# Patient Record
Sex: MALE | Race: WHITE | NOT HISPANIC OR LATINO | Employment: FULL TIME | URBAN - METROPOLITAN AREA
[De-identification: names, ages, dates, MRNs, and addresses within clinical notes are randomized per-mention and may not be internally consistent; named-entity substitution may affect disease eponyms.]

---

## 2017-01-13 RX ORDER — CARVEDILOL 25 MG/1
TABLET ORAL
Qty: 180 TABLET | Refills: 1 | Status: SHIPPED | OUTPATIENT
Start: 2017-01-13 | End: 2017-01-30

## 2017-01-30 ENCOUNTER — OFFICE VISIT (OUTPATIENT)
Dept: INTERNAL MEDICINE | Facility: CLINIC | Age: 47
End: 2017-01-30

## 2017-01-30 VITALS
SYSTOLIC BLOOD PRESSURE: 136 MMHG | BODY MASS INDEX: 23.67 KG/M2 | HEIGHT: 69 IN | WEIGHT: 159.8 LBS | RESPIRATION RATE: 16 BRPM | TEMPERATURE: 98.2 F | HEART RATE: 96 BPM | OXYGEN SATURATION: 96 % | DIASTOLIC BLOOD PRESSURE: 80 MMHG

## 2017-01-30 DIAGNOSIS — G43.109 MIGRAINE WITH AURA AND WITHOUT STATUS MIGRAINOSUS, NOT INTRACTABLE: ICD-10-CM

## 2017-01-30 DIAGNOSIS — Z95.2 AORTIC VALVE REPLACED: ICD-10-CM

## 2017-01-30 DIAGNOSIS — I10 ESSENTIAL HYPERTENSION: Primary | ICD-10-CM

## 2017-01-30 DIAGNOSIS — I48.0 PAROXYSMAL ATRIAL FIBRILLATION (HCC): ICD-10-CM

## 2017-01-30 PROCEDURE — 99214 OFFICE O/P EST MOD 30 MIN: CPT | Performed by: INTERNAL MEDICINE

## 2017-01-30 NOTE — MR AVS SNAPSHOT
Fernie L Charlotte   1/30/2017 1:10 PM   Office Visit    Provider:  Juan Antonio Ford MD   Department:  [facility] INTERNAL MEDICINE   Dept Phone:  [phone]                Your Full Care Plan              Today's Medication Changes          These changes are accurate as of: 1/30/17  2:07 PM.  If you have any questions, ask your nurse or doctor.               Medication(s)that have changed:     carvedilol 25 MG tablet   Commonly known as:  COREG   TAKE 1 TABLET TWICE A DAY   What changed:  Another medication with the same name was removed. Continue taking this medication, and follow the directions you see here.   Changed by:  Juan Antonio Ford MD       CloNIDine 0.2 MG tablet   Commonly known as:  CATAPRES   TAKE 1 TABLET BY MOUTH TWICE A DAY   What changed:  Another medication with the same name was removed. Continue taking this medication, and follow the directions you see here.   Changed by:  Juan Antonio Ford MD       hydrochlorothiazide 12.5 MG capsule   Commonly known as:  MICROZIDE   Take 12.5 mg by mouth daily.   What changed:  Another medication with the same name was removed. Continue taking this medication, and follow the directions you see here.   Changed by:  Contreras Colunga       imipramine 50 MG tablet   Commonly known as:  TOFRANIL   Take 50 mg by mouth every night.   What changed:  Another medication with the same name was removed. Continue taking this medication, and follow the directions you see here.   Changed by:  Contreras Colunga       warfarin 5 MG tablet   Commonly known as:  COUMADIN   TAKE 1 1/2 TABLETS ON MONDAY AND FRIDAY AND 1 TABLET ALL OTHER DAYS OR AS DIRECTED   What changed:  Another medication with the same name was removed. Continue taking this medication, and follow the directions you see here.   Changed by:  Juan Antonio Buenrostro MD         Stop taking medication(s)listed here:     cetirizine 10 MG tablet   Commonly known as:  zyrTEC   Stopped by:  Juan Antonio ASHTON  MD Liliana           fluticasone 50 MCG/ACT nasal spray   Commonly known as:  FLONASE   Stopped by:  Juan Antonio Ford MD           oxyCODONE-acetaminophen 5-325 MG per tablet   Commonly known as:  PERCOCET   Stopped by:  Juan Antonio Ford MD                      Your Updated Medication List          This list is accurate as of: 17  2:07 PM.  Always use your most recent med list.                amoxicillin 500 MG capsule   Commonly known as:  AMOXIL       carvedilol 25 MG tablet   Commonly known as:  COREG   TAKE 1 TABLET TWICE A DAY       CloNIDine 0.2 MG tablet   Commonly known as:  CATAPRES   TAKE 1 TABLET BY MOUTH TWICE A DAY       hydrochlorothiazide 12.5 MG capsule   Commonly known as:  MICROZIDE       imipramine 50 MG tablet   Commonly known as:  TOFRANIL       warfarin 5 MG tablet   Commonly known as:  COUMADIN   TAKE 1 1/2 TABLETS ON MONDAY AND FRIDAY AND 1 TABLET ALL OTHER DAYS OR AS DIRECTED               You Were Diagnosed With        Codes Comments    Essential hypertension    -  Primary ICD-10-CM: I10  ICD-9-CM: 401.9     Migraine with aura and without status migrainosus, not intractable     ICD-10-CM: G43.109  ICD-9-CM: 346.00     Paroxysmal atrial fibrillation     ICD-10-CM: I48.0  ICD-9-CM: 427.31     Aortic valve replaced     ICD-10-CM: Z95.2  ICD-9-CM: V43.3       Instructions     None    Patient Instructions History      [place] [facility]PD[facility] Kim[facility] HospitalDailymotion Signup     [facility] Wheelz allows you to send messages to your doctor, view your test results, renew your prescriptions, schedule appointments, and more. To sign up, go to [employer] and click on the Sign Up Now link in the New User? box. Enter your Wheelz Activation Code exactly as it appears below along with the last four digits of your Social Security Number and your Date of Birth () to complete the sign-up process. If you do not sign up before the expiration date, you must request a new code.    Wheelz Activation Code:  [email or web address]  Expires: 2/13/2017  2:07 PM    If you have questions, you can email Matchmaker [email or web address] or call [phone] to talk to our MyChart staff. Remember, [employer] is NOT to be used for urgent needs. For medical emergencies, dial 911.               Other Info from Your Visit           Your Appointments     May 01, 2017  1:00 PM EDT   Follow Up with Juan Antonio Ford MD   [facility] INTERNAL MEDICINE (--)    [address] [place] [id number] [phone]           Arrive 15 minutes prior to appointment.              Allergies     Contrast Dye  Itching    Iodinated Diagnostic Agents      Tagamet [Cimetidine]  Itching      Reason for Visit     Hypertension follow up AF, Aortic stenosis    Migraine           Vital Signs     Blood Pressure Pulse Temperature Respirations Height Weight    136/80 (BP Location: Left arm, Patient Position: Sitting, Cuff Size: Large Adult) 96 98.2 °F (36.8 °C) (Oral) 16 69\" (175.3 cm) 159 lb 12.8 oz (72.5 kg)    Oxygen Saturation Body Mass Index Smoking Status             96% 23.6 kg/m2 Never Smoker         Problems and Diagnoses Noted     High blood pressure    Migraines    Atrial fibrillation (irregular heartbeat)      "

## 2017-01-30 NOTE — PROGRESS NOTES
Subjective   Fernie Porter is a 46 y.o. male.     Chief Complaint   Patient presents with   • Hypertension     follow up AF, Aortic stenosis   • Migraine         Hypertension   This is a chronic problem. The current episode started more than 1 year ago. The problem is unchanged. The problem is controlled. Associated symptoms include headaches and palpitations. Pertinent negatives include no chest pain, orthopnea, peripheral edema, PND or shortness of breath. There are no associated agents to hypertension. Risk factors for coronary artery disease include male gender. Past treatments include alpha 1 blockers, diuretics and beta blockers. The current treatment provides significant improvement. There are no compliance problems.  There is no history of angina, kidney disease, CAD/MI, CVA or heart failure.   Migraine    This is a chronic problem. The current episode started more than 1 year ago. The problem occurs constantly. The problem has been waxing and waning. The pain is located in the left unilateral region. The pain does not radiate. The pain quality is similar to prior headaches. The quality of the pain is described as throbbing. The pain is moderate. Pertinent negatives include no abdominal pain, coughing, fever, nausea or vomiting. Nothing aggravates the symptoms. He has tried NSAIDs for the symptoms. The treatment provided significant relief.        The following portions of the patient's history were reviewed and updated as appropriate: allergies, current medications, past social history and problem list.    Outpatient Prescriptions Marked as Taking for the 1/30/17 encounter (Office Visit) with Juan Antonio Ford MD   Medication Sig Dispense Refill   • amoxicillin (AMOXIL) 500 MG capsule Take 2,000 mg by mouth.     • carvedilol (COREG) 25 MG tablet TAKE 1 TABLET TWICE A  tablet 1   • CloNIDine (CATAPRES) 0.2 MG tablet TAKE 1 TABLET BY MOUTH TWICE A  tablet 1   • hydrochlorothiazide (MICROZIDE)  12.5 MG capsule Take 12.5 mg by mouth daily.     • imipramine (TOFRANIL) 50 MG tablet Take 50 mg by mouth every night.     • warfarin (COUMADIN) 5 MG tablet TAKE 1 1/2 TABLETS ON MONDAY AND FRIDAY AND 1 TABLET ALL OTHER DAYS OR AS DIRECTED 135 tablet 0   • [DISCONTINUED] fluticasone (FLONASE) 50 MCG/ACT nasal spray 2 sprays into each nostril daily. Administer 2 sprays in each nostril for each dose.         Review of Systems   Constitutional: Negative for chills, fatigue and fever.   Respiratory: Negative for cough, shortness of breath and wheezing.    Cardiovascular: Positive for palpitations. Negative for chest pain, orthopnea, leg swelling and PND.   Gastrointestinal: Negative for abdominal pain, constipation, diarrhea, nausea and vomiting.   Neurological: Positive for headaches.       Objective   Vitals:    01/30/17 1318   BP: 136/80   Pulse: 96   Resp: 16   Temp: 98.2 °F (36.8 °C)   SpO2: 96%      Last Weight    01/30/17  1318   Weight: 159 lb 12.8 oz (72.5 kg)    [unfilled]  Body mass index is 23.6 kg/(m^2).      Physical Exam   Constitutional: He appears well-developed and well-nourished. No distress.   HENT:   Head: Normocephalic and atraumatic.   Neck: Carotid bruit is not present. No thyromegaly present.   Cardiovascular: Normal rate, regular rhythm and intact distal pulses.  Exam reveals no gallop.    No murmur heard.  Prosthetic heart sounds   Pulmonary/Chest: Effort normal and breath sounds normal. No respiratory distress. He has no wheezes. He has no rales.   Abdominal: Soft. Bowel sounds are normal. He exhibits no mass. There is no tenderness. There is no guarding.         Problem List Items Addressed This Visit        Cardiovascular and Mediastinum    Aortic valve replaced    Paroxysmal atrial fibrillation    Hypertension - Primary    Migraine        Assessment/Plan   In for recheck of hypertension, AF, aortic stenosis, and migraines.  Migraines persist.  They're doing pretty well to present time.   Gets 3 or 4 per month.  Has failed several drugs in the past including Topamax.  Got annual lab work January 2017 including CBC, CMP, urinalysis and labs look good.  Reviewed with patient.  Continue to monitor every 3 months.  FMLA forms filled out today.         Dragon disclaimer:   Much of this encounter note is an electronic transcription/translation of spoken language to printed text. The electronic translation of spoken language may permit erroneous, or at times, nonsensical words or phrases to be inadvertently transcribed; Although I have reviewed the note for such errors, some may still exist.

## 2017-05-22 RX ORDER — HYDROCHLOROTHIAZIDE 12.5 MG/1
CAPSULE, GELATIN COATED ORAL
Qty: 90 CAPSULE | Refills: 1 | Status: SHIPPED | OUTPATIENT
Start: 2017-05-22 | End: 2017-09-15 | Stop reason: SDUPTHER

## 2017-05-23 RX ORDER — CLONIDINE HYDROCHLORIDE 0.2 MG/1
TABLET ORAL
Qty: 180 TABLET | Refills: 1 | Status: SHIPPED | OUTPATIENT
Start: 2017-05-23 | End: 2017-11-17 | Stop reason: SDUPTHER

## 2017-05-30 ENCOUNTER — OFFICE VISIT (OUTPATIENT)
Dept: INTERNAL MEDICINE | Facility: CLINIC | Age: 47
End: 2017-05-30

## 2017-05-30 VITALS
SYSTOLIC BLOOD PRESSURE: 118 MMHG | OXYGEN SATURATION: 99 % | RESPIRATION RATE: 16 BRPM | BODY MASS INDEX: 24.2 KG/M2 | DIASTOLIC BLOOD PRESSURE: 58 MMHG | HEART RATE: 73 BPM | WEIGHT: 163.4 LBS | HEIGHT: 69 IN | TEMPERATURE: 97.9 F

## 2017-05-30 DIAGNOSIS — G43.109 MIGRAINE WITH AURA AND WITHOUT STATUS MIGRAINOSUS, NOT INTRACTABLE: ICD-10-CM

## 2017-05-30 DIAGNOSIS — I48.0 PAROXYSMAL ATRIAL FIBRILLATION (HCC): ICD-10-CM

## 2017-05-30 DIAGNOSIS — I10 ESSENTIAL HYPERTENSION: Primary | ICD-10-CM

## 2017-05-30 PROCEDURE — 99213 OFFICE O/P EST LOW 20 MIN: CPT | Performed by: INTERNAL MEDICINE

## 2017-07-07 RX ORDER — CARVEDILOL 25 MG/1
TABLET ORAL
Qty: 180 TABLET | Refills: 1 | Status: SHIPPED | OUTPATIENT
Start: 2017-07-07 | End: 2017-08-28

## 2017-07-07 RX ORDER — IMIPRAMINE HCL 50 MG
TABLET ORAL
Qty: 180 TABLET | Refills: 1 | Status: SHIPPED | OUTPATIENT
Start: 2017-07-07 | End: 2017-12-19 | Stop reason: SDUPTHER

## 2017-08-28 ENCOUNTER — OFFICE VISIT (OUTPATIENT)
Dept: INTERNAL MEDICINE | Facility: CLINIC | Age: 47
End: 2017-08-28

## 2017-08-28 VITALS
TEMPERATURE: 98.7 F | WEIGHT: 158.2 LBS | DIASTOLIC BLOOD PRESSURE: 82 MMHG | HEIGHT: 69 IN | HEART RATE: 72 BPM | RESPIRATION RATE: 16 BRPM | SYSTOLIC BLOOD PRESSURE: 140 MMHG | BODY MASS INDEX: 23.43 KG/M2

## 2017-08-28 DIAGNOSIS — I48.0 PAROXYSMAL ATRIAL FIBRILLATION (HCC): ICD-10-CM

## 2017-08-28 DIAGNOSIS — I10 ESSENTIAL HYPERTENSION: Primary | ICD-10-CM

## 2017-08-28 DIAGNOSIS — G43.109 MIGRAINE WITH AURA AND WITHOUT STATUS MIGRAINOSUS, NOT INTRACTABLE: ICD-10-CM

## 2017-08-28 DIAGNOSIS — Z95.2 AORTIC VALVE REPLACED: ICD-10-CM

## 2017-08-28 PROCEDURE — 99213 OFFICE O/P EST LOW 20 MIN: CPT | Performed by: INTERNAL MEDICINE

## 2017-08-28 NOTE — PROGRESS NOTES
Subjective   Fernie Porter is a 46 y.o. male.     Chief Complaint   Patient presents with   • Hypertension   • Atrial Fibrillation   • Migraine         Hypertension   This is a chronic problem. The current episode started more than 1 year ago. The problem is unchanged. The problem is controlled. Associated symptoms include headaches and palpitations. Pertinent negatives include no chest pain, orthopnea, peripheral edema, PND or shortness of breath. There are no associated agents to hypertension. Risk factors for coronary artery disease include male gender. Past treatments include alpha 1 blockers, diuretics and beta blockers. The current treatment provides significant improvement. There are no compliance problems.  There is no history of angina, kidney disease, CAD/MI, CVA or heart failure.   Atrial Fibrillation   Presents for follow-up visit. Symptoms include palpitations. Symptoms are negative for chest pain and shortness of breath. The symptoms have been stable. Past medical history includes atrial fibrillation. There are no medication compliance problems.   Migraine    This is a chronic problem. The current episode started more than 1 year ago. The problem occurs constantly. The problem has been waxing and waning. The pain is located in the left unilateral region. The pain does not radiate. The pain quality is similar to prior headaches. The quality of the pain is described as throbbing. The pain is moderate. Pertinent negatives include no abdominal pain, coughing, fever, nausea or vomiting. Nothing aggravates the symptoms. He has tried NSAIDs for the symptoms. The treatment provided significant relief.        The following portions of the patient's history were reviewed and updated as appropriate: allergies, current medications, past social history and problem list.    Outpatient Prescriptions Marked as Taking for the 8/28/17 encounter (Office Visit) with Juan Antonio Ford MD   Medication Sig Dispense Refill    • carvedilol (COREG) 25 MG tablet TAKE 1 TABLET TWICE A  tablet 1   • CloNIDine (CATAPRES) 0.2 MG tablet TAKE 1 TABLET BY MOUTH TWICE A  tablet 1   • hydrochlorothiazide (MICROZIDE) 12.5 MG capsule TAKE ONE CAPSULE BY MOUTH EVERY DAY 90 capsule 1   • hydrocortisone 2.5 % cream      • imipramine (TOFRANIL) 50 MG tablet TAKE 2 TABLETS BY MOUTH AT BEDTIME 180 tablet 1   • warfarin (COUMADIN) 5 MG tablet TAKE 1 1/2 TABLETS ON MONDAY AND FRIDAY AND 1 TABLET ALL OTHER DAYS OR AS DIRECTED 135 tablet 0       Review of Systems   Constitutional: Negative for chills, fatigue and fever.   Respiratory: Negative for cough, shortness of breath and wheezing.    Cardiovascular: Positive for palpitations. Negative for chest pain, orthopnea, leg swelling and PND.   Gastrointestinal: Negative for abdominal pain, constipation, diarrhea, nausea and vomiting.   Neurological: Positive for headaches.       Objective   Vitals:    08/28/17 1403   BP: 140/82   Pulse: 72   Resp: 16   Temp: 98.7 °F (37.1 °C)      Last Weight    08/28/17  1403   Weight: 158 lb 3.2 oz (71.8 kg)    [unfilled]  Body mass index is 23.36 kg/(m^2).      Physical Exam   Constitutional: He appears well-developed and well-nourished. No distress.   HENT:   Head: Normocephalic and atraumatic.   Neck: Carotid bruit is not present. No thyromegaly present.   Cardiovascular: Normal rate, regular rhythm and intact distal pulses.  Exam reveals no gallop.    Murmur heard.   Crescendo decrescendo systolic murmur is present with a grade of 2/6   Prosthetic heart sounds.  Grade 2/6 JOHNNIE loudest at the pulmonic area.   Pulmonary/Chest: Effort normal and breath sounds normal. No respiratory distress. He has no wheezes. He has no rales.   Abdominal: Soft. Bowel sounds are normal. He exhibits no mass. There is no tenderness. There is no guarding.         Problem List Items Addressed This Visit        Cardiovascular and Mediastinum    Aortic valve replaced    Paroxysmal  atrial fibrillation    Hypertension - Primary    Migraine        Assessment/Plan   In for recheck of hypertension, AF, aortic stenosis, and migraines.  Migraines persist.  They're doing pretty well to present time.  Gets 3 or 4 per month.  Has failed several drugs in the past including Topamax.  Got annual lab work January 2017 including CBC, CMP, urinalysis and labs look good.  Continue to monitor every 3 months.           Cassandra disclaimer:   Much of this encounter note is an electronic transcription/translation of spoken language to printed text. The electronic translation of spoken language may permit erroneous, or at times, nonsensical words or phrases to be inadvertently transcribed; Although I have reviewed the note for such errors, some may still exist.

## 2017-09-15 RX ORDER — HYDROCHLOROTHIAZIDE 12.5 MG/1
CAPSULE, GELATIN COATED ORAL
Qty: 90 CAPSULE | Refills: 1 | Status: SHIPPED | OUTPATIENT
Start: 2017-09-15 | End: 2018-06-12 | Stop reason: SDUPTHER

## 2017-10-10 ENCOUNTER — OFFICE VISIT (OUTPATIENT)
Dept: CARDIOLOGY | Facility: CLINIC | Age: 47
End: 2017-10-10

## 2017-10-10 VITALS
BODY MASS INDEX: 23.85 KG/M2 | SYSTOLIC BLOOD PRESSURE: 110 MMHG | HEIGHT: 69 IN | DIASTOLIC BLOOD PRESSURE: 70 MMHG | WEIGHT: 161 LBS | HEART RATE: 80 BPM

## 2017-10-10 DIAGNOSIS — I10 ESSENTIAL HYPERTENSION: ICD-10-CM

## 2017-10-10 DIAGNOSIS — I48.0 PAROXYSMAL ATRIAL FIBRILLATION (HCC): ICD-10-CM

## 2017-10-10 DIAGNOSIS — Z95.2 AORTIC VALVE REPLACED: Primary | ICD-10-CM

## 2017-10-10 PROCEDURE — 93000 ELECTROCARDIOGRAM COMPLETE: CPT | Performed by: INTERNAL MEDICINE

## 2017-10-10 PROCEDURE — 99214 OFFICE O/P EST MOD 30 MIN: CPT | Performed by: INTERNAL MEDICINE

## 2017-10-10 NOTE — PROGRESS NOTES
Date of Office Visit: 10/10/17  Encounter Provider: Juan Antonio Buenrostro MD  Place of Service: [facility] CARDIOLOGY  Patient Name: Fernie Porter  :1970      No chief complaint on file.    History of Present Illness  HPI Comments:   Mr. Porter is a pleasant 47-year-old gentleman with history of bicuspid  aortic valve with evidence of aortic stenosis as well as some mild  insufficiency. He had coarctation of the aorta with pseudoaneurysm and had  that repaired years ago. He also had intermittent episodes of atrial  fibrillation treated medically, but very symptomatic when he would go into  that. He presented 2012 to our arrhythmia clinic with rapid atrial  fibrillation. He did not convert back with IV amiodarone. Was started on  Pradaxa. We upped his sotalol. He ultimately did convert back to sinus  rhythm, but was still having some questionable shortness of breath. We  repeated an echocardiogram on him that showed what appeared to be worsening  aortic valve stenosis.   He then underwent on 2012 aortic valve replacement with resection of the ascending  aorta with an aortic valve conduit using a #25 [employer] mechanical valve. He had  reimplantation of the right coronary arteries and had a left Cryomaze procedure with left  atrial appendage ligation. He went home and was having a fair amount of problems with his  blood pressure as well as his Coumadin.   He then had intermittent episodes of hypotension.  We adjusted his medication and that  improved.  He had problems with dyspnea.  We checked an echocardiogram on him and  everything looked okay except his fluid collection around his aorta.  We did a CT of his  chest which showed what looked to be a seroma.  They suggested follow-up.  That was  reviewed with Dr. Casanova  he agreed it was benign.  He comes in now for follow-up. The patient denies chest pain, pressure and heaviness. No shortness of breath, othopnea or PND.  No palpitations, near syncope or syncope. No stroke type symptoms like paralysis, paresthesia, abrupt vision loss and dysarthria. No bleeding like blood in the stool or dark stools.  He feels like he's doing well.  Things at home are good.  3 days a week does a chill detraining.          Past Medical History:   Diagnosis Date   • AAA (abdominal aortic aneurysm) 1992    repaired, subclaven stenosis subsequently   • Aortic stenosis    • Bicuspid aortic valve    • Coarctation of aorta    • Hypertension    • PAF (paroxysmal atrial fibrillation)          Past Surgical History:   Procedure Laterality Date   • ABDOMINAL AORTIC ANEURYSM REPAIR  1992   • AORTIC VALVE REPAIR/REPLACEMENT      [employer]   • ASCENDING AORTIC ANEURYSM REPAIR W/ MECHANICAL AORTIC VALVE REPLACEMENT     • CARDIAC SURGERY  09/2012    AVR [employer] mechanical, maize procedure, aortic root resection   • CARDIAC SURGERY  1975    Coarct repair. Priscilla coarct aneurism 1989   • CARDIOVERSION     • COARCTATION OF AORTA EXCISION     • HERNIA REPAIR     • INGUINAL HERNIA REPAIR           Current Outpatient Prescriptions on File Prior to Visit   Medication Sig Dispense Refill   • carvedilol (COREG) 25 MG tablet TAKE 1 TABLET TWICE A  tablet 1   • CloNIDine (CATAPRES) 0.2 MG tablet TAKE 1 TABLET BY MOUTH TWICE A  tablet 1   • hydrochlorothiazide (MICROZIDE) 12.5 MG capsule TAKE ONE CAPSULE BY MOUTH EVERY DAY 90 capsule 1   • hydrocortisone 2.5 % cream      • imipramine (TOFRANIL) 50 MG tablet TAKE 2 TABLETS BY MOUTH AT BEDTIME 180 tablet 1   • warfarin (COUMADIN) 5 MG tablet TAKE 1 1/2 TABLETS ON MONDAY AND FRIDAY AND 1 TABLET ALL OTHER DAYS OR AS DIRECTED (Patient taking differently: TAKE 1 1/2 TABLETS ON M,W,F AND 1 TABLET ALL OTHER DAYS OR AS DIRECTED) 135 tablet 0     No current facility-administered medications on file prior to visit.          Social History     Social History   • Marital status:      Spouse name: N/A   •  Number of children: N/A   • Years of education: N/A     Occupational History   • Not on file.     Social History Main Topics   • Smoking status: Never Smoker   • Smokeless tobacco: Not on file   • Alcohol use No   • Drug use: Not on file   • Sexual activity: Not on file     Other Topics Concern   • Not on file     Social History Narrative    ** Merged History Encounter **            Family History   Problem Relation Age of Onset   • No Known Problems Father    • No Known Problems Sister    • Hypertension Mother    • Stroke Mother          Review of Systems   Constitution: Negative for decreased appetite, diaphoresis, fever, weakness, malaise/fatigue, weight gain and weight loss.   HENT: Negative for congestion, hearing loss, nosebleeds and tinnitus.    Eyes: Negative for blurred vision, double vision, vision loss in left eye, vision loss in right eye and visual disturbance.   Cardiovascular:        As noted in HPI   Respiratory:        As noted HPI   Endocrine: Negative for cold intolerance and heat intolerance.   Hematologic/Lymphatic: Negative for bleeding problem. Does not bruise/bleed easily.   Skin: Negative for color change, flushing, itching and rash.   Musculoskeletal: Negative for arthritis, back pain, joint pain, joint swelling, muscle weakness and myalgias.   Gastrointestinal: Negative for bloating, abdominal pain, constipation, diarrhea, dysphagia, heartburn, hematemesis, hematochezia, melena, nausea and vomiting.   Genitourinary: Negative for bladder incontinence, dysuria, frequency, nocturia and urgency.   Neurological: Negative for dizziness, focal weakness, headaches, light-headedness, loss of balance, numbness, paresthesias and vertigo.   Psychiatric/Behavioral: Negative for depression, memory loss and substance abuse.       Procedures      ECG 12 Lead  Date/Time: 10/10/2017 2:44 PM  Performed by: ANIA RAY  Authorized by: ANIA RAY   Comparison: compared with previous ECG   Similar  "to previous ECG  Rhythm: sinus rhythm  Rate: normal  Conduction: non-specific intraventricular conduction delay  QRS axis: normal                 Objective:    /70 (BP Location: Left arm)  Pulse 80  Ht 69\" (175.3 cm)  Wt 161 lb (73 kg)  BMI 23.78 kg/m2       Physical Exam  Physical Exam   Constitutional: He is oriented to person, place, and time. He appears well-developed and well-nourished. No distress.   HENT:   Head: Normocephalic.   Eyes: Conjunctivae are normal. Pupils are equal, round, and reactive to light. No scleral icterus.   Neck: Normal carotid pulses, no hepatojugular reflux and no JVD present. Carotid bruit is not present. No tracheal deviation, no edema and no erythema present. No thyromegaly present.   Cardiovascular: Normal rate, regular rhythm, S1 normal and intact distal pulses.   No extrasystoles are present. PMI is not displaced.  Exam reveals no gallop, no distant heart sounds and no friction rub.    Murmur heard.   Systolic murmur is present with a grade of 2/6  at the upper right sternal border  Pulses:       Carotid pulses are 2+ on the right side, and 2+ on the left side.       Radial pulses are 2+ on the right side, and 2+ on the left side.        Femoral pulses are 2+ on the right side, and 2+ on the left side.       Dorsalis pedis pulses are 2+ on the right side, and 2+ on the left side.        Posterior tibial pulses are 2+ on the right side, and 2+ on the left side.   Artificial S2   Pulmonary/Chest: Effort normal and breath sounds normal. No respiratory distress. He has no decreased breath sounds. He has no wheezes. He has no rhonchi. He has no rales. He exhibits no tenderness.   Abdominal: Soft. Bowel sounds are normal. He exhibits no distension and no mass. There is no hepatosplenomegaly. There is no tenderness. There is no rebound and no guarding.   Musculoskeletal: He exhibits no edema, tenderness or deformity.   Neurological: He is alert and oriented to person, place, " and time.   Skin: Skin is warm and dry. No rash noted. He is not diaphoretic. No cyanosis or erythema. No pallor. Nails show no clubbing.   Psychiatric: He has a normal mood and affect. His speech is normal and behavior is normal. Judgment and thought content normal.           Assessment:    1. This is a 47-year-old gentleman with a history of bicuspid aortic valve with severe aortic stenosis, ascending aortic enlargement.  He is now status post aortic valve  replacement with a valve conduit using a mechanical [employer] Vikash valve with reimplantation of the coronary arteries.Echocardiogram August 2016 unremarkable.  He will see us skin in follow-up in a year we'll repeat an echocardiogram at that time he's to call back for any problems.  2. 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  He is status post patch repair of that.     3. Paroxysmal atrial fibrillation.  This was prior to his valve surgery, but he now has cryoMaze procedure and appendage ligation.  No recurrent episodes off medication continue the same.  4. Hypertension.  His blood pressure is elevated.  He is to check this at home and call us back in a couple weeks let us know what they are running.  5. Abnormal fluid collection around the aortic graft.  It may just represent a seroma. Benign per Dr. Casanova.            Plan:

## 2017-11-17 RX ORDER — CLONIDINE HYDROCHLORIDE 0.2 MG/1
TABLET ORAL
Qty: 180 TABLET | Refills: 1 | Status: SHIPPED | OUTPATIENT
Start: 2017-11-17 | End: 2018-05-16 | Stop reason: SDUPTHER

## 2017-11-27 ENCOUNTER — OFFICE VISIT (OUTPATIENT)
Dept: INTERNAL MEDICINE | Facility: CLINIC | Age: 47
End: 2017-11-27

## 2017-11-27 VITALS
HEART RATE: 71 BPM | HEIGHT: 69 IN | WEIGHT: 161 LBS | SYSTOLIC BLOOD PRESSURE: 128 MMHG | TEMPERATURE: 98.4 F | DIASTOLIC BLOOD PRESSURE: 76 MMHG | OXYGEN SATURATION: 98 % | BODY MASS INDEX: 23.85 KG/M2 | RESPIRATION RATE: 16 BRPM

## 2017-11-27 DIAGNOSIS — Z23 NEED FOR IMMUNIZATION AGAINST INFLUENZA: Primary | ICD-10-CM

## 2017-11-27 DIAGNOSIS — Z95.2 AORTIC VALVE REPLACED: ICD-10-CM

## 2017-11-27 DIAGNOSIS — G43.109 MIGRAINE WITH AURA AND WITHOUT STATUS MIGRAINOSUS, NOT INTRACTABLE: ICD-10-CM

## 2017-11-27 DIAGNOSIS — I10 ESSENTIAL HYPERTENSION: ICD-10-CM

## 2017-11-27 DIAGNOSIS — I48.0 PAROXYSMAL ATRIAL FIBRILLATION (HCC): ICD-10-CM

## 2017-11-27 PROCEDURE — 90471 IMMUNIZATION ADMIN: CPT | Performed by: INTERNAL MEDICINE

## 2017-11-27 PROCEDURE — 99214 OFFICE O/P EST MOD 30 MIN: CPT | Performed by: INTERNAL MEDICINE

## 2017-11-27 PROCEDURE — 90686 IIV4 VACC NO PRSV 0.5 ML IM: CPT | Performed by: INTERNAL MEDICINE

## 2017-11-27 NOTE — PROGRESS NOTES
Subjective   Fernie Porter is a 47 y.o. male.     No chief complaint on file.        Hypertension   This is a chronic problem. The current episode started more than 1 year ago. The problem is unchanged. The problem is controlled. Associated symptoms include headaches and palpitations. Pertinent negatives include no chest pain, orthopnea, peripheral edema, PND or shortness of breath. There are no associated agents to hypertension. Risk factors for coronary artery disease include male gender. Past treatments include alpha 1 blockers, diuretics and beta blockers. The current treatment provides significant improvement. There are no compliance problems.  There is no history of angina, kidney disease, CAD/MI, CVA or heart failure.   Atrial Fibrillation   Presents for follow-up visit. Symptoms include palpitations. Symptoms are negative for chest pain and shortness of breath. The symptoms have been stable. Past medical history includes atrial fibrillation. There are no medication compliance problems.   Migraine    This is a chronic problem. The current episode started more than 1 year ago. The problem occurs constantly. The problem has been waxing and waning. The pain is located in the left unilateral region. The pain does not radiate. The pain quality is similar to prior headaches. The quality of the pain is described as throbbing. The pain is moderate. Pertinent negatives include no abdominal pain, coughing, fever, nausea or vomiting. Nothing aggravates the symptoms. He has tried NSAIDs for the symptoms. The treatment provided significant relief.        The following portions of the patient's history were reviewed and updated as appropriate: allergies, current medications, past social history and problem list.    Outpatient Prescriptions Marked as Taking for the 11/27/17 encounter (Office Visit) with Juan Antonio Ford MD   Medication Sig Dispense Refill   • carvedilol (COREG) 25 MG tablet TAKE 1 TABLET TWICE A   tablet 1   • CloNIDine (CATAPRES) 0.2 MG tablet TAKE 1 TABLET BY MOUTH TWICE A  tablet 1   • hydrochlorothiazide (MICROZIDE) 12.5 MG capsule TAKE ONE CAPSULE BY MOUTH EVERY DAY 90 capsule 1   • hydrocortisone 2.5 % cream      • imipramine (TOFRANIL) 50 MG tablet TAKE 2 TABLETS BY MOUTH AT BEDTIME 180 tablet 1   • warfarin (COUMADIN) 5 MG tablet TAKE 1 1/2 TABLETS ON MONDAY AND FRIDAY AND 1 TABLET ALL OTHER DAYS OR AS DIRECTED (Patient taking differently: TAKE 1 1/2 TABLETS ON M,W,F AND 1 TABLET ALL OTHER DAYS OR AS DIRECTED) 135 tablet 0       Review of Systems   Constitutional: Negative for chills, fatigue and fever.   Respiratory: Negative for cough, shortness of breath and wheezing.    Cardiovascular: Positive for palpitations. Negative for chest pain, orthopnea, leg swelling and PND.   Gastrointestinal: Negative for abdominal pain, constipation, diarrhea, nausea and vomiting.   Neurological: Positive for headaches.       Objective   Vitals:    11/27/17 1436   BP: 128/76   Pulse: 71   Resp: 16   Temp: 98.4 °F (36.9 °C)   SpO2: 98%      Last Weight    11/27/17  1436   Weight: 161 lb (73 kg)    [unfilled]  Body mass index is 23.78 kg/(m^2).      Physical Exam   Constitutional: He appears well-developed and well-nourished. No distress.   HENT:   Head: Normocephalic and atraumatic.   Neck: Carotid bruit is not present. No thyromegaly present.   Cardiovascular: Normal rate, regular rhythm and intact distal pulses.  Exam reveals no gallop.    Murmur heard.   Crescendo decrescendo systolic murmur is present with a grade of 2/6   Prosthetic heart sounds.  Grade 2/6 JOHNNIE loudest at the pulmonic area.   Pulmonary/Chest: Effort normal and breath sounds normal. No respiratory distress. He has no wheezes. He has no rales.   Abdominal: Soft. Bowel sounds are normal. He exhibits no mass. There is no tenderness. There is no guarding.         Problem List Items Addressed This Visit        Cardiovascular and Mediastinum     Aortic valve replaced    Paroxysmal atrial fibrillation    Hypertension    Migraine      Other Visit Diagnoses     Need for immunization against influenza    -  Primary    Relevant Orders    Flu Vaccine Quad PF 3YR+ (Completed)        Assessment/Plan   In for recheck of hypertension, AF, aortic stenosis, and migraines.  Migraines persist.  They're doing pretty well to present time.  Had a worse when recently.  Gets 3 or 4 per month.  Has failed several drugs in the past including Topamax.  Let pressure control is excellent.  Atrial fib is controlled.  Got annual lab work January 2017 including CBC, CMP, urinalysis and labs look good.  Continue to monitor every 3 months.  Flu shot updated today.         Dragon disclaimer:   Much of this encounter note is an electronic transcription/translation of spoken language to printed text. The electronic translation of spoken language may permit erroneous, or at times, nonsensical words or phrases to be inadvertently transcribed; Although I have reviewed the note for such errors, some may still exist.

## 2017-12-07 RX ORDER — WARFARIN SODIUM 5 MG/1
TABLET ORAL
Qty: 135 TABLET | Refills: 0 | Status: SHIPPED | OUTPATIENT
Start: 2017-12-07 | End: 2018-03-13 | Stop reason: SDUPTHER

## 2017-12-19 RX ORDER — IMIPRAMINE HCL 50 MG
TABLET ORAL
Qty: 180 TABLET | Refills: 1 | Status: SHIPPED | OUTPATIENT
Start: 2017-12-19 | End: 2018-06-12 | Stop reason: SDUPTHER

## 2017-12-19 RX ORDER — CARVEDILOL 25 MG/1
TABLET ORAL
Qty: 180 TABLET | Refills: 1 | Status: SHIPPED | OUTPATIENT
Start: 2017-12-19 | End: 2018-06-12 | Stop reason: SDUPTHER

## 2018-01-18 ENCOUNTER — TELEPHONE (OUTPATIENT)
Dept: CARDIOLOGY | Facility: HOSPITAL | Age: 48
End: 2018-01-18

## 2018-02-02 ENCOUNTER — TELEPHONE (OUTPATIENT)
Dept: INTERNAL MEDICINE | Facility: CLINIC | Age: 48
End: 2018-02-02

## 2018-02-02 RX ORDER — OSELTAMIVIR PHOSPHATE 75 MG/1
75 CAPSULE ORAL 2 TIMES DAILY
Qty: 10 CAPSULE | Refills: 0 | Status: SHIPPED | OUTPATIENT
Start: 2018-02-02 | End: 2018-05-07

## 2018-02-02 NOTE — TELEPHONE ENCOUNTER
The patient called saying that he thinks he is coming down with the flu. He has been experiencing headaches, body aches, and fatigue since yesterday. He does not have any congestion yet and is not sure if he has a fever or not. He asked about getting a prescription for tamiflu. Please advise. Thanks!    FYI: Patient is scheduled for a regular checkup on Monday, 2/5.    Okay Tamiflu 75 mg twice a day #10.

## 2018-02-05 ENCOUNTER — OFFICE VISIT (OUTPATIENT)
Dept: INTERNAL MEDICINE | Facility: CLINIC | Age: 48
End: 2018-02-05

## 2018-02-05 VITALS
OXYGEN SATURATION: 99 % | WEIGHT: 162.4 LBS | SYSTOLIC BLOOD PRESSURE: 114 MMHG | HEART RATE: 70 BPM | BODY MASS INDEX: 24.05 KG/M2 | RESPIRATION RATE: 16 BRPM | TEMPERATURE: 97.4 F | DIASTOLIC BLOOD PRESSURE: 42 MMHG | HEIGHT: 69 IN

## 2018-02-05 DIAGNOSIS — I48.0 PAROXYSMAL ATRIAL FIBRILLATION (HCC): ICD-10-CM

## 2018-02-05 DIAGNOSIS — Z95.2 AORTIC VALVE REPLACED: ICD-10-CM

## 2018-02-05 DIAGNOSIS — Z79.899 MEDICATION MANAGEMENT: ICD-10-CM

## 2018-02-05 DIAGNOSIS — G43.109 MIGRAINE WITH AURA AND WITHOUT STATUS MIGRAINOSUS, NOT INTRACTABLE: ICD-10-CM

## 2018-02-05 DIAGNOSIS — I10 ESSENTIAL HYPERTENSION: Primary | ICD-10-CM

## 2018-02-05 LAB
ALBUMIN SERPL-MCNC: 4.5 G/DL (ref 3.5–5.2)
ALBUMIN/GLOB SERPL: 1.9 G/DL
ALP SERPL-CCNC: 54 U/L (ref 39–117)
ALT SERPL-CCNC: 29 U/L (ref 1–41)
AST SERPL-CCNC: 30 U/L (ref 1–40)
BASOPHILS # BLD AUTO: 0.04 10*3/MM3 (ref 0–0.2)
BASOPHILS NFR BLD AUTO: 0.7 % (ref 0–1.5)
BILIRUB SERPL-MCNC: 0.4 MG/DL (ref 0.1–1.2)
BUN SERPL-MCNC: 14 MG/DL (ref 6–20)
BUN/CREAT SERPL: 16.5 (ref 7–25)
CALCIUM SERPL-MCNC: 9.7 MG/DL (ref 8.6–10.5)
CHLORIDE SERPL-SCNC: 96 MMOL/L (ref 98–107)
CHOLEST SERPL-MCNC: 173 MG/DL (ref 0–200)
CO2 SERPL-SCNC: 32.3 MMOL/L (ref 22–29)
CREAT SERPL-MCNC: 0.85 MG/DL (ref 0.76–1.27)
EOSINOPHIL # BLD AUTO: 0.32 10*3/MM3 (ref 0–0.7)
EOSINOPHIL NFR BLD AUTO: 5.7 % (ref 0.3–6.2)
ERYTHROCYTE [DISTWIDTH] IN BLOOD BY AUTOMATED COUNT: 12.9 % (ref 11.5–14.5)
GFR SERPLBLD CREATININE-BSD FMLA CKD-EPI: 118 ML/MIN/1.73
GFR SERPLBLD CREATININE-BSD FMLA CKD-EPI: 97 ML/MIN/1.73
GLOBULIN SER CALC-MCNC: 2.4 GM/DL
GLUCOSE SERPL-MCNC: 51 MG/DL (ref 65–99)
HCT VFR BLD AUTO: 47.8 % (ref 40.4–52.2)
HDLC SERPL-MCNC: 32 MG/DL (ref 40–60)
HGB BLD-MCNC: 15.6 G/DL (ref 13.7–17.6)
IMM GRANULOCYTES # BLD: 0 10*3/MM3 (ref 0–0.03)
IMM GRANULOCYTES NFR BLD: 0 % (ref 0–0.5)
LDLC SERPL CALC-MCNC: 96 MG/DL (ref 0–100)
LYMPHOCYTES # BLD AUTO: 1.43 10*3/MM3 (ref 0.9–4.8)
LYMPHOCYTES NFR BLD AUTO: 25.3 % (ref 19.6–45.3)
MCH RBC QN AUTO: 30.5 PG (ref 27–32.7)
MCHC RBC AUTO-ENTMCNC: 32.6 G/DL (ref 32.6–36.4)
MCV RBC AUTO: 93.4 FL (ref 79.8–96.2)
MONOCYTES # BLD AUTO: 0.52 10*3/MM3 (ref 0.2–1.2)
MONOCYTES NFR BLD AUTO: 9.2 % (ref 5–12)
NEUTROPHILS # BLD AUTO: 3.34 10*3/MM3 (ref 1.9–8.1)
NEUTROPHILS NFR BLD AUTO: 59.1 % (ref 42.7–76)
PLATELET # BLD AUTO: 157 10*3/MM3 (ref 140–500)
POTASSIUM SERPL-SCNC: 3.8 MMOL/L (ref 3.5–5.2)
PROT SERPL-MCNC: 6.9 G/DL (ref 6–8.5)
RBC # BLD AUTO: 5.12 10*6/MM3 (ref 4.6–6)
SODIUM SERPL-SCNC: 140 MMOL/L (ref 136–145)
TRIGL SERPL-MCNC: 223 MG/DL (ref 0–150)
VLDLC SERPL CALC-MCNC: 44.6 MG/DL (ref 5–40)
WBC # BLD AUTO: 5.65 10*3/MM3 (ref 4.5–10.7)

## 2018-02-05 PROCEDURE — 99214 OFFICE O/P EST MOD 30 MIN: CPT | Performed by: INTERNAL MEDICINE

## 2018-02-05 RX ORDER — FLUTICASONE PROPIONATE 50 MCG
2 SPRAY, SUSPENSION (ML) NASAL DAILY
Qty: 1 BOTTLE | Refills: 6 | Status: SHIPPED | OUTPATIENT
Start: 2018-02-05 | End: 2018-09-06 | Stop reason: SDUPTHER

## 2018-02-05 NOTE — PROGRESS NOTES
Subjective   Fernie Porter is a 47 y.o. male.     Chief Complaint   Patient presents with   • Hypertension   • Atrial Fibrillation   • Headache   • Aortic Stenosis         Hypertension   This is a chronic problem. The current episode started more than 1 year ago. The problem is unchanged. The problem is controlled. Associated symptoms include headaches. Pertinent negatives include no orthopnea or peripheral edema. There are no associated agents to hypertension. Risk factors for coronary artery disease include male gender. Past treatments include alpha 1 blockers, diuretics and beta blockers. The current treatment provides significant improvement. There are no compliance problems.  There is no history of angina, kidney disease, CAD/MI, CVA or heart failure.   Atrial Fibrillation   Presents for follow-up visit. The symptoms have been stable. Past medical history includes atrial fibrillation. There are no medication compliance problems.   Headache    This is a chronic problem. The current episode started more than 1 year ago. The problem occurs constantly. Pertinent negatives include no abdominal pain, coughing, fever, nausea or vomiting.   Migraine    This is a chronic problem. The current episode started more than 1 year ago. The problem occurs constantly. The problem has been waxing and waning. The pain is located in the left unilateral region. The pain does not radiate. The pain quality is similar to prior headaches. The quality of the pain is described as throbbing. The pain is moderate. Pertinent negatives include no abdominal pain, coughing, fever, nausea or vomiting. Nothing aggravates the symptoms. He has tried NSAIDs for the symptoms. The treatment provided significant relief.        The following portions of the patient's history were reviewed and updated as appropriate: allergies, current medications, past social history and problem list.    Outpatient Prescriptions Marked as Taking for the 2/5/18  encounter (Office Visit) with Juan Antonio Ford MD   Medication Sig Dispense Refill   • carvedilol (COREG) 25 MG tablet TAKE 1 TABLET TWICE A  tablet 1   • CloNIDine (CATAPRES) 0.2 MG tablet TAKE 1 TABLET BY MOUTH TWICE A  tablet 1   • hydrochlorothiazide (MICROZIDE) 12.5 MG capsule TAKE ONE CAPSULE BY MOUTH EVERY DAY 90 capsule 1   • hydrocortisone 2.5 % cream Apply  topically 3 (Three) Times a Day.     • imipramine (TOFRANIL) 50 MG tablet TAKE 2 TABLETS BY MOUTH AT BEDTIME 180 tablet 1   • oseltamivir (TAMIFLU) 75 MG capsule Take 1 capsule by mouth 2 (Two) Times a Day. 10 capsule 0   • warfarin (COUMADIN) 5 MG tablet TAKE 1.5 TABLETS DAILY OR AS DIRECTED 135 tablet 0       Review of Systems   Constitutional: Negative for chills, fatigue and fever.   Respiratory: Negative for cough and wheezing.    Cardiovascular: Negative for orthopnea and leg swelling.   Gastrointestinal: Negative for abdominal pain, constipation, diarrhea, nausea and vomiting.   Neurological: Positive for headaches.       Objective   Vitals:    02/05/18 1338   BP: 114/42   Pulse: 70   Resp: 16   Temp: 97.4 °F (36.3 °C)   SpO2: 99%      Last Weight    02/05/18  1338   Weight: 73.7 kg (162 lb 6.4 oz)    [unfilled]  Body mass index is 23.97 kg/(m^2).      Physical Exam   Constitutional: He appears well-developed and well-nourished. No distress.   HENT:   Head: Normocephalic and atraumatic.   Neck: Carotid bruit is not present. No thyromegaly present.   Cardiovascular: Normal rate, regular rhythm and intact distal pulses.  Exam reveals no gallop.    Murmur heard.   Crescendo decrescendo systolic murmur is present with a grade of 2/6   Prosthetic heart sounds.  Grade 2/6 JOHNNIE loudest at the pulmonic area.   Pulmonary/Chest: Effort normal and breath sounds normal. No respiratory distress. He has no wheezes. He has no rales.   Abdominal: Soft. Bowel sounds are normal. He exhibits no mass. There is no tenderness. There is no guarding.          Problem List Items Addressed This Visit        Cardiovascular and Mediastinum    Aortic valve replaced    Paroxysmal atrial fibrillation    Hypertension - Primary    Migraine      Other Visit Diagnoses     Medication management            Assessment/Plan   In for recheck of hypertension, AF, aortic stenosis, and migraines.  Migraines persist.  They're doing pretty well to present time.  Gets 3 or 4 per month.  Has failed several drugs in the past including Topamax.  Blood pressure control is excellent.  Atrial fib is controlled.  Got annual lab work January 2017 including CBC, CMP, urinalysis and labs look good.  Continue to monitor every 3 months.  Head congestion and headache and malaise 4 days ago.  Took a round of Tamiflu and feels back to normal at this point.  Turned out to be pretty mild constellation of symptoms.  FMLA papers filled out today.         Dragon disclaimer:   Much of this encounter note is an electronic transcription/translation of spoken language to printed text. The electronic translation of spoken language may permit erroneous, or at times, nonsensical words or phrases to be inadvertently transcribed; Although I have reviewed the note for such errors, some may still exist.

## 2018-03-14 RX ORDER — WARFARIN SODIUM 5 MG/1
TABLET ORAL
Qty: 135 TABLET | Refills: 0 | Status: SHIPPED | OUTPATIENT
Start: 2018-03-14 | End: 2018-06-19 | Stop reason: SDUPTHER

## 2018-05-07 ENCOUNTER — OFFICE VISIT (OUTPATIENT)
Dept: INTERNAL MEDICINE | Facility: CLINIC | Age: 48
End: 2018-05-07

## 2018-05-07 VITALS
BODY MASS INDEX: 24.23 KG/M2 | TEMPERATURE: 97.5 F | DIASTOLIC BLOOD PRESSURE: 60 MMHG | OXYGEN SATURATION: 97 % | HEART RATE: 68 BPM | RESPIRATION RATE: 16 BRPM | SYSTOLIC BLOOD PRESSURE: 112 MMHG | WEIGHT: 163.6 LBS | HEIGHT: 69 IN

## 2018-05-07 DIAGNOSIS — I48.0 PAROXYSMAL ATRIAL FIBRILLATION (HCC): ICD-10-CM

## 2018-05-07 DIAGNOSIS — G43.109 MIGRAINE WITH AURA AND WITHOUT STATUS MIGRAINOSUS, NOT INTRACTABLE: ICD-10-CM

## 2018-05-07 DIAGNOSIS — Z00.00 ENCOUNTER FOR PREVENTIVE HEALTH EXAMINATION: Primary | ICD-10-CM

## 2018-05-07 DIAGNOSIS — I10 ESSENTIAL HYPERTENSION: ICD-10-CM

## 2018-05-07 DIAGNOSIS — Z95.2 AORTIC VALVE REPLACED: ICD-10-CM

## 2018-05-07 PROCEDURE — 99396 PREV VISIT EST AGE 40-64: CPT | Performed by: INTERNAL MEDICINE

## 2018-05-07 NOTE — PROGRESS NOTES
Subjective   Fernie Porter is a 47 y.o. male.     Chief Complaint   Patient presents with   • Hypertension   • Atrial Fibrillation   • Migraine   • Aortic Stenosis         In for annual preventative exam.  Sleep is good.  Gets 7-8 hours at night.  Exercises 3-4 days per week.  Energy is good.  Diet is well-balanced.      Hypertension   This is a chronic problem. The current episode started more than 1 year ago. The problem is unchanged. The problem is controlled. Associated symptoms include headaches. Pertinent negatives include no chest pain, orthopnea, palpitations, peripheral edema or shortness of breath. There are no associated agents to hypertension. Risk factors for coronary artery disease include male gender. Current antihypertension treatment includes alpha 1 blockers, diuretics and beta blockers. The current treatment provides significant improvement. There are no compliance problems.  There is no history of angina, kidney disease, CAD/MI, CVA or heart failure.   Atrial Fibrillation   Presents for follow-up visit. Symptoms are negative for chest pain, dizziness, palpitations, shortness of breath and weakness. The symptoms have been stable. Past medical history includes atrial fibrillation. There are no medication compliance problems.   Migraine    This is a chronic problem. The current episode started more than 1 year ago. The problem occurs constantly. The problem has been waxing and waning. The pain is located in the left unilateral region. The pain does not radiate. The pain quality is similar to prior headaches. The quality of the pain is described as throbbing. The pain is moderate. Pertinent negatives include no abdominal pain, back pain, coughing, dizziness, ear pain, fever, nausea, rhinorrhea, sore throat, vomiting or weakness. Nothing aggravates the symptoms. He has tried NSAIDs for the symptoms. The treatment provided significant relief.        The following portions of the patient's history  were reviewed and updated as appropriate: allergies, current medications, past social history and problem list.    Outpatient Prescriptions Marked as Taking for the 5/7/18 encounter (Office Visit) with Juan Antonio Ford MD   Medication Sig Dispense Refill   • carvedilol (COREG) 25 MG tablet TAKE 1 TABLET TWICE A  tablet 1   • CloNIDine (CATAPRES) 0.2 MG tablet TAKE 1 TABLET BY MOUTH TWICE A  tablet 1   • fluticasone (FLONASE) 50 MCG/ACT nasal spray 2 sprays into each nostril Daily. 1 bottle 6   • hydrochlorothiazide (MICROZIDE) 12.5 MG capsule TAKE ONE CAPSULE BY MOUTH EVERY DAY 90 capsule 1   • hydrocortisone 2.5 % cream Apply  topically 3 (Three) Times a Day.     • imipramine (TOFRANIL) 50 MG tablet TAKE 2 TABLETS BY MOUTH AT BEDTIME 180 tablet 1   • warfarin (COUMADIN) 5 MG tablet TAKE 1.5 TABLETS DAILY OR AS DIRECTED 135 tablet 0       Review of Systems   Constitutional: Negative for chills, fatigue and fever.   HENT: Negative for congestion, ear pain, nosebleeds, rhinorrhea, sore throat and voice change.    Eyes: Negative for discharge, itching and visual disturbance.   Respiratory: Negative for cough, chest tightness, shortness of breath and wheezing.    Cardiovascular: Negative for chest pain, palpitations, orthopnea and leg swelling.   Gastrointestinal: Negative for abdominal pain, anal bleeding, constipation, diarrhea, nausea and vomiting.   Endocrine: Negative for cold intolerance, heat intolerance and polyuria.   Genitourinary: Negative for difficulty urinating, dysuria, frequency, hematuria and urgency.   Musculoskeletal: Positive for arthralgias (thumb ache bilateral). Negative for back pain and myalgias.   Skin: Negative for rash and wound.   Allergic/Immunologic: Positive for environmental allergies.   Neurological: Positive for headaches. Negative for dizziness, syncope, weakness and light-headedness.   Hematological: Negative for adenopathy. Does not bruise/bleed easily.    Psychiatric/Behavioral: Negative for confusion and sleep disturbance. The patient is not nervous/anxious.        Objective   Vitals:    05/07/18 1125   BP: 112/60   Pulse: 68   Resp: 16   Temp: 97.5 °F (36.4 °C)   SpO2: 97%      1    05/07/18  1125   Weight: 74.2 kg (163 lb 9.6 oz)    [unfilled]  Body mass index is 24.15 kg/m².      Physical Exam   Constitutional: He is oriented to person, place, and time. He appears well-developed and well-nourished. No distress.   HENT:   Head: Normocephalic and atraumatic.   Right Ear: External ear normal.   Left Ear: External ear normal.   Nose: Nose normal.   Mouth/Throat: Oropharynx is clear and moist.   Eyes: Conjunctivae and EOM are normal. Pupils are equal, round, and reactive to light. No scleral icterus.   Neck: Normal range of motion. Neck supple. No JVD present. Carotid bruit is not present. No thyromegaly present.   Cardiovascular: Normal rate, regular rhythm and intact distal pulses.  Exam reveals no gallop and no friction rub.    Murmur heard.   Crescendo decrescendo systolic murmur is present with a grade of 2/6   Prosthetic heart sounds.  Grade 2/6 JOHNNIE loudest at the pulmonic area.   Pulmonary/Chest: Effort normal and breath sounds normal. No respiratory distress. He has no wheezes. He has no rales.   Abdominal: Soft. Bowel sounds are normal. He exhibits no distension and no mass. There is no tenderness. There is no rebound and no guarding. No hernia.   Musculoskeletal: Normal range of motion. He exhibits no edema.   Lymphadenopathy:     He has no cervical adenopathy.   Neurological: He is alert and oriented to person, place, and time. He has normal reflexes. He displays normal reflexes.   Skin: Skin is warm and dry.   Psychiatric: He has a normal mood and affect. His behavior is normal. Judgment and thought content normal.   Nursing note and vitals reviewed.        Problem List Items Addressed This Visit        Cardiovascular and Mediastinum    Aortic valve  replaced    Paroxysmal atrial fibrillation    Hypertension    Migraine      Other Visit Diagnoses     Encounter for preventive health examination    -  Primary        Assessment/Plan   In for annual preventative exam and recheck of hypertension, AF, aortic stenosis, and migraines.  Migraines persist.  They're doing pretty well to present time.  Gets 3 or 4 per month.  Has failed several drugs in the past including Topamax.  Blood pressure control is excellent.  Atrial fib is controlled.  Got annual lab work February 2018 including CBC, CMP, urinalysis and labs look good.  Continue to monitor every 3 months.           Cassandra disclaimer:   Much of this encounter note is an electronic transcription/translation of spoken language to printed text. The electronic translation of spoken language may permit erroneous, or at times, nonsensical words or phrases to be inadvertently transcribed; Although I have reviewed the note for such errors, some may still exist.

## 2018-05-16 RX ORDER — CLONIDINE HYDROCHLORIDE 0.2 MG/1
TABLET ORAL
Qty: 180 TABLET | Refills: 1 | Status: SHIPPED | OUTPATIENT
Start: 2018-05-16 | End: 2018-11-08 | Stop reason: SDUPTHER

## 2018-06-12 RX ORDER — HYDROCHLOROTHIAZIDE 12.5 MG/1
CAPSULE, GELATIN COATED ORAL
Qty: 90 CAPSULE | Refills: 1 | Status: SHIPPED | OUTPATIENT
Start: 2018-06-12 | End: 2018-12-07 | Stop reason: SDUPTHER

## 2018-06-12 RX ORDER — IMIPRAMINE HCL 50 MG
TABLET ORAL
Qty: 180 TABLET | Refills: 1 | Status: SHIPPED | OUTPATIENT
Start: 2018-06-12 | End: 2018-12-07 | Stop reason: SDUPTHER

## 2018-06-12 RX ORDER — CARVEDILOL 25 MG/1
TABLET ORAL
Qty: 180 TABLET | Refills: 1 | Status: SHIPPED | OUTPATIENT
Start: 2018-06-12 | End: 2018-12-07 | Stop reason: SDUPTHER

## 2018-06-20 RX ORDER — WARFARIN SODIUM 5 MG/1
TABLET ORAL
Qty: 135 TABLET | Refills: 0 | Status: SHIPPED | OUTPATIENT
Start: 2018-06-20 | End: 2018-09-26 | Stop reason: SDUPTHER

## 2018-08-06 ENCOUNTER — OFFICE VISIT (OUTPATIENT)
Dept: INTERNAL MEDICINE | Facility: CLINIC | Age: 48
End: 2018-08-06

## 2018-08-06 VITALS
DIASTOLIC BLOOD PRESSURE: 64 MMHG | SYSTOLIC BLOOD PRESSURE: 118 MMHG | HEIGHT: 69 IN | TEMPERATURE: 98.6 F | OXYGEN SATURATION: 96 % | BODY MASS INDEX: 24.29 KG/M2 | HEART RATE: 65 BPM | WEIGHT: 164 LBS | RESPIRATION RATE: 16 BRPM

## 2018-08-06 DIAGNOSIS — G43.109 MIGRAINE WITH AURA AND WITHOUT STATUS MIGRAINOSUS, NOT INTRACTABLE: ICD-10-CM

## 2018-08-06 DIAGNOSIS — I10 ESSENTIAL HYPERTENSION: Primary | ICD-10-CM

## 2018-08-06 DIAGNOSIS — I48.0 PAROXYSMAL ATRIAL FIBRILLATION (HCC): ICD-10-CM

## 2018-08-06 DIAGNOSIS — Z95.2 AORTIC VALVE REPLACED: ICD-10-CM

## 2018-08-06 PROCEDURE — 99213 OFFICE O/P EST LOW 20 MIN: CPT | Performed by: INTERNAL MEDICINE

## 2018-08-06 NOTE — PATIENT INSTRUCTIONS
Exercising to Stay Healthy  Exercising regularly is important. It has many health benefits, such as:  · Improving your overall fitness, flexibility, and endurance.  · Increasing your bone density.  · Helping with weight control.  · Decreasing your body fat.  · Increasing your muscle strength.  · Reducing stress and tension.  · Improving your overall health.    In order to become healthy and stay healthy, it is recommended that you do moderate-intensity and vigorous-intensity exercise. You can tell that you are exercising at a moderate intensity if you have a higher heart rate and faster breathing, but you are still able to hold a conversation. You can tell that you are exercising at a vigorous intensity if you are breathing much harder and faster and cannot hold a conversation while exercising.  How often should I exercise?  Choose an activity that you enjoy and set realistic goals. Your health care provider can help you to make an activity plan that works for you. Exercise regularly as directed by your health care provider. This may include:  · Doing resistance training twice each week, such as:  ? Push-ups.  ? Sit-ups.  ? Lifting weights.  ? Using resistance bands.  · Doing a given intensity of exercise for a given amount of time. Choose from these options:  ? 150 minutes of moderate-intensity exercise every week.  ? 75 minutes of vigorous-intensity exercise every week.  ? A mix of moderate-intensity and vigorous-intensity exercise every week.    Children, pregnant women, people who are out of shape, people who are overweight, and older adults may need to consult a health care provider for individual recommendations. If you have any sort of medical condition, be sure to consult your health care provider before starting a new exercise program.  What are some exercise ideas?  Some moderate-intensity exercise ideas include:  · Walking at a rate of 1 mile in 15  minutes.  · Biking.  · Hiking.  · Golfing.  · Dancing.    Some vigorous-intensity exercise ideas include:  · Walking at a rate of at least 4.5 miles per hour.  · Jogging or running at a rate of 5 miles per hour.  · Biking at a rate of at least 10 miles per hour.  · Lap swimming.  · Roller-skating or in-line skating.  · Cross-country skiing.  · Vigorous competitive sports, such as football, basketball, and soccer.  · Jumping rope.  · Aerobic dancing.    What are some everyday activities that can help me to get exercise?  · Yard work, such as:  ? Pushing a .  ? Raking and bagging leaves.  · Washing and waxing your car.  · Pushing a stroller.  · Shoveling snow.  · Gardening.  · Washing windows or floors.  How can I be more active in my day-to-day activities?  · Use the stairs instead of the elevator.  · Take a walk during your lunch break.  · If you drive, park your car farther away from work or school.  · If you take public transportation, get off one stop early and walk the rest of the way.  · Make all of your phone calls while standing up and walking around.  · Get up, stretch, and walk around every 30 minutes throughout the day.  What guidelines should I follow while exercising?  · Do not exercise so much that you hurt yourself, feel dizzy, or get very short of breath.  · Consult your health care provider before starting a new exercise program.  · Wear comfortable clothes and shoes with good support.  · Drink plenty of water while you exercise to prevent dehydration or heat stroke. Body water is lost during exercise and must be replaced.  · Work out until you breathe faster and your heart beats faster.  This information is not intended to replace advice given to you by your health care provider. Make sure you discuss any questions you have with your health care provider.  Document Released: 01/20/2012 Document Revised: 05/25/2017 Document Reviewed: 05/21/2015  [employer] Interactive Patient Education © 2018  [employer]  Calorie Counting for Weight Loss  Calories are units of energy. Your body needs a certain amount of calories from food to keep you going throughout the day. When you eat more calories than your body needs, your body stores the extra calories as fat. When you eat fewer calories than your body needs, your body burns fat to get the energy it needs.  Calorie counting means keeping track of how many calories you eat and drink each day. Calorie counting can be helpful if you need to lose weight. If you make sure to eat fewer calories than your body needs, you should lose weight. Ask your health care provider what a healthy weight is for you.  For calorie counting to work, you will need to eat the right number of calories in a day in order to lose a healthy amount of weight per week. A dietitian can help you determine how many calories you need in a day and will give you suggestions on how to reach your calorie goal.  · A healthy amount of weight to lose per week is usually 1-2 lb (0.5-0.9 kg). This usually means that your daily calorie intake should be reduced by 500-750 calories.  · Eating 1,200 - 1,500 calories per day can help most women lose weight.  · Eating 1,500 - 1,800 calories per day can help most men lose weight.    What do I need to know about calorie counting?  In order to meet your daily calorie goal, you will need to:  · Find out how many calories are in each food you would like to eat. Try to do this before you eat.  · Decide how much of the food you plan to eat.  · Write down what you ate and how many calories it had. Doing this is called keeping a food log.    To successfully lose weight, it is important to balance calorie counting with a healthy lifestyle that includes regular activity. Aim for 150 minutes of moderate exercise (such as walking) or 75 minutes of vigorous exercise (such as running) each week.  Where do I find calorie information?    The number of calories in a food can be  found on a Nutrition Facts label. If a food does not have a Nutrition Facts label, try to look up the calories online or ask your dietitian for help.  Remember that calories are listed per serving. If you choose to have more than one serving of a food, you will have to multiply the calories per serving by the amount of servings you plan to eat. For example, the label on a package of bread might say that a serving size is 1 slice and that there are 90 calories in a serving. If you eat 1 slice, you will have eaten 90 calories. If you eat 2 slices, you will have eaten 180 calories.  How do I keep a food log?  Immediately after each meal, record the following information in your food log:  · What you ate. Don't forget to include toppings, sauces, and other extras on the food.  · How much you ate. This can be measured in cups, ounces, or number of items.  · How many calories each food and drink had.  · The total number of calories in the meal.    Keep your food log near you, such as in a small notebook in your pocket, or use a mobile stephanie or website. Some programs will calculate calories for you and show you how many calories you have left for the day to meet your goal.  What are some calorie counting tips?  · Use your calories on foods and drinks that will fill you up and not leave you hungry:  ? Some examples of foods that fill you up are nuts and nut butters, vegetables, lean proteins, and high-fiber foods like whole grains. High-fiber foods are foods with more than 5 g fiber per serving.  ? Drinks such as sodas, specialty coffee drinks, alcohol, and juices have a lot of calories, yet do not fill you up.  · Eat nutritious foods and avoid empty calories. Empty calories are calories you get from foods or beverages that do not have many vitamins or protein, such as candy, sweets, and soda. It is better to have a nutritious high-calorie food (such as an avocado) than a food with few nutrients (such as a bag of  "chips).  · Know how many calories are in the foods you eat most often. This will help you calculate calorie counts faster.  · Pay attention to calories in drinks. Low-calorie drinks include water and unsweetened drinks.  · Pay attention to nutrition labels for \"low fat\" or \"fat free\" foods. These foods sometimes have the same amount of calories or more calories than the full fat versions. They also often have added sugar, starch, or salt, to make up for flavor that was removed with the fat.  · Find a way of tracking calories that works for you. Get creative. Try different apps or programs if writing down calories does not work for you.  What are some portion control tips?  · Know how many calories are in a serving. This will help you know how many servings of a certain food you can have.  · Use a measuring cup to measure serving sizes. You could also try weighing out portions on a kitchen scale. With time, you will be able to estimate serving sizes for some foods.  · Take some time to put servings of different foods on your favorite plates, bowls, and cups so you know what a serving looks like.  · Try not to eat straight from a bag or box. Doing this can lead to overeating. Put the amount you would like to eat in a cup or on a plate to make sure you are eating the right portion.  · Use smaller plates, glasses, and bowls to prevent overeating.  · Try not to multitask (for example, watch TV or use your computer) while eating. If it is time to eat, sit down at a table and enjoy your food. This will help you to know when you are full. It will also help you to be aware of what you are eating and how much you are eating.  What are tips for following this plan?  Reading food labels  · Check the calorie count compared to the serving size. The serving size may be smaller than what you are used to eating.  · Check the source of the calories. Make sure the food you are eating is high in vitamins and protein and low in " saturated and trans fats.  Shopping  · Read nutrition labels while you shop. This will help you make healthy decisions before you decide to purchase your food.  · Make a grocery list and stick to it.  Cooking  · Try to cook your favorite foods in a healthier way. For example, try baking instead of frying.  · Use low-fat dairy products.  Meal planning  · Use more fruits and vegetables. Half of your plate should be fruits and vegetables.  · Include lean proteins like poultry and fish.  How do I count calories when eating out?  · Ask for smaller portion sizes.  · Consider sharing an entree and sides instead of getting your own entree.  · If you get your own entree, eat only half. Ask for a box at the beginning of your meal and put the rest of your entree in it so you are not tempted to eat it.  · If calories are listed on the menu, choose the lower calorie options.  · Choose dishes that include vegetables, fruits, whole grains, low-fat dairy products, and lean protein.  · Choose items that are boiled, broiled, grilled, or steamed. Stay away from items that are buttered, battered, fried, or served with cream sauce. Items labeled “crispy” are usually fried, unless stated otherwise.  · Choose water, low-fat milk, unsweetened iced tea, or other drinks without added sugar. If you want an alcoholic beverage, choose a lower calorie option such as a glass of wine or light beer.  · Ask for dressings, sauces, and syrups on the side. These are usually high in calories, so you should limit the amount you eat.  · If you want a salad, choose a garden salad and ask for grilled meats. Avoid extra toppings like keith, cheese, or fried items. Ask for the dressing on the side, or ask for olive oil and vinegar or lemon to use as dressing.  · Estimate how many servings of a food you are given. For example, a serving of cooked rice is ½ cup or about the size of half a baseball. Knowing serving sizes will help you be aware of how much food  you are eating at restaurants. The list below tells you how big or small some common portion sizes are based on everyday objects:  ? 1 oz--4 stacked dice.  ? 3 oz--1 deck of cards.  ? 1 tsp--1 die.  ? 1 Tbsp--½ a ping-pong ball.  ? 2 Tbsp--1 ping-pong ball.  ? ½ cup--½ baseball.  ? 1 cup--1 baseball.  Summary  · Calorie counting means keeping track of how many calories you eat and drink each day. If you eat fewer calories than your body needs, you should lose weight.  · A healthy amount of weight to lose per week is usually 1-2 lb (0.5-0.9 kg). This usually means reducing your daily calorie intake by 500-750 calories.  · The number of calories in a food can be found on a Nutrition Facts label. If a food does not have a Nutrition Facts label, try to look up the calories online or ask your dietitian for help.  · Use your calories on foods and drinks that will fill you up, and not on foods and drinks that will leave you hungry.  · Use smaller plates, glasses, and bowls to prevent overeating.  This information is not intended to replace advice given to you by your health care provider. Make sure you discuss any questions you have with your health care provider.  Document Released: 12/18/2006 Document Revised: 11/17/2017 Document Reviewed: 11/17/2017  [employer] Interactive Patient Education © 2018 [employer]  BMI for Adults  Body mass index (BMI) is a number that is calculated from a person's weight and height. In most adults, the number is used to find how much of an adult's weight is made up of fat. BMI is not as accurate as a direct measure of body fat.  How is BMI calculated?  BMI is calculated by dividing weight in kilograms by height in meters squared. It can also be calculated by dividing weight in pounds by height in inches squared, then multiplying the resulting number by 703. Charts are available to help you find your BMI quickly and easily without doing this calculation.  How is BMI interpreted?  Health care  professionals use BMI charts to identify whether an adult is underweight, at a normal weight, or overweight based on the following guidelines:  · Underweight: BMI less than 18.5.  · Normal weight: BMI between 18.5 and 24.9.  · Overweight: BMI between 25 and 29.9.  · Obese: BMI of 30 and above.    BMI is usually interpreted the same for males and females.  Weight includes both fat and muscle, so someone with a muscular build, such as an athlete, may have a BMI that is higher than 24.9. In cases like these, BMI may not accurately depict body fat. To determine if excess body fat is the cause of a BMI of 25 or higher, further assessments may need to be done by a health care provider.  Why is BMI a useful tool?  BMI is used to identify a possible weight problem that may be related to a medical problem or may increase the risk for medical problems. BMI can also be used to promote changes to reach a healthy weight.  This information is not intended to replace advice given to you by your health care provider. Make sure you discuss any questions you have with your health care provider.  Document Released: 08/29/2005 Document Revised: 04/27/2017 Document Reviewed: 05/15/2015  [employer] Interactive Patient Education © 2018 [employer]

## 2018-08-06 NOTE — PROGRESS NOTES
Subjective   Fernie Porter is a 47 y.o. male.     Chief Complaint   Patient presents with   • Hypertension     migraines   • Atrial Fibrillation   • Aortic Stenosis         Hypertension   This is a chronic problem. The current episode started more than 1 year ago. The problem is unchanged. The problem is controlled. Associated symptoms include headaches. Pertinent negatives include no orthopnea or peripheral edema. There are no associated agents to hypertension. Risk factors for coronary artery disease include male gender. Current antihypertension treatment includes alpha 1 blockers, diuretics and beta blockers. The current treatment provides significant improvement. There are no compliance problems.  There is no history of angina, kidney disease, CAD/MI, CVA or heart failure.   Atrial Fibrillation   Presents for follow-up visit. The symptoms have been stable. Past medical history includes atrial fibrillation. There are no medication compliance problems.   Headache    This is a chronic problem. The current episode started more than 1 year ago. The problem occurs constantly. Pertinent negatives include no abdominal pain, coughing, fever, nausea or vomiting.   Migraine    This is a chronic problem. The current episode started more than 1 year ago. The problem occurs constantly. The problem has been waxing and waning. The pain is located in the left unilateral region. The pain does not radiate. The pain quality is similar to prior headaches. The quality of the pain is described as throbbing. The pain is moderate. Pertinent negatives include no abdominal pain, coughing, fever, nausea or vomiting. Nothing aggravates the symptoms. He has tried NSAIDs for the symptoms. The treatment provided significant relief.        The following portions of the patient's history were reviewed and updated as appropriate: allergies, current medications, past social history and problem list.    Outpatient Prescriptions Marked as Taking  for the 8/6/18 encounter (Office Visit) with Juan Antonio Ford MD   Medication Sig Dispense Refill   • carvedilol (COREG) 25 MG tablet TAKE 1 TABLET TWICE A  tablet 1   • CloNIDine (CATAPRES) 0.2 MG tablet TAKE 1 TABLET BY MOUTH TWICE A  tablet 1   • fluticasone (FLONASE) 50 MCG/ACT nasal spray 2 sprays into each nostril Daily. 1 bottle 6   • hydrochlorothiazide (MICROZIDE) 12.5 MG capsule TAKE ONE CAPSULE BY MOUTH EVERY DAY 90 capsule 1   • hydrocortisone 2.5 % cream Apply  topically 3 (Three) Times a Day.     • imipramine (TOFRANIL) 50 MG tablet TAKE 2 TABLETS BY MOUTH AT BEDTIME 180 tablet 1   • warfarin (COUMADIN) 5 MG tablet TAKE 1.5 TABLETS DAILY OR AS DIRECTED 135 tablet 0       Review of Systems   Constitutional: Negative for chills, fatigue and fever.   Respiratory: Negative for cough and wheezing.    Cardiovascular: Negative for orthopnea and leg swelling.   Gastrointestinal: Negative for abdominal pain, constipation, diarrhea, nausea and vomiting.   Neurological: Positive for headaches.       Objective   Vitals:    08/06/18 1104   BP: 118/64   Pulse: 65   Resp: 16   Temp: 98.6 °F (37 °C)   SpO2: 96%      1    08/06/18  1104   Weight: 74.4 kg (164 lb)    [unfilled]  Body mass index is 24.21 kg/m².      Physical Exam   Constitutional: He appears well-developed and well-nourished. No distress.   HENT:   Head: Normocephalic and atraumatic.   Neck: Carotid bruit is not present. No thyromegaly present.   Cardiovascular: Normal rate, regular rhythm and intact distal pulses.  Exam reveals no gallop.    Murmur heard.   Crescendo decrescendo systolic murmur is present with a grade of 2/6   Prosthetic heart sounds.  Grade 2/6 JOHNNIE loudest at the pulmonic area.   Pulmonary/Chest: Effort normal and breath sounds normal. No respiratory distress. He has no wheezes. He has no rales.   Abdominal: Soft. Bowel sounds are normal. He exhibits no mass. There is no tenderness. There is no guarding.          Problem List Items Addressed This Visit        Cardiovascular and Mediastinum    Aortic valve replaced    Paroxysmal atrial fibrillation (CMS/HCC)    Hypertension - Primary    Migraine        Assessment/Plan   In for recheck of hypertension, AF, aortic stenosis, and migraines.  Migraines persist.  They're doing pretty well to present time.  Gets 3 or 4 per month.  Has failed several drugs in the past including Topamax.  Blood pressure control is excellent.  Atrial fib is controlled.  Got annual lab work January 2017 including CBC, CMP, urinalysis and labs look good.  Continue to monitor every 3 months.           Cassandra disclaimer:   Much of this encounter note is an electronic transcription/translation of spoken language to printed text. The electronic translation of spoken language may permit erroneous, or at times, nonsensical words or phrases to be inadvertently transcribed; Although I have reviewed the note for such errors, some may still exist.

## 2018-09-06 RX ORDER — FLUTICASONE PROPIONATE 50 MCG
2 SPRAY, SUSPENSION (ML) NASAL DAILY
Qty: 16 ML | Refills: 6 | Status: SHIPPED | OUTPATIENT
Start: 2018-09-06 | End: 2019-03-15 | Stop reason: SDUPTHER

## 2018-09-26 RX ORDER — WARFARIN SODIUM 5 MG/1
TABLET ORAL
Qty: 135 TABLET | Refills: 0 | Status: SHIPPED | OUTPATIENT
Start: 2018-09-26 | End: 2018-12-23 | Stop reason: SDUPTHER

## 2018-10-19 ENCOUNTER — OFFICE VISIT (OUTPATIENT)
Dept: CARDIOLOGY | Facility: CLINIC | Age: 48
End: 2018-10-19

## 2018-10-19 ENCOUNTER — HOSPITAL ENCOUNTER (OUTPATIENT)
Dept: CARDIOLOGY | Facility: HOSPITAL | Age: 48
Discharge: HOME | End: 2018-10-19
Attending: INTERNAL MEDICINE | Admitting: INTERNAL MEDICINE

## 2018-10-19 VITALS
DIASTOLIC BLOOD PRESSURE: 94 MMHG | BODY MASS INDEX: 23.99 KG/M2 | WEIGHT: 162 LBS | HEART RATE: 69 BPM | SYSTOLIC BLOOD PRESSURE: 140 MMHG | HEIGHT: 69 IN

## 2018-10-19 VITALS
BODY MASS INDEX: 24.29 KG/M2 | DIASTOLIC BLOOD PRESSURE: 80 MMHG | HEIGHT: 69 IN | HEART RATE: 71 BPM | WEIGHT: 164 LBS | SYSTOLIC BLOOD PRESSURE: 122 MMHG

## 2018-10-19 DIAGNOSIS — I48.0 PAROXYSMAL ATRIAL FIBRILLATION (HCC): ICD-10-CM

## 2018-10-19 DIAGNOSIS — I10 ESSENTIAL HYPERTENSION: ICD-10-CM

## 2018-10-19 DIAGNOSIS — Z95.2 AORTIC VALVE REPLACED: Primary | ICD-10-CM

## 2018-10-19 DIAGNOSIS — Z95.2 AORTIC VALVE REPLACED: ICD-10-CM

## 2018-10-19 LAB
AORTIC DIMENSIONLESS INDEX: 0.4 (DI)
ASCENDING AORTA: 3.4 CM
BH CV ECHO MEAS - AO MAX PG (FULL): 10.3 MMHG
BH CV ECHO MEAS - AO MAX PG: 12.3 MMHG
BH CV ECHO MEAS - AO MEAN PG (FULL): 5 MMHG
BH CV ECHO MEAS - AO MEAN PG: 6.1 MMHG
BH CV ECHO MEAS - AO ROOT AREA (BSA CORRECTED): 1.7
BH CV ECHO MEAS - AO ROOT AREA: 8.7 CM^2
BH CV ECHO MEAS - AO ROOT DIAM: 3.3 CM
BH CV ECHO MEAS - AO V2 MAX: 175.3 CM/SEC
BH CV ECHO MEAS - AO V2 MEAN: 118 CM/SEC
BH CV ECHO MEAS - AO V2 VTI: 30.7 CM
BH CV ECHO MEAS - ASC AORTA: 3.4 CM
BH CV ECHO MEAS - AVA(I,A): 1.2 CM^2
BH CV ECHO MEAS - AVA(I,D): 1.2 CM^2
BH CV ECHO MEAS - AVA(V,A): 1.2 CM^2
BH CV ECHO MEAS - AVA(V,D): 1.2 CM^2
BH CV ECHO MEAS - BSA(HAYCOCK): 1.9 M^2
BH CV ECHO MEAS - BSA: 1.9 M^2
BH CV ECHO MEAS - BZI_BMI: 24.2 KILOGRAMS/M^2
BH CV ECHO MEAS - BZI_METRIC_HEIGHT: 175.3 CM
BH CV ECHO MEAS - BZI_METRIC_WEIGHT: 74.4 KG
BH CV ECHO MEAS - EDV(MOD-SP2): 74 ML
BH CV ECHO MEAS - EDV(MOD-SP4): 106 ML
BH CV ECHO MEAS - EDV(TEICH): 130.2 ML
BH CV ECHO MEAS - EF(CUBED): 60.6 %
BH CV ECHO MEAS - EF(MOD-BP): 55 %
BH CV ECHO MEAS - EF(MOD-SP2): 52.7 %
BH CV ECHO MEAS - EF(MOD-SP4): 57.5 %
BH CV ECHO MEAS - EF(TEICH): 51.8 %
BH CV ECHO MEAS - ESV(MOD-SP2): 35 ML
BH CV ECHO MEAS - ESV(MOD-SP4): 45 ML
BH CV ECHO MEAS - ESV(TEICH): 62.8 ML
BH CV ECHO MEAS - IVS/LVPW: 1.1
BH CV ECHO MEAS - IVSD: 1.2 CM
BH CV ECHO MEAS - LAT PEAK E' VEL: 12 CM/SEC
BH CV ECHO MEAS - LV DIASTOLIC VOL/BSA (35-75): 55.8 ML/M^2
BH CV ECHO MEAS - LV MASS(C)D: 229.7 GRAMS
BH CV ECHO MEAS - LV MASS(C)DI: 121 GRAMS/M^2
BH CV ECHO MEAS - LV MAX PG: 2 MMHG
BH CV ECHO MEAS - LV MEAN PG: 1.1 MMHG
BH CV ECHO MEAS - LV SYSTOLIC VOL/BSA (12-30): 23.7 ML/M^2
BH CV ECHO MEAS - LV V1 MAX: 70.8 CM/SEC
BH CV ECHO MEAS - LV V1 MEAN: 48.4 CM/SEC
BH CV ECHO MEAS - LV V1 VTI: 13 CM
BH CV ECHO MEAS - LVIDD: 5.2 CM
BH CV ECHO MEAS - LVIDS: 3.8 CM
BH CV ECHO MEAS - LVLD AP2: 7.3 CM
BH CV ECHO MEAS - LVLD AP4: 7.7 CM
BH CV ECHO MEAS - LVLS AP2: 6.3 CM
BH CV ECHO MEAS - LVLS AP4: 7.3 CM
BH CV ECHO MEAS - LVOT AREA (M): 2.8 CM^2
BH CV ECHO MEAS - LVOT AREA: 2.9 CM^2
BH CV ECHO MEAS - LVOT DIAM: 1.9 CM
BH CV ECHO MEAS - LVPWD: 1.1 CM
BH CV ECHO MEAS - MED PEAK E' VEL: 6 CM/SEC
BH CV ECHO MEAS - MV A DUR: 0.1 SEC
BH CV ECHO MEAS - MV A MAX VEL: 29.1 CM/SEC
BH CV ECHO MEAS - MV DEC SLOPE: 502.1 CM/SEC^2
BH CV ECHO MEAS - MV DEC TIME: 0.14 SEC
BH CV ECHO MEAS - MV E MAX VEL: 90.2 CM/SEC
BH CV ECHO MEAS - MV E/A: 3.1
BH CV ECHO MEAS - MV MAX PG: 6.4 MMHG
BH CV ECHO MEAS - MV MEAN PG: 2 MMHG
BH CV ECHO MEAS - MV P1/2T MAX VEL: 121.2 CM/SEC
BH CV ECHO MEAS - MV P1/2T: 70.7 MSEC
BH CV ECHO MEAS - MV V2 MAX: 126 CM/SEC
BH CV ECHO MEAS - MV V2 MEAN: 62.2 CM/SEC
BH CV ECHO MEAS - MV V2 VTI: 29.3 CM
BH CV ECHO MEAS - MVA P1/2T LCG: 1.8 CM^2
BH CV ECHO MEAS - MVA(P1/2T): 3.1 CM^2
BH CV ECHO MEAS - MVA(VTI): 1.3 CM^2
BH CV ECHO MEAS - PA ACC TIME: 0.1 SEC
BH CV ECHO MEAS - PA MAX PG (FULL): 1.8 MMHG
BH CV ECHO MEAS - PA MAX PG: 3.4 MMHG
BH CV ECHO MEAS - PA PR(ACCEL): 34.6 MMHG
BH CV ECHO MEAS - PA V2 MAX: 92.3 CM/SEC
BH CV ECHO MEAS - PI END-D VEL: 119.4 CM/SEC
BH CV ECHO MEAS - PULM DIAS VEL: 53.8 CM/SEC
BH CV ECHO MEAS - PULM S/D: 0.34
BH CV ECHO MEAS - PULM SYS VEL: 18.4 CM/SEC
BH CV ECHO MEAS - PVA(V,A): 1.7 CM^2
BH CV ECHO MEAS - PVA(V,D): 1.7 CM^2
BH CV ECHO MEAS - QP/QS: 0.86
BH CV ECHO MEAS - RAP SYSTOLE: 3 MMHG
BH CV ECHO MEAS - RV MAX PG: 1.6 MMHG
BH CV ECHO MEAS - RV MEAN PG: 1 MMHG
BH CV ECHO MEAS - RV V1 MAX: 63.1 CM/SEC
BH CV ECHO MEAS - RV V1 MEAN: 47.3 CM/SEC
BH CV ECHO MEAS - RV V1 VTI: 13 CM
BH CV ECHO MEAS - RVOT AREA: 2.5 CM^2
BH CV ECHO MEAS - RVOT DIAM: 1.8 CM
BH CV ECHO MEAS - RVSP: 19 MMHG
BH CV ECHO MEAS - SI(AO): 140.1 ML/M^2
BH CV ECHO MEAS - SI(CUBED): 45.2 ML/M^2
BH CV ECHO MEAS - SI(LVOT): 19.7 ML/M^2
BH CV ECHO MEAS - SI(MOD-SP2): 20.5 ML/M^2
BH CV ECHO MEAS - SI(MOD-SP4): 32.1 ML/M^2
BH CV ECHO MEAS - SI(TEICH): 35.5 ML/M^2
BH CV ECHO MEAS - SUP REN AO DIAM: 1.8 CM
BH CV ECHO MEAS - SV(AO): 265.9 ML
BH CV ECHO MEAS - SV(CUBED): 85.8 ML
BH CV ECHO MEAS - SV(LVOT): 37.4 ML
BH CV ECHO MEAS - SV(MOD-SP2): 39 ML
BH CV ECHO MEAS - SV(MOD-SP4): 61 ML
BH CV ECHO MEAS - SV(RVOT): 32.2 ML
BH CV ECHO MEAS - SV(TEICH): 67.4 ML
BH CV ECHO MEAS - TAPSE (>1.6): 2.4 CM2
BH CV ECHO MEAS - TR MAX VEL: 200 CM/SEC
BH CV ECHO MEASUREMENTS AVERAGE E/E' RATIO: 10.02
BH CV XLRA - RV BASE: 3 CM
BH CV XLRA - TDI S': 8 CM/SEC
LEFT ATRIUM VOLUME INDEX: 26 ML/M2
SINUS: 2.8 CM
STJ: 3.3 CM

## 2018-10-19 PROCEDURE — 99214 OFFICE O/P EST MOD 30 MIN: CPT | Performed by: INTERNAL MEDICINE

## 2018-10-19 PROCEDURE — 93306 TTE W/DOPPLER COMPLETE: CPT

## 2018-10-19 PROCEDURE — 93000 ELECTROCARDIOGRAM COMPLETE: CPT | Performed by: INTERNAL MEDICINE

## 2018-10-19 PROCEDURE — 93306 TTE W/DOPPLER COMPLETE: CPT | Performed by: INTERNAL MEDICINE

## 2018-10-19 RX ORDER — AMLODIPINE BESYLATE 2.5 MG/1
2.5 TABLET ORAL DAILY
Qty: 30 TABLET | Refills: 11 | Status: SHIPPED | OUTPATIENT
Start: 2018-10-19 | End: 2018-11-02 | Stop reason: DRUGHIGH

## 2018-10-19 NOTE — PROGRESS NOTES
Date of Office Visit: 10/19/18  Encounter Provider: Juan Antonio Buenrostro MD  Place of Service: [facility] CARDIOLOGY  Patient Name: Fernie Porter  :1970  Referral Provider:Referring, Self      Chief Complaint   Patient presents with   • Cardiac Valve Problem     History of Present Illness    Mr. Porter is a pleasant 48-year-old gentleman with history of bicuspid  aortic valve with evidence of aortic stenosis as well as some mild  insufficiency. He had coarctation of the aorta with pseudoaneurysm and had  that repaired years ago. He also had intermittent episodes of atrial  fibrillation treated medically, but very symptomatic when he would go into  that. He presented 2012 to our arrhythmia clinic with rapid atrial  fibrillation. He did not convert back with IV amiodarone. Was started on  Pradaxa. We upped his sotalol. He ultimately did convert back to sinus  rhythm, but was still having some questionable shortness of breath. We  repeated an echocardiogram on him that showed what appeared to be worsening  aortic valve stenosis.   He then underwent on 2012 aortic valve replacement with resection of the ascending  aorta with an aortic valve conduit using a #25 [employer] mechanical valve. He had  reimplantation of the right coronary arteries and had a left Cryomaze procedure with left  atrial appendage ligation. He went home and was having a fair amount of problems with his  blood pressure as well as his Coumadin.   He then had intermittent episodes of hypotension.  We adjusted his medication and that  improved.  He had problems with dyspnea.  We checked an echocardiogram on him and  everything looked okay except his fluid collection around his aorta.  We did a CT of his  chest which showed what looked to be a seroma.  They suggested follow-up. The patient denies chest pain, pressure and heaviness. No shortness of breath, othopnea or PND. No palpitations, near syncope or  syncope. No stroke type symptoms like paralysis, paresthesia, abrupt vision loss and dysarthria. No bleeding like blood in the stool or dark stools.  He still very active training agility dogs as well as doing some jogging.  He has noted his blood pressures a little bit elevated at home at times in the 140 range and he gets intermittent flushing upped a couple times a day lasting 30 minutes.          Past Medical History:   Diagnosis Date   • AAA (abdominal aortic aneurysm) (CMS/Formerly [facility]) 1992    repaired, subclaven stenosis subsequently   • Aortic stenosis    • Bicuspid aortic valve    • Coarctation of aorta    • Hypertension    • PAF (paroxysmal atrial fibrillation) (CMS/Formerly [facility])          Past Surgical History:   Procedure Laterality Date   • ABDOMINAL AORTIC ANEURYSM REPAIR  1992   • AORTIC VALVE REPAIR/REPLACEMENT      [employer] Mechanical   • ASCENDING AORTIC ANEURYSM REPAIR W/ MECHANICAL AORTIC VALVE REPLACEMENT     • CARDIAC SURGERY  09/2012    AVR [facility] [facility] mechanical, maize procedure, aortic root resection   • CARDIAC SURGERY  1975    Coarct repair. Priscilla coarct aneurism 1989   • CARDIOVERSION     • COARCTATION OF AORTA EXCISION     • HERNIA REPAIR     • INGUINAL HERNIA REPAIR           Current Outpatient Prescriptions on File Prior to Visit   Medication Sig Dispense Refill   • carvedilol (COREG) 25 MG tablet TAKE 1 TABLET TWICE A  tablet 1   • CloNIDine (CATAPRES) 0.2 MG tablet TAKE 1 TABLET BY MOUTH TWICE A  tablet 1   • fluticasone (FLONASE) 50 MCG/ACT nasal spray 2 SPRAYS INTO EACH NOSTRIL DAILY. (Patient taking differently: 2 sprays into the nostril(s) as directed by provider As Needed.) 16 mL 6   • hydrochlorothiazide (MICROZIDE) 12.5 MG capsule TAKE ONE CAPSULE BY MOUTH EVERY DAY 90 capsule 1   • hydrocortisone 2.5 % cream Apply  topically 3 (Three) Times a Day.     • imipramine (TOFRANIL) 50 MG tablet TAKE 2 TABLETS BY MOUTH AT BEDTIME 180 tablet 1   • warfarin (COUMADIN) 5 MG tablet TAKE 1.5  TABLETS DAILY OR AS DIRECTED 135 tablet 0     No current facility-administered medications on file prior to visit.          Social History     Social History   • Marital status:      Spouse name: N/A   • Number of children: N/A   • Years of education: N/A     Occupational History   • Not on file.     Social History Main Topics   • Smoking status: Never Smoker   • Smokeless tobacco: Never Used   • Alcohol use No   • Drug use: Unknown   • Sexual activity: Not on file     Other Topics Concern   • Not on file     Social History Narrative    ** Merged History Encounter **            Family History   Problem Relation Age of Onset   • No Known Problems Father    • No Known Problems Sister    • Hypertension Mother    • Stroke Mother          Review of Systems   Constitution: Negative for decreased appetite, diaphoresis, fever, weakness, malaise/fatigue, weight gain and weight loss.   HENT: Negative for congestion, hearing loss, nosebleeds and tinnitus.    Eyes: Negative for blurred vision, double vision, vision loss in left eye, vision loss in right eye and visual disturbance.   Cardiovascular:        As noted in HPI   Respiratory:        As noted HPI   Endocrine: Negative for cold intolerance and heat intolerance.   Hematologic/Lymphatic: Negative for bleeding problem. Does not bruise/bleed easily.   Skin: Positive for flushing. Negative for color change, itching and rash.   Musculoskeletal: Negative for arthritis, back pain, joint pain, joint swelling, muscle weakness and myalgias.   Gastrointestinal: Negative for bloating, abdominal pain, constipation, diarrhea, dysphagia, heartburn, hematemesis, hematochezia, melena, nausea and vomiting.   Genitourinary: Negative for bladder incontinence, dysuria, frequency, nocturia and urgency.   Neurological: Negative for dizziness, focal weakness, headaches, light-headedness, loss of balance, numbness, paresthesias and vertigo.   Psychiatric/Behavioral: Negative for  "depression, memory loss and substance abuse.       Procedures      ECG 12 Lead  Date/Time: 10/19/2018 2:01 PM  Performed by: ANIA RAY  Authorized by: ANIA RAY   Comparison: compared with previous ECG   Similar to previous ECG  Rhythm: sinus rhythm  Rate: normal  Conduction: non-specific intraventricular conduction delay  QRS axis: normal                  Objective:    /94 (BP Location: Right arm)   Pulse 69   Ht 175.3 cm (69\")   Wt 73.5 kg (162 lb)   BMI 23.92 kg/m²        Physical Exam  Physical Exam   Constitutional: He is oriented to person, place, and time. He appears well-developed and well-nourished. No distress.   HENT:   Head: Normocephalic.   Eyes: Pupils are equal, round, and reactive to light. Conjunctivae are normal. No scleral icterus.   Neck: Normal carotid pulses, no hepatojugular reflux and no JVD present. Carotid bruit is not present. No tracheal deviation, no edema and no erythema present. No thyromegaly present.   Cardiovascular: Normal rate, regular rhythm, S1 normal and intact distal pulses.   No extrasystoles are present. PMI is not displaced.  Exam reveals no gallop, no distant heart sounds and no friction rub.    Murmur heard.   Systolic murmur is present with a grade of 2/6  at the upper right sternal border  Pulses:       Carotid pulses are 2+ on the right side, and 2+ on the left side.       Radial pulses are 2+ on the right side, and 2+ on the left side.        Femoral pulses are 2+ on the right side, and 2+ on the left side.       Dorsalis pedis pulses are 2+ on the right side, and 2+ on the left side.        Posterior tibial pulses are 2+ on the right side, and 2+ on the left side.   Artificial S2   Pulmonary/Chest: Effort normal and breath sounds normal. No respiratory distress. He has no decreased breath sounds. He has no wheezes. He has no rhonchi. He has no rales. He exhibits no tenderness.   Abdominal: Soft. Bowel sounds are normal. He exhibits no " distension and no mass. There is no hepatosplenomegaly. There is no tenderness. There is no rebound and no guarding.   Musculoskeletal: He exhibits no edema, tenderness or deformity.   Neurological: He is alert and oriented to person, place, and time.   Skin: Skin is warm and dry. No rash noted. He is not diaphoretic. No cyanosis or erythema. No pallor. Nails show no clubbing.   Psychiatric: He has a normal mood and affect. His speech is normal and behavior is normal. Judgment and thought content normal.           Assessment:    1. This is a 48-year-old gentleman with a history of bicuspid aortic valve with severe aortic stenosis, ascending aortic enlargement.  He is now status post aortic valve  replacement with a valve conduit using a mechanical [employer]. Vikash valve with reimplantation of the coronary arteries.Echocardiogram October 2018 unremarkable.   normal functioning valve may be mild concentric hypertrophy normal LV systolic function.  He is to continue the same see us again in follow-up in a year and call back if problems.    2. 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  He is status post patch repair of that.   Follow-up CT scan in 2015 appears to be minimal evidence of coarctation.  3. Paroxysmal atrial fibrillation.  This was prior to his valve surgery, but he now has cryoMaze procedure and appendage ligation.  No recurrent episodes off medication continue the same.  4. Hypertension.  His blood pressure is elevated.  We are starting him on amlodipine 2.5 mg a day he's to check his blood pressure twice a day call us back in one to 2 weeks.  5. Abnormal fluid collection around the aortic graft.  It may just represent a seroma. Benign per Dr. Casanova.     6.  Flushing may be related to his blood pressure see if this improves.         Plan:

## 2018-10-26 ENCOUNTER — TELEPHONE (OUTPATIENT)
Dept: CARDIOLOGY | Facility: CLINIC | Age: 48
End: 2018-10-26

## 2018-10-26 NOTE — TELEPHONE ENCOUNTER
Pt called give an update on his bp since starting the Amlodipine 2.5 mg qd.  Pt is also taking the the Carvedilol 25 mg bid, Clonidine 0.2 mg bid and HCTZ 12.5 mg.     His avg bp is 145/75 hr 73.  His highest bp 156/79 and the lowest 130/68.      Pt reports that his is not having any adverse side affects from the medication.  Does pt need to continue with med regimen or any other recommendations?

## 2018-10-26 NOTE — TELEPHONE ENCOUNTER
Called and informed pt regarding the increase.  He verbalized understanding.  He will continue to monitor his bp/ hr and call back with an update.

## 2018-11-02 RX ORDER — AMLODIPINE BESYLATE 5 MG/1
5 TABLET ORAL DAILY
Qty: 90 TABLET | Refills: 3 | Status: SHIPPED | OUTPATIENT
Start: 2018-11-02 | End: 2019-10-27 | Stop reason: SDUPTHER

## 2018-11-05 ENCOUNTER — OFFICE VISIT (OUTPATIENT)
Dept: INTERNAL MEDICINE | Facility: CLINIC | Age: 48
End: 2018-11-05

## 2018-11-05 VITALS
RESPIRATION RATE: 16 BRPM | HEART RATE: 66 BPM | BODY MASS INDEX: 23.7 KG/M2 | OXYGEN SATURATION: 97 % | TEMPERATURE: 98.4 F | HEIGHT: 69 IN | DIASTOLIC BLOOD PRESSURE: 56 MMHG | WEIGHT: 160 LBS | SYSTOLIC BLOOD PRESSURE: 110 MMHG

## 2018-11-05 DIAGNOSIS — I10 ESSENTIAL HYPERTENSION: ICD-10-CM

## 2018-11-05 DIAGNOSIS — Z23 NEED FOR IMMUNIZATION AGAINST INFLUENZA: Primary | ICD-10-CM

## 2018-11-05 DIAGNOSIS — I48.0 PAROXYSMAL ATRIAL FIBRILLATION (HCC): ICD-10-CM

## 2018-11-05 DIAGNOSIS — Z95.2 AORTIC VALVE REPLACED: ICD-10-CM

## 2018-11-05 PROCEDURE — 90471 IMMUNIZATION ADMIN: CPT | Performed by: INTERNAL MEDICINE

## 2018-11-05 PROCEDURE — 90674 CCIIV4 VAC NO PRSV 0.5 ML IM: CPT | Performed by: INTERNAL MEDICINE

## 2018-11-05 PROCEDURE — 99214 OFFICE O/P EST MOD 30 MIN: CPT | Performed by: INTERNAL MEDICINE

## 2018-11-05 NOTE — PROGRESS NOTES
Subjective   Fernie Porter is a 48 y.o. male.     Chief Complaint   Patient presents with   • Hypertension   • Migraine         Hypertension   This is a chronic problem. The current episode started more than 1 year ago. The problem is unchanged. The problem is controlled. Associated symptoms include headaches. Pertinent negatives include no orthopnea or peripheral edema. There are no associated agents to hypertension. Risk factors for coronary artery disease include male gender. Current antihypertension treatment includes alpha 1 blockers, diuretics and beta blockers. The current treatment provides significant improvement. There are no compliance problems.  There is no history of angina, kidney disease, CAD/MI, CVA or heart failure.   Migraine    This is a chronic problem. The current episode started more than 1 year ago. The problem occurs constantly. The problem has been waxing and waning. The pain is located in the left unilateral region. The pain does not radiate. The pain quality is similar to prior headaches. The quality of the pain is described as throbbing. The pain is moderate. Pertinent negatives include no abdominal pain, coughing, fever, nausea or vomiting. Nothing aggravates the symptoms. He has tried NSAIDs for the symptoms. The treatment provided significant relief.   Atrial Fibrillation   Presents for follow-up visit. The symptoms have been stable. Past medical history includes atrial fibrillation. There are no medication compliance problems.   Headache    This is a chronic problem. The current episode started more than 1 year ago. The problem occurs constantly. Pertinent negatives include no abdominal pain, coughing, fever, nausea or vomiting.        The following portions of the patient's history were reviewed and updated as appropriate: allergies, current medications, past social history and problem list.    Outpatient Prescriptions Marked as Taking for the 11/5/18 encounter (Office Visit)  with Juan Antonio Ford MD   Medication Sig Dispense Refill   • amLODIPine (NORVASC) 5 MG tablet Take 1 tablet by mouth Daily. 90 tablet 3   • carvedilol (COREG) 25 MG tablet TAKE 1 TABLET TWICE A  tablet 1   • CloNIDine (CATAPRES) 0.2 MG tablet TAKE 1 TABLET BY MOUTH TWICE A  tablet 1   • fluticasone (FLONASE) 50 MCG/ACT nasal spray 2 SPRAYS INTO EACH NOSTRIL DAILY. (Patient taking differently: 2 sprays into the nostril(s) as directed by provider As Needed.) 16 mL 6   • hydrochlorothiazide (MICROZIDE) 12.5 MG capsule TAKE ONE CAPSULE BY MOUTH EVERY DAY 90 capsule 1   • hydrocortisone 2.5 % cream Apply  topically 3 (Three) Times a Day.     • imipramine (TOFRANIL) 50 MG tablet TAKE 2 TABLETS BY MOUTH AT BEDTIME 180 tablet 1   • warfarin (COUMADIN) 5 MG tablet TAKE 1.5 TABLETS DAILY OR AS DIRECTED 135 tablet 0       Review of Systems   Constitutional: Negative for chills, fatigue and fever.   Respiratory: Negative for cough and wheezing.    Cardiovascular: Negative for orthopnea and leg swelling.   Gastrointestinal: Negative for abdominal pain, constipation, diarrhea, nausea and vomiting.   Neurological: Positive for headaches.       Objective   Vitals:    11/05/18 0952   BP: 110/56   Pulse: 66   Resp: 16   Temp: 98.4 °F (36.9 °C)   SpO2: 97%      1    11/05/18 0952   Weight: 72.6 kg (160 lb)    [unfilled]  Body mass index is 23.7 kg/m².      Physical Exam   Constitutional: He appears well-developed and well-nourished. No distress.   HENT:   Head: Normocephalic and atraumatic.   Neck: Carotid bruit is not present. No thyromegaly present.   Cardiovascular: Normal rate, regular rhythm and intact distal pulses.  Exam reveals no gallop.    Murmur heard.   Crescendo decrescendo systolic murmur is present with a grade of 2/6   Prosthetic heart sounds.  Grade 2/6 JOHNNIE loudest at the pulmonic area.   Pulmonary/Chest: Effort normal and breath sounds normal. No respiratory distress. He has no wheezes. He has no  rales.   Abdominal: Soft. Bowel sounds are normal. He exhibits no mass. There is no tenderness. There is no guarding.         Problem List Items Addressed This Visit        Cardiovascular and Mediastinum    Paroxysmal atrial fibrillation (CMS/HCC)    Hypertension       Other    Aortic valve replaced      Other Visit Diagnoses     Need for immunization against influenza    -  Primary    Relevant Orders    Flucelvax Quad=>4Years (PFS) (Completed)        Assessment/Plan   In for recheck of hypertension, AF, aortic stenosis, and migraines.  Migraines persist.  They're doing pretty well to present time.  Gets 3 or 4 per month.  Has failed several drugs in the past including Topamax.  Blood pressure control is excellent.  Atrial fib is controlled.  Got annual lab work February 2018 including CBC, CMP, urinalysis and labs look good.  Continue to monitor every 3 months.           Cassandra disclaimer:   Much of this encounter note is an electronic transcription/translation of spoken language to printed text. The electronic translation of spoken language may permit erroneous, or at times, nonsensical words or phrases to be inadvertently transcribed; Although I have reviewed the note for such errors, some may still exist.

## 2018-11-05 NOTE — PATIENT INSTRUCTIONS
Influenza Virus Vaccine injection  What is this medicine?  INFLUENZA VIRUS VACCINE (in [facility] SE[facility]) helps to reduce the risk of getting influenza also known as the flu. The vaccine only helps protect you against some strains of the flu.  This medicine may be used for other purposes; ask your health care provider or pharmacist if you have questions.  COMMON BRAND NAME(S): Afluria, Agriflu, Alfuria, FLUAD, Fluarix, Fluarix Quadrivalent, Flublok, Flublok Quadrivalent, FLUCELVAX, Flulaval, Fluvirin, Fluzone, Fluzone High-Dose, Fluzone Intradermal  What should I tell my health care provider before I take this medicine?  They need to know if you have any of these conditions:  -bleeding disorder like hemophilia  -fever or infection  -Guillain-Drift syndrome or other neurological problems  -immune system problems  -infection with the human immunodeficiency virus (HIV) or AIDS  -low blood platelet counts  -multiple sclerosis  -an unusual or allergic reaction to influenza virus vaccine, latex, other medicines, foods, dyes, or preservatives. Different brands of vaccines contain different allergens. Some may contain latex or eggs. Talk to your doctor about your allergies to make sure that you get the right vaccine.  -pregnant or trying to get pregnant  -breast-feeding  How should I use this medicine?  This vaccine is for injection into a muscle or under the skin. It is given by a health care professional.  A copy of Vaccine Information Statements will be given before each vaccination. Read this sheet carefully each time. The sheet may change frequently.  Talk to your healthcare provider to see which vaccines are right for you. Some vaccines should not be used in all age groups.  Overdosage: If you think you have taken too much of this medicine contact a poison control center or emergency room at once.  NOTE: This medicine is only for you. Do not share this medicine with others.  What if I miss a dose?  This  does not apply.  What may interact with this medicine?  -chemotherapy or radiation therapy  -medicines that lower your immune system like etanercept, anakinra, infliximab, and adalimumab  -medicines that treat or prevent blood clots like warfarin  -phenytoin  -steroid medicines like prednisone or cortisone  -theophylline  -vaccines  This list may not describe all possible interactions. Give your health care provider a list of all the medicines, herbs, non-prescription drugs, or dietary supplements you use. Also tell them if you smoke, drink alcohol, or use illegal drugs. Some items may interact with your medicine.  What should I watch for while using this medicine?  Report any side effects that do not go away within 3 days to your doctor or health care professional. Call your health care provider if any unusual symptoms occur within 6 weeks of receiving this vaccine.  You may still catch the flu, but the illness is not usually as bad. You cannot get the flu from the vaccine. The vaccine will not protect against colds or other illnesses that may cause fever. The vaccine is needed every year.  What side effects may I notice from receiving this medicine?  Side effects that you should report to your doctor or health care professional as soon as possible:  -allergic reactions like skin rash, itching or hives, swelling of the face, lips, or tongue  Side effects that usually do not require medical attention (report to your doctor or health care professional if they continue or are bothersome):  -fever  -headache  -muscle aches and pains  -pain, tenderness, redness, or swelling at the injection site  -tiredness  This list may not describe all possible side effects. Call your doctor for medical advice about side effects. You may report side effects to FDA at 9-584-FDA-5810.  Where should I keep my medicine?  The vaccine will be given by a health care professional in a clinic, pharmacy, doctor's office, or other health care  setting. You will not be given vaccine doses to store at home.  NOTE: This sheet is a summary. It may not cover all possible information. If you have questions about this medicine, talk to your doctor, pharmacist, or health care provider.  © 2018 [employer]vier/Gold Standard (2016-07-08 10:07:28)

## 2018-11-07 ENCOUNTER — TELEPHONE (OUTPATIENT)
Dept: CARDIOLOGY | Facility: CLINIC | Age: 48
End: 2018-11-07

## 2018-11-07 NOTE — TELEPHONE ENCOUNTER
----- Message from Fernie Porter sent at 11/7/2018  2:24 AM EST -----  Regarding: Test Results Question  Contact: [phone]  I have a question about ECHOCARDIOGRAM 2D COMPLETE resulted on 10/19/18, 1:53 PM.    I have a few questions I thought of after my appointment  With my heart wall Getting thicker. Can I continue to exercise at the same level “jogging on treadmill 3to4 times a week for about 20min”?  Do you think it will get worse?  Is there anything I should be looking out for?    Thanks   Fernie Porter

## 2018-11-08 RX ORDER — CLONIDINE HYDROCHLORIDE 0.2 MG/1
TABLET ORAL
Qty: 180 TABLET | Refills: 1 | Status: SHIPPED | OUTPATIENT
Start: 2018-11-08 | End: 2019-02-11 | Stop reason: SDUPTHER

## 2018-11-09 ENCOUNTER — TELEPHONE (OUTPATIENT)
Dept: INTERNAL MEDICINE | Facility: CLINIC | Age: 48
End: 2018-11-09

## 2018-11-09 RX ORDER — AMOXICILLIN 500 MG/1
CAPSULE ORAL
Qty: 20 CAPSULE | Refills: 0 | Status: SHIPPED | OUTPATIENT
Start: 2018-11-09 | End: 2021-03-15

## 2018-11-09 NOTE — TELEPHONE ENCOUNTER
The patient is having a dental procedure on 11/13/18 and is supposed to take antibiotics prior to the procedure since he is a cardiac patient. Is this ok? What should he take? Please advise.     Yes.  It is okay.  Take amoxicillin 500 mg, 4 tablets one hour prior to the procedure.  Give him #20 and he can save some for future use.

## 2018-11-14 LAB — INR PPP: 3.5

## 2018-11-16 ENCOUNTER — ANTICOAGULATION VISIT (OUTPATIENT)
Dept: PHARMACY | Facility: HOSPITAL | Age: 48
End: 2018-11-16

## 2018-11-16 DIAGNOSIS — I48.0 PAROXYSMAL ATRIAL FIBRILLATION (HCC): ICD-10-CM

## 2018-11-16 DIAGNOSIS — Z95.2 AORTIC VALVE REPLACED: ICD-10-CM

## 2018-11-16 NOTE — PROGRESS NOTES
Anticoagulation Clinic Progress Note    Anticoagulation Summary  As of 11/16/2018    INR goal:   2.5-3.5   TTR:   55.6 % (10.8 mo)   INR used for dosing:   3.50 (11/14/2018)   Warfarin maintenance plan:   7.5 mg on Sun, Tue, Thu; 5 mg all other days   Weekly warfarin total:   42.5 mg   Plan last modified:   Christy Teixeira, [facility] (11/16/2018)   Next INR check:   11/28/2018   Priority:   Maintenance   Target end date:       Indications    Aortic valve replaced [Z95.2]  Paroxysmal atrial fibrillation (CMS/HCC) [I48.0]             Anticoagulation Episode Summary     INR check location:       Preferred lab:       Send INR reminders to:   [facility] [employer]  POOL    Comments:   *[place]ag[place]k*      Anticoagulation Care Providers     Provider Role Specialty Phone number    Juan Antonio Buenrostro MD Referring Cardiology [phone]          Drug interactions: has remained unchanged.  Diet: has remained unchanged.    Clinic Interview:  No pertinent clinical findings have been reported.    INR History:  Anticoagulation Monitoring 12/22/2016 12/29/2016 11/16/2018   INR 3.30 4.20 3.50   INR Date 12/22/2016 12/29/2016 11/14/2018   INR Goal 2.5-3.5 2.5-3.5 2.5-3.5   Trend Same Same Down   Last Week Total 45 mg 45 mg 45 mg   Next Week Total 45 mg 17.5 mg 42.5 mg   Sun 7.5 mg 2.5 mg (1/1) 7.5 mg   Mon 5 mg 2.5 mg (1/2) 5 mg   Tue 7.5 mg - 7.5 mg   Wed 5 mg - 5 mg   Thu 7.5 mg Hold (12/29) 7.5 mg   Fri 5 mg Hold (12/30) 5 mg   Sat 7.5 mg Hold (12/31) 5 mg   Visit Report - - -   Some recent data might be hidden       Plan:  1. INR is therapeutic today- see above in Anticoagulation Summary.    Fernie Porter to continue their warfarin regimen- see above in Anticoagulation Summary.  2. Follow up in 2 weeks  3. Pt has agreed to only be called if INR out of range. They have been instructed to call if any changes in medications, doses, concerns, etc. Patient expresses understanding and has no further questions at this time.    Christy  Lluvia, [facility]

## 2018-11-29 LAB — INR PPP: 3.4

## 2018-11-30 ENCOUNTER — ANTICOAGULATION VISIT (OUTPATIENT)
Dept: PHARMACY | Facility: HOSPITAL | Age: 48
End: 2018-11-30

## 2018-11-30 DIAGNOSIS — Z95.2 AORTIC VALVE REPLACED: ICD-10-CM

## 2018-11-30 DIAGNOSIS — I48.0 PAROXYSMAL ATRIAL FIBRILLATION (HCC): ICD-10-CM

## 2018-11-30 NOTE — PROGRESS NOTES
Anticoagulation Clinic Progress Note    Anticoagulation Summary  As of 11/30/2018    INR goal:   2.5-3.5   TTR:   100.0 % (3 d)   INR used for dosing:   3.40 (11/29/2018)   Warfarin maintenance plan:   7.5 mg on Sun, Tue, Thu; 5 mg all other days   Weekly warfarin total:   42.5 mg   Plan last modified:   Christy Teixeira, [facility] (11/16/2018)   Next INR check:   12/13/2018   Priority:   Maintenance   Target end date:       Indications    Aortic valve replaced [Z95.2]  Paroxysmal atrial fibrillation (CMS/HCC) [I48.0]             Anticoagulation Episode Summary     INR check location:       Preferred lab:       Send INR reminders to:   [facility]  POOL    Comments:   *Coaguchek*      Anticoagulation Care Providers     Provider Role Specialty Phone number    Juan Antonio Buenrostro MD Referring Cardiology [phone]          Drug interactions: has remained unchanged.  Diet: has remained unchanged.    Clinic Interview:  No pertinent clinical findings have been reported.    INR History:  Anticoagulation Monitoring 12/29/2016 11/16/2018 11/30/2018   INR 4.20 3.50 3.40   INR Date 12/29/2016 11/14/2018 11/29/2018   INR Goal 2.5-3.5 2.5-3.5 2.5-3.5   Trend Same Down Same   Last Week Total 45 mg 45 mg 42.5 mg   Next Week Total 17.5 mg 42.5 mg 42.5 mg   Sun 2.5 mg (1/1) 7.5 mg 7.5 mg   Mon 2.5 mg (1/2) 5 mg 5 mg   Tue - 7.5 mg 7.5 mg   Wed - 5 mg 5 mg   Thu Hold (12/29) 7.5 mg 7.5 mg   Fri Hold (12/30) 5 mg 5 mg   Sat Hold (12/31) 5 mg 5 mg   Visit Report - - -   Some recent data might be hidden       Plan:  1. INR is therapeutic today- see above in Anticoagulation Summary.    Fernie Porter to continue their warfarin regimen- see above in Anticoagulation Summary.  2. Follow up in 2 weeks  3. Pt has agreed to only be called if INR out of range. They have been instructed to call if any changes in medications, doses, concerns, etc. Patient expresses understanding and has no further questions at this time.    Christy  Lluvia, [facility]

## 2018-12-07 RX ORDER — HYDROCHLOROTHIAZIDE 12.5 MG/1
CAPSULE, GELATIN COATED ORAL
Qty: 90 CAPSULE | Refills: 1 | Status: SHIPPED | OUTPATIENT
Start: 2018-12-07 | End: 2019-05-10 | Stop reason: SDUPTHER

## 2018-12-07 RX ORDER — IMIPRAMINE HCL 50 MG
TABLET ORAL
Qty: 180 TABLET | Refills: 1 | Status: SHIPPED | OUTPATIENT
Start: 2018-12-07 | End: 2019-05-31 | Stop reason: SDUPTHER

## 2018-12-07 RX ORDER — CARVEDILOL 25 MG/1
TABLET ORAL
Qty: 180 TABLET | Refills: 1 | Status: SHIPPED | OUTPATIENT
Start: 2018-12-07 | End: 2019-02-04

## 2018-12-14 ENCOUNTER — ANTICOAGULATION VISIT (OUTPATIENT)
Dept: PHARMACY | Facility: HOSPITAL | Age: 48
End: 2018-12-14

## 2018-12-14 DIAGNOSIS — I48.0 PAROXYSMAL ATRIAL FIBRILLATION (HCC): ICD-10-CM

## 2018-12-14 DIAGNOSIS — Z95.2 AORTIC VALVE REPLACED: ICD-10-CM

## 2018-12-14 LAB — INR PPP: 3.5

## 2018-12-14 NOTE — PROGRESS NOTES
Anticoagulation Clinic Progress Note    Anticoagulation Summary  As of 12/14/2018    INR goal:   2.5-3.5   TTR:   100.0 % (2.6 wk)   INR used for dosing:   3.50 (12/14/2018)   Warfarin maintenance plan:   7.5 mg on Sun, Tue, Thu; 5 mg all other days   Weekly warfarin total:   42.5 mg   No change documented:   Keyla Patel RPH   Plan last modified:   Christy Teixeira RPH (11/16/2018)   Next INR check:   12/28/2018   Priority:   Maintenance   Target end date:       Indications    Aortic valve replaced [Z95.2]  Paroxysmal atrial fibrillation (CMS/HCC) [I48.0]             Anticoagulation Episode Summary     INR check location:       Preferred lab:       [facility]  [facility]    Comments:   *Coaguchek*      Anticoagulation Care Providers     Provider Role Specialty Phone number    Juan Antonio Buenrostro MD Referring Cardiology [phone]        Clinic Interview:  No pertinent clinical findings have been reported.    INR History:  Anticoagulation Monitoring 11/16/2018 11/30/2018 12/14/2018   INR 3.50 3.40 3.50   INR Date 11/14/2018 11/29/2018 12/14/2018   INR Goal 2.5-3.5 2.5-3.5 2.5-3.5   Trend Down Same Same   Last Week Total 45 mg 42.5 mg 42.5 mg   Next Week Total 42.5 mg 42.5 mg 42.5 mg   Sun 7.5 mg 7.5 mg 7.5 mg   Mon 5 mg 5 mg 5 mg   Tue 7.5 mg 7.5 mg 7.5 mg   Wed 5 mg 5 mg 5 mg   Thu 7.5 mg 7.5 mg 7.5 mg   Fri 5 mg 5 mg 5 mg   Sat 5 mg 5 mg 5 mg   Visit Report - - -   Some recent data might be hidden       Plan:  1. INR is Therapeutic today- see above in Anticoagulation Summary.   Will instruct Fernie Porter to Continue their warfarin regimen- see above in Anticoagulation Summary.  2. Patient is Home INR Danny. Will retest in 2  weeks  3. Pt has agreed to only be called if INR out of range. They have been instructed to call if any changes in medications, doses, concerns, etc. Patient expresses understanding and has no further questions at this time.    Keyla Patel RP

## 2018-12-24 RX ORDER — WARFARIN SODIUM 5 MG/1
TABLET ORAL
Qty: 135 TABLET | Refills: 0 | Status: SHIPPED | OUTPATIENT
Start: 2018-12-24 | End: 2019-03-27 | Stop reason: SDUPTHER

## 2018-12-28 ENCOUNTER — ANTICOAGULATION VISIT (OUTPATIENT)
Dept: PHARMACY | Facility: HOSPITAL | Age: 48
End: 2018-12-28

## 2018-12-28 DIAGNOSIS — Z95.2 AORTIC VALVE REPLACED: ICD-10-CM

## 2018-12-28 DIAGNOSIS — I48.0 PAROXYSMAL ATRIAL FIBRILLATION (HCC): ICD-10-CM

## 2018-12-28 LAB — INR PPP: 3.7

## 2018-12-28 NOTE — PROGRESS NOTES
Anticoagulation Clinic Progress Note    Anticoagulation Summary  As of 12/28/2018    INR goal:   2.5-3.5   TTR:   56.3 % (1.1 mo)   INR used for dosing:   3.70! (12/28/2018)   Warfarin maintenance plan:   7.5 mg on Sun, Tue, Thu; 5 mg all other days   Weekly warfarin total:   42.5 mg   No change documented:   Luiz Reid RPH   Plan last modified:   Christy Teixeira RPH (11/16/2018)   Next INR check:   1/4/2019   Priority:   Maintenance   Target end date:       Indications    Aortic valve replaced [Z95.2]  Paroxysmal atrial fibrillation (CMS/HCC) [I48.0]             Anticoagulation Episode Summary     INR check location:       Preferred lab:       Send INR reminders to:   [facility]  POOL    Comments:   *Coaguchek*      Anticoagulation Care Providers     Provider Role Specialty Phone number    Juan Antonio Buenrostro MD Referring Cardiology [phone]          Drug interactions: has remained unchanged.  Diet: has remained unchanged.    Clinic Interview:  No pertinent clinical findings have been reported.    INR History:  Anticoagulation Monitoring 11/30/2018 12/14/2018 12/28/2018   INR 3.40 3.50 3.70   INR Date 11/29/2018 12/14/2018 12/28/2018   INR Goal 2.5-3.5 2.5-3.5 2.5-3.5   Trend Same Same Same   Last Week Total 42.5 mg 42.5 mg 42.5 mg   Next Week Total 42.5 mg 42.5 mg 42.5 mg   Sun 7.5 mg 7.5 mg 7.5 mg   Mon 5 mg 5 mg 5 mg   Tue 7.5 mg 7.5 mg 7.5 mg   Wed 5 mg 5 mg 5 mg   Thu 7.5 mg 7.5 mg 7.5 mg   Fri 5 mg 5 mg 5 mg   Sat 5 mg 5 mg 5 mg   Visit Report - - -   Some recent data might be hidden       Plan:  1. INR is Supratherapeutic today- see above in Anticoagulation Summary.   Will instruct Fernie CORY [place] to Continue their warfarin regimen- see above in Anticoagulation Summary.  2. Follow up in 1 week  3. Pt has agreed to only be called if INR out of range. They have been instructed to call if any changes in medications, doses, concerns, etc.     Luiz Reid RPH

## 2019-01-04 ENCOUNTER — ANTICOAGULATION VISIT (OUTPATIENT)
Dept: PHARMACY | Facility: HOSPITAL | Age: 49
End: 2019-01-04

## 2019-01-04 DIAGNOSIS — Z95.2 AORTIC VALVE REPLACED: ICD-10-CM

## 2019-01-04 DIAGNOSIS — I48.0 PAROXYSMAL ATRIAL FIBRILLATION (HCC): ICD-10-CM

## 2019-01-04 LAB — INR PPP: 3.2

## 2019-01-04 NOTE — PROGRESS NOTES
Anticoagulation Clinic Progress Note    Anticoagulation Summary  As of 1/4/2019    INR goal:   2.5-3.5   TTR:   56.9 % (1.3 mo)   INR used for dosing:   3.20 (1/4/2019)   Warfarin maintenance plan:   7.5 mg on Sun, Tue, Thu; 5 mg all other days   Weekly warfarin total:   42.5 mg   No change documented:   Luiz Reid RPH   Plan last modified:   Christy Teixeira RPH (11/16/2018)   Next INR check:   1/18/2019   Priority:   Maintenance   Target end date:       Indications    Aortic valve replaced [Z95.2]  Paroxysmal atrial fibrillation (CMS/HCC) [I48.0]             Anticoagulation Episode Summary     INR check location:       Preferred lab:       Send INR reminders to:   [facility]  POOL    Comments:   *Coaguchek*      Anticoagulation Care Providers     Provider Role Specialty Phone number    Juan Antonio Buenrostro MD Referring Cardiology [phone]          Drug interactions: has remained unchanged.  Diet: has remained unchanged.    Clinic Interview:  No pertinent clinical findings have been reported.    INR History:  Anticoagulation Monitoring 12/14/2018 12/28/2018 1/4/2019   INR 3.50 3.70 3.20   INR Date 12/14/2018 12/28/2018 1/4/2019   INR Goal 2.5-3.5 2.5-3.5 2.5-3.5   Trend Same Same Same   Last Week Total 42.5 mg 42.5 mg 42.5 mg   Next Week Total 42.5 mg 42.5 mg 42.5 mg   Sun 7.5 mg 7.5 mg 7.5 mg   Mon 5 mg 5 mg 5 mg   Tue 7.5 mg 7.5 mg 7.5 mg   Wed 5 mg 5 mg 5 mg   Thu 7.5 mg 7.5 mg 7.5 mg   Fri 5 mg 5 mg 5 mg   Sat 5 mg 5 mg 5 mg   Visit Report - - -   Some recent data might be hidden       Plan:  1. INR is Therapeutic today- see above in Anticoagulation Summary.   Will instruct Fernie Porter to Continue their warfarin regimen- see above in Anticoagulation Summary.  2. Follow up in 2 weeks  3. Pt has agreed to only be called if INR out of range. They have been instructed to call if any changes in medications, doses, concerns, etc. Patient expresses understanding and has no further  questions at this time.    Luiz eRid RPH

## 2019-01-18 ENCOUNTER — ANTICOAGULATION VISIT (OUTPATIENT)
Dept: PHARMACY | Facility: HOSPITAL | Age: 49
End: 2019-01-18

## 2019-01-18 DIAGNOSIS — Z95.2 AORTIC VALVE REPLACED: ICD-10-CM

## 2019-01-18 DIAGNOSIS — I48.0 PAROXYSMAL ATRIAL FIBRILLATION (HCC): ICD-10-CM

## 2019-01-18 LAB — INR PPP: 3.2

## 2019-01-18 NOTE — PROGRESS NOTES
Anticoagulation Clinic Progress Note    Anticoagulation Summary  As of 1/18/2019    INR goal:   2.5-3.5   TTR:   68.3 % (1.8 mo)   INR used for dosing:   3.20 (1/18/2019)   Warfarin maintenance plan:   7.5 mg on Sun, Tue, Thu; 5 mg all other days   Weekly warfarin total:   42.5 mg   No change documented:   Luiz Reid RPH   Plan last modified:   Christy Teixeira RP (11/16/2018)   Next INR check:   2/1/2019   Priority:   Maintenance   Target end date:       Indications    Aortic valve replaced [Z95.2]  Paroxysmal atrial fibrillation (CMS/HCC) [I48.0]             Anticoagulation Episode Summary     INR check location:       Preferred lab:       Send INR reminders to:   [facility]  POOL    Comments:   *Coaguchek*      Anticoagulation Care Providers     Provider Role Specialty Phone number    Juan Antonio Buenrostro MD Referring Cardiology [phone]          Drug interactions: has remained unchanged.  Diet: has remained unchanged.    Clinic Interview:  No pertinent clinical findings have been reported.    INR History:  Anticoagulation Monitoring 12/28/2018 1/4/2019 1/18/2019   INR 3.70 3.20 3.20   INR Date 12/28/2018 1/4/2019 1/18/2019   INR Goal 2.5-3.5 2.5-3.5 2.5-3.5   Trend Same Same Same   Last Week Total 42.5 mg 42.5 mg 42.5 mg   Next Week Total 42.5 mg 42.5 mg 42.5 mg   Sun 7.5 mg 7.5 mg 7.5 mg   Mon 5 mg 5 mg 5 mg   Tue 7.5 mg 7.5 mg 7.5 mg   Wed 5 mg 5 mg 5 mg   Thu 7.5 mg 7.5 mg 7.5 mg   Fri 5 mg 5 mg 5 mg   Sat 5 mg 5 mg 5 mg   Visit Report - - -   Some recent data might be hidden       Plan:  1. INR is Therapeutic today- see above in Anticoagulation Summary.   Will instruct Fernie Porter to Continue their warfarin regimen- see above in Anticoagulation Summary.  2. Follow up in 2 weeks  3. Pt has agreed to only be called if INR out of range. They have been instructed to call if any changes in medications, doses, concerns, etc. Patient expresses understanding and has no further  questions at this time.    Luiz Reid RPH

## 2019-02-01 LAB — INR PPP: 2.8

## 2019-02-04 ENCOUNTER — ANTICOAGULATION VISIT (OUTPATIENT)
Dept: PHARMACY | Facility: HOSPITAL | Age: 49
End: 2019-02-04

## 2019-02-04 ENCOUNTER — OFFICE VISIT (OUTPATIENT)
Dept: INTERNAL MEDICINE | Facility: CLINIC | Age: 49
End: 2019-02-04

## 2019-02-04 VITALS
TEMPERATURE: 98.5 F | OXYGEN SATURATION: 99 % | SYSTOLIC BLOOD PRESSURE: 130 MMHG | BODY MASS INDEX: 24.14 KG/M2 | HEIGHT: 69 IN | DIASTOLIC BLOOD PRESSURE: 64 MMHG | WEIGHT: 163 LBS | RESPIRATION RATE: 16 BRPM | HEART RATE: 64 BPM

## 2019-02-04 DIAGNOSIS — I48.0 PAROXYSMAL ATRIAL FIBRILLATION (HCC): ICD-10-CM

## 2019-02-04 DIAGNOSIS — Z95.2 AORTIC VALVE REPLACED: ICD-10-CM

## 2019-02-04 DIAGNOSIS — I10 ESSENTIAL HYPERTENSION: Primary | ICD-10-CM

## 2019-02-04 PROCEDURE — 99214 OFFICE O/P EST MOD 30 MIN: CPT | Performed by: INTERNAL MEDICINE

## 2019-02-04 RX ORDER — PROPRANOLOL HYDROCHLORIDE 80 MG/1
80 CAPSULE, EXTENDED RELEASE ORAL DAILY
Qty: 90 CAPSULE | Refills: 3 | Status: SHIPPED | OUTPATIENT
Start: 2019-02-04 | End: 2020-01-24

## 2019-02-04 NOTE — PROGRESS NOTES
Subjective   Fernie Porter is a 48 y.o. male.     Chief Complaint   Patient presents with   • Hypertension     paf, avr   • Migraine         Hypertension   This is a chronic problem. The current episode started more than 1 year ago. The problem is unchanged. The problem is controlled. Associated symptoms include headaches. Pertinent negatives include no orthopnea or peripheral edema. There are no associated agents to hypertension. Risk factors for coronary artery disease include male gender. Current antihypertension treatment includes alpha 1 blockers, diuretics and beta blockers. The current treatment provides significant improvement. There are no compliance problems.  There is no history of angina, kidney disease, CAD/MI, CVA or heart failure.   Migraine    This is a chronic problem. The current episode started more than 1 year ago. The problem occurs constantly. The problem has been waxing and waning. The pain is located in the left unilateral region. The pain does not radiate. The pain quality is similar to prior headaches. The quality of the pain is described as throbbing. The pain is moderate. Pertinent negatives include no abdominal pain, coughing, fever, nausea or vomiting. Nothing aggravates the symptoms. He has tried NSAIDs for the symptoms. The treatment provided significant relief.   Atrial Fibrillation   Presents for follow-up visit. The symptoms have been stable. Past medical history includes atrial fibrillation. There are no medication compliance problems.   Headache    This is a chronic problem. The current episode started more than 1 year ago. The problem occurs constantly. Pertinent negatives include no abdominal pain, coughing, fever, nausea or vomiting.        The following portions of the patient's history were reviewed and updated as appropriate: allergies, current medications, past social history and problem list.    Outpatient Medications Marked as Taking for the 2/4/19 encounter (Office  Visit) with Juan Antonio Ford MD   Medication Sig Dispense Refill   • amLODIPine (NORVASC) 5 MG tablet Take 1 tablet by mouth Daily. 90 tablet 3   • amoxicillin (AMOXIL) 500 MG capsule Take 4 tablets by mouth 1 hr prior to dental procedures 20 capsule 0   • carvedilol (COREG) 25 MG tablet TAKE 1 TABLET TWICE A  tablet 1   • CloNIDine (CATAPRES) 0.2 MG tablet TAKE 1 TABLET BY MOUTH TWICE A  tablet 1   • fluticasone (FLONASE) 50 MCG/ACT nasal spray 2 SPRAYS INTO EACH NOSTRIL DAILY. (Patient taking differently: 2 sprays into the nostril(s) as directed by provider As Needed.) 16 mL 6   • hydrochlorothiazide (MICROZIDE) 12.5 MG capsule TAKE ONE CAPSULE BY MOUTH EVERY DAY 90 capsule 1   • hydrocortisone 2.5 % cream Apply  topically 3 (Three) Times a Day.     • imipramine (TOFRANIL) 50 MG tablet TAKE 2 TABLETS BY MOUTH AT BEDTIME 180 tablet 1   • warfarin (COUMADIN) 5 MG tablet TAKE 1.5 TABLETS DAILY OR AS DIRECTED 135 tablet 0       Review of Systems   Constitutional: Negative for chills, fatigue and fever.   Respiratory: Negative for cough and wheezing.    Cardiovascular: Negative for orthopnea and leg swelling.   Gastrointestinal: Negative for abdominal pain, constipation, diarrhea, nausea and vomiting.   Neurological: Positive for headaches.       Objective   Vitals:    02/04/19 1007   BP: 130/64   Pulse: 64   Resp: 16   Temp: 98.5 °F (36.9 °C)   SpO2: 99%          02/04/19  1007   Weight: 73.9 kg (163 lb)    [unfilled]  Body mass index is 24.14 kg/m².      Physical Exam   Constitutional: He appears well-developed and well-nourished. No distress.   HENT:   Head: Normocephalic and atraumatic.   Neck: Carotid bruit is not present. No thyromegaly present.   Cardiovascular: Normal rate, regular rhythm and intact distal pulses. Exam reveals no gallop.   Murmur heard.   Crescendo decrescendo systolic murmur is present with a grade of 2/6.  Prosthetic heart sounds.  Grade 2/6 JOHNNIE loudest at the pulmonic area.    Pulmonary/Chest: Effort normal and breath sounds normal. No respiratory distress. He has no wheezes. He has no rales.   Abdominal: Soft. Bowel sounds are normal. He exhibits no mass. There is no tenderness. There is no guarding.         Problem List Items Addressed This Visit        Cardiovascular and Mediastinum    Paroxysmal atrial fibrillation (CMS/HCC)    Hypertension - Primary       Other    Aortic valve replaced        Assessment/Plan   In for recheck of hypertension, AF, aortic stenosis, and migraines.  Migraines persist.  They're doing pretty well to present time.  Gets 3 or 4 per month.  Has failed several drugs in the past including Topamax.  Blood pressure control is excellent.  Atrial fib is controlled.  Got annual lab work February 2018 including CBC, CMP, urinalysis and labs look good.  Continue to monitor every 3 months.  Had virtually no migraines in December than that.  Complicated migraine in January.  We'll try switching his coreg to Inderal LA 80 mg daily and see if it does a job for migraines.  If not we'll consider trying Depakote.  He thinks he failed Zonegran as well.         Dragon disclaimer:   Much of this encounter note is an electronic transcription/translation of spoken language to printed text. The electronic translation of spoken language may permit erroneous, or at times, nonsensical words or phrases to be inadvertently transcribed; Although I have reviewed the note for such errors, some may still exist.

## 2019-02-04 NOTE — PROGRESS NOTES
Anticoagulation Clinic Progress Note    Anticoagulation Summary  As of 2019    INR goal:   2.5-3.5   TTR:   74.9 % (2.2 mo)   INR used for dosin.80 (2019)   Warfarin maintenance plan:   7.5 mg on Sun, Tue, Thu; 5 mg all other days   Weekly warfarin total:   42.5 mg   No change documented:   Luiz Reid RPH   Plan last modified:   Christy Teixeira RPH (2018)   Next INR check:   2/15/2019   Priority:   Maintenance   Target end date:       Indications    Aortic valve replaced [Z95.2]  Paroxysmal atrial fibrillation (CMS/HCC) [I48.0]             Anticoagulation Episode Summary     INR check location:       Preferred lab:       Send INR reminders to:   [facility]  POOL    Comments:   *Coaguchek*      Anticoagulation Care Providers     Provider Role Specialty Phone number    Juan Antonio Buenrostro MD Referring Cardiology [phone]          Drug interactions: has remained unchanged.  Diet: has remained unchanged.    Clinic Interview:  No pertinent clinical findings have been reported.    INR History:  Anticoagulation Monitoring 2019   INR 3.20 3.20 2.80   INR Date 2019   INR Goal 2.5-3.5 2.5-3.5 2.5-3.5   Trend Same Same Same   Last Week Total 42.5 mg 42.5 mg 42.5 mg   Next Week Total 42.5 mg 42.5 mg 42.5 mg   Sun 7.5 mg 7.5 mg 7.5 mg   Mon 5 mg 5 mg 5 mg   Tue 7.5 mg 7.5 mg 7.5 mg   Wed 5 mg 5 mg 5 mg   Thu 7.5 mg 7.5 mg 7.5 mg   Fri 5 mg 5 mg 5 mg   Sat 5 mg 5 mg 5 mg   Visit Report - - -   Some recent data might be hidden       Plan:  1. INR is Therapeutic today- see above in Anticoagulation Summary.   Will instruct Fernie Porter to Continue their warfarin regimen- see above in Anticoagulation Summary.  2. Follow up in 2 weeks  3. Pt has agreed to only be called if INR out of range. They have been instructed to call if any changes in medications, doses, concerns, etc. Patient expresses understanding and has no further questions at  this time.    Luiz Reid [facility]

## 2019-02-11 RX ORDER — CLONIDINE HYDROCHLORIDE 0.2 MG/1
0.2 TABLET ORAL 2 TIMES DAILY
Qty: 180 TABLET | Refills: 1 | Status: SHIPPED | OUTPATIENT
Start: 2019-02-11 | End: 2019-05-17 | Stop reason: SDUPTHER

## 2019-02-15 ENCOUNTER — ANTICOAGULATION VISIT (OUTPATIENT)
Dept: PHARMACY | Facility: HOSPITAL | Age: 49
End: 2019-02-15

## 2019-02-15 DIAGNOSIS — I48.0 PAROXYSMAL ATRIAL FIBRILLATION (HCC): ICD-10-CM

## 2019-02-15 DIAGNOSIS — Z95.2 AORTIC VALVE REPLACED: ICD-10-CM

## 2019-02-15 LAB — INR PPP: 2.9

## 2019-02-15 NOTE — PROGRESS NOTES
Anticoagulation Clinic Progress Note    Anticoagulation Summary  As of 2/15/2019    INR goal:   2.5-3.5   TTR:   79.3 % (2.7 mo)   INR used for dosin.90 (2/15/2019)   Warfarin maintenance plan:   7.5 mg on Sun, Tue, Thu; 5 mg all other days   Weekly warfarin total:   42.5 mg   Plan last modified:   Christy Teixeira RP (2018)   Next INR check:   3/1/2019   Priority:   Maintenance   Target end date:       Indications    Aortic valve replaced [Z95.2]  Paroxysmal atrial fibrillation (CMS/HCC) [I48.0]             Anticoagulation Episode Summary     INR check location:       Preferred lab:       Send INR reminders to:   [facility]  POOL    Comments:   *Coaguchek*      Anticoagulation Care Providers     Provider Role Specialty Phone number    Juan Antonio Buenrostro MD Referring Cardiology [phone]          Clinic Interview:  No pertinent clinical findings have been reported.    INR History:  Anticoagulation Monitoring 2019 2019 2/15/2019   INR 3.20 2.80 2.90   INR Date 2019 2019 2/15/2019   INR Goal 2.5-3.5 2.5-3.5 2.5-3.5   Trend Same Same Same   Last Week Total 42.5 mg 42.5 mg 42.5 mg   Next Week Total 42.5 mg 42.5 mg 42.5 mg   Sun 7.5 mg 7.5 mg 7.5 mg   Mon 5 mg 5 mg 5 mg   Tue 7.5 mg 7.5 mg 7.5 mg   Wed 5 mg 5 mg 5 mg   Thu 7.5 mg 7.5 mg 7.5 mg   Fri 5 mg 5 mg 5 mg   Sat 5 mg 5 mg 5 mg   Visit Report - - -   Some recent data might be hidden       Plan:  1. INR is therapeutic today- see above in Anticoagulation Summary.    Fernie CORY Porter to continue their warfarin regimen- see above in Anticoagulation Summary.  2. Follow up in 2 weeks  3. Pt has agreed to only be called if INR out of range. They have been instructed to call if any changes in medications, doses, concerns, etc. Patient expresses understanding and has no further questions at this time.    Brittany Lockhart [facility]

## 2019-03-01 LAB — INR PPP: 3.1

## 2019-03-04 ENCOUNTER — ANTICOAGULATION VISIT (OUTPATIENT)
Dept: PHARMACY | Facility: HOSPITAL | Age: 49
End: 2019-03-04

## 2019-03-04 DIAGNOSIS — Z95.2 AORTIC VALVE REPLACED: ICD-10-CM

## 2019-03-04 DIAGNOSIS — I48.0 PAROXYSMAL ATRIAL FIBRILLATION (HCC): ICD-10-CM

## 2019-03-04 NOTE — PROGRESS NOTES
Anticoagulation Clinic Progress Note    Anticoagulation Summary  As of 3/4/2019    INR goal:   2.5-3.5   TTR:   82.3 % (3.2 mo)   INR used for dosing:   3.10 (3/1/2019)   Warfarin maintenance plan:   7.5 mg on Sun, Tue, Thu; 5 mg all other days   Weekly warfarin total:   42.5 mg   No change documented:   Brayan Patterson RPH   Plan last modified:   Christy Teixeira RPH (11/16/2018)   Next INR check:   3/15/2019   Priority:   Maintenance   Target end date:       Indications    Aortic valve replaced [Z95.2]  Paroxysmal atrial fibrillation (CMS/HCC) [I48.0]             Anticoagulation Episode Summary     INR check location:       Preferred lab:       [facility]  [facility]    Comments:   *Coaguchek*      Anticoagulation Care Providers     Provider Role Specialty Phone number    Juan Antonio Buenrostro MD Referring Cardiology [phone]                INR History:  Anticoagulation Monitoring 2/4/2019 2/15/2019 3/4/2019   INR 2.80 2.90 3.10   INR Date 2/1/2019 2/15/2019 3/1/2019   INR Goal 2.5-3.5 2.5-3.5 2.5-3.5   Trend Same Same Same   Last Week Total 42.5 mg 42.5 mg 42.5 mg   Next Week Total 42.5 mg 42.5 mg 42.5 mg   Sun 7.5 mg 7.5 mg 7.5 mg   Mon 5 mg 5 mg 5 mg   Tue 7.5 mg 7.5 mg 7.5 mg   Wed 5 mg 5 mg 5 mg   Thu 7.5 mg 7.5 mg 7.5 mg   Fri 5 mg 5 mg 5 mg   Sat 5 mg 5 mg 5 mg   Visit Report - - -   Some recent data might be hidden       Plan:  1. INR is Therapeutic today- see above in Anticoagulation Summary.   Will instruct Fernie Porter to Continue their warfarin regimen- see above in Anticoagulation Summary.  2. Follow up in 2 weeks  3. Pt has agreed to only be called if INR out of range. They have been instructed to call if any changes in medications, doses, concerns, etc. Patient expresses understanding and has no further questions at this time.    Brayan Patterson RPH

## 2019-03-15 LAB — INR PPP: 3.4

## 2019-03-15 RX ORDER — FLUTICASONE PROPIONATE 50 MCG
2 SPRAY, SUSPENSION (ML) NASAL DAILY
Qty: 16 ML | Refills: 6 | Status: SHIPPED | OUTPATIENT
Start: 2019-03-15 | End: 2019-12-14 | Stop reason: SDUPTHER

## 2019-03-18 ENCOUNTER — ANTICOAGULATION VISIT (OUTPATIENT)
Dept: PHARMACY | Facility: HOSPITAL | Age: 49
End: 2019-03-18

## 2019-03-18 DIAGNOSIS — I48.0 PAROXYSMAL ATRIAL FIBRILLATION (HCC): ICD-10-CM

## 2019-03-18 DIAGNOSIS — Z95.2 AORTIC VALVE REPLACED: ICD-10-CM

## 2019-03-18 NOTE — PROGRESS NOTES
Anticoagulation Clinic Progress Note    Anticoagulation Summary  As of 3/18/2019    INR goal:   2.5-3.5   TTR:   84.6 % (3.6 mo)   INR used for dosing:   3.40 (3/15/2019)   Warfarin maintenance plan:   7.5 mg on Sun, Tue, Thu; 5 mg all other days   Weekly warfarin total:   42.5 mg   No change documented:   Brayan Patterson RPH   Plan last modified:   Christy Teixeira RPH (11/16/2018)   Next INR check:   3/29/2019   Priority:   Maintenance   Target end date:       Indications    Aortic valve replaced [Z95.2]  Paroxysmal atrial fibrillation (CMS/HCC) [I48.0]             Anticoagulation Episode Summary     INR check location:       Preferred lab:       [facility]  [facility]    Comments:   *Coaguchek*      Anticoagulation Care Providers     Provider Role Specialty Phone number    Juan Antonio Buenrostro MD Referring Cardiology [phone]              INR History:  Anticoagulation Monitoring 2/15/2019 3/4/2019 3/18/2019   INR 2.90 3.10 3.40   INR Date 2/15/2019 3/1/2019 3/15/2019   INR Goal 2.5-3.5 2.5-3.5 2.5-3.5   Trend Same Same Same   Last Week Total 42.5 mg 42.5 mg 42.5 mg   Next Week Total 42.5 mg 42.5 mg 42.5 mg   Sun 7.5 mg 7.5 mg 7.5 mg   Mon 5 mg 5 mg 5 mg   Tue 7.5 mg 7.5 mg 7.5 mg   Wed 5 mg 5 mg 5 mg   Thu 7.5 mg 7.5 mg 7.5 mg   Fri 5 mg 5 mg 5 mg   Sat 5 mg 5 mg 5 mg   Visit Report - - -   Some recent data might be hidden       Plan:  1. INR is Therapeutic today- see above in Anticoagulation Summary.   Will instruct Fernie Porter to Continue their warfarin regimen- see above in Anticoagulation Summary.  2. Follow up in 2 weeks  3. Pt has agreed to only be called if INR out of range. They have been instructed to call if any changes in medications, doses, concerns, etc. Patient expresses understanding and has no further questions at this time.    Brayan Patterson RPH

## 2019-03-20 ENCOUNTER — OFFICE VISIT (OUTPATIENT)
Dept: INTERNAL MEDICINE | Facility: CLINIC | Age: 49
End: 2019-03-20

## 2019-03-20 VITALS
HEIGHT: 69 IN | BODY MASS INDEX: 23.99 KG/M2 | WEIGHT: 162 LBS | DIASTOLIC BLOOD PRESSURE: 72 MMHG | TEMPERATURE: 97.6 F | RESPIRATION RATE: 16 BRPM | HEART RATE: 87 BPM | SYSTOLIC BLOOD PRESSURE: 122 MMHG | OXYGEN SATURATION: 98 %

## 2019-03-20 DIAGNOSIS — M54.31 SCIATICA OF RIGHT SIDE: Primary | ICD-10-CM

## 2019-03-20 PROCEDURE — 99213 OFFICE O/P EST LOW 20 MIN: CPT | Performed by: INTERNAL MEDICINE

## 2019-03-20 RX ORDER — GABAPENTIN 300 MG/1
300 CAPSULE ORAL 3 TIMES DAILY
Qty: 30 CAPSULE | Refills: 0 | Status: SHIPPED | OUTPATIENT
Start: 2019-03-20 | End: 2019-03-28 | Stop reason: SDUPTHER

## 2019-03-20 RX ORDER — METHYLPREDNISOLONE 4 MG/1
TABLET ORAL
Qty: 1 EACH | Refills: 0 | Status: SHIPPED | OUTPATIENT
Start: 2019-03-20 | End: 2019-05-06

## 2019-03-20 NOTE — PROGRESS NOTES
Subjective   Fernie Porter is a 48 y.o. male.     Chief Complaint   Patient presents with   • Back Pain   • Leg Pain     right         Back Pain   This is a new problem. The current episode started 1 to 4 weeks ago. The problem occurs constantly. The problem has been gradually worsening since onset. The pain is present in the lumbar spine. The quality of the pain is described as stabbing. The pain radiates to the right thigh. The pain is severe. The symptoms are aggravated by standing. Associated symptoms include leg pain. Pertinent negatives include no fever, numbness or weakness. He has tried NSAIDs for the symptoms. The treatment provided no relief.   Leg Pain    The incident occurred 5 to 7 days ago. The incident occurred at home. The injury mechanism was a twisting injury. The quality of the pain is described as stabbing. The pain is severe. The pain has been worsening since onset. Pertinent negatives include no numbness.        The following portions of the patient's history were reviewed and updated as appropriate: allergies, current medications, past social history and problem list.    Outpatient Medications Marked as Taking for the 3/20/19 encounter (Office Visit) with Juan Antonio Ford MD   Medication Sig Dispense Refill   • amLODIPine (NORVASC) 5 MG tablet Take 1 tablet by mouth Daily. 90 tablet 3   • amoxicillin (AMOXIL) 500 MG capsule Take 4 tablets by mouth 1 hr prior to dental procedures 20 capsule 0   • CloNIDine (CATAPRES) 0.2 MG tablet Take 1 tablet by mouth 2 (Two) Times a Day. 180 tablet 1   • fluticasone (FLONASE) 50 MCG/ACT nasal spray 2 sprays into the nostril(s) as directed by provider Daily. 16 mL 6   • hydrochlorothiazide (MICROZIDE) 12.5 MG capsule TAKE ONE CAPSULE BY MOUTH EVERY DAY 90 capsule 1   • hydrocortisone 2.5 % cream Apply  topically 3 (Three) Times a Day.     • imipramine (TOFRANIL) 50 MG tablet TAKE 2 TABLETS BY MOUTH AT BEDTIME 180 tablet 1   • propranolol LA (INDERAL LA) 80  MG 24 hr capsule Take 1 capsule by mouth Daily. 90 capsule 3   • warfarin (COUMADIN) 5 MG tablet TAKE 1.5 TABLETS DAILY OR AS DIRECTED 135 tablet 0       Review of Systems   Constitutional: Negative for chills and fever.   Genitourinary: Negative for difficulty urinating.   Musculoskeletal: Positive for back pain. Negative for arthralgias.   Neurological: Negative for weakness and numbness.       Objective   Vitals:    03/20/19 0915   BP: 122/72   Pulse: 87   Resp: 16   Temp: 97.6 °F (36.4 °C)   SpO2: 98%          03/20/19 0915   Weight: 73.5 kg (162 lb)    [unfilled]  Body mass index is 23.99 kg/m².      Physical Exam   Constitutional: He is oriented to person, place, and time. He appears well-developed and well-nourished.   HENT:   Head: Normocephalic and atraumatic.   Neurological: He is alert and oriented to person, place, and time. He has normal strength and normal reflexes. He exhibits normal muscle tone.         Problem List Items Addressed This Visit     None      Visit Diagnoses     Sciatica of right side    -  Primary        Assessment/Plan   Injury to lower back about 12 days ago.  He was lifting some pallets at home and he twisted to the side to lift 1 and without motion developed low back pain.  That gradually worsened over the next several days.  About 5 days ago he began with pain into the right anterior thigh.  This is a sharp and severe pain.  Not treating this so far.  The back pain seems to actually be improving.  Most of his symptoms now occur with standing or walking.  It sounds like he is ruptured a lumbar disc.  The good news is the back pain is improving.  The sciatica is getting worse however.  Treatment is going to be problematic given his long-term Coumadin use as well as the fact that he is got an artificial valve that would need to be bridged in the event of any invasive procedures including epidural.  For now he is advised to be off work.  We will add a Medrol Dosepak today.   Gabapentin 300 mg at bedtime.  We will need to keep an eye on his protimes.  He does home pro time monitoring.  Due back in 6 weeks and will determine whether he needs an MRI at that time.           .[email or web address]  Dragon disclaimer:   Much of this encounter note is an electronic transcription/translation of spoken language to printed text. The electronic translation of spoken language may permit erroneous, or at times, nonsensical words or phrases to be inadvertently transcribed; Although I have reviewed the note for such errors, some may still exist.

## 2019-03-21 ENCOUNTER — TELEPHONE (OUTPATIENT)
Dept: INTERNAL MEDICINE | Facility: CLINIC | Age: 49
End: 2019-03-21

## 2019-03-21 NOTE — TELEPHONE ENCOUNTER
Patient was in the office 3-20-19 c/o back pain. You advised him not to do any heavy lifting & to be off work for now. Patient is wondering how much weight he should avoid lifting and for what period of time? Patient is due back in 6 weeks for a f/u appt. He is also wondering how long he should remain off work. He works for [employer] where he mostly does heavy lifting. Please advise.     Weight restriction should be 10 pounds.  He will need to be off work until his symptoms have cleared up.

## 2019-03-21 NOTE — TELEPHONE ENCOUNTER
Informed patient. He requested a written letter he can give to his employer explaining this. Patient will  letter in the office tomorrow.

## 2019-03-28 RX ORDER — WARFARIN SODIUM 5 MG/1
TABLET ORAL
Qty: 135 TABLET | Refills: 0 | Status: SHIPPED | OUTPATIENT
Start: 2019-03-28 | End: 2019-07-10 | Stop reason: SDUPTHER

## 2019-03-28 RX ORDER — GABAPENTIN 300 MG/1
CAPSULE ORAL
Qty: 30 CAPSULE | Refills: 0 | OUTPATIENT
Start: 2019-03-28 | End: 2019-05-06 | Stop reason: SDUPTHER

## 2019-03-29 LAB — INR PPP: 3.5

## 2019-04-01 ENCOUNTER — ANTICOAGULATION VISIT (OUTPATIENT)
Dept: PHARMACY | Facility: HOSPITAL | Age: 49
End: 2019-04-01

## 2019-04-01 DIAGNOSIS — I48.0 PAROXYSMAL ATRIAL FIBRILLATION (HCC): ICD-10-CM

## 2019-04-01 DIAGNOSIS — Z95.2 AORTIC VALVE REPLACED: ICD-10-CM

## 2019-04-01 NOTE — PROGRESS NOTES
Anticoagulation Clinic Progress Note    Anticoagulation Summary  As of 4/1/2019    INR goal:   2.5-3.5   TTR:   86.3 % (4.1 mo)   INR used for dosing:   3.50 (3/29/2019)   Warfarin maintenance plan:   7.5 mg every Sun, Tue, Thu; 5 mg all other days   Weekly warfarin total:   42.5 mg   No change documented:   Cindy Mathew   Plan last modified:   Christy Teixeira [facility] (11/16/2018)   Next INR check:   4/12/2019   Priority:   Maintenance   Target end date:       Indications    Aortic valve replaced [Z95.2]  Paroxysmal atrial fibrillation (CMS/HCC) [I48.0]             Anticoagulation Episode Summary     INR check location:       Preferred lab:       Send INR reminders to:   [facility]  POOL    Comments:   *Coag[place]k*      Anticoagulation Care Providers     Provider Role Specialty Phone number    Juan Antonio Buenrostro MD Referring Cardiology [phone]          Clinic Interview:  No pertinent clinical findings have been reported.    INR History:  Anticoagulation Monitoring 3/4/2019 3/18/2019 4/1/2019   INR 3.10 3.40 3.50   INR Date 3/1/2019 3/15/2019 3/29/2019   INR Goal 2.5-3.5 2.5-3.5 2.5-3.5   Trend Same Same Same   Last Week Total 42.5 mg 42.5 mg 42.5 mg   Next Week Total 42.5 mg 42.5 mg 42.5 mg   Sun 7.5 mg 7.5 mg 7.5 mg   Mon 5 mg 5 mg 5 mg   Tue 7.5 mg 7.5 mg 7.5 mg   Wed 5 mg 5 mg 5 mg   Thu 7.5 mg 7.5 mg 7.5 mg   Fri 5 mg 5 mg 5 mg   Sat 5 mg 5 mg 5 mg   Visit Report - - -   Some recent data might be hidden       Plan:  1. INR is therapeutic today- see above in Anticoagulation Summary.    Fernie Porter to continue their warfarin regimen- see above in Anticoagulation Summary.  2. Follow up in 2 weeks  3. Pt has agreed to only be called if INR out of range. They have been instructed to call if any changes in medications, doses, concerns, etc. Patient expresses understanding and has no further questions at this time.    Cindy Mathew

## 2019-04-12 LAB — INR PPP: 3.3

## 2019-04-15 ENCOUNTER — ANTICOAGULATION VISIT (OUTPATIENT)
Dept: PHARMACY | Facility: HOSPITAL | Age: 49
End: 2019-04-15

## 2019-04-15 DIAGNOSIS — I48.0 PAROXYSMAL ATRIAL FIBRILLATION (HCC): ICD-10-CM

## 2019-04-15 DIAGNOSIS — Z95.2 AORTIC VALVE REPLACED: ICD-10-CM

## 2019-04-15 NOTE — PROGRESS NOTES
Anticoagulation Clinic Progress Note    Anticoagulation Summary  As of 4/15/2019    INR goal:   2.5-3.5   TTR:   87.7 % (4.6 mo)   INR used for dosing:   3.30 (4/12/2019)   Warfarin maintenance plan:   7.5 mg every Sun, Tue, Thu; 5 mg all other days   Weekly warfarin total:   42.5 mg   No change documented:   Brayan Patterson RPH   Plan last modified:   Christy Teixeira RPH (11/16/2018)   Next INR check:   4/26/2019   Priority:   Maintenance   Target end date:       Indications    Aortic valve replaced [Z95.2]  Paroxysmal atrial fibrillation (CMS/HCC) [I48.0]             Anticoagulation Episode Summary     INR check location:       Preferred lab:       Send INR reminders to:    [facility]  POOL    Comments:   *Coaguchek*      Anticoagulation Care Providers     Provider Role Specialty Phone number    Juan Antonio Buenrostro MD Referring Cardiology [phone]          Clinic Interview:  No pertinent clinical findings have been reported.    INR History:  Anticoagulation Monitoring 3/18/2019 4/1/2019 4/15/2019   INR 3.40 3.50 3.30   INR Date 3/15/2019 3/29/2019 4/12/2019   INR Goal 2.5-3.5 2.5-3.5 2.5-3.5   Trend Same Same Same   Last Week Total 42.5 mg 42.5 mg 42.5 mg   Next Week Total 42.5 mg 42.5 mg 42.5 mg   Sun 7.5 mg 7.5 mg 7.5 mg   Mon 5 mg 5 mg 5 mg   Tue 7.5 mg 7.5 mg 7.5 mg   Wed 5 mg 5 mg 5 mg   Thu 7.5 mg 7.5 mg 7.5 mg   Fri 5 mg 5 mg 5 mg   Sat 5 mg 5 mg 5 mg   Visit Report - - -   Some recent data might be hidden       Plan:  1. INR is therapeutic today- see above in Anticoagulation Summary.    Fernie Porter to continue their warfarin regimen- see above in Anticoagulation Summary.  2. Follow up in 2 weeks  3. Pt has agreed to only be called if INR out of range. They have been instructed to call if any changes in medications, doses, concerns, etc. Patient expresses understanding and has no further questions at this time.    Brayan Patterson RPH

## 2019-04-26 ENCOUNTER — ANTICOAGULATION VISIT (OUTPATIENT)
Dept: PHARMACY | Facility: HOSPITAL | Age: 49
End: 2019-04-26

## 2019-04-26 DIAGNOSIS — I48.0 PAROXYSMAL ATRIAL FIBRILLATION (HCC): ICD-10-CM

## 2019-04-26 DIAGNOSIS — Z95.2 AORTIC VALVE REPLACED: ICD-10-CM

## 2019-04-26 LAB — INR PPP: 3.1

## 2019-04-26 NOTE — PROGRESS NOTES
Anticoagulation Clinic Progress Note    Anticoagulation Summary  As of 4/26/2019    INR goal:   2.5-3.5   TTR:   88.9 % (5 mo)   INR used for dosing:   3.10 (4/26/2019)   Warfarin maintenance plan:   7.5 mg every Sun, Tue, Thu; 5 mg all other days   Weekly warfarin total:   42.5 mg   No change documented:   Brayan Patterson RPH   Plan last modified:   Christy Teixeira RPH (11/16/2018)   Next INR check:   5/10/2019   Priority:   Maintenance   Target end date:       Indications    Aortic valve replaced [Z95.2]  Paroxysmal atrial fibrillation (CMS/HCC) [I48.0]             Anticoagulation Episode Summary     INR check location:       Preferred lab:       Send INR reminders to:   [facility]  POOL    Comments:   *Coag[place]k*      Anticoagulation Care Providers     Provider Role Specialty Phone number    Juan Antonio Buenrostro MD Referring Cardiology [phone]          Clinic Interview:  No pertinent clinical findings have been reported.    INR History:  Anticoagulation Monitoring 4/1/2019 4/15/2019 4/26/2019   INR 3.50 3.30 3.10   INR Date 3/29/2019 4/12/2019 4/26/2019   INR Goal 2.5-3.5 2.5-3.5 2.5-3.5   Trend Same Same Same   Last Week Total 42.5 mg 42.5 mg 42.5 mg   Next Week Total 42.5 mg 42.5 mg 42.5 mg   Sun 7.5 mg 7.5 mg 7.5 mg   Mon 5 mg 5 mg 5 mg   Tue 7.5 mg 7.5 mg 7.5 mg   Wed 5 mg 5 mg 5 mg   Thu 7.5 mg 7.5 mg 7.5 mg   Fri 5 mg 5 mg 5 mg   Sat 5 mg 5 mg 5 mg   Visit Report - - -   Some recent data might be hidden       Plan:  1. INR is therapeutic today- see above in Anticoagulation Summary.    Fernie Porter to continue their warfarin regimen- see above in Anticoagulation Summary.  2. Follow up in 2 weeks  3. Pt has agreed to only be called if INR out of range. They have been instructed to call if any changes in medications, doses, concerns, etc. Patient expresses understanding and has no further questions at this time.    Brayan Patterson RPH

## 2019-05-06 ENCOUNTER — OFFICE VISIT (OUTPATIENT)
Dept: INTERNAL MEDICINE | Facility: CLINIC | Age: 49
End: 2019-05-06

## 2019-05-06 VITALS
HEIGHT: 69 IN | WEIGHT: 164.8 LBS | TEMPERATURE: 98.3 F | RESPIRATION RATE: 16 BRPM | BODY MASS INDEX: 24.41 KG/M2 | DIASTOLIC BLOOD PRESSURE: 62 MMHG | SYSTOLIC BLOOD PRESSURE: 110 MMHG | OXYGEN SATURATION: 99 % | HEART RATE: 75 BPM

## 2019-05-06 DIAGNOSIS — G43.109 MIGRAINE WITH AURA AND WITHOUT STATUS MIGRAINOSUS, NOT INTRACTABLE: ICD-10-CM

## 2019-05-06 DIAGNOSIS — I48.0 PAROXYSMAL ATRIAL FIBRILLATION (HCC): ICD-10-CM

## 2019-05-06 DIAGNOSIS — Z79.899 MEDICATION MANAGEMENT: ICD-10-CM

## 2019-05-06 DIAGNOSIS — I10 ESSENTIAL HYPERTENSION: Primary | ICD-10-CM

## 2019-05-06 DIAGNOSIS — M54.17 LUMBOSACRAL RADICULOPATHY: ICD-10-CM

## 2019-05-06 PROCEDURE — 99214 OFFICE O/P EST MOD 30 MIN: CPT | Performed by: INTERNAL MEDICINE

## 2019-05-06 RX ORDER — GABAPENTIN 300 MG/1
300 CAPSULE ORAL NIGHTLY
Qty: 30 CAPSULE | Refills: 2 | OUTPATIENT
Start: 2019-05-06 | End: 2019-05-28

## 2019-05-06 NOTE — PROGRESS NOTES
Subjective   Fernie Porter is a 48 y.o. male.     Chief Complaint   Patient presents with   • Hypertension   • Atrial Fibrillation         Hypertension   This is a chronic problem. The current episode started more than 1 year ago. The problem is unchanged. The problem is controlled. Associated symptoms include headaches. Pertinent negatives include no orthopnea or peripheral edema. There are no associated agents to hypertension. Risk factors for coronary artery disease include male gender. Current antihypertension treatment includes alpha 1 blockers, diuretics and beta blockers. The current treatment provides significant improvement. There are no compliance problems.  There is no history of angina, kidney disease, CAD/MI, CVA or heart failure.   Atrial Fibrillation   Presents for follow-up visit. The symptoms have been stable. Past medical history includes atrial fibrillation. There are no medication compliance problems.   Migraine    This is a chronic problem. The current episode started more than 1 year ago. The problem occurs constantly. The problem has been waxing and waning. The pain is located in the left unilateral region. The pain does not radiate. The pain quality is similar to prior headaches. The quality of the pain is described as throbbing. The pain is moderate. Pertinent negatives include no abdominal pain, coughing, fever, nausea or vomiting. Nothing aggravates the symptoms. He has tried NSAIDs for the symptoms. The treatment provided significant relief.   Headache    This is a chronic problem. The current episode started more than 1 year ago. The problem occurs constantly. Pertinent negatives include no abdominal pain, coughing, fever, nausea or vomiting.        The following portions of the patient's history were reviewed and updated as appropriate: allergies, current medications, past social history and problem list.    Outpatient Medications Marked as Taking for the 5/6/19 encounter (Office  Visit) with Juan Antonio Ford MD   Medication Sig Dispense Refill   • amLODIPine (NORVASC) 5 MG tablet Take 1 tablet by mouth Daily. 90 tablet 3   • amoxicillin (AMOXIL) 500 MG capsule Take 4 tablets by mouth 1 hr prior to dental procedures 20 capsule 0   • CloNIDine (CATAPRES) 0.2 MG tablet Take 1 tablet by mouth 2 (Two) Times a Day. 180 tablet 1   • fluticasone (FLONASE) 50 MCG/ACT nasal spray 2 sprays into the nostril(s) as directed by provider Daily. 16 mL 6   • gabapentin (NEURONTIN) 300 MG capsule TAKE 1 CAPSULE BY MOUTH THREE TIMES A DAY 30 capsule 0   • hydrochlorothiazide (MICROZIDE) 12.5 MG capsule TAKE ONE CAPSULE BY MOUTH EVERY DAY 90 capsule 1   • hydrocortisone 2.5 % cream Apply  topically 3 (Three) Times a Day.     • imipramine (TOFRANIL) 50 MG tablet TAKE 2 TABLETS BY MOUTH AT BEDTIME 180 tablet 1   • propranolol LA (INDERAL LA) 80 MG 24 hr capsule Take 1 capsule by mouth Daily. 90 capsule 3   • warfarin (COUMADIN) 5 MG tablet TAKE 1.5 TABLETS DAILY OR AS DIRECTED 135 tablet 0       Review of Systems   Constitutional: Negative for chills, fatigue and fever.   Respiratory: Negative for cough and wheezing.    Cardiovascular: Negative for orthopnea and leg swelling.   Gastrointestinal: Negative for abdominal pain, constipation, diarrhea, nausea and vomiting.   Neurological: Positive for headaches.       Objective   Vitals:    05/06/19 1055   BP: 110/62   Pulse: 75   Resp: 16   Temp: 98.3 °F (36.8 °C)   SpO2: 99%          05/06/19  1055   Weight: 74.8 kg (164 lb 12.8 oz)    [unfilled]  Body mass index is 24.41 kg/m².      Physical Exam   Constitutional: He appears well-developed and well-nourished. No distress.   HENT:   Head: Normocephalic and atraumatic.   Neck: Carotid bruit is not present. No thyromegaly present.   Cardiovascular: Normal rate, regular rhythm and intact distal pulses. Exam reveals no gallop.   Murmur heard.   Crescendo decrescendo systolic murmur is present with a grade of  2/6.  Prosthetic heart sounds.  Grade 2/6 JOHNNIE loudest at the pulmonic area.   Pulmonary/Chest: Effort normal and breath sounds normal. No respiratory distress. He has no wheezes. He has no rales.   Abdominal: Soft. Bowel sounds are normal. He exhibits no mass. There is no tenderness. There is no guarding.         Problem List Items Addressed This Visit        Cardiovascular and Mediastinum    Paroxysmal atrial fibrillation (CMS/HCC)    Hypertension - Primary    Migraine        Assessment/Plan   In for recheck of hypertension, AF, aortic stenosis, and migraines.  Migraines persist.  They're doing pretty well to present time.  Gets 3 or 4 per month.  Has failed several drugs in the past including Topamax.  Blood pressure control is excellent.  Atrial fib is controlled.  Gets annual lab work today May 2019 including CBC, CMP, urinalysis.  Continue to monitor every 3 months.  Had virtually no migraines in December than that.  Complicated migraine in January.  We  Switched his coreg to Inderal LA 80 mg daily and it seems to have helped his migraines.  He wants to give it a longer period of time to see how it plays out.  Certainly no severe migraine since he started Inderal LA.  We can always increase the dose.  If not we'll consider trying Depakote.  He thinks he failed Zonegran in the past.  He injured his lower back on 3/8/2019 lifting some pallets at home.  Still gets some right thigh pain mostly in bed turning.  Not when he is walking.  He is getting numbness and tingling in the right thigh however including with walking.  That is new.  The Medrol Dosepak and gabapentin both seem to help last visit 2 months ago but he is off of both of those.  Also went to physical therapy.  We will plan to schedule an MRI.  Resume the gabapentin 300 mg at bedtime.  He still has a 10 pound lifting restriction right now.  He still off work over the past 4 weeks.  Hopefully the MRI will shed some light on how much he can increase his  lifting.         Dragon disclaimer:   Much of this encounter note is an electronic transcription/translation of spoken language to printed text. The electronic translation of spoken language may permit erroneous, or at times, nonsensical words or phrases to be inadvertently transcribed; Although I have reviewed the note for such errors, some may still exist.

## 2019-05-07 LAB
ALBUMIN SERPL-MCNC: 5 G/DL (ref 3.5–5.2)
ALBUMIN/GLOB SERPL: 2.2 G/DL
ALP SERPL-CCNC: 47 U/L (ref 39–117)
ALT SERPL-CCNC: 19 U/L (ref 1–41)
AST SERPL-CCNC: 26 U/L (ref 1–40)
BASOPHILS # BLD AUTO: 0.06 10*3/MM3 (ref 0–0.2)
BASOPHILS NFR BLD AUTO: 0.7 % (ref 0–1.5)
BILIRUB SERPL-MCNC: 0.6 MG/DL (ref 0.2–1.2)
BUN SERPL-MCNC: 9 MG/DL (ref 6–20)
BUN/CREAT SERPL: 10.5 (ref 7–25)
CALCIUM SERPL-MCNC: 10.2 MG/DL (ref 8.6–10.5)
CHLORIDE SERPL-SCNC: 96 MMOL/L (ref 98–107)
CHOLEST SERPL-MCNC: 200 MG/DL (ref 0–200)
CO2 SERPL-SCNC: 30.6 MMOL/L (ref 22–29)
CREAT SERPL-MCNC: 0.86 MG/DL (ref 0.76–1.27)
EOSINOPHIL # BLD AUTO: 0.2 10*3/MM3 (ref 0–0.4)
EOSINOPHIL NFR BLD AUTO: 2.4 % (ref 0.3–6.2)
ERYTHROCYTE [DISTWIDTH] IN BLOOD BY AUTOMATED COUNT: 13.3 % (ref 12.3–15.4)
GLOBULIN SER CALC-MCNC: 2.3 GM/DL
GLUCOSE SERPL-MCNC: 87 MG/DL (ref 65–99)
HCT VFR BLD AUTO: 49 % (ref 37.5–51)
HDLC SERPL-MCNC: 51 MG/DL (ref 40–60)
HGB BLD-MCNC: 15.2 G/DL (ref 13–17.7)
IMM GRANULOCYTES # BLD AUTO: 0.03 10*3/MM3 (ref 0–0.05)
IMM GRANULOCYTES NFR BLD AUTO: 0.4 % (ref 0–0.5)
LDLC SERPL CALC-MCNC: 128 MG/DL (ref 0–100)
LYMPHOCYTES # BLD AUTO: 0.98 10*3/MM3 (ref 0.7–3.1)
LYMPHOCYTES NFR BLD AUTO: 11.8 % (ref 19.6–45.3)
MCH RBC QN AUTO: 29.3 PG (ref 26.6–33)
MCHC RBC AUTO-ENTMCNC: 31 G/DL (ref 31.5–35.7)
MCV RBC AUTO: 94.6 FL (ref 79–97)
MONOCYTES # BLD AUTO: 0.62 10*3/MM3 (ref 0.1–0.9)
MONOCYTES NFR BLD AUTO: 7.5 % (ref 5–12)
NEUTROPHILS # BLD AUTO: 6.4 10*3/MM3 (ref 1.7–7)
NEUTROPHILS NFR BLD AUTO: 77.2 % (ref 42.7–76)
NRBC BLD AUTO-RTO: 0 /100 WBC (ref 0–0.2)
PLATELET # BLD AUTO: 217 10*3/MM3 (ref 140–450)
POTASSIUM SERPL-SCNC: 4.2 MMOL/L (ref 3.5–5.2)
PROT SERPL-MCNC: 7.3 G/DL (ref 6–8.5)
RBC # BLD AUTO: 5.18 10*6/MM3 (ref 4.14–5.8)
SODIUM SERPL-SCNC: 138 MMOL/L (ref 136–145)
TRIGL SERPL-MCNC: 105 MG/DL (ref 0–150)
VLDLC SERPL CALC-MCNC: 21 MG/DL
WBC # BLD AUTO: 8.29 10*3/MM3 (ref 3.4–10.8)

## 2019-05-09 ENCOUNTER — ANTICOAGULATION VISIT (OUTPATIENT)
Dept: PHARMACY | Facility: HOSPITAL | Age: 49
End: 2019-05-09

## 2019-05-09 DIAGNOSIS — Z95.2 AORTIC VALVE REPLACED: ICD-10-CM

## 2019-05-09 DIAGNOSIS — I48.0 PAROXYSMAL ATRIAL FIBRILLATION (HCC): ICD-10-CM

## 2019-05-09 LAB — INR PPP: 2.9

## 2019-05-09 NOTE — PROGRESS NOTES
Anticoagulation Clinic Progress Note    Anticoagulation Summary  As of 2019    INR goal:   2.5-3.5   TTR:   89.8 % (5.5 mo)   INR used for dosin.90 (2019)   Warfarin maintenance plan:   7.5 mg every Sun, Tue, Thu; 5 mg all other days   Weekly warfarin total:   42.5 mg   No change documented:   Rosa Newman   Plan last modified:   Christy Teixeira [facility] (2018)   Next INR check:   2019   Priority:   Maintenance   Target end date:       Indications    Aortic valve replaced [Z95.2]  Paroxysmal atrial fibrillation (CMS/HCC) [I48.0]             Anticoagulation Episode Summary     INR check location:       Preferred lab:       [facility]    Comments:   *Coag[place]k*      Anticoagulation Care Providers     Provider Role Specialty Phone number    Juan Antonio Buenrostro MD Referring Cardiology [phone]          Clinic Interview:  No pertinent clinical findings have been reported.    INR History:  Anticoagulation Monitoring 4/15/2019 2019 2019   INR 3.30 3.10 2.90   INR Date 2019   INR Goal 2.5-3.5 2.5-3.5 2.5-3.5   Trend Same Same Same   Last Week Total 42.5 mg 42.5 mg 42.5 mg   Next Week Total 42.5 mg 42.5 mg 42.5 mg   Sun 7.5 mg 7.5 mg 7.5 mg   Mon 5 mg 5 mg 5 mg   Tue 7.5 mg 7.5 mg 7.5 mg   Wed 5 mg 5 mg 5 mg   Thu 7.5 mg 7.5 mg 7.5 mg   Fri 5 mg 5 mg 5 mg   Sat 5 mg 5 mg 5 mg   Visit Report - - -   Some recent data might be hidden       Plan:  1. INR is therapeutic today- see above in Anticoagulation Summary.    Fernie Porter to continue their warfarin regimen- see above in Anticoagulation Summary.  2. Follow up in 2 weeks  3. Pt has agreed to only be called if INR out of range. They have been instructed to call if any changes in medications, doses, concerns, etc. Patient expresses understanding and has no further questions at this time.    Rosa Newman

## 2019-05-10 RX ORDER — HYDROCHLOROTHIAZIDE 12.5 MG/1
CAPSULE, GELATIN COATED ORAL
Qty: 90 CAPSULE | Refills: 1 | Status: SHIPPED | OUTPATIENT
Start: 2019-05-10 | End: 2019-10-27 | Stop reason: SDUPTHER

## 2019-05-13 DIAGNOSIS — M54.17 LUMBOSACRAL RADICULOPATHY: Primary | ICD-10-CM

## 2019-05-17 RX ORDER — CLONIDINE HYDROCHLORIDE 0.2 MG/1
TABLET ORAL
Qty: 180 TABLET | Refills: 1 | OUTPATIENT
Start: 2019-05-17 | End: 2019-10-27 | Stop reason: SDUPTHER

## 2019-05-24 ENCOUNTER — ANTICOAGULATION VISIT (OUTPATIENT)
Dept: PHARMACY | Facility: HOSPITAL | Age: 49
End: 2019-05-24

## 2019-05-24 DIAGNOSIS — Z95.2 AORTIC VALVE REPLACED: ICD-10-CM

## 2019-05-24 DIAGNOSIS — I48.0 PAROXYSMAL ATRIAL FIBRILLATION (HCC): ICD-10-CM

## 2019-05-24 LAB — INR PPP: 3.1

## 2019-05-24 NOTE — PROGRESS NOTES
Anticoagulation Clinic Progress Note    Anticoagulation Summary  As of 5/24/2019    INR goal:   2.5-3.5   TTR:   90.6 % (6 mo)   INR used for dosing:   3.10 (5/24/2019)   Warfarin maintenance plan:   7.5 mg every Sun, Tue, Thu; 5 mg all other days   Weekly warfarin total:   42.5 mg   No change documented:   Rosa Newman   Plan last modified:   Christy Teixeira [facility] (11/16/2018)   Next INR check:   6/7/2019   Priority:   Maintenance   Target end date:       Indications    Aortic valve replaced [Z95.2]  Paroxysmal atrial fibrillation (CMS/HCC) [I48.0]             Anticoagulation Episode Summary     INR check location:       Preferred lab:       Send INR reminders to:   [facility]  POOL    Comments:   *Coaguchek*      Anticoagulation Care Providers     Provider Role Specialty Phone number    Juan Antonio Buenrostro MD Referring Cardiology [phone]          Clinic Interview:  No pertinent clinical findings have been reported.    INR History:  Anticoagulation Monitoring 4/26/2019 5/9/2019 5/24/2019   INR 3.10 2.90 3.10   INR Date 4/26/2019 5/9/2019 5/24/2019   INR Goal 2.5-3.5 2.5-3.5 2.5-3.5   Trend Same Same Same   Last Week Total 42.5 mg 42.5 mg 42.5 mg   Next Week Total 42.5 mg 42.5 mg 42.5 mg   Sun 7.5 mg 7.5 mg 7.5 mg   Mon 5 mg 5 mg 5 mg   Tue 7.5 mg 7.5 mg 7.5 mg   Wed 5 mg 5 mg 5 mg   Thu 7.5 mg 7.5 mg 7.5 mg   Fri 5 mg 5 mg 5 mg   Sat 5 mg 5 mg 5 mg   Visit Report - - -   Some recent data might be hidden       Plan:  1. INR is therapeutic today- see above in Anticoagulation Summary.    Fernie Porter to continue their warfarin regimen- see above in Anticoagulation Summary.  2. Follow up in 2 weeks  3. Pt has agreed to only be called if INR out of range. They have been instructed to call if any changes in medications, doses, concerns, etc. Patient expresses understanding and has no further questions at this time.    Rosa Newman

## 2019-05-28 ENCOUNTER — OFFICE VISIT (OUTPATIENT)
Dept: NEUROSURGERY | Facility: CLINIC | Age: 49
End: 2019-05-28

## 2019-05-28 VITALS
BODY MASS INDEX: 24.86 KG/M2 | SYSTOLIC BLOOD PRESSURE: 120 MMHG | WEIGHT: 164 LBS | RESPIRATION RATE: 12 BRPM | HEIGHT: 68 IN | HEART RATE: 76 BPM | DIASTOLIC BLOOD PRESSURE: 70 MMHG

## 2019-05-28 DIAGNOSIS — M54.16 LUMBAR RADICULOPATHY: Primary | ICD-10-CM

## 2019-05-28 DIAGNOSIS — R93.7 ABNORMAL MRI, LUMBAR SPINE: ICD-10-CM

## 2019-05-28 PROCEDURE — 99243 OFF/OP CNSLTJ NEW/EST LOW 30: CPT | Performed by: NEUROLOGICAL SURGERY

## 2019-05-28 RX ORDER — DIPHENHYDRAMINE HCL 25 MG
25 CAPSULE ORAL EVERY 6 HOURS PRN
Status: DISCONTINUED | OUTPATIENT
Start: 2019-05-28 | End: 2020-02-17

## 2019-05-28 RX ORDER — IBUPROFEN 200 MG
200 TABLET ORAL EVERY 6 HOURS PRN
COMMUNITY

## 2019-05-28 RX ORDER — DEXAMETHASONE 1.5 MG/1
1.5 TABLET ORAL TAKE AS DIRECTED
Qty: 1 EACH | Refills: 0 | Status: SHIPPED | OUTPATIENT
Start: 2019-05-28 | End: 2019-12-04

## 2019-05-28 NOTE — PROGRESS NOTES
"Subjective   Patient ID: Fernie Porter is a 48 y.o. male is being seen for consultation today at the request of Juan Antonio Ford MD for LSS. Pt is unaccompanied.    Back Pain   This is a new problem. The pain is present in the sacro-iliac. The quality of the pain is described as aching. The pain radiates to the left thigh. The pain is at a severity of 3/10. The pain is worse during the day. The symptoms are aggravated by standing. Stiffness is present all day. Associated symptoms include leg pain, numbness (R leg), paresthesias and tingling. Pertinent negatives include no abdominal pain, bladder incontinence, bowel incontinence, chest pain, dysuria, fever, headaches, pelvic pain, perianal numbness, weakness or weight loss.        He presents to the office today at the request of his primary care provider for lumbar spinal stenosis.  He has had lumbar MRI imaging at [facility] on May 8, 2018.    Today, he reports a couple of months of right-sided leg and low back pain.  The pain came on suddenly and radiates into the right anterior thigh.  He was also having significant low back discomfort at the time.  He has been doing physical therapy and the pain in the right thigh resolved but he now has tingling.  The tingling comes and goes depending on positioning.  He denies any pain, weakness or numbness in either leg.    He continues to have minimal low back discomfort.  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  His job does not require heavy lifting, pushing and pulling.  He denies any bowel or bladder problems as well as saddle paresthesias.    He presents unaccompanied.      /70 (BP Location: Right arm, Patient Position: Sitting, Cuff Size: Adult)   Pulse 76   Resp 12   Ht 172.7 cm (68\")   Wt 74.4 kg (164 lb)   BMI 24.94 kg/m²     The following portions of the patient's history were reviewed and updated as appropriate: allergies, current medications, past family history, past medical history, past " social history, past surgical history and problem list.    Review of Systems   Constitutional: Negative for chills, fever and weight loss.   HENT: Negative for ear pain and tinnitus.    Eyes: Negative for pain and visual disturbance.   Respiratory: Negative for shortness of breath.    Cardiovascular: Negative for chest pain.   Gastrointestinal: Negative for abdominal pain, bowel incontinence and nausea.   Genitourinary: Negative for bladder incontinence, difficulty urinating, dysuria, enuresis and pelvic pain.   Musculoskeletal: Positive for back pain (R leg to knee).   Skin: Negative for rash.   Neurological: Positive for tingling, numbness (R leg) and paresthesias. Negative for weakness and headaches.   Psychiatric/Behavioral: Negative for sleep disturbance.       Objective   Physical Exam   Constitutional: He is oriented to person, place, and time. Vital signs are normal. He appears well-developed and well-nourished. He is cooperative.  Non-toxic appearance. He does not have a sickly appearance. He does not appear ill.   Very pleasant well-appearing middle-aged gentleman   HENT:   Head: Normocephalic and atraumatic.   Neck: Neck supple. No tracheal deviation present.   Pulmonary/Chest: Effort normal.   Abdominal: Soft.   Musculoskeletal: Normal range of motion. He exhibits no tenderness or deformity.        Lumbar back: He exhibits normal range of motion, no tenderness, no bony tenderness and no pain.   Full lumbar range of motion without pain  Strength is equal and intact bilateral lower extremities   Neurological: He is alert and oriented to person, place, and time. He has normal strength. He displays no tremor and normal reflexes. No sensory deficit. He exhibits normal muscle tone. Coordination and gait normal. GCS eye subscore is 4. GCS verbal subscore is 5. GCS motor subscore is 6.   Reflex Scores:       Patellar reflexes are 2+ on the right side and 2+ on the left side.       Achilles reflexes are 2+ on the  right side and 2+ on the left side.  Gait is stable and upright, able to heel and toe walk, able to tandem  Negative straight leg raise  Negative clonus   Skin: Skin is warm and dry.   Psychiatric: He has a normal mood and affect. His behavior is normal. Thought content normal.   Vitals reviewed.    Neurologic Exam     Mental Status   Oriented to person, place, and time.     Motor Exam     Strength   Strength 5/5 throughout.     Gait, Coordination, and Reflexes     Reflexes   Right patellar: 2+  Left patellar: 2+  Right achilles: 2+  Left achilles: 2+      Assessment/Plan   Independent Review of Radiographic Studies:    I personally reviewed the MRI scan of the lumbar spine done recently: This demonstrates fullness in the right L2-3 neural foramen.  There is a small disc bulge to the left at L3-4.  The radiologist indicates that he thinks that there is a schwannoma at the right L to nerve root.  Medical Decision Making:    I confirmed and obtained the above history as recorded by the nurse practitioner acting as a scribe. I performed the above examination and it is documented by the nurse practitioner acting as a scribe.    Thankfully the patient is improving.  He has had some physical therapy and has ongoing sessions scheduled.    At this point I think he can return to work.  I would like to see him back in about 6 months with an MRI including contrast.  Patient indicates that he does have a allergy to IV dye when he had a angiogram and CAT scan.  I did explain to the patient that this was a likely iodine dye and that the MRI diet is based on gadolinium and therefore it does not cross-react.  We will nonetheless treat the patient with a Medrol Dosepak and Benadryl beforehand.    Fernie was seen today for lss.    Diagnoses and all orders for this visit:    Lumbar radiculopathy    Abnormal MRI, lumbar spine -patient of L2 nerve root schwannoma on the right  -     MRI Lumbar Spine With & Without Contrast; Future  -      diphenhydrAMINE (BENADRYL) capsule 25 mg    Other orders  -     dexamethasone (DEXPAK 6 DAY) 1.5 MG (21) tablet therapy pack; Take 1.5 mg by mouth Take As Directed. Start taking the day before the MRI scan      Return in about 6 months (around 11/28/2019) for Follow up with nurse practitioner.

## 2019-05-31 RX ORDER — IMIPRAMINE HCL 50 MG
TABLET ORAL
Qty: 180 TABLET | Refills: 1 | Status: SHIPPED | OUTPATIENT
Start: 2019-05-31 | End: 2019-12-03 | Stop reason: SDUPTHER

## 2019-06-10 ENCOUNTER — ANTICOAGULATION VISIT (OUTPATIENT)
Dept: PHARMACY | Facility: HOSPITAL | Age: 49
End: 2019-06-10

## 2019-06-10 DIAGNOSIS — Z95.2 AORTIC VALVE REPLACED: ICD-10-CM

## 2019-06-10 DIAGNOSIS — I48.0 PAROXYSMAL ATRIAL FIBRILLATION (HCC): ICD-10-CM

## 2019-06-10 LAB — INR PPP: 3.1

## 2019-06-10 NOTE — PROGRESS NOTES
Anticoagulation Clinic Progress Note    Anticoagulation Summary  As of 6/10/2019    INR goal:   2.5-3.5   TTR:   91.3 % (6.4 mo)   INR used for dosing:   3.10 (6/7/2019)   Warfarin maintenance plan:   7.5 mg every Sun, Tue, Thu; 5 mg all other days   Weekly warfarin total:   42.5 mg   No change documented:   Rosa Newman   Plan last modified:   Christy Teixeira [facility] (11/16/2018)   Next INR check:   6/24/2019   Priority:   Maintenance   Target end date:       Indications    Aortic valve replaced [Z95.2]  Paroxysmal atrial fibrillation (CMS/HCC) [I48.0]             Anticoagulation Episode Summary     INR check location:       Preferred lab:       [facility]  [facility]    Comments:   *Coaguchek*      Anticoagulation Care Providers     Provider Role Specialty Phone number    Juan Antonio Buenrostro MD Referring Cardiology [phone]          Clinic Interview:  No pertinent clinical findings have been reported.    INR History:  Anticoagulation Monitoring 5/9/2019 5/24/2019 6/10/2019   INR 2.90 3.10 3.10   INR Date 5/9/2019 5/24/2019 6/7/2019   INR Goal 2.5-3.5 2.5-3.5 2.5-3.5   Trend Same Same Same   Last Week Total 42.5 mg 42.5 mg 42.5 mg   Next Week Total 42.5 mg 42.5 mg 42.5 mg   Sun 7.5 mg 7.5 mg 7.5 mg   Mon 5 mg 5 mg 5 mg   Tue 7.5 mg 7.5 mg 7.5 mg   Wed 5 mg 5 mg 5 mg   Thu 7.5 mg 7.5 mg 7.5 mg   Fri 5 mg 5 mg 5 mg   Sat 5 mg 5 mg 5 mg   Visit Report - - -   Some recent data might be hidden       Plan:  1. INR is therapeutic today- see above in Anticoagulation Summary.    Fernie Porter to continue their warfarin regimen- see above in Anticoagulation Summary.  2. Follow up in 2 weeks  3. Pt has agreed to only be called if INR out of range. They have been instructed to call if any changes in medications, doses, concerns, etc. Patient expresses understanding and has no further questions at this time.    Rosa Newman

## 2019-06-24 ENCOUNTER — ANTICOAGULATION VISIT (OUTPATIENT)
Dept: PHARMACY | Facility: HOSPITAL | Age: 49
End: 2019-06-24

## 2019-06-24 DIAGNOSIS — Z95.2 AORTIC VALVE REPLACED: ICD-10-CM

## 2019-06-24 DIAGNOSIS — I48.0 PAROXYSMAL ATRIAL FIBRILLATION (HCC): ICD-10-CM

## 2019-06-24 LAB — INR PPP: 3.3

## 2019-06-24 NOTE — PROGRESS NOTES
Anticoagulation Clinic Progress Note    Anticoagulation Summary  As of 6/24/2019    INR goal:   2.5-3.5   TTR:   91.9 % (6.9 mo)   INR used for dosing:   3.30 (6/22/2019)   Warfarin maintenance plan:   7.5 mg every Sun, Tue, Thu; 5 mg all other days   Weekly warfarin total:   42.5 mg   No change documented:   Rosa Newman   Plan last modified:   Christy Teixeira [facility] (11/16/2018)   Next INR check:   7/8/2019   Priority:   Maintenance   Target end date:       Indications    Aortic valve replaced [Z95.2]  Paroxysmal atrial fibrillation (CMS/HCC) [I48.0]             Anticoagulation Episode Summary     INR check location:       Preferred lab:       [facility]  [facility]    Comments:   *Coaguchek*      Anticoagulation Care Providers     Provider Role Specialty Phone number    Juan Antonio Buenrostro MD Referring Cardiology [phone]          Clinic Interview:  No pertinent clinical findings have been reported.    INR History:  Anticoagulation Monitoring 5/24/2019 6/10/2019 6/24/2019   INR 3.10 3.10 3.30   INR Date 5/24/2019 6/7/2019 6/22/2019   INR Goal 2.5-3.5 2.5-3.5 2.5-3.5   Trend Same Same Same   Last Week Total 42.5 mg 42.5 mg 42.5 mg   Next Week Total 42.5 mg 42.5 mg 42.5 mg   Sun 7.5 mg 7.5 mg 7.5 mg   Mon 5 mg 5 mg 5 mg   Tue 7.5 mg 7.5 mg 7.5 mg   Wed 5 mg 5 mg 5 mg   Thu 7.5 mg 7.5 mg 7.5 mg   Fri 5 mg 5 mg 5 mg   Sat 5 mg 5 mg 5 mg   Visit Report - - -   Some recent data might be hidden       Plan:  1. INR is therapeutic today- see above in Anticoagulation Summary.    Fernie Porter to continue their warfarin regimen- see above in Anticoagulation Summary.  2. Follow up in 2 weeks  3. Pt has agreed to only be called if INR out of range. They have been instructed to call if any changes in medications, doses, concerns, etc. Patient expresses understanding and has no further questions at this time.    Rosa Newman

## 2019-07-11 RX ORDER — WARFARIN SODIUM 5 MG/1
TABLET ORAL
Qty: 135 TABLET | Refills: 0 | Status: SHIPPED | OUTPATIENT
Start: 2019-07-11 | End: 2019-10-15 | Stop reason: SDUPTHER

## 2019-07-12 ENCOUNTER — ANTICOAGULATION VISIT (OUTPATIENT)
Dept: PHARMACY | Facility: HOSPITAL | Age: 49
End: 2019-07-12

## 2019-07-12 DIAGNOSIS — Z95.2 AORTIC VALVE REPLACED: ICD-10-CM

## 2019-07-12 DIAGNOSIS — I48.0 PAROXYSMAL ATRIAL FIBRILLATION (HCC): ICD-10-CM

## 2019-07-12 LAB — INR PPP: 2.9

## 2019-07-12 NOTE — PROGRESS NOTES
Anticoagulation Clinic Progress Note    Anticoagulation Summary  As of 2019    INR goal:   2.5-3.5   TTR:   92.6 % (7.6 mo)   INR used for dosin.90 (2019)   Warfarin maintenance plan:   7.5 mg every Sun, Tue, Thu; 5 mg all other days   Weekly warfarin total:   42.5 mg   No change documented:   Cindy Mathew   Plan last modified:   Christy Teixeira [facility] (2018)   Next INR check:   2019   Priority:   Maintenance   Target end date:       Indications    Aortic valve replaced [Z95.2]  Paroxysmal atrial fibrillation (CMS/HCC) [I48.0]             Anticoagulation Episode Summary     INR check location:       Preferred lab:       Send INR reminders to:   [facility]  POOL    Comments:   *Coaguchek*      Anticoagulation Care Providers     Provider Role Specialty Phone number    Juan Antonio Buenrostro MD Referring Cardiology [phone]          Clinic Interview:  No pertinent clinical findings have been reported.    INR History:  Anticoagulation Monitoring 6/10/2019 2019 2019   INR 3.10 3.30 2.90   INR Date 2019   INR Goal 2.5-3.5 2.5-3.5 2.5-3.5   Trend Same Same Same   Last Week Total 42.5 mg 42.5 mg 42.5 mg   Next Week Total 42.5 mg 42.5 mg 42.5 mg   Sun 7.5 mg 7.5 mg 7.5 mg   Mon 5 mg 5 mg 5 mg   Tue 7.5 mg 7.5 mg 7.5 mg   Wed 5 mg 5 mg 5 mg   Thu 7.5 mg 7.5 mg 7.5 mg   Fri 5 mg 5 mg 5 mg   Sat 5 mg 5 mg 5 mg   Visit Report - - -   Some recent data might be hidden       Plan:  1. INR is therapeutic today- see above in Anticoagulation Summary.    Fernie Porter to continue their warfarin regimen- see above in Anticoagulation Summary.  2. Follow up in 2 weeks  3. Pt has agreed to only be called if INR out of range. They have been instructed to call if any changes in medications, doses, concerns, etc. Patient expresses understanding and has no further questions at this time.    Cindy Mathew

## 2019-07-22 RX ORDER — CARVEDILOL 25 MG/1
TABLET ORAL
Qty: 180 TABLET | Refills: 1 | OUTPATIENT
Start: 2019-07-22

## 2019-07-26 ENCOUNTER — ANTICOAGULATION VISIT (OUTPATIENT)
Dept: PHARMACY | Facility: HOSPITAL | Age: 49
End: 2019-07-26

## 2019-07-26 DIAGNOSIS — Z95.2 AORTIC VALVE REPLACED: ICD-10-CM

## 2019-07-26 DIAGNOSIS — I48.0 PAROXYSMAL ATRIAL FIBRILLATION (HCC): ICD-10-CM

## 2019-07-26 LAB — INR PPP: 2.5

## 2019-07-26 NOTE — PROGRESS NOTES
Anticoagulation Clinic Progress Note    Anticoagulation Summary  As of 2019    INR goal:   2.5-3.5   TTR:   93.1 % (8.1 mo)   INR used for dosin.50 (2019)   Warfarin maintenance plan:   7.5 mg every Sun, Tue, Thu; 5 mg all other days   Weekly warfarin total:   42.5 mg   No change documented:   Brayan Patterson RPH   Plan last modified:   Christy Teixeira RPH (2018)   Next INR check:   2019   Priority:   Maintenance   Target end date:       Indications    Aortic valve replaced [Z95.2]  Paroxysmal atrial fibrillation (CMS/HCC) [I48.0]             Anticoagulation Episode Summary     INR check location:       Preferred lab:       Send INR reminders to:    [facility]  POOL    Comments:   *Coaguchek*      Anticoagulation Care Providers     Provider Role Specialty Phone number    Juan Antonio Buenrostro MD Referring Cardiology [phone]          Clinic Interview:  No pertinent clinical findings have been reported.    INR History:  Anticoagulation Monitoring 2019   INR 3.30 2.90 2.50   INR Date 2019   INR Goal 2.5-3.5 2.5-3.5 2.5-3.5   Trend Same Same Same   Last Week Total 42.5 mg 42.5 mg 42.5 mg   Next Week Total 42.5 mg 42.5 mg 42.5 mg   Sun 7.5 mg 7.5 mg 7.5 mg   Mon 5 mg 5 mg 5 mg   Tue 7.5 mg 7.5 mg 7.5 mg   Wed 5 mg 5 mg 5 mg   Thu 7.5 mg 7.5 mg 7.5 mg   Fri 5 mg 5 mg 5 mg   Sat 5 mg 5 mg 5 mg   Visit Report - - -   Some recent data might be hidden       Plan:  1. INR is therapeutic today- see above in Anticoagulation Summary.    Fernie Porter to continue their warfarin regimen- see above in Anticoagulation Summary.  2. Follow up in 2 weeks  3. Pt has agreed to only be called if INR out of range. They have been instructed to call if any changes in medications, doses, concerns, etc. Patient expresses understanding and has no further questions at this time.    Brayan Patterson RPH

## 2019-08-05 ENCOUNTER — OFFICE VISIT (OUTPATIENT)
Dept: INTERNAL MEDICINE | Facility: CLINIC | Age: 49
End: 2019-08-05

## 2019-08-05 VITALS
DIASTOLIC BLOOD PRESSURE: 48 MMHG | OXYGEN SATURATION: 98 % | HEART RATE: 60 BPM | HEIGHT: 68 IN | BODY MASS INDEX: 24.86 KG/M2 | WEIGHT: 164 LBS | SYSTOLIC BLOOD PRESSURE: 108 MMHG | TEMPERATURE: 98.3 F | RESPIRATION RATE: 16 BRPM

## 2019-08-05 DIAGNOSIS — I10 ESSENTIAL HYPERTENSION: Primary | ICD-10-CM

## 2019-08-05 DIAGNOSIS — I48.0 PAROXYSMAL ATRIAL FIBRILLATION (HCC): ICD-10-CM

## 2019-08-05 DIAGNOSIS — G43.109 MIGRAINE WITH AURA AND WITHOUT STATUS MIGRAINOSUS, NOT INTRACTABLE: ICD-10-CM

## 2019-08-05 PROCEDURE — 99213 OFFICE O/P EST LOW 20 MIN: CPT | Performed by: INTERNAL MEDICINE

## 2019-08-05 NOTE — PROGRESS NOTES
Subjective   Fernie Porter is a 48 y.o. male.     Chief Complaint   Patient presents with   • Hypertension   • Migraine   • Atrial Fibrillation   • Aortic Stenosis         Hypertension   This is a chronic problem. The current episode started more than 1 year ago. The problem is unchanged. The problem is controlled. Associated symptoms include headaches. Pertinent negatives include no orthopnea or peripheral edema. There are no associated agents to hypertension. Risk factors for coronary artery disease include male gender. Current antihypertension treatment includes alpha 1 blockers, diuretics and beta blockers. The current treatment provides significant improvement. There are no compliance problems.  There is no history of angina, kidney disease, CAD/MI, CVA or heart failure.   Migraine    This is a chronic problem. The current episode started more than 1 year ago. The problem occurs constantly. The problem has been waxing and waning. The pain is located in the left unilateral region. The pain does not radiate. The pain quality is similar to prior headaches. The quality of the pain is described as throbbing. The pain is moderate. Pertinent negatives include no abdominal pain, coughing, fever, nausea or vomiting. Nothing aggravates the symptoms. He has tried NSAIDs for the symptoms. The treatment provided significant relief.   Atrial Fibrillation   Presents for follow-up visit. The symptoms have been stable. Past medical history includes atrial fibrillation. There are no medication compliance problems.   Headache    This is a chronic problem. The current episode started more than 1 year ago. The problem occurs constantly. Pertinent negatives include no abdominal pain, coughing, fever, nausea or vomiting.        The following portions of the patient's history were reviewed and updated as appropriate: allergies, current medications, past social history and problem list.    Outpatient Medications Marked as Taking  for the 8/5/19 encounter (Office Visit) with Juan Antonio Ford MD   Medication Sig Dispense Refill   • amLODIPine (NORVASC) 5 MG tablet Take 1 tablet by mouth Daily. 90 tablet 3   • amoxicillin (AMOXIL) 500 MG capsule Take 4 tablets by mouth 1 hr prior to dental procedures 20 capsule 0   • CloNIDine (CATAPRES) 0.2 MG tablet TAKE 1 TABLET BY MOUTH TWICE A  tablet 1   • dexamethasone (DEXPAK 6 DAY) 1.5 MG (21) tablet therapy pack Take 1.5 mg by mouth Take As Directed. Start taking the day before the MRI scan 1 each 0   • fluticasone (FLONASE) 50 MCG/ACT nasal spray 2 sprays into the nostril(s) as directed by provider Daily. 16 mL 6   • hydrochlorothiazide (MICROZIDE) 12.5 MG capsule TAKE ONE CAPSULE BY MOUTH EVERY DAY 90 capsule 1   • hydrocortisone 2.5 % cream Apply  topically 3 (Three) Times a Day.     • ibuprofen (ADVIL,MOTRIN) 200 MG tablet Take 200 mg by mouth Every 6 (Six) Hours As Needed for Mild Pain .     • imipramine (TOFRANIL) 50 MG tablet TAKE 2 TABLETS BY MOUTH AT BEDTIME 180 tablet 1   • propranolol LA (INDERAL LA) 80 MG 24 hr capsule Take 1 capsule by mouth Daily. 90 capsule 3   • warfarin (COUMADIN) 5 MG tablet TAKE 1.5 TABLETS DAILY OR AS DIRECTED 135 tablet 0     Current Facility-Administered Medications for the 8/5/19 encounter (Office Visit) with Juan Antonio Ford MD   Medication Dose Route Frequency Provider Last Rate Last Dose   • diphenhydrAMINE (BENADRYL) capsule 25 mg  25 mg Oral Q6H PRN Isaías Rivera MD           Review of Systems   Constitutional: Negative for chills, fatigue and fever.   Respiratory: Negative for cough and wheezing.    Cardiovascular: Negative for orthopnea and leg swelling.   Gastrointestinal: Negative for abdominal pain, constipation, diarrhea, nausea and vomiting.   Neurological: Positive for headaches.       Objective   Vitals:    08/05/19 1057   BP: 108/48   Pulse: 60   Resp: 16   Temp: 98.3 °F (36.8 °C)   SpO2: 98%          08/05/19  1057   Weight: 74.4 kg  (164 lb)    [unfilled]  Body mass index is 24.94 kg/m².      Physical Exam   Constitutional: He appears well-developed and well-nourished. No distress.   HENT:   Head: Normocephalic and atraumatic.   Neck: Carotid bruit is not present. No thyromegaly present.   Cardiovascular: Normal rate, regular rhythm and intact distal pulses. Exam reveals no gallop.   Murmur heard.   Crescendo decrescendo systolic murmur is present with a grade of 2/6.  Prosthetic heart sounds.  Grade 2/6 JOHNNIE loudest at the pulmonic area.   Pulmonary/Chest: Effort normal and breath sounds normal. No respiratory distress. He has no wheezes. He has no rales.   Abdominal: Soft. Bowel sounds are normal. He exhibits no mass. There is no tenderness. There is no guarding.         Problem List Items Addressed This Visit        Cardiovascular and Mediastinum    Hypertension - Primary    Migraine    Paroxysmal atrial fibrillation (CMS/HCC)        Assessment/Plan   In for recheck of hypertension, AF, aortic stenosis, and migraines.  Migraines persist.  They're doing pretty well to present time.  Gets 3 or 4 per month.  Has failed several drugs in the past including Topamax.  Blood pressure control is excellent.  Atrial fib is controlled.  Gets annual lab work May 2019 including CBC, CMP, urinalysis.  Continue to monitor every 3 months.  We  Switched his coreg to Inderal LA 80 mg daily and it seems to have helped his migraines.  Certainly no severe migraine since he started Inderal LA.  We can always increase the dose.  If not we'll consider trying Depakote.  He thinks he failed Zonegran in the past.  Schwannoma diagnosed in his lumbar spine since last visit.  Being followed by neurosurgery.         Dragon disclaimer:   Much of this encounter note is an electronic transcription/translation of spoken language to printed text. The electronic translation of spoken language may permit erroneous, or at times, nonsensical words or phrases to be inadvertently  transcribed; Although I have reviewed the note for such errors, some may still exist.

## 2019-08-09 ENCOUNTER — ANTICOAGULATION VISIT (OUTPATIENT)
Dept: PHARMACY | Facility: HOSPITAL | Age: 49
End: 2019-08-09

## 2019-08-09 DIAGNOSIS — I48.0 PAROXYSMAL ATRIAL FIBRILLATION (HCC): ICD-10-CM

## 2019-08-09 DIAGNOSIS — Z95.2 AORTIC VALVE REPLACED: ICD-10-CM

## 2019-08-09 LAB — INR PPP: 3.1

## 2019-08-09 NOTE — PROGRESS NOTES
Anticoagulation Clinic Progress Note    Anticoagulation Summary  As of 8/9/2019    INR goal:   2.5-3.5   TTR:   93.4 % (8.5 mo)   INR used for dosing:   3.10 (8/9/2019)   Warfarin maintenance plan:   7.5 mg every Sun, Tue, Thu; 5 mg all other days   Weekly warfarin total:   42.5 mg   No change documented:   Rosa Newman   Plan last modified:   Christy Teixeira [facility] (11/16/2018)   Next INR check:   8/23/2019   Priority:   Maintenance   Target end date:       Indications    Aortic valve replaced [Z95.2]  Paroxysmal atrial fibrillation (CMS/HCC) [I48.0]             Anticoagulation Episode Summary     INR check location:       Preferred lab:       [facility]  [facility]    Comments:   *Coaguchek*      Anticoagulation Care Providers     Provider Role Specialty Phone number    Juan Antonio Buenrostro MD Referring Cardiology [phone]          Clinic Interview:  No pertinent clinical findings have been reported.    INR History:  Anticoagulation Monitoring 7/12/2019 7/26/2019 8/9/2019   INR 2.90 2.50 3.10   INR Date 7/12/2019 7/26/2019 8/9/2019   INR Goal 2.5-3.5 2.5-3.5 2.5-3.5   Trend Same Same Same   Last Week Total 42.5 mg 42.5 mg 42.5 mg   Next Week Total 42.5 mg 42.5 mg 42.5 mg   Sun 7.5 mg 7.5 mg 7.5 mg   Mon 5 mg 5 mg 5 mg   Tue 7.5 mg 7.5 mg 7.5 mg   Wed 5 mg 5 mg 5 mg   Thu 7.5 mg 7.5 mg 7.5 mg   Fri 5 mg 5 mg 5 mg   Sat 5 mg 5 mg 5 mg   Visit Report - - -   Some recent data might be hidden       Plan:  1. INR is therapeutic today- see above in Anticoagulation Summary.    Fernie Porter to continue their warfarin regimen- see above in Anticoagulation Summary.  2. Follow up in 2 weeks  3. Pt has agreed to only be called if INR out of range. They have been instructed to call if any changes in medications, doses, concerns, etc. Patient expresses understanding and has no further questions at this time.    Rosa Newman

## 2019-08-26 ENCOUNTER — ANTICOAGULATION VISIT (OUTPATIENT)
Dept: PHARMACY | Facility: HOSPITAL | Age: 49
End: 2019-08-26

## 2019-08-26 DIAGNOSIS — Z95.2 AORTIC VALVE REPLACED: ICD-10-CM

## 2019-08-26 DIAGNOSIS — I48.0 PAROXYSMAL ATRIAL FIBRILLATION (HCC): ICD-10-CM

## 2019-08-26 LAB — INR PPP: 3.5

## 2019-08-26 NOTE — PROGRESS NOTES
Anticoagulation Clinic Progress Note    Anticoagulation Summary  As of 8/26/2019    INR goal:   2.5-3.5   TTR:   93.8 % (9 mo)   INR used for dosing:   3.50 (8/23/2019)   Warfarin maintenance plan:   7.5 mg every Sun, Tue, Thu; 5 mg all other days   Weekly warfarin total:   42.5 mg   No change documented:   Rosa Newman   Plan last modified:   Christy Teixeira [facility] (11/16/2018)   Next INR check:   9/9/2019   Priority:   Maintenance   Target end date:       Indications    Aortic valve replaced [Z95.2]  Paroxysmal atrial fibrillation (CMS/HCC) [I48.0]             Anticoagulation Episode Summary     INR check location:       Preferred lab:       Send INR reminders to:   [facility]  POOL    Comments:   *Coaguchek*      Anticoagulation Care Providers     Provider Role Specialty Phone number    Juan Antonio Buenrostro MD Referring Cardiology [phone]          Clinic Interview:  No pertinent clinical findings have been reported.    INR History:  Anticoagulation Monitoring 7/26/2019 8/9/2019 8/26/2019   INR 2.50 3.10 3.50   INR Date 7/26/2019 8/9/2019 8/23/2019   INR Goal 2.5-3.5 2.5-3.5 2.5-3.5   Trend Same Same Same   Last Week Total 42.5 mg 42.5 mg 42.5 mg   Next Week Total 42.5 mg 42.5 mg 42.5 mg   Sun 7.5 mg 7.5 mg 7.5 mg   Mon 5 mg 5 mg 5 mg   Tue 7.5 mg 7.5 mg 7.5 mg   Wed 5 mg 5 mg 5 mg   Thu 7.5 mg 7.5 mg 7.5 mg   Fri 5 mg 5 mg 5 mg   Sat 5 mg 5 mg 5 mg   Visit Report - - -   Some recent data might be hidden       Plan:  1. INR is therapeutic today- see above in Anticoagulation Summary.    Fernie Porter to continue their warfarin regimen- see above in Anticoagulation Summary.  2. Follow up in 2 weeks  3. Pt has agreed to only be called if INR out of range. They have been instructed to call if any changes in medications, doses, concerns, etc. Patient expresses understanding and has no further questions at this time.    Rosa Newman

## 2019-09-06 LAB — INR PPP: 3.8

## 2019-09-09 ENCOUNTER — ANTICOAGULATION VISIT (OUTPATIENT)
Dept: PHARMACY | Facility: HOSPITAL | Age: 49
End: 2019-09-09

## 2019-09-09 DIAGNOSIS — Z95.2 AORTIC VALVE REPLACED: ICD-10-CM

## 2019-09-09 DIAGNOSIS — I48.0 PAROXYSMAL ATRIAL FIBRILLATION (HCC): ICD-10-CM

## 2019-09-09 NOTE — PROGRESS NOTES
Anticoagulation Clinic Progress Note    Anticoagulation Summary  As of 9/9/2019    INR goal:   2.5-3.5   TTR:   89.2 % (9.5 mo)   INR used for dosing:   3.80! (9/6/2019)   Warfarin maintenance plan:   7.5 mg every Sun, Tue, Thu; 5 mg all other days   Weekly warfarin total:   42.5 mg   No change documented:   Keyla Patel [facility]   Plan last modified:   Christy Teixeira [facility] (11/16/2018)   Next INR check:   9/16/2019   Priority:   Maintenance   Target end date:       Indications    Aortic valve replaced [Z95.2]  Paroxysmal atrial fibrillation (CMS/HCC) [I48.0]             Anticoagulation Episode Summary     INR check location:       Preferred lab:       [facility]    Comments:   *Coaguchek*      Anticoagulation Care Providers     Provider Role Specialty Phone number    Juan Antonio Buenrostro MD Referring Cardiology [phone]          Clinic Interview:  No pertinent clinical findings have been reported.    INR History:  Anticoagulation Monitoring 8/9/2019 8/26/2019 9/9/2019   INR 3.10 3.50 3.80   INR Date 8/9/2019 8/23/2019 9/6/2019   INR Goal 2.5-3.5 2.5-3.5 2.5-3.5   Trend Same Same Same   Last Week Total 42.5 mg 42.5 mg 42.5 mg   Next Week Total 42.5 mg 42.5 mg 42.5 mg   Sun 7.5 mg 7.5 mg 7.5 mg   Mon 5 mg 5 mg 5 mg   Tue 7.5 mg 7.5 mg 7.5 mg   Wed 5 mg 5 mg 5 mg   Thu 7.5 mg 7.5 mg 7.5 mg   Fri 5 mg 5 mg 5 mg   Sat 5 mg 5 mg 5 mg   Visit Report - - -   Some recent data might be hidden       Plan:  1. INR is therapeutic today- see above in Anticoagulation Summary.    Fernie Porter to continue their warfarin regimen- see above in Anticoagulation Summary.  INR= 3.8 patient has been within range of 2.5-3.5 for months on this dosage regimen. Will continue same and ask patient to recheck in 1 week.  2. Follow up in 1 week on 9/16  3. Pt has agreed to only be called if INR out of range. They have been instructed to call if any changes in medications, doses, concerns, etc.  Patient expresses understanding and has no further questions at this time.    Keyla Patel, [facility]

## 2019-09-16 ENCOUNTER — ANTICOAGULATION VISIT (OUTPATIENT)
Dept: PHARMACY | Facility: HOSPITAL | Age: 49
End: 2019-09-16

## 2019-09-16 DIAGNOSIS — I48.0 PAROXYSMAL ATRIAL FIBRILLATION (HCC): ICD-10-CM

## 2019-09-16 DIAGNOSIS — Z95.2 AORTIC VALVE REPLACED: ICD-10-CM

## 2019-09-16 LAB — INR PPP: 3.7

## 2019-09-16 NOTE — PROGRESS NOTES
Anticoagulation Clinic Progress Note    Anticoagulation Summary  As of 9/16/2019    INR goal:   2.5-3.5   TTR:   86.1 % (9.8 mo)   INR used for dosing:   3.70! (9/16/2019)   Warfarin maintenance plan:   7.5 mg every Sun, Tue, Thu; 5 mg all other days   Weekly warfarin total:   42.5 mg   No change documented:   Brayan Patterson RPH   Plan last modified:   Christy Teixeira RPH (11/16/2018)   Next INR check:   9/30/2019   Priority:   Maintenance   Target end date:       Indications    Aortic valve replaced [Z95.2]  Paroxysmal atrial fibrillation (CMS/HCC) [I48.0]             Anticoagulation Episode Summary     INR check location:       Preferred lab:       Send INR reminders to:    [facility]  POOL    Comments:   *Coaguchek*      Anticoagulation Care Providers     Provider Role Specialty Phone number    Juan Antonio Buenrostro MD Referring Cardiology [phone]            Clinic Interview:  Patient Findings     Positives:   Change in health, Change in medications    Negatives:   Signs/symptoms of thrombosis, Signs/symptoms of bleeding,   Laboratory test error suspected, Change in alcohol use, Change in   activity, Upcoming invasive procedure, Emergency department visit,   Upcoming dental procedure, Missed doses, Extra doses, Change in   diet/appetite, Hospital admission, Bruising, Other complaints    Comments:   Migraine over weekend; took a lot of ibuprofen.      Clinical Outcomes     Negatives:   Major bleeding event, Thromboembolic event,   Anticoagulation-related hospital admission, Anticoagulation-related ED   visit, Anticoagulation-related fatality    Comments:   Migraine over weekend; took a lot of ibuprofen.        INR History:  Anticoagulation Monitoring 8/26/2019 9/9/2019 9/16/2019   INR 3.50 3.80 3.70   INR Date 8/23/2019 9/6/2019 9/16/2019   INR Goal 2.5-3.5 2.5-3.5 2.5-3.5   Trend Same Same Same   Last Week Total 42.5 mg 42.5 mg 42.5 mg   Next Week Total 42.5 mg 42.5 mg 42.5 mg   Sun 7.5 mg 7.5 mg  7.5 mg   Mon 5 mg 5 mg 5 mg   Tue 7.5 mg 7.5 mg 7.5 mg   Wed 5 mg 5 mg 5 mg   Thu 7.5 mg 7.5 mg 7.5 mg   Fri 5 mg 5 mg 5 mg   Sat 5 mg 5 mg 5 mg   Visit Report - - -   Some recent data might be hidden       Plan:  1. INR is Supratherapeutic today- see above in Anticoagulation Summary.   Will instruct Fernie Porter to Continue their warfarin regimen- see above in Anticoagulation Summary.  2. Follow up in 2 weeks  3. Pt has agreed to only be called if INR out of range. They have been instructed to call if any changes in medications, doses, concerns, etc. Patient expresses understanding and has no further questions at this time.    Brayan Patterson [facility]

## 2019-10-04 ENCOUNTER — ANTICOAGULATION VISIT (OUTPATIENT)
Dept: PHARMACY | Facility: HOSPITAL | Age: 49
End: 2019-10-04

## 2019-10-04 DIAGNOSIS — I48.0 PAROXYSMAL ATRIAL FIBRILLATION (HCC): ICD-10-CM

## 2019-10-04 DIAGNOSIS — Z95.2 AORTIC VALVE REPLACED: ICD-10-CM

## 2019-10-04 LAB — INR PPP: 3.9

## 2019-10-04 NOTE — PROGRESS NOTES
Anticoagulation Clinic Progress Note    Anticoagulation Summary  As of 10/4/2019    INR goal:   2.5-3.5   TTR:   81.2 % (10.4 mo)   INR used for dosing:   3.90! (10/4/2019)   Warfarin maintenance plan:   7.5 mg every Sun, Thu; 5 mg all other days   Weekly warfarin total:   40 mg   Plan last modified:   José Miguel Zapata RPH (10/4/2019)   Next INR check:   10/18/2019   Priority:   Maintenance   Target end date:       Indications    Aortic valve replaced [Z95.2]  Paroxysmal atrial fibrillation (CMS/HCC) [I48.0]             Anticoagulation Episode Summary     INR check location:       Preferred lab:       Send INR reminders to:   [facility]  POOL    Comments:   *Coaguchek*      Anticoagulation Care Providers     Provider Role Specialty Phone number    Juan Antonio Buenrostro MD Referring Cardiology [phone]          Drug interactions: has remained unchanged.  Diet: has remained unchanged.    Clinic Interview:  Patient Findings     Positives:   Change in diet/appetite    Negatives:   Signs/symptoms of thrombosis, Signs/symptoms of bleeding,   Laboratory test error suspected, Change in health, Change in alcohol use,   Change in activity, Upcoming invasive procedure, Emergency department   visit, Upcoming dental procedure, Missed doses, Extra doses, Change in   medications, Hospital admission, Bruising, Other complaints    Comments:   Patient reports increased vegetable intake       Clinical Outcomes     Negatives:   Major bleeding event, Thromboembolic event,   Anticoagulation-related hospital admission, Anticoagulation-related ED   visit, Anticoagulation-related fatality    Comments:   Patient reports increased vegetable intake         INR History:  Anticoagulation Monitoring 9/9/2019 9/16/2019 10/4/2019   INR 3.80 3.70 3.90   INR Date 9/6/2019 9/16/2019 10/4/2019   INR Goal 2.5-3.5 2.5-3.5 2.5-3.5   Trend Same Same Down   Last Week Total 42.5 mg 42.5 mg 42.5 mg   Next Week Total 42.5 mg 42.5 mg 37.5 mg    Sun 7.5 mg 7.5 mg 7.5 mg   Mon 5 mg 5 mg 5 mg   Tue 7.5 mg 7.5 mg 5 mg   Wed 5 mg 5 mg 5 mg   Thu 7.5 mg 7.5 mg 7.5 mg   Fri 5 mg 5 mg 2.5 mg (10/4); Otherwise 5 mg   Sat 5 mg 5 mg 5 mg   Visit Report - - -   Some recent data might be hidden       Plan:  1. INR is Supratherapeutic today- see above in Anticoagulation Summary.   Will instruct Fernie Porter to Change their warfarin regimen- see above in Anticoagulation Summary. Half-dose today (2.5mg) and change Tuesday dosing to 5mg   2. Follow up in 2 weeks  3. Pt has agreed to only be called if INR out of range. They have been instructed to call if any changes in medications, doses, concerns, etc. Patient expresses understanding and has no further questions at this time.    José Miguel Zapata, PharmD

## 2019-10-15 RX ORDER — WARFARIN SODIUM 5 MG/1
TABLET ORAL
Qty: 135 TABLET | Refills: 0 | Status: SHIPPED | OUTPATIENT
Start: 2019-10-15 | End: 2020-01-24

## 2019-10-18 LAB — INR PPP: 2.9

## 2019-10-21 ENCOUNTER — OFFICE VISIT (OUTPATIENT)
Dept: CARDIOLOGY | Facility: CLINIC | Age: 49
End: 2019-10-21

## 2019-10-21 ENCOUNTER — HOSPITAL ENCOUNTER (OUTPATIENT)
Dept: CARDIOLOGY | Facility: HOSPITAL | Age: 49
Discharge: HOME | End: 2019-10-21
Attending: INTERNAL MEDICINE | Admitting: INTERNAL MEDICINE

## 2019-10-21 ENCOUNTER — ANTICOAGULATION VISIT (OUTPATIENT)
Dept: PHARMACY | Facility: HOSPITAL | Age: 49
End: 2019-10-21

## 2019-10-21 VITALS
WEIGHT: 149 LBS | BODY MASS INDEX: 22.07 KG/M2 | SYSTOLIC BLOOD PRESSURE: 110 MMHG | HEART RATE: 59 BPM | HEIGHT: 69 IN | DIASTOLIC BLOOD PRESSURE: 64 MMHG

## 2019-10-21 VITALS
HEIGHT: 67 IN | DIASTOLIC BLOOD PRESSURE: 70 MMHG | WEIGHT: 164 LBS | OXYGEN SATURATION: 98 % | HEART RATE: 66 BPM | BODY MASS INDEX: 25.74 KG/M2 | SYSTOLIC BLOOD PRESSURE: 110 MMHG

## 2019-10-21 DIAGNOSIS — Z95.2 AORTIC VALVE REPLACED: Primary | ICD-10-CM

## 2019-10-21 DIAGNOSIS — I48.0 PAROXYSMAL ATRIAL FIBRILLATION (HCC): ICD-10-CM

## 2019-10-21 DIAGNOSIS — Z95.2 AORTIC VALVE REPLACED: ICD-10-CM

## 2019-10-21 DIAGNOSIS — I10 ESSENTIAL HYPERTENSION: ICD-10-CM

## 2019-10-21 LAB
AORTIC ROOT ANNULUS: 2.3 CM
ASCENDING AORTA: 3 CM
BH CV ECHO MEAS - AO MAX PG (FULL): 11 MMHG
BH CV ECHO MEAS - AO MAX PG: 12.9 MMHG
BH CV ECHO MEAS - AO MEAN PG (FULL): 5.7 MMHG
BH CV ECHO MEAS - AO MEAN PG: 6.7 MMHG
BH CV ECHO MEAS - AO ROOT AREA (BSA CORRECTED): 1.9
BH CV ECHO MEAS - AO ROOT AREA: 9.4 CM^2
BH CV ECHO MEAS - AO ROOT DIAM: 3.5 CM
BH CV ECHO MEAS - AO V2 MAX: 179.4 CM/SEC
BH CV ECHO MEAS - AO V2 MEAN: 120.2 CM/SEC
BH CV ECHO MEAS - AO V2 VTI: 33 CM
BH CV ECHO MEAS - ASC AORTA: 3 CM
BH CV ECHO MEAS - AVA(I,A): 1.1 CM^2
BH CV ECHO MEAS - AVA(I,D): 1.1 CM^2
BH CV ECHO MEAS - AVA(V,A): 1.1 CM^2
BH CV ECHO MEAS - AVA(V,D): 1.1 CM^2
BH CV ECHO MEAS - BSA(HAYCOCK): 1.9 M^2
BH CV ECHO MEAS - BSA: 1.9 M^2
BH CV ECHO MEAS - BZI_BMI: 25.7 KILOGRAMS/M^2
BH CV ECHO MEAS - BZI_METRIC_HEIGHT: 170.2 CM
BH CV ECHO MEAS - BZI_METRIC_WEIGHT: 74.4 KG
BH CV ECHO MEAS - EDV(MOD-SP2): 77 ML
BH CV ECHO MEAS - EDV(MOD-SP4): 115 ML
BH CV ECHO MEAS - EDV(TEICH): 152.2 ML
BH CV ECHO MEAS - EF(CUBED): 72.2 %
BH CV ECHO MEAS - EF(MOD-BP): 55 %
BH CV ECHO MEAS - EF(MOD-SP2): 54.5 %
BH CV ECHO MEAS - EF(MOD-SP4): 55.7 %
BH CV ECHO MEAS - EF(TEICH): 63.3 %
BH CV ECHO MEAS - ESV(MOD-SP2): 35 ML
BH CV ECHO MEAS - ESV(MOD-SP4): 51 ML
BH CV ECHO MEAS - ESV(TEICH): 55.9 ML
BH CV ECHO MEAS - IVS/LVPW: 1
BH CV ECHO MEAS - IVSD: 1.1 CM
BH CV ECHO MEAS - LAT PEAK E' VEL: 9 CM/SEC
BH CV ECHO MEAS - LV DIASTOLIC VOL/BSA (35-75): 61.9 ML/M^2
BH CV ECHO MEAS - LV MASS(C)D: 249.5 GRAMS
BH CV ECHO MEAS - LV MASS(C)DI: 134.3 GRAMS/M^2
BH CV ECHO MEAS - LV MAX PG: 1.8 MMHG
BH CV ECHO MEAS - LV MEAN PG: 1 MMHG
BH CV ECHO MEAS - LV SYSTOLIC VOL/BSA (12-30): 27.4 ML/M^2
BH CV ECHO MEAS - LV V1 MAX: 67.8 CM/SEC
BH CV ECHO MEAS - LV V1 MEAN: 46.5 CM/SEC
BH CV ECHO MEAS - LV V1 VTI: 13.1 CM
BH CV ECHO MEAS - LVIDD: 5.6 CM
BH CV ECHO MEAS - LVIDS: 3.6 CM
BH CV ECHO MEAS - LVLD AP2: 7 CM
BH CV ECHO MEAS - LVLD AP4: 7.5 CM
BH CV ECHO MEAS - LVLS AP2: 6.5 CM
BH CV ECHO MEAS - LVLS AP4: 6.8 CM
BH CV ECHO MEAS - LVOT AREA (M): 2.8 CM^2
BH CV ECHO MEAS - LVOT AREA: 2.9 CM^2
BH CV ECHO MEAS - LVOT DIAM: 1.9 CM
BH CV ECHO MEAS - LVPWD: 1.1 CM
BH CV ECHO MEAS - MED PEAK E' VEL: 6 CM/SEC
BH CV ECHO MEAS - MV A DUR: 0.07 SEC
BH CV ECHO MEAS - MV A MAX VEL: 23 CM/SEC
BH CV ECHO MEAS - MV DEC SLOPE: 512.8 CM/SEC^2
BH CV ECHO MEAS - MV DEC TIME: 0.17 SEC
BH CV ECHO MEAS - MV E MAX VEL: 120.5 CM/SEC
BH CV ECHO MEAS - MV E/A: 5.2
BH CV ECHO MEAS - MV MAX PG: 7 MMHG
BH CV ECHO MEAS - MV MEAN PG: 2.9 MMHG
BH CV ECHO MEAS - MV P1/2T MAX VEL: 128.4 CM/SEC
BH CV ECHO MEAS - MV P1/2T: 73.4 MSEC
BH CV ECHO MEAS - MV V2 MAX: 132.4 CM/SEC
BH CV ECHO MEAS - MV V2 MEAN: 77.5 CM/SEC
BH CV ECHO MEAS - MV V2 VTI: 31.2 CM
BH CV ECHO MEAS - MVA P1/2T LCG: 1.7 CM^2
BH CV ECHO MEAS - MVA(P1/2T): 3 CM^2
BH CV ECHO MEAS - MVA(VTI): 1.2 CM^2
BH CV ECHO MEAS - PA ACC TIME: 0.11 SEC
BH CV ECHO MEAS - PA MAX PG (FULL): 1.2 MMHG
BH CV ECHO MEAS - PA MAX PG: 2.1 MMHG
BH CV ECHO MEAS - PA PR(ACCEL): 28.3 MMHG
BH CV ECHO MEAS - PA V2 MAX: 71.8 CM/SEC
BH CV ECHO MEAS - PULM DIAS VEL: 66.7 CM/SEC
BH CV ECHO MEAS - PULM S/D: 0.24
BH CV ECHO MEAS - PULM SYS VEL: 15.8 CM/SEC
BH CV ECHO MEAS - PVA(V,A): 1.7 CM^2
BH CV ECHO MEAS - PVA(V,D): 1.7 CM^2
BH CV ECHO MEAS - QP/QS: 0.64
BH CV ECHO MEAS - RAP SYSTOLE: 3 MMHG
BH CV ECHO MEAS - RV MAX PG: 0.86 MMHG
BH CV ECHO MEAS - RV MEAN PG: 0.49 MMHG
BH CV ECHO MEAS - RV V1 MAX: 46.5 CM/SEC
BH CV ECHO MEAS - RV V1 MEAN: 33.3 CM/SEC
BH CV ECHO MEAS - RV V1 VTI: 9.2 CM
BH CV ECHO MEAS - RVOT AREA: 2.6 CM^2
BH CV ECHO MEAS - RVOT DIAM: 1.8 CM
BH CV ECHO MEAS - RVSP: 20 MMHG
BH CV ECHO MEAS - SI(AO): 166.7 ML/M^2
BH CV ECHO MEAS - SI(CUBED): 67.4 ML/M^2
BH CV ECHO MEAS - SI(LVOT): 20.2 ML/M^2
BH CV ECHO MEAS - SI(MOD-SP2): 22.6 ML/M^2
BH CV ECHO MEAS - SI(MOD-SP4): 34.4 ML/M^2
BH CV ECHO MEAS - SI(TEICH): 51.8 ML/M^2
BH CV ECHO MEAS - SUP REN AO DIAM: 1.9 CM
BH CV ECHO MEAS - SV(AO): 309.7 ML
BH CV ECHO MEAS - SV(CUBED): 125.2 ML
BH CV ECHO MEAS - SV(LVOT): 37.6 ML
BH CV ECHO MEAS - SV(MOD-SP2): 42 ML
BH CV ECHO MEAS - SV(MOD-SP4): 64 ML
BH CV ECHO MEAS - SV(RVOT): 23.9 ML
BH CV ECHO MEAS - SV(TEICH): 96.3 ML
BH CV ECHO MEAS - TAPSE (>1.6): 1.8 CM2
BH CV ECHO MEAS - TR MAX VEL: 203.6 CM/SEC
BH CV ECHO MEASUREMENTS AVERAGE E/E' RATIO: 16.07
BH CV XLRA - RV BASE: 3.4 CM
BH CV XLRA - TDI S': 8 CM/SEC
LEFT ATRIUM VOLUME INDEX: 25 ML/M2
SINUS: 3.3 CM
STJ: 3 CM

## 2019-10-21 PROCEDURE — 93306 TTE W/DOPPLER COMPLETE: CPT

## 2019-10-21 PROCEDURE — 93000 ELECTROCARDIOGRAM COMPLETE: CPT | Performed by: INTERNAL MEDICINE

## 2019-10-21 PROCEDURE — 93306 TTE W/DOPPLER COMPLETE: CPT | Performed by: INTERNAL MEDICINE

## 2019-10-21 PROCEDURE — 99214 OFFICE O/P EST MOD 30 MIN: CPT | Performed by: INTERNAL MEDICINE

## 2019-10-21 NOTE — PROGRESS NOTES
Date of Office Visit: 10/21/19  Encounter Provider: Juan Antonio Buenrostro MD  Place of Service: [facility] CARDIOLOGY  Patient Name: Fernie Porter  :1970  Referral Provider:No ref. provider found      Chief Complaint   Patient presents with   • Aortic valve replaced     History of Present Illness    Mr. Porter is a pleasant 49-year-old gentleman with history of bicuspid  aortic valve with evidence of aortic stenosis as well as some mild  insufficiency. He had coarctation of the aorta with pseudoaneurysm and had  that repaired years ago. He also had intermittent episodes of atrial  fibrillation treated medically, but very symptomatic when he would go into  that. He presented 2012 to our arrhythmia clinic with rapid atrial  fibrillation. He did not convert back with IV amiodarone. Was started on  Pradaxa. We upped his sotalol. He ultimately did convert back to sinus  rhythm, but was still having some questionable shortness of breath. We  repeated an echocardiogram on him that showed what appeared to be worsening  aortic valve stenosis.   He then underwent on 2012 aortic valve replacement with resection of the ascending  aorta with an aortic valve conduit using a #25 [employer] mechanical valve. He had  reimplantation of the right coronary arteries and had a left Cryomaze procedure with left  atrial appendage ligation. He went home and was having a fair amount of problems with his  blood pressure as well as his Coumadin.   He then had intermittent episodes of hypotension.  We adjusted his medication and that  improved.  He had problems with dyspnea.  We checked an echocardiogram on him and  everything looked okay except his fluid collection around his aorta.  We did a CT of his  chest which showed what looked to be a seroma.  He comes in for follow-up. The patient denies chest pain, pressure and heaviness. No shortness of breath, othopnea or PND. No palpitations, near  syncope or syncope. No stroke type symptoms like paralysis, paresthesia, abrupt vision loss and dysarthria. No bleeding like blood in the stool or dark stools.  He is very active training dogs and jogs couple days a week has no problems.          Past Medical History:   Diagnosis Date   • AAA (abdominal aortic aneurysm) (CMS/[facility]) 1992    repaired, subclaven stenosis subsequently   • Allergic 8/2011    Contrast dye   • Aortic stenosis    • Bicuspid aortic valve    • Coarctation of aorta    • Headache 1977   • Heart murmur 1971   • Hypertension    • Kidney stone 2010   • PAF (paroxysmal atrial fibrillation) (CMS/[facility])          Past Surgical History:   Procedure Laterality Date   • ABDOMINAL AORTIC ANEURYSM REPAIR  1992   • AORTIC VALVE REPAIR/REPLACEMENT      [employer]   • ASCENDING AORTIC ANEURYSM REPAIR W/ MECHANICAL AORTIC VALVE REPLACEMENT     • CARDIAC SURGERY  09/2012    AVR [employer] mechanical, maize procedure, aortic root resection   • CARDIAC SURGERY  1975    Coarct repair. Priscilla coarct aneurism 1989   • CARDIOVERSION     • COARCTATION OF AORTA EXCISION     • HERNIA REPAIR     • INGUINAL HERNIA REPAIR           Current Outpatient Medications on File Prior to Visit   Medication Sig Dispense Refill   • amLODIPine (NORVASC) 5 MG tablet Take 1 tablet by mouth Daily. 90 tablet 3   • amoxicillin (AMOXIL) 500 MG capsule Take 4 tablets by mouth 1 hr prior to dental procedures 20 capsule 0   • CloNIDine (CATAPRES) 0.2 MG tablet TAKE 1 TABLET BY MOUTH TWICE A  tablet 1   • dexamethasone (DEXPAK 6 DAY) 1.5 MG (21) tablet therapy pack Take 1.5 mg by mouth Take As Directed. Start taking the day before the MRI scan 1 each 0   • fluticasone (FLONASE) 50 MCG/ACT nasal spray 2 sprays into the nostril(s) as directed by provider Daily. 16 mL 6   • hydrochlorothiazide (MICROZIDE) 12.5 MG capsule TAKE ONE CAPSULE BY MOUTH EVERY DAY 90 capsule 1   • hydrocortisone 2.5 % cream Apply  topically to the appropriate  area as directed 3 (Three) Times a Day As Needed.     • ibuprofen (ADVIL,MOTRIN) 200 MG tablet Take 200 mg by mouth Every 6 (Six) Hours As Needed for Mild Pain .     • imipramine (TOFRANIL) 50 MG tablet TAKE 2 TABLETS BY MOUTH AT BEDTIME 180 tablet 1   • propranolol LA (INDERAL LA) 80 MG 24 hr capsule Take 1 capsule by mouth Daily. 90 capsule 3   • warfarin (COUMADIN) 5 MG tablet Take 1.5 tablet (7.5mg) on Sun and Thurs. Take 1 tablet (5mg) all other days OR as directed by [facility]. 135 tablet 0     Current Facility-Administered Medications on File Prior to Visit   Medication Dose Route Frequency Provider Last Rate Last Dose   • diphenhydrAMINE (BENADRYL) capsule 25 mg  25 mg Oral Q6H PRN Isaías Rivera MD             Social History     Socioeconomic History   • Marital status:      Spouse name: Not on file   • Number of children: Not on file   • Years of education: Not on file   • Highest education level: Not on file   Occupational History   • Occupation: [employer]   Tobacco Use   • Smoking status: Never Smoker   • Smokeless tobacco: Never Used   Substance and Sexual Activity   • Alcohol use: No     Comment: occ caffeine use   • Drug use: No   • Sexual activity: Yes     Partners: Female     Birth control/protection: Other   Social History Narrative    ** Merged History Encounter **            Family History   Problem Relation Age of Onset   • Hypertension Mother    • Stroke Mother    • No Known Problems Father    • No Known Problems Sister    • Cancer Maternal Grandfather    • Cancer Paternal Grandmother          Review of Systems   Constitution: Negative for decreased appetite, diaphoresis, fever, weakness, malaise/fatigue, weight gain and weight loss.   HENT: Negative for congestion, hearing loss, nosebleeds and tinnitus.    Eyes: Negative for blurred vision, double vision, vision loss in left eye, vision loss in right eye and visual disturbance.   Cardiovascular:        As noted in HPI  "  Respiratory:        As noted HPI   Endocrine: Negative for cold intolerance and heat intolerance.   Hematologic/Lymphatic: Negative for bleeding problem. Does not bruise/bleed easily.   Skin: Negative for color change, flushing, itching and rash.   Musculoskeletal: Negative for arthritis, back pain, joint pain, joint swelling, muscle weakness and myalgias.   Gastrointestinal: Negative for bloating, abdominal pain, constipation, diarrhea, dysphagia, heartburn, hematemesis, hematochezia, melena, nausea and vomiting.   Genitourinary: Negative for bladder incontinence, dysuria, frequency, nocturia and urgency.   Neurological: Negative for dizziness, focal weakness, headaches, light-headedness, loss of balance, numbness, paresthesias and vertigo.   Psychiatric/Behavioral: Negative for depression, memory loss and substance abuse.       Procedures      ECG 12 Lead  Date/Time: 10/21/2019 2:49 PM  Performed by: Juan Antonio Buenrostro MD  Authorized by: Juan Antonio Buenrostro MD   Comparison: compared with previous ECG   Similar to previous ECG  Rhythm: sinus rhythm  Rate: normal  Conduction: non-specific intraventricular conduction delay  QRS axis: normal                  Objective:    /64   Pulse 59   Ht 175.3 cm (69\")   Wt 67.6 kg (149 lb)   BMI 22.00 kg/m²        Physical Exam  Physical Exam   Constitutional: He is oriented to person, place, and time. He appears well-developed and well-nourished. No distress.   HENT:   Head: Normocephalic.   Eyes: Conjunctivae are normal. Pupils are equal, round, and reactive to light. No scleral icterus.   Neck: Normal carotid pulses, no hepatojugular reflux and no JVD present. Carotid bruit is not present. No tracheal deviation, no edema and no erythema present. No thyromegaly present.   Cardiovascular: Normal rate, regular rhythm, S1 normal and intact distal pulses.  No extrasystoles are present. PMI is not displaced. Exam reveals no gallop, no distant heart sounds and no friction " rub.   Murmur heard.   Systolic murmur is present with a grade of 2/6 at the upper right sternal border.  Pulses:       Carotid pulses are 2+ on the right side, and 2+ on the left side.       Radial pulses are 2+ on the right side, and 2+ on the left side.        Femoral pulses are 2+ on the right side, and 2+ on the left side.       Dorsalis pedis pulses are 2+ on the right side, and 2+ on the left side.        Posterior tibial pulses are 2+ on the right side, and 2+ on the left side.   Artificial S2   Pulmonary/Chest: Effort normal and breath sounds normal. No respiratory distress. He has no decreased breath sounds. He has no wheezes. He has no rhonchi. He has no rales. He exhibits no tenderness.   Abdominal: Soft. Bowel sounds are normal. He exhibits no distension and no mass. There is no hepatosplenomegaly. There is no tenderness. There is no rebound and no guarding.   Musculoskeletal: He exhibits no edema, tenderness or deformity.   Neurological: He is alert and oriented to person, place, and time.   Skin: Skin is warm and dry. No rash noted. He is not diaphoretic. No cyanosis or erythema. No pallor. Nails show no clubbing.   Psychiatric: He has a normal mood and affect. His speech is normal and behavior is normal. Judgment and thought content normal.           Assessment:    1. This is a 49-year-old gentleman with a history of bicuspid aortic valve with severe aortic stenosis, ascending aortic enlargement.  He is now status post aortic valve  replacement with a valve conduit using a mechanical [employer]. Vikash valve with reimplantation of the coronary arteries.Echocardiogram today October 2019 unremarkable. Normal functioning valve normal chamber size normal LV systolic function.  He is to continue the same see us again in follow-up in a year and call back if problems.    2. 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  He is status post patch repair of that.   Follow-up CT scan in 2015 appears to be  minimal evidence of coarctation.  3. Paroxysmal atrial fibrillation.  This was prior to his valve surgery, but he now has cryoMaze procedure and appendage ligation.  No recurrent episodes off medication continue the same.  4. Hypertension.  His blood pressure is at goal.  5. Abnormal fluid collection around the aortic graft.  It may just represent a seroma. Benign per Dr. Casanova.     6.  Hyperlipidemia.  His [employer] cardi[employer] risk of stroke or heart attack in 10 years is low at 2.5% compared to his age-matched control of 1.7%.  He does not meet criteria for statin therapy at this time.         Plan:

## 2019-10-21 NOTE — PROGRESS NOTES
Anticoagulation Clinic Progress Note    Anticoagulation Summary  As of 10/21/2019    INR goal:   2.5-3.5   TTR:   80.2 % (10.9 mo)   INR used for dosin.90 (10/18/2019)   Warfarin maintenance plan:   7.5 mg every Sun, Thu; 5 mg all other days   Weekly warfarin total:   40 mg   No change documented:   José Miguel Zapata RPH   Plan last modified:   José Miguel Zapata RPH (10/4/2019)   Next INR check:   2019   Priority:   Maintenance   Target end date:       Indications    Aortic valve replaced [Z95.2]  Paroxysmal atrial fibrillation (CMS/HCC) [I48.0]             Anticoagulation Episode Summary     INR check location:       Preferred lab:       Send INR reminders to:   [facility]  POOL    Comments:   *Coaguchek*      Anticoagulation Care Providers     Provider Role Specialty Phone number    Juan Antonio Buenrostro MD Referring Cardiology [phone]          Drug interactions: has remained unchanged.  Diet: has remained unchanged.    Clinic Interview:  Patient Findings     Negatives:   Signs/symptoms of thrombosis, Signs/symptoms of bleeding,   Laboratory test error suspected, Change in health, Change in alcohol use,   Change in activity, Upcoming invasive procedure, Emergency department   visit, Upcoming dental procedure, Missed doses, Extra doses, Change in   medications, Change in diet/appetite, Hospital admission, Bruising, Other   complaints      Clinical Outcomes     Negatives:   Major bleeding event, Thromboembolic event,   Anticoagulation-related hospital admission, Anticoagulation-related ED   visit, Anticoagulation-related fatality        INR History:  Anticoagulation Monitoring 2019 10/4/2019 10/21/2019   INR 3.70 3.90 2.90   INR Date 2019 10/4/2019 10/18/2019   INR Goal 2.5-3.5 2.5-3.5 2.5-3.5   Trend Same Down Same   Last Week Total 42.5 mg 42.5 mg 40 mg   Next Week Total 42.5 mg 37.5 mg 40 mg   Sun 7.5 mg 7.5 mg 7.5 mg   Mon 5 mg 5 mg 5 mg   Tue 7.5 mg 5 mg 5 mg   Wed 5 mg 5 mg  5 mg   Thu 7.5 mg 7.5 mg 7.5 mg   Fri 5 mg 2.5 mg (10/4); Otherwise 5 mg 5 mg   Sat 5 mg 5 mg 5 mg   Visit Report - - -   Some recent data might be hidden       Plan:  1. INR is Therapeutic today- see above in Anticoagulation Summary.   Will instruct Fernie Porter to Continue their warfarin regimen- see above in Anticoagulation Summary.  2. Follow up in 2 weeks  3. Pt has agreed to only be called if INR out of range. They have been instructed to call if any changes in medications, doses, concerns, etc. Patient expresses understanding and has no further questions at this time.    José Miguel Zapata, PharmD

## 2019-10-28 ENCOUNTER — TELEPHONE (OUTPATIENT)
Dept: NEUROSURGERY | Facility: CLINIC | Age: 49
End: 2019-10-28

## 2019-10-28 RX ORDER — HYDROCHLOROTHIAZIDE 12.5 MG/1
CAPSULE, GELATIN COATED ORAL
Qty: 90 CAPSULE | Refills: 1 | Status: SHIPPED | OUTPATIENT
Start: 2019-10-28 | End: 2020-04-21

## 2019-10-28 RX ORDER — CLONIDINE HYDROCHLORIDE 0.2 MG/1
TABLET ORAL
Qty: 180 TABLET | Refills: 1 | Status: SHIPPED | OUTPATIENT
Start: 2019-10-28 | End: 2020-04-21

## 2019-10-28 RX ORDER — AMLODIPINE BESYLATE 5 MG/1
TABLET ORAL
Qty: 90 TABLET | Refills: 3 | Status: SHIPPED | OUTPATIENT
Start: 2019-10-28 | End: 2020-10-19

## 2019-10-28 NOTE — TELEPHONE ENCOUNTER
Pt is suppose to get his imaging done at  [facility] imaging before his appt on 11/27 margo/ Jenni.     LVM for pt to call us back and let us know if he has gotten it done or when to schedule it for.      Need information on [facility] imaging phone and address please

## 2019-11-01 LAB — INR PPP: 3.4

## 2019-11-04 ENCOUNTER — ANTICOAGULATION VISIT (OUTPATIENT)
Dept: PHARMACY | Facility: HOSPITAL | Age: 49
End: 2019-11-04

## 2019-11-04 DIAGNOSIS — Z95.2 AORTIC VALVE REPLACED: ICD-10-CM

## 2019-11-04 DIAGNOSIS — I48.0 PAROXYSMAL ATRIAL FIBRILLATION (HCC): ICD-10-CM

## 2019-11-04 NOTE — PROGRESS NOTES
Anticoagulation Clinic Progress Note    Anticoagulation Summary  As of 11/4/2019    INR goal:   2.5-3.5   TTR:   81.1 % (11.3 mo)   INR used for dosing:   3.40 (11/1/2019)   Warfarin maintenance plan:   7.5 mg every Sun, Thu; 5 mg all other days   Weekly warfarin total:   40 mg   No change documented:   Cindy Mathew   Plan last modified:   José Miguel Zapata RP (10/4/2019)   Next INR check:   11/15/2019   Priority:   Maintenance   Target end date:       Indications    Aortic valve replaced [Z95.2]  Paroxysmal atrial fibrillation (CMS/HCC) [I48.0]             Anticoagulation Episode Summary     INR check location:       Preferred lab:       [facility]    Comments:   *Coaguchek*      Anticoagulation Care Providers     Provider Role Specialty Phone number    Juan Antonio Buenrostro MD Referring Cardiology [phone]          Clinic Interview:  No pertinent clinical findings have been reported.    INR History:  Anticoagulation Monitoring 10/4/2019 10/21/2019 11/4/2019   INR 3.90 2.90 3.40   INR Date 10/4/2019 10/18/2019 11/1/2019   INR Goal 2.5-3.5 2.5-3.5 2.5-3.5   Trend Down Same Same   Last Week Total 42.5 mg 40 mg 40 mg   Next Week Total 37.5 mg 40 mg 40 mg   Sun 7.5 mg 7.5 mg 7.5 mg   Mon 5 mg 5 mg 5 mg   Tue 5 mg 5 mg 5 mg   Wed 5 mg 5 mg 5 mg   Thu 7.5 mg 7.5 mg 7.5 mg   Fri 2.5 mg (10/4); Otherwise 5 mg 5 mg 5 mg   Sat 5 mg 5 mg 5 mg   Visit Report - - -   Some recent data might be hidden       Plan:  1. INR is therapeutic today- see above in Anticoagulation Summary.    Fernie Porter to continue their warfarin regimen- see above in Anticoagulation Summary.  2. Follow up in 2 weeks  3. Pt has agreed to only be called if INR out of range. They have been instructed to call if any changes in medications, doses, concerns, etc. Patient expresses understanding and has no further questions at this time.    Cindy Mathew

## 2019-11-05 DIAGNOSIS — R93.7 ABNORMAL MRI, LUMBAR SPINE: Primary | ICD-10-CM

## 2019-11-11 ENCOUNTER — OFFICE VISIT (OUTPATIENT)
Dept: INTERNAL MEDICINE | Facility: CLINIC | Age: 49
End: 2019-11-11

## 2019-11-11 VITALS
HEART RATE: 80 BPM | OXYGEN SATURATION: 99 % | BODY MASS INDEX: 22.96 KG/M2 | HEIGHT: 69 IN | SYSTOLIC BLOOD PRESSURE: 132 MMHG | RESPIRATION RATE: 16 BRPM | DIASTOLIC BLOOD PRESSURE: 64 MMHG | TEMPERATURE: 98.4 F | WEIGHT: 155 LBS

## 2019-11-11 DIAGNOSIS — Z00.00 ENCOUNTER FOR PREVENTIVE HEALTH EXAMINATION: Primary | ICD-10-CM

## 2019-11-11 DIAGNOSIS — G43.109 MIGRAINE WITH AURA AND WITHOUT STATUS MIGRAINOSUS, NOT INTRACTABLE: ICD-10-CM

## 2019-11-11 DIAGNOSIS — I10 ESSENTIAL HYPERTENSION: ICD-10-CM

## 2019-11-11 DIAGNOSIS — Z23 NEED FOR VACCINATION: ICD-10-CM

## 2019-11-11 DIAGNOSIS — I48.0 PAROXYSMAL ATRIAL FIBRILLATION (HCC): ICD-10-CM

## 2019-11-11 PROCEDURE — 90471 IMMUNIZATION ADMIN: CPT | Performed by: INTERNAL MEDICINE

## 2019-11-11 PROCEDURE — 99396 PREV VISIT EST AGE 40-64: CPT | Performed by: INTERNAL MEDICINE

## 2019-11-11 PROCEDURE — 90674 CCIIV4 VAC NO PRSV 0.5 ML IM: CPT | Performed by: INTERNAL MEDICINE

## 2019-11-11 NOTE — PATIENT INSTRUCTIONS
Influenza Virus Vaccine injection  What is this medicine?  INFLUENZA VIRUS VACCINE (in [facility] SEEN) helps to reduce the risk of getting influenza also known as the flu. The vaccine only helps protect you against some strains of the flu.  This medicine may be used for other purposes; ask your health care provider or pharmacist if you have questions.  COMMON BRAND NAME(S): Afluria, Agriflu, Alfuria, FLUAD, Fluarix, Fluarix Quadrivalent, Flublok, Flublok Quadrivalent, FLUCELVAX, Flulaval, Fluvirin, Fluzone, Fluzone High-Dose, Fluzone Intradermal  What should I tell my health care provider before I take this medicine?  They need to know if you have any of these conditions:  -bleeding disorder like hemophilia  -fever or infection  -Guillain-Allenton syndrome or other neurological problems  -immune system problems  -infection with the human immunodeficiency virus (HIV) or AIDS  -low blood platelet counts  -multiple sclerosis  -an unusual or allergic reaction to influenza virus vaccine, latex, other medicines, foods, dyes, or preservatives. Different brands of vaccines contain different allergens. Some may contain latex or eggs. Talk to your doctor about your allergies to make sure that you get the right vaccine.  -pregnant or trying to get pregnant  -breast-feeding  How should I use this medicine?  This vaccine is for injection into a muscle or under the skin. It is given by a health care professional.  A copy of Vaccine Information Statements will be given before each vaccination. Read this sheet carefully each time. The sheet may change frequently.  Talk to your healthcare provider to see which vaccines are right for you. Some vaccines should not be used in all age groups.  Overdosage: If you think you have taken too much of this medicine contact a poison control center or emergency room at once.  NOTE: This medicine is only for you. Do not share this medicine with others.  What if I miss a dose?  This  does not apply.  What may interact with this medicine?  -chemotherapy or radiation therapy  -medicines that lower your immune system like etanercept, anakinra, infliximab, and adalimumab  -medicines that treat or prevent blood clots like warfarin  -phenytoin  -steroid medicines like prednisone or cortisone  -theophylline  -vaccines  This list may not describe all possible interactions. Give your health care provider a list of all the medicines, herbs, non-prescription drugs, or dietary supplements you use. Also tell them if you smoke, drink alcohol, or use illegal drugs. Some items may interact with your medicine.  What should I watch for while using this medicine?  Report any side effects that do not go away within 3 days to your doctor or health care professional. Call your health care provider if any unusual symptoms occur within 6 weeks of receiving this vaccine.  You may still catch the flu, but the illness is not usually as bad. You cannot get the flu from the vaccine. The vaccine will not protect against colds or other illnesses that may cause fever. The vaccine is needed every year.  What side effects may I notice from receiving this medicine?  Side effects that you should report to your doctor or health care professional as soon as possible:  -allergic reactions like skin rash, itching or hives, swelling of the face, lips, or tongue  Side effects that usually do not require medical attention (report to your doctor or health care professional if they continue or are bothersome):  -fever  -headache  -muscle aches and pains  -pain, tenderness, redness, or swelling at the injection site  -tiredness  This list may not describe all possible side effects. Call your doctor for medical advice about side effects. You may report side effects to FDA at 6-614-FDA-7646.  Where should I keep my medicine?  The vaccine will be given by a health care professional in a clinic, pharmacy, doctor's office, or other health care  setting. You will not be given vaccine doses to store at home.  NOTE: This sheet is a summary. It may not cover all possible information. If you have questions about this medicine, talk to your doctor, pharmacist, or health care provider.  © 2019 [employer]vier/Gold Standard (2016-07-08 10:07:28)

## 2019-11-11 NOTE — PROGRESS NOTES
Subjective   Fernie Porter is a 49 y.o. male.     Chief Complaint   Patient presents with   • Hypertension   • Atrial Fibrillation   • Aortic Stenosis   • Migraine         In for annual preventative exam.  Sleep is good.  Gets 7 hours at night.  Exercises 3 days per week.  Energy is good.  Diet is well-balanced.      Hypertension   This is a chronic problem. The current episode started more than 1 year ago. The problem is unchanged. The problem is controlled. Associated symptoms include headaches. Pertinent negatives include no chest pain, orthopnea, palpitations, peripheral edema or shortness of breath. There are no associated agents to hypertension. Risk factors for coronary artery disease include male gender. Current antihypertension treatment includes alpha 1 blockers, diuretics and beta blockers. The current treatment provides significant improvement. There are no compliance problems.  There is no history of angina, kidney disease, CAD/MI, CVA or heart failure.   Atrial Fibrillation   Presents for follow-up visit. Symptoms are negative for chest pain, dizziness, palpitations, shortness of breath and weakness. The symptoms have been stable. Past medical history includes atrial fibrillation. There are no medication compliance problems.   Migraine    This is a chronic problem. The current episode started more than 1 year ago. The problem occurs constantly. The problem has been waxing and waning. The pain is located in the left unilateral region. The pain does not radiate. The pain quality is similar to prior headaches. The quality of the pain is described as throbbing. The pain is moderate. Associated symptoms include back pain. Pertinent negatives include no abdominal pain, coughing, dizziness, ear pain, fever, nausea, rhinorrhea, sore throat, vomiting or weakness. Nothing aggravates the symptoms. He has tried NSAIDs for the symptoms. The treatment provided significant relief.        The following portions of  the patient's history were reviewed and updated as appropriate: allergies, current medications, past social history and problem list.    Outpatient Medications Marked as Taking for the 11/11/19 encounter (Office Visit) with Juan Antonio Ford MD   Medication Sig Dispense Refill   • amLODIPine (NORVASC) 5 MG tablet TAKE 1 TABLET BY MOUTH EVERY DAY 90 tablet 3   • cloNIDine (CATAPRES) 0.2 MG tablet TAKE ONE TABLET 2 TIMES A  tablet 1   • dexamethasone (DEXPAK 6 DAY) 1.5 MG (21) tablet therapy pack Take 1.5 mg by mouth Take As Directed. Start taking the day before the MRI scan 1 each 0   • fluticasone (FLONASE) 50 MCG/ACT nasal spray 2 sprays into the nostril(s) as directed by provider Daily. 16 mL 6   • hydroCHLOROthiazide (MICROZIDE) 12.5 MG capsule TAKE ONE CAPSULE BY MOUTH EVERY DAY 90 capsule 1   • hydrocortisone 2.5 % cream Apply  topically to the appropriate area as directed 3 (Three) Times a Day As Needed.     • ibuprofen (ADVIL,MOTRIN) 200 MG tablet Take 200 mg by mouth Every 6 (Six) Hours As Needed for Mild Pain .     • imipramine (TOFRANIL) 50 MG tablet TAKE 2 TABLETS BY MOUTH AT BEDTIME 180 tablet 1   • propranolol LA (INDERAL LA) 80 MG 24 hr capsule Take 1 capsule by mouth Daily. 90 capsule 3   • warfarin (COUMADIN) 5 MG tablet Take 1.5 tablet (7.5mg) on Sun and Thurs. Take 1 tablet (5mg) all other days OR as directed by [facility]. 135 tablet 0     Current Facility-Administered Medications for the 11/11/19 encounter (Office Visit) with Juan Antonio Ford MD   Medication Dose Route Frequency Provider Last Rate Last Dose   • diphenhydrAMINE (BENADRYL) capsule 25 mg  25 mg Oral Q6H PRN Isaías Rivera MD           Review of Systems   Constitutional: Negative for chills, fatigue and fever.   HENT: Negative for congestion, ear pain, nosebleeds, rhinorrhea, sore throat and voice change.    Eyes: Negative for discharge, itching and visual disturbance.   Respiratory: Negative for cough, chest  tightness, shortness of breath and wheezing.    Cardiovascular: Negative for chest pain, palpitations, orthopnea and leg swelling.   Gastrointestinal: Negative for abdominal pain, anal bleeding, constipation, diarrhea, nausea and vomiting.   Endocrine: Negative for cold intolerance, heat intolerance and polyuria.   Genitourinary: Negative for difficulty urinating, dysuria, frequency, hematuria and urgency.   Musculoskeletal: Positive for arthralgias (thumb ache bilateral) and back pain. Negative for myalgias.   Skin: Negative for rash and wound.   Allergic/Immunologic: Positive for environmental allergies.   Neurological: Positive for headaches. Negative for dizziness, syncope, weakness and light-headedness.   Hematological: Negative for adenopathy. Does not bruise/bleed easily.   Psychiatric/Behavioral: Negative for confusion and sleep disturbance. The patient is not nervous/anxious.        Objective   Vitals:    11/11/19 1308   BP: 132/64   Pulse: 80   Resp: 16   Temp: 98.4 °F (36.9 °C)   SpO2: 99%          11/11/19  1308   Weight: 70.3 kg (155 lb)    [unfilled]  Body mass index is 22.88 kg/m².      Physical Exam   Constitutional: He is oriented to person, place, and time. He appears well-developed and well-nourished. No distress.   HENT:   Head: Normocephalic and atraumatic.   Right Ear: External ear normal.   Left Ear: External ear normal.   Nose: Nose normal.   Mouth/Throat: Oropharynx is clear and moist.   Eyes: Conjunctivae and EOM are normal. Pupils are equal, round, and reactive to light. No scleral icterus.   Neck: Normal range of motion. Neck supple. No JVD present. Carotid bruit is not present. No thyromegaly present.   Cardiovascular: Normal rate, regular rhythm and intact distal pulses. Exam reveals no gallop and no friction rub.   Murmur heard.   Crescendo decrescendo systolic murmur is present with a grade of 2/6.  Prosthetic heart sounds.  Grade 2/6 JOHNNIE loudest at the pulmonic area.    Pulmonary/Chest: Effort normal and breath sounds normal. No respiratory distress. He has no wheezes. He has no rales.   Abdominal: Soft. Bowel sounds are normal. He exhibits no distension and no mass. There is no tenderness. There is no rebound and no guarding. No hernia.   Musculoskeletal: Normal range of motion. He exhibits no edema.   Lymphadenopathy:     He has no cervical adenopathy.   Neurological: He is alert and oriented to person, place, and time. He has normal reflexes. He displays normal reflexes.   Skin: Skin is warm and dry.   Psychiatric: He has a normal mood and affect. His behavior is normal. Judgment and thought content normal.   Nursing note and vitals reviewed.        Problem List Items Addressed This Visit        Cardiovascular and Mediastinum    Hypertension    Migraine    Paroxysmal atrial fibrillation (CMS/HCC)      Other Visit Diagnoses     Encounter for preventive health examination    -  Primary    Need for vaccination        Relevant Orders    Flucelvax Quad=>4Years ([id number]) (Completed)        Assessment/Plan   In for annual preventative exam and recheck of hypertension, AF, aortic stenosis, and migraines.  Migraines persist.  They're doing pretty well to present time.  Gets 3 or 4 per month.  Has failed several drugs in the past including Topamax.  Blood pressure control is excellent.  Atrial fib is controlled.  Got annual lab work May 2019 including CBC, CMP, lipids and urinalysis.  Continue to monitor every 3 months.  Flu shot updated today.    Prevention counseling was performed today. The counseling performed was routine health maintenance topics.           Dragon disclaimer:   Much of this encounter note is an electronic transcription/translation of spoken language to printed text. The electronic translation of spoken language may permit erroneous, or at times, nonsensical words or phrases to be inadvertently transcribed; Although I have reviewed the note for such errors, some  may still exist.

## 2019-11-15 ENCOUNTER — ANTICOAGULATION VISIT (OUTPATIENT)
Dept: PHARMACY | Facility: HOSPITAL | Age: 49
End: 2019-11-15

## 2019-11-15 DIAGNOSIS — Z95.2 AORTIC VALVE REPLACED: ICD-10-CM

## 2019-11-15 DIAGNOSIS — I48.0 PAROXYSMAL ATRIAL FIBRILLATION (HCC): ICD-10-CM

## 2019-11-15 LAB — INR PPP: 3.2

## 2019-11-15 NOTE — PROGRESS NOTES
Anticoagulation Clinic Progress Note    Anticoagulation Summary  As of 11/15/2019    INR goal:   2.5-3.5   TTR:   81.8 % (11.8 mo)   INR used for dosing:   3.20 (11/15/2019)   Warfarin maintenance plan:   7.5 mg every Sun, Thu; 5 mg all other days   Weekly warfarin total:   40 mg   Plan last modified:   José Miguel Zapata RPH (10/4/2019)   Next INR check:   11/29/2019   Priority:   Maintenance   Target end date:       Indications    Aortic valve replaced [Z95.2]  Paroxysmal atrial fibrillation (CMS/HCC) [I48.0]             Anticoagulation Episode Summary     INR check location:       Preferred lab:       Send INR reminders to:   [facility]  POOL    Comments:   *Coaguchek*      Anticoagulation Care Providers     Provider Role Specialty Phone number    Juan Antonio Buenrostro MD Referring Cardiology [phone]          Clinic Interview:  No pertinent clinical findings have been reported.    INR History:  Anticoagulation Monitoring 10/21/2019 11/4/2019 11/15/2019   INR 2.90 3.40 3.20   INR Date 10/18/2019 11/1/2019 11/15/2019   INR Goal 2.5-3.5 2.5-3.5 2.5-3.5   Trend Same Same Same   Last Week Total 40 mg 40 mg 40 mg   Next Week Total 40 mg 40 mg 40 mg   Sun 7.5 mg 7.5 mg 7.5 mg   Mon 5 mg 5 mg 5 mg   Tue 5 mg 5 mg 5 mg   Wed 5 mg 5 mg 5 mg   Thu 7.5 mg 7.5 mg 7.5 mg   Fri 5 mg 5 mg 5 mg   Sat 5 mg 5 mg 5 mg   Visit Report - - -   Some recent data might be hidden       Plan:  1. INR is therapeutic today- see above in Anticoagulation Summary.    Fernie Porter to continue their warfarin regimen- see above in Anticoagulation Summary.  2. Follow up in 2 weeks  3. Pt has agreed to only be called if INR out of range. They have been instructed to call if any changes in medications, doses, concerns, etc. Patient expresses understanding and has no further questions at this time.    Rosa Newman

## 2019-11-19 ENCOUNTER — TELEPHONE (OUTPATIENT)
Dept: NEUROSURGERY | Facility: CLINIC | Age: 49
End: 2019-11-19

## 2019-11-19 RX ORDER — PREDNISONE 50 MG/1
TABLET ORAL
Qty: 3 TABLET | Refills: 0 | Status: SHIPPED | OUTPATIENT
Start: 2019-11-19 | End: 2019-12-04

## 2019-11-21 ENCOUNTER — TELEPHONE (OUTPATIENT)
Dept: NEUROSURGERY | Facility: CLINIC | Age: 49
End: 2019-11-21

## 2019-11-26 DIAGNOSIS — R93.7 ABNORMAL MRI, LUMBAR SPINE: ICD-10-CM

## 2019-11-30 LAB — INR PPP: 3.5

## 2019-12-02 ENCOUNTER — ANTICOAGULATION VISIT (OUTPATIENT)
Dept: PHARMACY | Facility: HOSPITAL | Age: 49
End: 2019-12-02

## 2019-12-02 DIAGNOSIS — Z95.2 AORTIC VALVE REPLACED: ICD-10-CM

## 2019-12-02 DIAGNOSIS — I48.0 PAROXYSMAL ATRIAL FIBRILLATION (HCC): ICD-10-CM

## 2019-12-02 NOTE — PROGRESS NOTES
"Subjective   Patient ID: Fernie Porter is a 49 y.o. male is here today for follow-up of 6mo schwannoma.  Pt had MRI lumbar done on 11/25/19.    History of Present Illness    He follows up today for history of low back pain radiating into the right anterior thigh.  He was initially seen by Dr. Rivera in May 2019.  He also reported numbness in the right thigh.  Most of the pain has resolved with physical therapy but the tingling continued.  He has had new MRI lumbar spine imaging for further evaluation of a possible schwannoma that was indicated on the radiology report from the prior lumbar MRI in 2018.    He is doing and feeling well and denies any leg pain, numbness, tingling or weakness, as well as any low back discomfort.      /66   Pulse 80   Ht 175.3 cm (69\")   Wt 69.4 kg (153 lb)   BMI 22.59 kg/m²       The following portions of the patient's history were reviewed and updated as appropriate: allergies, current medications, past family history, past medical history, past social history, past surgical history and problem list.    Review of Systems   Musculoskeletal: Negative for back pain.        Soreness in the back   Neurological: Negative for weakness and numbness.   Psychiatric/Behavioral: Negative for sleep disturbance.       Objective   Physical Exam   Constitutional: He is oriented to person, place, and time. Vital signs are normal. He appears well-developed and well-nourished. He is cooperative.  Non-toxic appearance. He does not have a sickly appearance. He does not appear ill.   Very pleasant well-appearing middle-aged male   HENT:   Head: Normocephalic and atraumatic.   Eyes: EOM are normal.   Neck: Neck supple. No tracheal deviation present.   Pulmonary/Chest: Effort normal.   Musculoskeletal: Normal range of motion. He exhibits no tenderness or deformity.   Strength equal and intact throughout   Neurological: He is alert and oriented to person, place, and time. He has normal strength. He " displays no tremor and normal reflexes. No cranial nerve deficit or sensory deficit. He exhibits normal muscle tone. Coordination and gait normal. GCS eye subscore is 4. GCS verbal subscore is 5. GCS motor subscore is 6.   Gait is stable and upright   Skin: Skin is warm and dry.   Psychiatric: He has a normal mood and affect. His behavior is normal. Thought content normal.   Vitals reviewed.    Neurologic Exam     Mental Status   Oriented to person, place, and time.     Cranial Nerves     CN III, IV, VI   Extraocular motions are normal.     Motor Exam     Strength   Strength 5/5 throughout.       Assessment/Plan   Independent Review of Radiographic Studies:    MRI lumbar spine with and without contrast from [facility] dated November 25, 2019 reveals mild degenerative changes.  Postcontrast images show no abnormal enhancement.  No significant canal or neuroforaminal narrowing at any level.    Medical Decision Making:    He returns to the office today for ongoing follow-up with history of abnormal lumbar MRI imaging concerning for schwannoma.  He has had new lumbar MRI imaging at [facility] which reveals no evidence of any abnormal enhancement.  No schwannoma is seen.  He also has no significant canal or neuroforaminal narrowing at any level.  He is doing and feeling very with no back or leg pain.  From our standpoint we will see him going forward on an as-needed basis.    Plan: Return to office as needed    Fernie was seen today for 6mo schwannoma.    Diagnoses and all orders for this visit:    Abnormal MRI, lumbar spine -patient of L2 nerve root schwannoma on the right      Return if symptoms worsen or fail to improve.

## 2019-12-02 NOTE — PROGRESS NOTES
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    INR History:  Anticoagulation Monitoring 11/4/2019 11/15/2019 12/2/2019   INR 3.40 3.20 3.50   INR Date 11/1/2019 11/15/2019 11/30/2019   INR Goal 2.5-3.5 2.5-3.5 2.5-3.5   Trend Same Same Same   Last Week Total 40 mg 40 mg 40 mg   Next Week Total 40 mg 40 mg 40 mg   Sun 7.5 mg 7.5 mg 7.5 mg   Mon 5 mg 5 mg 5 mg   Tue 5 mg 5 mg 5 mg   Wed 5 mg 5 mg 5 mg   Thu 7.5 mg 7.5 mg 7.5 mg   Fri 5 mg 5 mg 5 mg   Sat 5 mg 5 mg 5 mg   Visit Report - - -   Some recent data might be hidden       Plan:  1. INR is therapeutic today- see above in Anticoagulation Summary.    Fernie Porter to continue their warfarin regimen- see above in Anticoagulation Summary.  2. Follow up in 2 weeks  3. Pt has agreed to only be called if INR out of range. They have been instructed to call if any changes in medications, doses, concerns, etc. Patient expresses understanding and has no further questions at this time.    Georgie Gaming

## 2019-12-03 RX ORDER — IMIPRAMINE HCL 50 MG
TABLET ORAL
Qty: 180 TABLET | Refills: 1 | Status: SHIPPED | OUTPATIENT
Start: 2019-12-03 | End: 2020-06-01

## 2019-12-04 ENCOUNTER — OFFICE VISIT (OUTPATIENT)
Dept: NEUROSURGERY | Facility: CLINIC | Age: 49
End: 2019-12-04

## 2019-12-04 VITALS
DIASTOLIC BLOOD PRESSURE: 66 MMHG | HEIGHT: 69 IN | SYSTOLIC BLOOD PRESSURE: 116 MMHG | WEIGHT: 153 LBS | HEART RATE: 80 BPM | BODY MASS INDEX: 22.66 KG/M2

## 2019-12-04 DIAGNOSIS — R93.7 ABNORMAL MRI, LUMBAR SPINE: Primary | ICD-10-CM

## 2019-12-04 PROCEDURE — 99213 OFFICE O/P EST LOW 20 MIN: CPT | Performed by: NURSE PRACTITIONER

## 2019-12-16 RX ORDER — FLUTICASONE PROPIONATE 50 MCG
2 SPRAY, SUSPENSION (ML) NASAL DAILY
Qty: 48 ML | Refills: 2 | Status: SHIPPED | OUTPATIENT
Start: 2019-12-16 | End: 2020-10-19

## 2019-12-20 LAB — INR PPP: 2.9

## 2019-12-23 ENCOUNTER — ANTICOAGULATION VISIT (OUTPATIENT)
Dept: PHARMACY | Facility: HOSPITAL | Age: 49
End: 2019-12-23

## 2019-12-23 DIAGNOSIS — Z95.2 AORTIC VALVE REPLACED: ICD-10-CM

## 2019-12-23 DIAGNOSIS — I48.0 PAROXYSMAL ATRIAL FIBRILLATION (HCC): ICD-10-CM

## 2019-12-23 NOTE — PROGRESS NOTES
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    INR History:  Anticoagulation Monitoring 11/15/2019 2019 2019   INR 3.20 3.50 2.90   INR Date 11/15/2019 2019 2019   INR Goal 2.5-3.5 2.5-3.5 2.5-3.5   Trend Same Same Same   Last Week Total 40 mg 40 mg 40 mg   Next Week Total 40 mg 40 mg 40 mg   Sun 7.5 mg 7.5 mg 7.5 mg   Mon 5 mg 5 mg 5 mg   Tue 5 mg 5 mg 5 mg   Wed 5 mg 5 mg 5 mg   Thu 7.5 mg 7.5 mg 7.5 mg   Fri 5 mg 5 mg 5 mg   Sat 5 mg 5 mg 5 mg   Visit Report - - -   Some recent data might be hidden       Plan:  1. INR is therapeutic today- see above in Anticoagulation Summary.    Fernie Porter to continue their warfarin regimen- see above in Anticoagulation Summary.  2. Follow up in 2 weeks  3. Pt has agreed to only be called if INR out of range. They have been instructed to call if any changes in medications, doses, concerns, etc. Patient expresses understanding and has no further questions at this time.    Georgie Gaming

## 2020-01-03 ENCOUNTER — ANTICOAGULATION VISIT (OUTPATIENT)
Dept: PHARMACY | Facility: HOSPITAL | Age: 50
End: 2020-01-03

## 2020-01-03 DIAGNOSIS — Z95.2 AORTIC VALVE REPLACED: ICD-10-CM

## 2020-01-03 DIAGNOSIS — I48.0 PAROXYSMAL ATRIAL FIBRILLATION (HCC): ICD-10-CM

## 2020-01-03 LAB — INR PPP: 2.5

## 2020-01-03 NOTE — PROGRESS NOTES
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    INR History:  Anticoagulation Monitoring 2019 2019 1/3/2020   INR 3.50 2.90 2.50   INR Date 2019 2019 1/3/2020   INR Goal 2.5-3.5 2.5-3.5 2.5-3.5   Trend Same Same Same   Last Week Total 40 mg 40 mg 40 mg   Next Week Total 40 mg 40 mg 40 mg   Sun 7.5 mg 7.5 mg 7.5 mg   Mon 5 mg 5 mg 5 mg   Tue 5 mg 5 mg 5 mg   Wed 5 mg 5 mg 5 mg   Thu 7.5 mg 7.5 mg 7.5 mg   Fri 5 mg 5 mg 5 mg   Sat 5 mg 5 mg 5 mg   Visit Report - - -   Some recent data might be hidden       Plan:  1. INR is therapeutic today- see above in Anticoagulation Summary.    Fernie Porter to continue their warfarin regimen- see above in Anticoagulation Summary.  2. Follow up in 2 weeks  3. Pt has agreed to only be called if INR out of range. They have been instructed to call if any changes in medications, doses, concerns, etc. Patient expresses understanding and has no further questions at this time.    Rosa Newman

## 2020-01-18 LAB — INR PPP: 2.5

## 2020-01-20 ENCOUNTER — ANTICOAGULATION VISIT (OUTPATIENT)
Dept: PHARMACY | Facility: HOSPITAL | Age: 50
End: 2020-01-20

## 2020-01-20 DIAGNOSIS — I48.0 PAROXYSMAL ATRIAL FIBRILLATION (HCC): ICD-10-CM

## 2020-01-20 DIAGNOSIS — Z95.2 AORTIC VALVE REPLACED: ICD-10-CM

## 2020-01-20 NOTE — PROGRESS NOTES
Anticoagulation Clinic Progress Note    Anticoagulation Summary  As of 2020    INR goal:   2.5-3.5   TTR:   84.6 % (1.1 y)   INR used for dosin.50 (2020)   Warfarin maintenance plan:   7.5 mg every Sun, Thu; 5 mg all other days   Weekly warfarin total:   40 mg   No change documented:   Georgie Gaming   Plan last modified:   José Miguel Zapata RP (10/4/2019)   Next INR check:   2020   Priority:   Maintenance   Target end date:       Indications    Aortic valve replaced [Z95.2]  Paroxysmal atrial fibrillation (CMS/HCC) [I48.0]             Anticoagulation Episode Summary     INR check location:       Preferred lab:       Send INR reminders to:    JULIETTE OLMEDO  POOL    Comments:   *Coaguchek*      Anticoagulation Care Providers     Provider Role Specialty Phone number    Juan Antonio Buenrostro MD Referring Cardiology [phone]          Clinic Interview:  No pertinent clinical findings have been reported.    INR History:  Anticoagulation Monitoring 2019 1/3/2020 2020   INR 2.90 2.50 2.50   INR Date 2019 1/3/2020 2020   INR Goal 2.5-3.5 2.5-3.5 2.5-3.5   Trend Same Same Same   Last Week Total 40 mg 40 mg 40 mg   Next Week Total 40 mg 40 mg 40 mg   Sun 7.5 mg 7.5 mg 7.5 mg   Mon 5 mg 5 mg 5 mg   Tue 5 mg 5 mg 5 mg   Wed 5 mg 5 mg 5 mg   Thu 7.5 mg 7.5 mg 7.5 mg   Fri 5 mg 5 mg 5 mg   Sat 5 mg 5 mg 5 mg   Visit Report - - -   Some recent data might be hidden       Plan:  1. INR is therapeutic today- see above in Anticoagulation Summary.    Fernie Porter to continue their warfarin regimen- see above in Anticoagulation Summary.  2. Follow up in 2 weeks  3. Pt has agreed to only be called if INR out of range. They have been instructed to call if any changes in medications, doses, concerns, etc. Patient expresses understanding and has no further questions at this time.    Georgie Gaming

## 2020-01-24 RX ORDER — PROPRANOLOL HYDROCHLORIDE 80 MG/1
CAPSULE, EXTENDED RELEASE ORAL
Qty: 90 CAPSULE | Refills: 3 | Status: SHIPPED | OUTPATIENT
Start: 2020-01-24 | End: 2020-02-17 | Stop reason: SDUPTHER

## 2020-01-24 RX ORDER — WARFARIN SODIUM 5 MG/1
TABLET ORAL
Qty: 105 TABLET | Refills: 0 | Status: SHIPPED | OUTPATIENT
Start: 2020-01-24 | End: 2020-04-20

## 2020-02-07 ENCOUNTER — ANTICOAGULATION VISIT (OUTPATIENT)
Dept: PHARMACY | Facility: HOSPITAL | Age: 50
End: 2020-02-07

## 2020-02-07 DIAGNOSIS — Z95.2 AORTIC VALVE REPLACED: ICD-10-CM

## 2020-02-07 DIAGNOSIS — I48.0 PAROXYSMAL ATRIAL FIBRILLATION (HCC): ICD-10-CM

## 2020-02-07 LAB — INR PPP: 3.2

## 2020-02-07 NOTE — PROGRESS NOTES
Anticoagulation Clinic Progress Note    Anticoagulation Summary  As of 2/7/2020    INR goal:   2.5-3.5   TTR:   85.3 % (1.2 y)   INR used for dosing:   3.20 (2/7/2020)   Warfarin maintenance plan:   7.5 mg every Sun, Thu; 5 mg all other days   Weekly warfarin total:   40 mg   No change documented:   Georgie Gaming   Plan last modified:   José Miguel Zapata RP (10/4/2019)   Next INR check:   2/21/2020   Priority:   Maintenance   Target end date:       Indications    Aortic valve replaced [Z95.2]  Paroxysmal atrial fibrillation (CMS/HCC) [I48.0]             Anticoagulation Episode Summary     INR check location:       Preferred lab:       Send INR reminders to:    JULIETTE OLMEDO  POOL    Comments:   *Coaguchek*      Anticoagulation Care Providers     Provider Role Specialty Phone number    Juan Antonio Buenrostro MD Referring Cardiology [phone]          Clinic Interview:  No pertinent clinical findings have been reported.    INR History:  Anticoagulation Monitoring 1/3/2020 1/20/2020 2/7/2020   INR 2.50 2.50 3.20   INR Date 1/3/2020 1/18/2020 2/7/2020   INR Goal 2.5-3.5 2.5-3.5 2.5-3.5   Trend Same Same Same   Last Week Total 40 mg 40 mg 40 mg   Next Week Total 40 mg 40 mg 40 mg   Sun 7.5 mg 7.5 mg 7.5 mg   Mon 5 mg 5 mg 5 mg   Tue 5 mg 5 mg 5 mg   Wed 5 mg 5 mg 5 mg   Thu 7.5 mg 7.5 mg 7.5 mg   Fri 5 mg 5 mg 5 mg   Sat 5 mg 5 mg 5 mg   Visit Report - - -   Some recent data might be hidden       Plan:  1. INR is therapeutic today- see above in Anticoagulation Summary.    Fernie Porter to continue their warfarin regimen- see above in Anticoagulation Summary.  2. Follow up in 2 weeks  3. Pt has agreed to only be called if INR out of range. They have been instructed to call if any changes in medications, doses, concerns, etc. Patient expresses understanding and has no further questions at this time.    Georgie Gaming

## 2020-02-17 ENCOUNTER — OFFICE VISIT (OUTPATIENT)
Dept: INTERNAL MEDICINE | Facility: CLINIC | Age: 50
End: 2020-02-17

## 2020-02-17 VITALS
HEIGHT: 69 IN | WEIGHT: 162 LBS | BODY MASS INDEX: 23.99 KG/M2 | RESPIRATION RATE: 16 BRPM | HEART RATE: 71 BPM | DIASTOLIC BLOOD PRESSURE: 64 MMHG | OXYGEN SATURATION: 99 % | SYSTOLIC BLOOD PRESSURE: 114 MMHG | TEMPERATURE: 97.4 F

## 2020-02-17 DIAGNOSIS — Z95.2 AORTIC VALVE REPLACED: ICD-10-CM

## 2020-02-17 DIAGNOSIS — G43.109 MIGRAINE WITH AURA AND WITHOUT STATUS MIGRAINOSUS, NOT INTRACTABLE: ICD-10-CM

## 2020-02-17 DIAGNOSIS — I48.0 PAROXYSMAL ATRIAL FIBRILLATION (HCC): ICD-10-CM

## 2020-02-17 DIAGNOSIS — I10 ESSENTIAL HYPERTENSION: Primary | ICD-10-CM

## 2020-02-17 PROCEDURE — 99214 OFFICE O/P EST MOD 30 MIN: CPT | Performed by: INTERNAL MEDICINE

## 2020-02-17 RX ORDER — PROPRANOLOL HYDROCHLORIDE 120 MG/1
120 CAPSULE, EXTENDED RELEASE ORAL DAILY
Qty: 90 CAPSULE | Refills: 1 | Status: SHIPPED | OUTPATIENT
Start: 2020-02-17 | End: 2020-05-18 | Stop reason: SDUPTHER

## 2020-02-17 NOTE — PROGRESS NOTES
Subjective   Fernie Porter is a 49 y.o. male.     Chief Complaint   Patient presents with   • Hypertension   • Atrial Fibrillation   • Migraine         Hypertension   This is a chronic problem. The current episode started more than 1 year ago. The problem is unchanged. The problem is controlled. Associated symptoms include headaches. Pertinent negatives include no orthopnea or peripheral edema. There are no associated agents to hypertension. Risk factors for coronary artery disease include male gender. Current antihypertension treatment includes alpha 1 blockers, diuretics and beta blockers. The current treatment provides significant improvement. There are no compliance problems.  There is no history of angina, kidney disease, CAD/MI, CVA or heart failure.   Atrial Fibrillation   Presents for follow-up visit. The symptoms have been stable. Past medical history includes atrial fibrillation. There are no medication compliance problems.   Migraine    This is a chronic problem. The current episode started more than 1 year ago. The problem occurs constantly. The problem has been waxing and waning. The pain is located in the left unilateral region. The pain does not radiate. The pain quality is similar to prior headaches. The quality of the pain is described as throbbing. The pain is moderate. Pertinent negatives include no abdominal pain, coughing, fever, nausea or vomiting. Nothing aggravates the symptoms. He has tried NSAIDs for the symptoms. The treatment provided significant relief.   Headache    This is a chronic problem. The current episode started more than 1 year ago. The problem occurs constantly. Pertinent negatives include no abdominal pain, coughing, fever, nausea or vomiting.        The following portions of the patient's history were reviewed and updated as appropriate: allergies, current medications, past social history and problem list.    Outpatient Medications Marked as Taking for the 2/17/20  encounter (Office Visit) with Juan Antonio Ford MD   Medication Sig Dispense Refill   • amLODIPine (NORVASC) 5 MG tablet TAKE 1 TABLET BY MOUTH EVERY DAY 90 tablet 3   • amoxicillin (AMOXIL) 500 MG capsule Take 4 tablets by mouth 1 hr prior to dental procedures 20 capsule 0   • cloNIDine (CATAPRES) 0.2 MG tablet TAKE ONE TABLET 2 TIMES A  tablet 1   • fluticasone (FLONASE) 50 MCG/ACT nasal spray 2 SPRAYS INTO THE NOSTRIL(S) AS DIRECTED BY PROVIDER DAILY. 48 mL 2   • hydroCHLOROthiazide (MICROZIDE) 12.5 MG capsule TAKE ONE CAPSULE BY MOUTH EVERY DAY 90 capsule 1   • hydrocortisone 2.5 % cream Apply  topically to the appropriate area as directed 3 (Three) Times a Day As Needed.     • ibuprofen (ADVIL,MOTRIN) 200 MG tablet Take 200 mg by mouth Every 6 (Six) Hours As Needed for Mild Pain .     • imipramine (TOFRANIL) 50 MG tablet TAKE 2 TABLETS BY MOUTH AT BEDTIME 180 tablet 1   • propranolol LA (INDERAL LA) 80 MG 24 hr capsule TAKE 1 CAPSULE BY MOUTH EVERY DAY 90 capsule 3   • warfarin (COUMADIN) 5 MG tablet Take 1.5 tablets (7.5 mg) by mouth Sun and Thurs, and take 1 tablet (5 mg) by mouth all other days or as directed. 105 tablet 0     Current Facility-Administered Medications for the 2/17/20 encounter (Office Visit) with Juan Antonio Ford MD   Medication Dose Route Frequency Provider Last Rate Last Dose   • diphenhydrAMINE (BENADRYL) capsule 25 mg  25 mg Oral Q6H PRN Isaías Rivera MD           Review of Systems   Constitutional: Negative for chills, fatigue and fever.   Respiratory: Negative for cough and wheezing.    Cardiovascular: Negative for orthopnea and leg swelling.   Gastrointestinal: Negative for abdominal pain, constipation, diarrhea, nausea and vomiting.   Neurological: Positive for headaches.       Objective   Vitals:    02/17/20 1245   BP: 114/64   Pulse: 71   Resp: 16   Temp: 97.4 °F (36.3 °C)   SpO2: 99%          02/17/20  1245   Weight: 73.5 kg (162 lb)    [unfilled]  Body mass index is  23.91 kg/m².      Physical Exam   Constitutional: He appears well-developed and well-nourished. No distress.   HENT:   Head: Normocephalic and atraumatic.   Neck: Carotid bruit is not present. No thyromegaly present.   Cardiovascular: Normal rate, regular rhythm and intact distal pulses. Exam reveals no gallop.   Murmur heard.   Crescendo decrescendo systolic murmur is present with a grade of 2/6.  Prosthetic heart sounds.  Grade 2/6 JOHNNIE loudest at the pulmonic area.   Pulmonary/Chest: Effort normal and breath sounds normal. No respiratory distress. He has no wheezes. He has no rales.   Abdominal: Soft. Bowel sounds are normal. He exhibits no mass. There is no tenderness. There is no guarding.         Problem List Items Addressed This Visit        Cardiovascular and Mediastinum    Hypertension - Primary    Migraine    Paroxysmal atrial fibrillation (CMS/HCC)       Other    Aortic valve replaced        Assessment/Plan   In for recheck of hypertension, AF, aortic stenosis, and migraines.  Migraines persist.  They're doing pretty well to present time.  Gets 3 or 4 per month.  Has failed several drugs in the past including Topamax.  Blood pressure control is excellent.  Atrial fib is controlled.  Gets annual lab work May 2019 including CBC, CMP, urinalysis.  Continue to monitor every 3 months.  We  Switched his coreg to Inderal LA 80 mg daily and it seems to have helped his migraines.  Certainly no severe migraine since he started Inderal LA.  He would like to do a little better job with his migraines.  We will push his dose up to 120 mg daily on the Inderal LA.  If not we'll consider trying Depakote.  He thinks he failed Zonegran in the past.  Schwannoma diagnosed in his lumbar spine since last visit.  Being followed by neurosurgery.         Dragon disclaimer:   Much of this encounter note is an electronic transcription/translation of spoken language to printed text. The electronic translation of spoken language may  permit erroneous, or at times, nonsensical words or phrases to be inadvertently transcribed; Although I have reviewed the note for such errors, some may still exist.

## 2020-02-21 LAB — INR PPP: 2.6

## 2020-02-24 ENCOUNTER — ANTICOAGULATION VISIT (OUTPATIENT)
Dept: PHARMACY | Facility: HOSPITAL | Age: 50
End: 2020-02-24

## 2020-02-24 DIAGNOSIS — Z95.2 AORTIC VALVE REPLACED: ICD-10-CM

## 2020-02-24 DIAGNOSIS — I48.0 PAROXYSMAL ATRIAL FIBRILLATION (HCC): ICD-10-CM

## 2020-02-24 NOTE — PROGRESS NOTES
Anticoagulation Clinic Progress Note    Anticoagulation Summary  As of 2020    INR goal:   2.5-3.5   TTR:   85.8 % (1.2 y)   INR used for dosin.60 (2020)   Warfarin maintenance plan:   7.5 mg every Sun, Thu; 5 mg all other days   Weekly warfarin total:   40 mg   No change documented:   Cindy Mathew   Plan last modified:   José Miguel Zapata [facility] (10/4/2019)   Next INR check:   3/6/2020   Priority:   Maintenance   Target end date:       Indications    Aortic valve replaced [Z95.2]  Paroxysmal atrial fibrillation (CMS/HCC) [I48.0]             Anticoagulation Episode Summary     INR check location:       Preferred lab:       Send INR reminders to:    JULIETTE HONORIO  POOL    Comments:   *Coaguchek*      Anticoagulation Care Providers     Provider Role Specialty Phone number    Juan Antonio Buenrostro MD Referring Cardiology [phone]          Clinic Interview:  No pertinent clinical findings have been reported.    INR History:  Anticoagulation Monitoring 2020   INR 2.50 3.20 2.60   INR Date 2020   INR Goal 2.5-3.5 2.5-3.5 2.5-3.5   Trend Same Same Same   Last Week Total 40 mg 40 mg 40 mg   Next Week Total 40 mg 40 mg 40 mg   Sun 7.5 mg 7.5 mg 7.5 mg   Mon 5 mg 5 mg 5 mg   Tue 5 mg 5 mg 5 mg   Wed 5 mg 5 mg 5 mg   Thu 7.5 mg 7.5 mg 7.5 mg   Fri 5 mg 5 mg 5 mg   Sat 5 mg 5 mg 5 mg   Visit Report - - -   Some recent data might be hidden       Plan:  1. INR is therapeutic today- see above in Anticoagulation Summary.    Fernie Calvofield to continue their warfarin regimen- see above in Anticoagulation Summary.  2. Follow up in 2 weeks  3. Pt has agreed to only be called if INR out of range. They have been instructed to call if any changes in medications, doses, concerns, etc. Patient expresses understanding and has no further questions at this time.    Cindy Mathew

## 2020-03-06 LAB — INR PPP: 2.5

## 2020-03-09 ENCOUNTER — ANTICOAGULATION VISIT (OUTPATIENT)
Dept: PHARMACY | Facility: HOSPITAL | Age: 50
End: 2020-03-09

## 2020-03-09 DIAGNOSIS — Z95.2 AORTIC VALVE REPLACED: ICD-10-CM

## 2020-03-09 DIAGNOSIS — I48.0 PAROXYSMAL ATRIAL FIBRILLATION (HCC): ICD-10-CM

## 2020-03-09 NOTE — PROGRESS NOTES
Anticoagulation Clinic Progress Note    Anticoagulation Summary  As of 3/9/2020    INR goal:   2.5-3.5   TTR:   86.2 % (1.3 y)   INR used for dosin.50 (3/6/2020)   Warfarin maintenance plan:   7.5 mg every Sun, Thu; 5 mg all other days   Weekly warfarin total:   40 mg   No change documented:   Georgie Gaming   Plan last modified:   José Miguel Zapata RPH (10/4/2019)   Next INR check:   3/20/2020   Priority:   Maintenance   Target end date:       Indications    Aortic valve replaced [Z95.2]  Paroxysmal atrial fibrillation (CMS/HCC) [I48.0]             Anticoagulation Episode Summary     INR check location:       Preferred lab:       Send INR reminders to:    JULIETTE OLMEDO  POOL    Comments:   *Coaguchek*      Anticoagulation Care Providers     Provider Role Specialty Phone number    Juan Antonio Buenrostro MD Referring Cardiology [phone]          Clinic Interview:  No pertinent clinical findings have been reported.    INR History:  Anticoagulation Monitoring 2020 2020 3/9/2020   INR 3.20 2.60 2.50   INR Date 2020 2020 3/6/2020   INR Goal 2.5-3.5 2.5-3.5 2.5-3.5   Trend Same Same Same   Last Week Total 40 mg 40 mg 40 mg   Next Week Total 40 mg 40 mg 40 mg   Sun 7.5 mg 7.5 mg 7.5 mg   Mon 5 mg 5 mg 5 mg   Tue 5 mg 5 mg 5 mg   Wed 5 mg 5 mg 5 mg   Thu 7.5 mg 7.5 mg 7.5 mg   Fri 5 mg 5 mg 5 mg   Sat 5 mg 5 mg 5 mg   Visit Report - - -   Some recent data might be hidden       Plan:  1. INR is therapeutic today- see above in Anticoagulation Summary.    Fernie Porter to continue their warfarin regimen- see above in Anticoagulation Summary.  2. Follow up in 2 weeks  3. Pt has agreed to only be called if INR out of range. They have been instructed to call if any changes in medications, doses, concerns, etc. Patient expresses understanding and has no further questions at this time.    Georgie Gaming

## 2020-03-24 LAB — INR PPP: 2.7

## 2020-03-27 ENCOUNTER — ANTICOAGULATION VISIT (OUTPATIENT)
Dept: PHARMACY | Facility: HOSPITAL | Age: 50
End: 2020-03-27

## 2020-03-27 DIAGNOSIS — I48.0 PAROXYSMAL ATRIAL FIBRILLATION (HCC): ICD-10-CM

## 2020-03-27 DIAGNOSIS — Z95.2 AORTIC VALVE REPLACED: ICD-10-CM

## 2020-03-27 NOTE — PROGRESS NOTES
Anticoagulation Clinic Progress Note    Anticoagulation Summary  As of 3/27/2020    INR goal:   2.5-3.5   TTR:   86.7 % (1.3 y)   INR used for dosin.70 (3/24/2020)   Warfarin maintenance plan:   7.5 mg every Sun, Thu; 5 mg all other days   Weekly warfarin total:   40 mg   No change documented:   Cindy Mathew   Plan last modified:   José Miguel Zapata [facility] (10/4/2019)   Next INR check:   2020   Priority:   Maintenance   Target end date:       Indications    Aortic valve replaced [Z95.2]  Paroxysmal atrial fibrillation (CMS/HCC) [I48.0]             Anticoagulation Episode Summary     INR check location:       Preferred lab:       Send INR reminders to:    JULIETTE OLMEDO  POOL    Comments:   *Coaguchek*      Anticoagulation Care Providers     Provider Role Specialty Phone number    Juan Antonio Buenrostro MD Referring Cardiology [phone]          Clinic Interview:  No pertinent clinical findings have been reported.    INR History:  Anticoagulation Monitoring 2020 3/9/2020 3/27/2020   INR 2.60 2.50 2.70   INR Date 2020 3/6/2020 3/24/2020   INR Goal 2.5-3.5 2.5-3.5 2.5-3.5   Trend Same Same Same   Last Week Total 40 mg 40 mg 40 mg   Next Week Total 40 mg 40 mg 40 mg   Sun 7.5 mg 7.5 mg 7.5 mg   Mon 5 mg 5 mg 5 mg   Tue 5 mg 5 mg 5 mg   Wed 5 mg 5 mg 5 mg   Thu 7.5 mg 7.5 mg 7.5 mg   Fri 5 mg 5 mg 5 mg   Sat 5 mg 5 mg 5 mg   Visit Report - - -   Some recent data might be hidden       Plan:  1. INR is therapeutic today- see above in Anticoagulation Summary.    Fernie Porter to continue their warfarin regimen- see above in Anticoagulation Summary.  2. Follow up in 2 weeks  3. Pt has agreed to only be called if INR out of range. They have been instructed to call if any changes in medications, doses, concerns, etc. Patient expresses understanding and has no further questions at this time.    Cindy Mathew

## 2020-04-10 LAB — INR PPP: 3

## 2020-04-13 ENCOUNTER — ANTICOAGULATION VISIT (OUTPATIENT)
Dept: PHARMACY | Facility: HOSPITAL | Age: 50
End: 2020-04-13

## 2020-04-13 DIAGNOSIS — Z95.2 AORTIC VALVE REPLACED: ICD-10-CM

## 2020-04-13 DIAGNOSIS — I48.0 PAROXYSMAL ATRIAL FIBRILLATION (HCC): ICD-10-CM

## 2020-04-13 NOTE — PROGRESS NOTES
Anticoagulation Clinic Progress Note    Anticoagulation Summary  As of 4/13/2020    INR goal:   2.5-3.5   TTR:   87.1 % (1.4 y)   INR used for dosing:   3.00 (4/10/2020)   Warfarin maintenance plan:   7.5 mg every Sun, Thu; 5 mg all other days   Weekly warfarin total:   40 mg   No change documented:   Cindy Mathew   Plan last modified:   José Miguel Zapata RP (10/4/2019)   Next INR check:   4/24/2020   Priority:   Maintenance   Target end date:       Indications    Aortic valve replaced [Z95.2]  Paroxysmal atrial fibrillation (CMS/HCC) [I48.0]             Anticoagulation Episode Summary     INR check location:       Preferred lab:       Send INR reminders to:    JULIETTE OLMEDO  POOL    Comments:   *Coaguchek*      Anticoagulation Care Providers     Provider Role Specialty Phone number    Juan Antonio Buenrostro MD Referring Cardiology [phone]          Clinic Interview:  No pertinent clinical findings have been reported.    INR History:  Anticoagulation Monitoring 3/9/2020 3/27/2020 4/13/2020   INR 2.50 2.70 3.00   INR Date 3/6/2020 3/24/2020 4/10/2020   INR Goal 2.5-3.5 2.5-3.5 2.5-3.5   Trend Same Same Same   Last Week Total 40 mg 40 mg 40 mg   Next Week Total 40 mg 40 mg 40 mg   Sun 7.5 mg 7.5 mg 7.5 mg   Mon 5 mg 5 mg 5 mg   Tue 5 mg 5 mg 5 mg   Wed 5 mg 5 mg 5 mg   Thu 7.5 mg 7.5 mg 7.5 mg   Fri 5 mg 5 mg 5 mg   Sat 5 mg 5 mg 5 mg   Visit Report - - -   Some recent data might be hidden       Plan:  1. INR is therapeutic today- see above in Anticoagulation Summary.    Fernie Porter to continue their warfarin regimen- see above in Anticoagulation Summary.  2. Follow up in 2 weeks  3. Pt has agreed to only be called if INR out of range. They have been instructed to call if any changes in medications, doses, concerns, etc. Patient expresses understanding and has no further questions at this time.    Cindy Mathew

## 2020-04-20 ENCOUNTER — TELEPHONE (OUTPATIENT)
Dept: CARDIOLOGY | Facility: CLINIC | Age: 50
End: 2020-04-20

## 2020-04-20 RX ORDER — WARFARIN SODIUM 5 MG/1
TABLET ORAL
Qty: 105 TABLET | Refills: 0 | Status: SHIPPED | OUTPATIENT
Start: 2020-04-20 | End: 2020-12-08

## 2020-04-20 NOTE — TELEPHONE ENCOUNTER
Pt called asking if you feel he should look into short term disability due to him working at [employer] and being at high risk with the COVID-19?  Thanks/hafsa

## 2020-04-21 RX ORDER — HYDROCHLOROTHIAZIDE 12.5 MG/1
CAPSULE, GELATIN COATED ORAL
Qty: 90 CAPSULE | Refills: 1 | Status: SHIPPED | OUTPATIENT
Start: 2020-04-21 | End: 2020-10-19

## 2020-04-21 RX ORDER — CLONIDINE HYDROCHLORIDE 0.2 MG/1
TABLET ORAL
Qty: 180 TABLET | Refills: 1 | Status: SHIPPED | OUTPATIENT
Start: 2020-04-21 | End: 2020-10-19

## 2020-04-21 NOTE — TELEPHONE ENCOUNTER
Dakota Woodward, I thought I had included his employee number.  His employee number is [id number].  Thanks!

## 2020-04-21 NOTE — TELEPHONE ENCOUNTER
Not sure he can get disability but he might be able to get unemployment but whatever he can get.MJI

## 2020-04-21 NOTE — TELEPHONE ENCOUNTER
"Pt called back stating he received my message.  He states he can take \"emergnecy paid leave\" due to being high risk for COVID-19 for 2 weeks but will need a doctors note.  After the 2 weeks, if the epidemic is not clear then he can apply for short term disability.  Are you able to dictate a letter? The letter will need to state his employee ID number   Thanks--Vernell    #[phone]    "

## 2020-04-22 NOTE — TELEPHONE ENCOUNTER
Vernell   I have tried faxing this several times through out the day. I will keep trying until I leave.   I am off tomorrow and Friday.   I have attached Anne NIETO to this. She is going to be in office to check on this  if I can't get it to go through today .   Not sure if there is a other number to fax it too.   Nitin RAY

## 2020-04-22 NOTE — TELEPHONE ENCOUNTER
Genia, when you get a chance, could you please fax the letter that Crissy created to [employer]?  Thank you/hafsa

## 2020-04-23 NOTE — TELEPHONE ENCOUNTER
We have faxed multiple times and this has not gone through. Please advise if he would like to have thismailed

## 2020-04-23 NOTE — TELEPHONE ENCOUNTER
Mr. Porter called back and confirmed the fax number was correct.  Fax confirmation was confirmed./[facility]  Fax# [phone]

## 2020-04-23 NOTE — TELEPHONE ENCOUNTER
I called pt to see if there is a different fax number we could use.  I had to leave a message./hafsa

## 2020-04-24 LAB — INR PPP: 2.8

## 2020-04-27 ENCOUNTER — ANTICOAGULATION VISIT (OUTPATIENT)
Dept: PHARMACY | Facility: HOSPITAL | Age: 50
End: 2020-04-27

## 2020-04-27 DIAGNOSIS — I48.0 PAROXYSMAL ATRIAL FIBRILLATION (HCC): ICD-10-CM

## 2020-04-27 DIAGNOSIS — Z95.2 AORTIC VALVE REPLACED: ICD-10-CM

## 2020-04-27 NOTE — PROGRESS NOTES
Anticoagulation Clinic Progress Note    Anticoagulation Summary  As of 2020    INR goal:   2.5-3.5   TTR:   87.5 % (1.4 y)   INR used for dosin.80 (2020)   Warfarin maintenance plan:   7.5 mg every Sun, Thu; 5 mg all other days   Weekly warfarin total:   40 mg   No change documented:   Georgie Gaming   Plan last modified:   José Miguel Zapata RPH (10/4/2019)   Next INR check:   2020   Priority:   Maintenance   Target end date:       Indications    Aortic valve replaced [Z95.2]  Paroxysmal atrial fibrillation (CMS/HCC) [I48.0]             Anticoagulation Episode Summary     INR check location:       Preferred lab:       Send INR reminders to:    JULIETTE OLMEDO  POOL    Comments:   *Coaguchek*      Anticoagulation Care Providers     Provider Role Specialty Phone number    Juan Antonio Buenrostro MD Referring Cardiology [phone]          Clinic Interview:  No pertinent clinical findings have been reported.    INR History:  Anticoagulation Monitoring 3/27/2020 2020 2020   INR 2.70 3.00 2.80   INR Date 3/24/2020 4/10/2020 2020   INR Goal 2.5-3.5 2.5-3.5 2.5-3.5   Trend Same Same Same   Last Week Total 40 mg 40 mg 40 mg   Next Week Total 40 mg 40 mg 40 mg   Sun 7.5 mg 7.5 mg 7.5 mg   Mon 5 mg 5 mg 5 mg   Tue 5 mg 5 mg 5 mg   Wed 5 mg 5 mg 5 mg   Thu 7.5 mg 7.5 mg 7.5 mg   Fri 5 mg 5 mg 5 mg   Sat 5 mg 5 mg 5 mg   Visit Report - - -   Some recent data might be hidden       Plan:  1. INR is therapeutic today- see above in Anticoagulation Summary.    Fernie Porter to continue their warfarin regimen- see above in Anticoagulation Summary.  2. Follow up in 2 weeks  3. Pt has agreed to only be called if INR out of range. They have been instructed to call if any changes in medications, doses, concerns, etc. Patient expresses understanding and has no further questions at this time.    Georgie Gaming

## 2020-05-08 LAB — INR PPP: 2.5

## 2020-05-11 ENCOUNTER — ANTICOAGULATION VISIT (OUTPATIENT)
Dept: PHARMACY | Facility: HOSPITAL | Age: 50
End: 2020-05-11

## 2020-05-11 DIAGNOSIS — I48.0 PAROXYSMAL ATRIAL FIBRILLATION (HCC): ICD-10-CM

## 2020-05-11 DIAGNOSIS — Z95.2 AORTIC VALVE REPLACED: ICD-10-CM

## 2020-05-11 NOTE — PROGRESS NOTES
Anticoagulation Clinic Progress Note    Anticoagulation Summary  As of 2020    INR goal:   2.5-3.5   TTR:   87.8 % (1.4 y)   INR used for dosin.50 (2020)   Warfarin maintenance plan:   7.5 mg every Sun, Thu; 5 mg all other days   Weekly warfarin total:   40 mg   No change documented:   Georgie Gaming   Plan last modified:   José Miguel Zapata RPH (10/4/2019)   Next INR check:   2020   Priority:   Maintenance   Target end date:       Indications    Aortic valve replaced [Z95.2]  Paroxysmal atrial fibrillation (CMS/HCC) [I48.0]             Anticoagulation Episode Summary     INR check location:       Preferred lab:       Send INR reminders to:    JULIETTE OLMEDO  POOL    Comments:   *Coaguchek*      Anticoagulation Care Providers     Provider Role Specialty Phone number    Juan Antonio Buenrostro MD Referring Cardiology [phone]          Clinic Interview:  No pertinent clinical findings have been reported.    INR History:  Anticoagulation Monitoring 2020   INR 3.00 2.80 2.50   INR Date 4/10/2020 2020 2020   INR Goal 2.5-3.5 2.5-3.5 2.5-3.5   Trend Same Same Same   Last Week Total 40 mg 40 mg 40 mg   Next Week Total 40 mg 40 mg 40 mg   Sun 7.5 mg 7.5 mg 7.5 mg   Mon 5 mg 5 mg 5 mg   Tue 5 mg 5 mg 5 mg   Wed 5 mg 5 mg 5 mg   Thu 7.5 mg 7.5 mg 7.5 mg   Fri 5 mg 5 mg 5 mg   Sat 5 mg 5 mg 5 mg   Visit Report - - -   Some recent data might be hidden       Plan:  1. INR is therapeutic today- see above in Anticoagulation Summary.    Fernie Porter to continue their warfarin regimen- see above in Anticoagulation Summary.  2. Follow up in 2 weeks  3. Pt has agreed to only be called if INR out of range. They have been instructed to call if any changes in medications, doses, concerns, etc. Patient expresses understanding and has no further questions at this time.    Georgie Gaming

## 2020-05-18 ENCOUNTER — OFFICE VISIT (OUTPATIENT)
Dept: INTERNAL MEDICINE | Facility: CLINIC | Age: 50
End: 2020-05-18

## 2020-05-18 ENCOUNTER — TELEPHONE (OUTPATIENT)
Dept: INTERNAL MEDICINE | Facility: CLINIC | Age: 50
End: 2020-05-18

## 2020-05-18 DIAGNOSIS — Z95.2 AORTIC VALVE REPLACED: ICD-10-CM

## 2020-05-18 DIAGNOSIS — I48.0 PAROXYSMAL ATRIAL FIBRILLATION (HCC): ICD-10-CM

## 2020-05-18 DIAGNOSIS — G43.109 MIGRAINE WITH AURA AND WITHOUT STATUS MIGRAINOSUS, NOT INTRACTABLE: ICD-10-CM

## 2020-05-18 DIAGNOSIS — I10 ESSENTIAL HYPERTENSION: Primary | ICD-10-CM

## 2020-05-18 DIAGNOSIS — Z79.899 MEDICATION MANAGEMENT: ICD-10-CM

## 2020-05-18 PROCEDURE — 99213 OFFICE O/P EST LOW 20 MIN: CPT | Performed by: INTERNAL MEDICINE

## 2020-05-18 RX ORDER — PROPRANOLOL HYDROCHLORIDE 120 MG/1
120 CAPSULE, EXTENDED RELEASE ORAL DAILY
Qty: 90 CAPSULE | Refills: 1 | Status: SHIPPED | OUTPATIENT
Start: 2020-05-18 | End: 2020-11-12

## 2020-05-18 NOTE — TELEPHONE ENCOUNTER
Patient was advised by cardiologist to be off work due to COVID-19. His return date is next Monday. Patient is wanting your opinion on him returning due to his hear conditions. Please advise.

## 2020-05-19 ENCOUNTER — TELEPHONE (OUTPATIENT)
Dept: CARDIOLOGY | Facility: CLINIC | Age: 50
End: 2020-05-19

## 2020-05-19 NOTE — TELEPHONE ENCOUNTER
Crissy,  We received a fax regarding Mr. Porter and his short term disability.  They would like more information added to the letter we submitted on 4/21/2020.  It needs to state the treatment plan is to self quarantine due to his heart disease and also estimate date to return to work.  I can fill out the form we received and have Dr. Buenrostro sign it and attach a copy of the letter created.    Thanks--Vernell

## 2020-05-20 NOTE — TELEPHONE ENCOUNTER
I faxed letter and STD form to [phone] att: [employer]/hafsa  I also mailed a copy of the letter and STD form to Mr. Porter./hafsa

## 2020-05-23 LAB
ALBUMIN SERPL-MCNC: 4.9 G/DL (ref 3.5–5.2)
ALBUMIN/GLOB SERPL: 2.5 G/DL
ALP SERPL-CCNC: 45 U/L (ref 39–117)
ALT SERPL-CCNC: 16 U/L (ref 1–41)
AST SERPL-CCNC: 24 U/L (ref 1–40)
BASOPHILS # BLD AUTO: 0.08 10*3/MM3 (ref 0–0.2)
BASOPHILS NFR BLD AUTO: 1.6 % (ref 0–1.5)
BILIRUB SERPL-MCNC: 0.4 MG/DL (ref 0.2–1.2)
BUN SERPL-MCNC: 10 MG/DL (ref 6–20)
BUN/CREAT SERPL: 11.9 (ref 7–25)
CALCIUM SERPL-MCNC: 9.7 MG/DL (ref 8.6–10.5)
CHLORIDE SERPL-SCNC: 98 MMOL/L (ref 98–107)
CO2 SERPL-SCNC: 34.3 MMOL/L (ref 22–29)
CREAT SERPL-MCNC: 0.84 MG/DL (ref 0.76–1.27)
EOSINOPHIL # BLD AUTO: 0.21 10*3/MM3 (ref 0–0.4)
EOSINOPHIL NFR BLD AUTO: 4.1 % (ref 0.3–6.2)
ERYTHROCYTE [DISTWIDTH] IN BLOOD BY AUTOMATED COUNT: 12.7 % (ref 12.3–15.4)
GLOBULIN SER CALC-MCNC: 2 GM/DL
GLUCOSE SERPL-MCNC: 95 MG/DL (ref 65–99)
HCT VFR BLD AUTO: 45.9 % (ref 37.5–51)
HGB BLD-MCNC: 15.2 G/DL (ref 13–17.7)
IMM GRANULOCYTES # BLD AUTO: 0.01 10*3/MM3 (ref 0–0.05)
IMM GRANULOCYTES NFR BLD AUTO: 0.2 % (ref 0–0.5)
LYMPHOCYTES # BLD AUTO: 1.21 10*3/MM3 (ref 0.7–3.1)
LYMPHOCYTES NFR BLD AUTO: 23.7 % (ref 19.6–45.3)
MCH RBC QN AUTO: 30.2 PG (ref 26.6–33)
MCHC RBC AUTO-ENTMCNC: 33.1 G/DL (ref 31.5–35.7)
MCV RBC AUTO: 91.1 FL (ref 79–97)
MONOCYTES # BLD AUTO: 0.46 10*3/MM3 (ref 0.1–0.9)
MONOCYTES NFR BLD AUTO: 9 % (ref 5–12)
NEUTROPHILS # BLD AUTO: 3.14 10*3/MM3 (ref 1.7–7)
NEUTROPHILS NFR BLD AUTO: 61.4 % (ref 42.7–76)
NRBC BLD AUTO-RTO: 0 /100 WBC (ref 0–0.2)
PLATELET # BLD AUTO: 222 10*3/MM3 (ref 140–450)
POTASSIUM SERPL-SCNC: 3.9 MMOL/L (ref 3.5–5.2)
PROT SERPL-MCNC: 6.9 G/DL (ref 6–8.5)
RBC # BLD AUTO: 5.04 10*6/MM3 (ref 4.14–5.8)
SODIUM SERPL-SCNC: 138 MMOL/L (ref 136–145)
WBC # BLD AUTO: 5.11 10*3/MM3 (ref 3.4–10.8)

## 2020-06-01 RX ORDER — IMIPRAMINE HCL 50 MG
TABLET ORAL
Qty: 180 TABLET | Refills: 1 | Status: SHIPPED | OUTPATIENT
Start: 2020-06-01 | End: 2020-12-08

## 2020-06-12 LAB — INR PPP: 2.8

## 2020-06-15 ENCOUNTER — ANTICOAGULATION VISIT (OUTPATIENT)
Dept: PHARMACY | Facility: HOSPITAL | Age: 50
End: 2020-06-15

## 2020-06-15 DIAGNOSIS — Z95.2 AORTIC VALVE REPLACED: ICD-10-CM

## 2020-06-15 DIAGNOSIS — I48.0 PAROXYSMAL ATRIAL FIBRILLATION (HCC): ICD-10-CM

## 2020-06-15 NOTE — PROGRESS NOTES
Anticoagulation Clinic Progress Note    Anticoagulation Summary  As of 6/15/2020    INR goal:   2.5-3.5   TTR:   88.6 % (1.5 y)   INR used for dosin.80 (2020)   Warfarin maintenance plan:   7.5 mg every Sun, Thu; 5 mg all other days   Weekly warfarin total:   40 mg   No change documented:   Cindy Mathew   Plan last modified:   José Miguel Zapata RP (10/4/2019)   Next INR check:   2020   Priority:   Maintenance   Target end date:       Indications    Aortic valve replaced [Z95.2]  Paroxysmal atrial fibrillation (CMS/HCC) [I48.0]             Anticoagulation Episode Summary     INR check location:       Preferred lab:       Send INR reminders to:    [facility]  POOL    Comments:   *Coaguchek*      Anticoagulation Care Providers     Provider Role Specialty Phone number    Juan Antonio Buenrostro MD Referring Cardiology [phone]          Clinic Interview:  No pertinent clinical findings have been reported.    INR History:  Anticoagulation Monitoring 2020 2020 6/15/2020   INR 2.80 2.50 2.80   INR Date 2020   INR Goal 2.5-3.5 2.5-3.5 2.5-3.5   Trend Same Same Same   Last Week Total 40 mg 40 mg 40 mg   Next Week Total 40 mg 40 mg 40 mg   Sun 7.5 mg 7.5 mg 7.5 mg   Mon 5 mg 5 mg 5 mg   Tue 5 mg 5 mg 5 mg   Wed 5 mg 5 mg 5 mg   Thu 7.5 mg 7.5 mg 7.5 mg   Fri 5 mg 5 mg 5 mg   Sat 5 mg 5 mg 5 mg   Visit Report - - -   Some recent data might be hidden       Plan:  1. INR is therapeutic today- see above in Anticoagulation Summary.    Fernie Calvofield to continue their warfarin regimen- see above in Anticoagulation Summary.  2. Follow up in 2 weeks  3. Pt has agreed to only be called if INR out of range. They have been instructed to call if any changes in medications, doses, concerns, etc. Patient expresses understanding and has no further questions at this time.    Cindy Mathew

## 2020-06-26 LAB — INR PPP: 2.5

## 2020-06-29 ENCOUNTER — ANTICOAGULATION VISIT (OUTPATIENT)
Dept: PHARMACY | Facility: HOSPITAL | Age: 50
End: 2020-06-29

## 2020-06-29 DIAGNOSIS — Z95.2 AORTIC VALVE REPLACED: ICD-10-CM

## 2020-06-29 DIAGNOSIS — I48.0 PAROXYSMAL ATRIAL FIBRILLATION (HCC): ICD-10-CM

## 2020-06-29 NOTE — PROGRESS NOTES
Anticoagulation Clinic Progress Note    Anticoagulation Summary  As of 2020    INR goal:   2.5-3.5   TTR:   88.9 % (1.6 y)   INR used for dosin.50 (2020)   Warfarin maintenance plan:   7.5 mg every Sun, Thu; 5 mg all other days   Weekly warfarin total:   40 mg   No change documented:   Georgie Gaming   Plan last modified:   José Miguel Zapata RP (10/4/2019)   Next INR check:   7/10/2020   Priority:   Maintenance   Target end date:       Indications    Aortic valve replaced [Z95.2]  Paroxysmal atrial fibrillation (CMS/HCC) [I48.0]             Anticoagulation Episode Summary     INR check location:       Preferred lab:       Send INR reminders to:    [facility]  POOL    Comments:   *Coaguchek*      Anticoagulation Care Providers     Provider Role Specialty Phone number    Juan Antonio Buenrostro MD Referring Cardiology [phone]          Clinic Interview:  No pertinent clinical findings have been reported.    INR History:  Anticoagulation Monitoring 2020 6/15/2020 2020   INR 2.50 2.80 2.50   INR Date 2020   INR Goal 2.5-3.5 2.5-3.5 2.5-3.5   Trend Same Same Same   Last Week Total 40 mg 40 mg 40 mg   Next Week Total 40 mg 40 mg 40 mg   Sun 7.5 mg 7.5 mg 7.5 mg   Mon 5 mg 5 mg 5 mg   Tue 5 mg 5 mg 5 mg   Wed 5 mg 5 mg 5 mg   Thu 7.5 mg 7.5 mg 7.5 mg   Fri 5 mg 5 mg 5 mg   Sat 5 mg 5 mg 5 mg   Visit Report - - -   Some recent data might be hidden       Plan:  1. INR is therapeutic today- see above in Anticoagulation Summary.    Fernie Porter to continue their warfarin regimen- see above in Anticoagulation Summary.  2. Follow up in 2 weeks  3. Pt has agreed to only be called if INR out of range. They have been instructed to call if any changes in medications, doses, concerns, etc. Patient expresses understanding and has no further questions at this time.    Georgie Gaming

## 2020-06-30 ENCOUNTER — TELEPHONE (OUTPATIENT)
Dept: CARDIOLOGY | Facility: CLINIC | Age: 50
End: 2020-06-30

## 2020-06-30 NOTE — TELEPHONE ENCOUNTER
"Pt called and is requesting a return to work statement.  We filled out STD paperwork due to pt being at an increased risk for COVID-19.  Pt would know like to return to work on 7/2/20.  Are you able to create a letter stating it is okay for him to return to work?  The prior letter stated \"estimated\" return to work date 7/1/20.  Thanks-Vernell    # [phone]  "

## 2020-07-10 ENCOUNTER — ANTICOAGULATION VISIT (OUTPATIENT)
Dept: PHARMACY | Facility: HOSPITAL | Age: 50
End: 2020-07-10

## 2020-07-10 DIAGNOSIS — Z95.2 AORTIC VALVE REPLACED: ICD-10-CM

## 2020-07-10 DIAGNOSIS — I48.0 PAROXYSMAL ATRIAL FIBRILLATION (HCC): ICD-10-CM

## 2020-07-10 LAB — INR PPP: 2.5

## 2020-07-10 NOTE — PROGRESS NOTES
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    INR History:  Anticoagulation Monitoring 6/15/2020 2020 7/10/2020   INR 2.80 2.50 2.50   INR Date 2020   INR Goal 2.5-3.5 2.5-3.5 2.5-3.5   Trend Same Same Same   Last Week Total 40 mg 40 mg 40 mg   Next Week Total 40 mg 40 mg 40 mg   Sun 7.5 mg 7.5 mg 7.5 mg   Mon 5 mg 5 mg 5 mg   Tue 5 mg 5 mg 5 mg   Wed 5 mg 5 mg 5 mg   Thu 7.5 mg 7.5 mg 7.5 mg   Fri 5 mg 5 mg 5 mg   Sat 5 mg 5 mg 5 mg   Visit Report - - -   Some recent data might be hidden       Plan:  1. INR is therapeutic today- see above in Anticoagulation Summary.    Fernie Porter to continue their warfarin regimen- see above in Anticoagulation Summary.  2. Follow up in 2 weeks  3. Pt has agreed to only be called if INR out of range. They have been instructed to call if any changes in medications, doses, concerns, etc. Patient expresses understanding and has no further questions at this time.    Rosa Newman

## 2020-07-27 ENCOUNTER — ANTICOAGULATION VISIT (OUTPATIENT)
Dept: PHARMACY | Facility: HOSPITAL | Age: 50
End: 2020-07-27

## 2020-07-27 DIAGNOSIS — I48.0 PAROXYSMAL ATRIAL FIBRILLATION (HCC): ICD-10-CM

## 2020-07-27 DIAGNOSIS — Z95.2 AORTIC VALVE REPLACED: ICD-10-CM

## 2020-07-27 LAB — INR PPP: 2.7

## 2020-07-27 NOTE — PROGRESS NOTES
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    INR History:  Anticoagulation Monitoring 2020 7/10/2020 2020   INR 2.50 2.50 2.70   INR Date 2020   INR Goal 2.5-3.5 2.5-3.5 2.5-3.5   Trend Same Same Same   Last Week Total 40 mg 40 mg 40 mg   Next Week Total 40 mg 40 mg 40 mg   Sun 7.5 mg 7.5 mg 7.5 mg   Mon 5 mg 5 mg 5 mg   Tue 5 mg 5 mg 5 mg   Wed 5 mg 5 mg 5 mg   Thu 7.5 mg 7.5 mg 7.5 mg   Fri 5 mg 5 mg 5 mg   Sat 5 mg 5 mg 5 mg   Visit Report - - -   Some recent data might be hidden       Plan:  1. INR is therapeutic today- see above in Anticoagulation Summary.    Fernie Porter to continue their warfarin regimen- see above in Anticoagulation Summary.  2. Follow up in 2 weeks  3. Pt has agreed to only be called if INR out of range. They have been instructed to call if any changes in medications, doses, concerns, etc. Patient expresses understanding and has no further questions at this time.    Rosa Newman

## 2020-08-15 LAB — INR PPP: 2.6

## 2020-08-17 ENCOUNTER — ANTICOAGULATION VISIT (OUTPATIENT)
Dept: PHARMACY | Facility: HOSPITAL | Age: 50
End: 2020-08-17

## 2020-08-17 DIAGNOSIS — I48.0 PAROXYSMAL ATRIAL FIBRILLATION (HCC): ICD-10-CM

## 2020-08-17 DIAGNOSIS — Z95.2 AORTIC VALVE REPLACED: ICD-10-CM

## 2020-08-17 NOTE — PROGRESS NOTES
Anticoagulation Clinic Progress Note    Anticoagulation Summary  As of 2020    INR goal:   2.5-3.5   TTR:   89.7 % (1.7 y)   INR used for dosin.60 (8/15/2020)   Warfarin maintenance plan:   7.5 mg every Sun, Thu; 5 mg all other days   Weekly warfarin total:   40 mg   No change documented:   Cindy Mathew   Plan last modified:   José Miguel Zapata RP (10/4/2019)   Next INR check:   2020   Priority:   Maintenance   Target end date:       Indications    Aortic valve replaced [Z95.2]  Paroxysmal atrial fibrillation (CMS/HCC) [I48.0]             Anticoagulation Episode Summary     INR check location:       Preferred lab:       Send INR reminders to:    [facility]TE [facility]  POOL    Comments:   *Coaguchek*      Anticoagulation Care Providers     Provider Role Specialty Phone number    Juan Antonio Buenrostro MD Referring Cardiology [phone]          Clinic Interview:  No pertinent clinical findings have been reported.    INR History:  Anticoagulation Monitoring 7/10/2020 2020 2020   INR 2.50 2.70 2.60   INR Date 2020 2020 8/15/2020   INR Goal 2.5-3.5 2.5-3.5 2.5-3.5   Trend Same Same Same   Last Week Total 40 mg 40 mg 40 mg   Next Week Total 40 mg 40 mg 40 mg   Sun 7.5 mg 7.5 mg 7.5 mg   Mon 5 mg 5 mg 5 mg   Tue 5 mg 5 mg 5 mg   Wed 5 mg 5 mg 5 mg   Thu 7.5 mg 7.5 mg 7.5 mg   Fri 5 mg 5 mg 5 mg   Sat 5 mg 5 mg 5 mg   Visit Report - - -   Some recent data might be hidden       Plan:  1. INR is therapeutic today- see above in Anticoagulation Summary.    Fernie Calvofield to continue their warfarin regimen- see above in Anticoagulation Summary.  2. Follow up in 2 weeks  3. Pt has agreed to only be called if INR out of range. They have been instructed to call if any changes in medications, doses, concerns, etc. Patient expresses understanding and has no further questions at this time.    Cindy Mathew

## 2020-08-31 ENCOUNTER — OFFICE VISIT (OUTPATIENT)
Dept: INTERNAL MEDICINE | Facility: CLINIC | Age: 50
End: 2020-08-31

## 2020-08-31 VITALS
SYSTOLIC BLOOD PRESSURE: 108 MMHG | DIASTOLIC BLOOD PRESSURE: 72 MMHG | OXYGEN SATURATION: 100 % | HEIGHT: 69 IN | BODY MASS INDEX: 23.99 KG/M2 | HEART RATE: 61 BPM | WEIGHT: 162 LBS | TEMPERATURE: 97.1 F | RESPIRATION RATE: 16 BRPM

## 2020-08-31 DIAGNOSIS — Z95.2 AORTIC VALVE REPLACED: ICD-10-CM

## 2020-08-31 DIAGNOSIS — I10 ESSENTIAL HYPERTENSION: Primary | ICD-10-CM

## 2020-08-31 DIAGNOSIS — G43.109 MIGRAINE WITH AURA AND WITHOUT STATUS MIGRAINOSUS, NOT INTRACTABLE: ICD-10-CM

## 2020-08-31 DIAGNOSIS — I48.0 PAROXYSMAL ATRIAL FIBRILLATION (HCC): ICD-10-CM

## 2020-08-31 PROCEDURE — 99214 OFFICE O/P EST MOD 30 MIN: CPT | Performed by: INTERNAL MEDICINE

## 2020-08-31 NOTE — PROGRESS NOTES
Subjective   Fernie Porter is a 49 y.o. male.     Chief Complaint   Patient presents with   • Hypertension   • Atrial Fibrillation   • Migraine         Hypertension   This is a chronic problem. The current episode started more than 1 year ago. The problem is unchanged. The problem is controlled. Associated symptoms include headaches. Pertinent negatives include no orthopnea or peripheral edema. There are no associated agents to hypertension. Risk factors for coronary artery disease include male gender. Current antihypertension treatment includes alpha 1 blockers, diuretics and beta blockers. The current treatment provides significant improvement. There are no compliance problems.  There is no history of angina, kidney disease, CAD/MI, CVA or heart failure.   Atrial Fibrillation   Presents for follow-up visit. The symptoms have been stable. Past medical history includes atrial fibrillation. There are no medication compliance problems.   Migraine    This is a chronic problem. The current episode started more than 1 year ago. The problem occurs constantly. The problem has been waxing and waning. The pain is located in the left unilateral region. The pain does not radiate. The pain quality is similar to prior headaches. The quality of the pain is described as throbbing. The pain is moderate. Pertinent negatives include no abdominal pain, coughing, fever, nausea or vomiting. Nothing aggravates the symptoms. He has tried NSAIDs for the symptoms. The treatment provided significant relief.   Headache    This is a chronic problem. The current episode started more than 1 year ago. The problem occurs constantly. Pertinent negatives include no abdominal pain, coughing, fever, nausea or vomiting.        The following portions of the patient's history were reviewed and updated as appropriate: allergies, current medications, past social history and problem list.    Outpatient Medications Marked as Taking for the 8/31/20  encounter (Office Visit) with Juan Antonio Ford MD   Medication Sig Dispense Refill   • amLODIPine (NORVASC) 5 MG tablet TAKE 1 TABLET BY MOUTH EVERY DAY 90 tablet 3   • amoxicillin (AMOXIL) 500 MG capsule Take 4 tablets by mouth 1 hr prior to dental procedures 20 capsule 0   • cloNIDine (CATAPRES) 0.2 MG tablet TAKE ONE TABLET 2 TIMES A  tablet 1   • fluticasone (FLONASE) 50 MCG/ACT nasal spray 2 SPRAYS INTO THE NOSTRIL(S) AS DIRECTED BY PROVIDER DAILY. 48 mL 2   • hydroCHLOROthiazide (MICROZIDE) 12.5 MG capsule TAKE ONE CAPSULE BY MOUTH EVERY DAY 90 capsule 1   • hydrocortisone 2.5 % cream Apply  topically to the appropriate area as directed 3 (Three) Times a Day As Needed.     • ibuprofen (ADVIL,MOTRIN) 200 MG tablet Take 200 mg by mouth Every 6 (Six) Hours As Needed for Mild Pain .     • imipramine (TOFRANIL) 50 MG tablet TAKE 2 TABLETS BY MOUTH AT BEDTIME 180 tablet 1   • propranolol LA (INDERAL LA) 120 MG 24 hr capsule Take 1 capsule by mouth Daily. 90 capsule 1   • warfarin (COUMADIN) 5 MG tablet TAKE 1.5 TABLETS BY MOUTH SUN AND THURS, AND TAKE 1 TABLET BY MOUTH ALL OTHER DAYS OR AS DIRECTED. 105 tablet 0       Review of Systems   Constitutional: Negative for chills, fatigue and fever.   Respiratory: Negative for cough and wheezing.    Cardiovascular: Negative for orthopnea and leg swelling.   Gastrointestinal: Negative for abdominal pain, constipation, diarrhea, nausea and vomiting.   Neurological: Positive for headaches.       Objective   Vitals:    08/31/20 1519   BP: 108/72   Pulse: 61   Resp: 16   Temp: 97.1 °F (36.2 °C)   SpO2: 100%          08/31/20  1519   Weight: 73.5 kg (162 lb)    [unfilled]  Body mass index is 23.91 kg/m².      Physical Exam   Constitutional: He appears well-developed and well-nourished. No distress.   HENT:   Head: Normocephalic and atraumatic.   Neck: Carotid bruit is not present.   Cardiovascular: Normal rate, regular rhythm and intact distal pulses. Exam reveals no  gallop.   Murmur heard.   Crescendo decrescendo systolic murmur is present with a grade of 2/6.  Prosthetic heart sounds.  Grade 2/6 JOHNNIE loudest at the pulmonic area.   Pulmonary/Chest: Effort normal and breath sounds normal. No respiratory distress. He has no wheezes. He has no rales.   Abdominal: Soft. Bowel sounds are normal. He exhibits no mass. There is no tenderness. There is no guarding.         Problem List Items Addressed This Visit        Cardiovascular and Mediastinum    Hypertension - Primary    Migraine    Paroxysmal atrial fibrillation (CMS/HCC)       Other    Aortic valve replaced        Assessment/Plan   In for recheck of hypertension, AF, aortic stenosis, and migraines.  Migraines persist.  They're doing pretty well to present time.  Gets 3 or 4 per month.  Has failed several drugs in the past including Topamax.  Blood pressure control is excellent.  Atrial fib is controlled.  Gets annual lab work May 2020 including CBC, CMP, urinalysis.  Continue to monitor every 3 months.  Certainly no severe migraine since he started Inderal LA.  He would like to do a little better job with his migraines.  If not we'll consider trying Depakote.  Perhaps Emgality.  He thinks he failed Zonegran in the past.  Schwannoma diagnosed in his lumbar spine since last visit.  Being followed by neurosurgery.  He is due for colonoscopy.  Is going to have to bridge off of Coumadin on the Lovenox perioperatively.  He will discuss that with Dr. Buenrostro.  He will let me know when he is ready to schedule this.    PPE today included the face mask and eye shield.         Dragon disclaimer:   Much of this encounter note is an electronic transcription/translation of spoken language to printed text. The electronic translation of spoken language may permit erroneous, or at times, nonsensical words or phrases to be inadvertently transcribed; Although I have reviewed the note for such errors, some may still exist.

## 2020-09-04 ENCOUNTER — ANTICOAGULATION VISIT (OUTPATIENT)
Dept: PHARMACY | Facility: HOSPITAL | Age: 50
End: 2020-09-04

## 2020-09-04 DIAGNOSIS — Z95.2 AORTIC VALVE REPLACED: ICD-10-CM

## 2020-09-04 DIAGNOSIS — I48.0 PAROXYSMAL ATRIAL FIBRILLATION (HCC): ICD-10-CM

## 2020-09-04 LAB — INR PPP: 2.7

## 2020-09-04 NOTE — PROGRESS NOTES
Anticoagulation Clinic Progress Note    Anticoagulation Summary  As of 2020    INR goal:   2.5-3.5   TTR:   90.1 % (1.8 y)   INR used for dosin.70 (2020)   Warfarin maintenance plan:   7.5 mg every Sun, Thu; 5 mg all other days   Weekly warfarin total:   40 mg   No change documented:   Cindy Mathew   Plan last modified:   José Miguel Zapata RP (10/4/2019)   Next INR check:   2020   Priority:   Maintenance   Target end date:       Indications    Aortic valve replaced [Z95.2]  Paroxysmal atrial fibrillation (CMS/HCC) [I48.0]             Anticoagulation Episode Summary     INR check location:       Preferred lab:       Send INR reminders to:    JUL[facility]TE [facility]  POOL    Comments:   *Coaguchek*      Anticoagulation Care Providers     Provider Role Specialty Phone number    Juan Antonio Buenrostro MD Referring Cardiology [phone]          Clinic Interview:  No pertinent clinical findings have been reported.    INR History:  Anticoagulation Monitoring 2020   INR 2.70 2.60 2.70   INR Date 2020 8/15/2020 2020   INR Goal 2.5-3.5 2.5-3.5 2.5-3.5   Trend Same Same Same   Last Week Total 40 mg 40 mg 40 mg   Next Week Total 40 mg 40 mg 40 mg   Sun 7.5 mg 7.5 mg 7.5 mg   Mon 5 mg 5 mg 5 mg   Tue 5 mg 5 mg 5 mg   Wed 5 mg 5 mg 5 mg   Thu 7.5 mg 7.5 mg 7.5 mg   Fri 5 mg 5 mg 5 mg   Sat 5 mg 5 mg 5 mg   Visit Report - - -   Some recent data might be hidden       Plan:  1. INR is therapeutic today- see above in Anticoagulation Summary.    Fernie CORY [place] to continue their warfarin regimen- see above in Anticoagulation Summary.  2. Follow up in 2 weeks  3. Pt has agreed to only be called if INR out of range. They have been instructed to call if any changes in medications, doses, concerns, etc. Patient expresses understanding and has no further questions at this time.    Cindy Mathew

## 2020-09-19 LAB — INR PPP: 3

## 2020-09-21 ENCOUNTER — ANTICOAGULATION VISIT (OUTPATIENT)
Dept: PHARMACY | Facility: HOSPITAL | Age: 50
End: 2020-09-21

## 2020-09-21 DIAGNOSIS — Z95.2 AORTIC VALVE REPLACED: ICD-10-CM

## 2020-09-21 DIAGNOSIS — I48.0 PAROXYSMAL ATRIAL FIBRILLATION (HCC): ICD-10-CM

## 2020-09-21 NOTE — PROGRESS NOTES
Anticoagulation Clinic Progress Note    Anticoagulation Summary  As of 9/21/2020    INR goal:  2.5-3.5   TTR:  90.3 % (1.8 y)   INR used for dosing:  3.00 (9/19/2020)   Warfarin maintenance plan:  7.5 mg every Sun, Thu; 5 mg all other days   Weekly warfarin total:  40 mg   No change documented:  Georgie Gaming   Plan last modified:  José Miguel Zapata RP (10/4/2019)   Next INR check:  10/3/2020   Priority:  Maintenance   Target end date:      Indications    Aortic valve replaced [Z95.2]  Paroxysmal atrial fibrillation (CMS/HCC) [I48.0]             Anticoagulation Episode Summary     INR check location:      Preferred lab:      Send INR reminders to:   JUL[facility]TE [facility]MED[facility]  POOL    Comments:  *Coaguchek*      Anticoagulation Care Providers     Provider Role Specialty Phone number    Juan Antonio Buenrostro MD Referring Cardiology [phone]          Clinic Interview:  No pertinent clinical findings have been reported.    INR History:  Anticoagulation Monitoring 8/17/2020 9/4/2020 9/21/2020   INR 2.60 2.70 3.00   INR Date 8/15/2020 9/4/2020 9/19/2020   INR Goal 2.5-3.5 2.5-3.5 2.5-3.5   Trend Same Same Same   Last Week Total 40 mg 40 mg 40 mg   Next Week Total 40 mg 40 mg 40 mg   Sun 7.5 mg 7.5 mg 7.5 mg   Mon 5 mg 5 mg 5 mg   Tue 5 mg 5 mg 5 mg   Wed 5 mg 5 mg 5 mg   Thu 7.5 mg 7.5 mg 7.5 mg   Fri 5 mg 5 mg 5 mg   Sat 5 mg 5 mg 5 mg   Visit Report - - -   Some recent data might be hidden       Plan:  1. INR is therapeutic today- see above in Anticoagulation Summary.    Fernie Porter to continue their warfarin regimen- see above in Anticoagulation Summary.  2. Follow up in 2 weeks  3. Pt has agreed to only be called if INR out of range. They have been instructed to call if any changes in medications, doses, concerns, etc. Patient expresses understanding and has no further questions at this time.    Georgie Gaming

## 2020-10-10 LAB — INR PPP: 2.8

## 2020-10-12 ENCOUNTER — ANTICOAGULATION VISIT (OUTPATIENT)
Dept: PHARMACY | Facility: HOSPITAL | Age: 50
End: 2020-10-12

## 2020-10-12 DIAGNOSIS — I48.0 PAROXYSMAL ATRIAL FIBRILLATION (HCC): ICD-10-CM

## 2020-10-12 DIAGNOSIS — Z95.2 AORTIC VALVE REPLACED: ICD-10-CM

## 2020-10-12 NOTE — PROGRESS NOTES
Anticoagulation Clinic Progress Note    Anticoagulation Summary  As of 10/12/2020    INR goal:  2.5-3.5   TTR:  90.6 % (1.9 y)   INR used for dosin.80 (10/10/2020)   Warfarin maintenance plan:  7.5 mg every Sun, Thu; 5 mg all other days   Weekly warfarin total:  40 mg   No change documented:  Georgie Gaming   Plan last modified:  José Miguel Zapata RPH (10/4/2019)   Next INR check:  10/24/2020   Priority:  Maintenance   Target end date:      Indications    Aortic valve replaced [Z95.2]  Paroxysmal atrial fibrillation (CMS/HCC) [I48.0]             Anticoagulation Episode Summary     INR check location:      Preferred lab:      Send INR reminders to:   JULIETTE [facility]MEDO  POOL    Comments:  *Coaguchek*      Anticoagulation Care Providers     Provider Role Specialty Phone number    Juan Antonio Buenrostro MD Referring Cardiology [phone]          Clinic Interview:  No pertinent clinical findings have been reported.    INR History:  Anticoagulation Monitoring 2020 2020 10/12/2020   INR 2.70 3.00 2.80   INR Date 2020 2020 10/10/2020   INR Goal 2.5-3.5 2.5-3.5 2.5-3.5   Trend Same Same Same   Last Week Total 40 mg 40 mg 40 mg   Next Week Total 40 mg 40 mg 40 mg   Sun 7.5 mg 7.5 mg 7.5 mg   Mon 5 mg 5 mg 5 mg   Tue 5 mg 5 mg 5 mg   Wed 5 mg 5 mg 5 mg   Thu 7.5 mg 7.5 mg 7.5 mg   Fri 5 mg 5 mg 5 mg   Sat 5 mg 5 mg 5 mg   Visit Report - - -   Some recent data might be hidden       Plan:  1. INR is therapeutic today- see above in Anticoagulation Summary.    Fernie Porter to continue their warfarin regimen- see above in Anticoagulation Summary.  2. Follow up in 2 weeks  3. Pt has agreed to only be called if INR out of range. They have been instructed to call if any changes in medications, doses, concerns, etc. Patient expresses understanding and has no further questions at this time.    Georgie Gaming

## 2020-10-19 RX ORDER — CLONIDINE HYDROCHLORIDE 0.2 MG/1
TABLET ORAL
Qty: 180 TABLET | Refills: 1 | Status: SHIPPED | OUTPATIENT
Start: 2020-10-19 | End: 2021-04-19

## 2020-10-19 RX ORDER — FLUTICASONE PROPIONATE 50 MCG
2 SPRAY, SUSPENSION (ML) NASAL DAILY
Qty: 48 ML | Refills: 2 | Status: SHIPPED | OUTPATIENT
Start: 2020-10-19 | End: 2021-07-16

## 2020-10-19 RX ORDER — HYDROCHLOROTHIAZIDE 12.5 MG/1
CAPSULE, GELATIN COATED ORAL
Qty: 90 CAPSULE | Refills: 1 | Status: SHIPPED | OUTPATIENT
Start: 2020-10-19 | End: 2021-04-19

## 2020-10-19 RX ORDER — AMLODIPINE BESYLATE 5 MG/1
TABLET ORAL
Qty: 90 TABLET | Refills: 3 | Status: SHIPPED | OUTPATIENT
Start: 2020-10-19 | End: 2021-10-14

## 2020-10-21 ENCOUNTER — OFFICE VISIT (OUTPATIENT)
Dept: CARDIOLOGY | Facility: CLINIC | Age: 50
End: 2020-10-21

## 2020-10-21 ENCOUNTER — HOSPITAL ENCOUNTER (OUTPATIENT)
Dept: CARDIOLOGY | Facility: HOSPITAL | Age: 50
Discharge: HOME | End: 2020-10-21
Admitting: INTERNAL MEDICINE

## 2020-10-21 VITALS
DIASTOLIC BLOOD PRESSURE: 68 MMHG | WEIGHT: 160 LBS | HEART RATE: 65 BPM | SYSTOLIC BLOOD PRESSURE: 118 MMHG | HEIGHT: 69 IN | BODY MASS INDEX: 23.7 KG/M2

## 2020-10-21 VITALS
DIASTOLIC BLOOD PRESSURE: 85 MMHG | HEIGHT: 69 IN | WEIGHT: 162 LBS | BODY MASS INDEX: 23.99 KG/M2 | HEART RATE: 71 BPM | SYSTOLIC BLOOD PRESSURE: 134 MMHG

## 2020-10-21 DIAGNOSIS — Q25.1 COARCTATION OF AORTA: ICD-10-CM

## 2020-10-21 DIAGNOSIS — Z95.2 AORTIC VALVE REPLACED: ICD-10-CM

## 2020-10-21 DIAGNOSIS — Z95.2 AORTIC VALVE REPLACED: Primary | ICD-10-CM

## 2020-10-21 DIAGNOSIS — I48.0 PAROXYSMAL ATRIAL FIBRILLATION (HCC): ICD-10-CM

## 2020-10-21 DIAGNOSIS — I10 ESSENTIAL HYPERTENSION: ICD-10-CM

## 2020-10-21 PROCEDURE — 93306 TTE W/DOPPLER COMPLETE: CPT | Performed by: INTERNAL MEDICINE

## 2020-10-21 PROCEDURE — 93000 ELECTROCARDIOGRAM COMPLETE: CPT | Performed by: INTERNAL MEDICINE

## 2020-10-21 PROCEDURE — 93306 TTE W/DOPPLER COMPLETE: CPT

## 2020-10-21 PROCEDURE — 93356 MYOCRD STRAIN IMG SPCKL TRCK: CPT

## 2020-10-21 PROCEDURE — 99214 OFFICE O/P EST MOD 30 MIN: CPT | Performed by: INTERNAL MEDICINE

## 2020-10-21 PROCEDURE — 93356 MYOCRD STRAIN IMG SPCKL TRCK: CPT | Performed by: INTERNAL MEDICINE

## 2020-10-21 NOTE — PROGRESS NOTES
Date of Office Visit: 10/21/20  Encounter Provider: Juan Antonio Buenrostro MD  Place of Service: [facility] CARDIOLOGY  Patient Name: Fernie Porter  :1970  Referral Provider:No ref. provider found      No chief complaint on file.    History of Present Illness    Mr. Porter is a pleasant 50-year-old gentleman with history of bicuspid  aortic valve with evidence of aortic stenosis as well as some mild  insufficiency. He had coarctation of the aorta with pseudoaneurysm and had  that repaired years ago. He also had intermittent episodes of atrial  fibrillation treated medically, but very symptomatic when he would go into  that. He presented 2012 to our arrhythmia clinic with rapid atrial  fibrillation. He did not convert back with IV amiodarone. Was started on  Pradaxa. We upped his sotalol. He ultimately did convert back to sinus  rhythm, but was still having some questionable shortness of breath. We  repeated an echocardiogram on him that showed what appeared to be worsening  aortic valve stenosis.   He then underwent on 2012 aortic valve replacement with resection of the ascending  aorta with an aortic valve conduit using a #25 [employer] mechanical valve. He had  reimplantation of the right coronary arteries and had a left Cryomaze procedure with left  atrial appendage ligation. He went home and was having a fair amount of problems with his  blood pressure as well as his Coumadin.   He then had intermittent episodes of hypotension.  We adjusted his medication and that  improved.  He had problems with dyspnea.  We checked an echocardiogram on him and  everything looked okay except his fluid collection around his aorta.  We did a CT of his  chest which showed what looked to be a seroma.  He comes in for follow-up. The patient denies chest pain, pressure and heaviness. No shortness of breath, othopnea or PND. No palpitations, near syncope or syncope. No stroke type  symptoms like paralysis, paresthesia, abrupt vision loss and dysarthria. No bleeding like blood in the stool or dark stools.         Past Medical History:   Diagnosis Date   • AAA (abdominal aortic aneurysm) (CMS/HCC) 1992    repaired, subclaven stenosis subsequently   • Allergic 8/2011    Contrast dye   • Aortic stenosis    • Bicuspid aortic valve    • Coarctation of aorta    • Headache 1977   • Heart murmur 1971   • Hypertension    • Kidney stone 2010   • PAF (paroxysmal atrial fibrillation) (CMS/[facility])          Past Surgical History:   Procedure Laterality Date   • ABDOMINAL AORTIC ANEURYSM REPAIR  1992   • AORTIC VALVE REPAIR/REPLACEMENT      [employer]   • ASCENDING AORTIC ANEURYSM REPAIR W/ MECHANICAL AORTIC VALVE REPLACEMENT     • CARDIAC SURGERY  09/2012    AVR [facility] [facility] mechanical, maize procedure, aortic root resection   • CARDIAC SURGERY  1975    Coarct repair. Priscilla coarct aneurism 1989   • CARDIOVERSION     • COARCTATION OF AORTA EXCISION     • HERNIA REPAIR     • INGUINAL HERNIA REPAIR           Current Outpatient Medications on File Prior to Visit   Medication Sig Dispense Refill   • amLODIPine (NORVASC) 5 MG tablet TAKE 1 TABLET BY MOUTH EVERY DAY 90 tablet 3   • amoxicillin (AMOXIL) 500 MG capsule Take 4 tablets by mouth 1 hr prior to dental procedures 20 capsule 0   • cloNIDine (CATAPRES) 0.2 MG tablet TAKE ONE TABLET 2 TIMES A  tablet 1   • fluticasone (FLONASE) 50 MCG/ACT nasal spray 2 SPRAYS INTO THE NOSTRIL(S) AS DIRECTED BY PROVIDER DAILY. 48 mL 2   • hydroCHLOROthiazide (MICROZIDE) 12.5 MG capsule TAKE 1 CAPSULE BY MOUTH EVERY DAY 90 capsule 1   • hydrocortisone 2.5 % cream Apply  topically to the appropriate area as directed 3 (Three) Times a Day As Needed.     • ibuprofen (ADVIL,MOTRIN) 200 MG tablet Take 200 mg by mouth Every 6 (Six) Hours As Needed for Mild Pain .     • imipramine (TOFRANIL) 50 MG tablet TAKE 2 TABLETS BY MOUTH AT BEDTIME 180 tablet 1   • propranolol LA  (INDERAL LA) 120 MG 24 hr capsule Take 1 capsule by mouth Daily. 90 capsule 1   • warfarin (COUMADIN) 5 MG tablet TAKE 1.5 TABLETS BY MOUTH SUN AND THURS, AND TAKE 1 TABLET BY MOUTH ALL OTHER DAYS OR AS DIRECTED. 105 tablet 0     No current facility-administered medications on file prior to visit.          Social History     Socioeconomic History   • Marital status:      Spouse name: Not on file   • Number of children: Not on file   • Years of education: Not on file   • Highest education level: Not on file   Occupational History   • Occupation: [employer]   Tobacco Use   • Smoking status: Never Smoker   • Smokeless tobacco: Never Used   Substance and Sexual Activity   • Alcohol use: No     Comment: occ caffeine use   • Drug use: No   • Sexual activity: Yes     Partners: Female     Birth control/protection: Other   Social History Narrative    ** Merged History Encounter **            Family History   Problem Relation Age of Onset   • Hypertension Mother    • Stroke Mother    • No Known Problems Father    • No Known Problems Sister    • Cancer Maternal Grandfather    • Cancer Paternal Grandmother          Review of Systems   Constitution: Negative for decreased appetite, diaphoresis, fever, malaise/fatigue, weight gain and weight loss.   HENT: Negative for congestion, hearing loss, nosebleeds and tinnitus.    Eyes: Negative for blurred vision, double vision, vision loss in left eye, vision loss in right eye and visual disturbance.   Cardiovascular:        As noted in HPI   Respiratory:        As noted HPI   Endocrine: Negative for cold intolerance and heat intolerance.   Hematologic/Lymphatic: Negative for bleeding problem. Does not bruise/bleed easily.   Skin: Negative for color change, flushing, itching and rash.   Musculoskeletal: Negative for arthritis, back pain, joint pain, joint swelling, muscle weakness and myalgias.   Gastrointestinal: Negative for bloating, abdominal pain, constipation, diarrhea, dysphagia,  heartburn, hematemesis, hematochezia, melena, nausea and vomiting.   Genitourinary: Negative for bladder incontinence, dysuria, frequency, nocturia and urgency.   Neurological: Negative for dizziness, focal weakness, headaches, light-headedness, loss of balance, numbness, paresthesias, vertigo and weakness.   Psychiatric/Behavioral: Negative for depression, memory loss and substance abuse.       Procedures      ECG 12 Lead    Date/Time: 10/21/2020 2:17 PM  Performed by: Juan Antonio Buenrostro MD  Authorized by: Juan Antonio Buenrostro MD   Comparison: compared with previous ECG   Similar to previous ECG  Rhythm: sinus rhythm  Rate: normal  QRS axis: normal                  Objective:    There were no vitals taken for this visit.       Physical Exam  Vitals signs reviewed.   Constitutional:       General: Not in acute distress.     Appearance: Well-developed. Not diaphoretic.   Eyes:      General: No scleral icterus.     Conjunctiva/sclera: Conjunctivae normal.      Pupils: Pupils are equal, round, and reactive to light.   HENT:      Head: Normocephalic.   Neck:      Musculoskeletal: No edema or erythema.      Thyroid: No thyromegaly.      Vascular: Normal carotid pulses. No carotid bruit, hepatojugular reflux or JVD.      Trachea: No tracheal deviation.   Pulmonary:      Effort: Pulmonary effort is normal. No respiratory distress.      Breath sounds: Normal breath sounds. No decreased breath sounds. No wheezing. No rhonchi. No rales.   Chest:      Chest wall: Not tender to palpatation.   Cardiovascular:      PMI at left midclavicular line. Normal rate. Regular rhythm. S1 with normal intensity. S2 with normal intensity. Artificial S2       Murmurs: There is a grade 2/6 systolic murmur at the URSB.      No gallop. No click. No rub.   Pulses:     Intact distal pulses.   Edema:     Peripheral edema absent.   Abdominal:      General: Bowel sounds are normal. There is no distension.      Palpations: Abdomen is soft. There is no  abdominal mass.      Tenderness: There is no abdominal tenderness. There is no guarding or rebound.   Musculoskeletal:         General: No tenderness or deformity.      Extremities: No clubbing present.  Skin:     General: Skin is warm and dry. There is no cyanosis.      Coloration: Skin is not pale.      Findings: No erythema or rash.   Neurological:      Mental Status: Alert and oriented to person, place, and time.   Psychiatric:         Speech: Speech normal.         Behavior: Behavior normal.         Thought Content: Thought content normal.         Judgment: Judgment normal.             Assessment:    1. This is a 50-year-old gentleman with a history of bicuspid aortic valve with severe aortic stenosis, ascending aortic enlargement.  He is now status post aortic valve  replacement with a valve conduit using a mechanical St. Vikash valve with reimplantation of the coronary arteries.Echocardiogram today  October 2020 the valve appears to be functioning normally however there is this muscular looking mass which appears probably the right ventricle and it may just be angle but it was on his last echo since were getting a CT scan will make sure that gated rule out any cardiac abnormality.  2. 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  He is status post patch repair of that.   Follow-up CT scan in 2015 appears to be minimal evidence of coarctation.  He will have a repeat CT scan.  3. Paroxysmal atrial fibrillation.  This was prior to his valve surgery, but he now has cryoMaze procedure and appendage ligation.  No recurrent episodes off medication continue the same.  4. Hypertension.  His blood pressure is at goal.  5. Abnormal fluid collection around the aortic graft.  It may just represent a seroma. Benign per Dr. Casanova.   This might be what we are seeing on his echo.  6.  Hyperlipidemia.  His [employer] cardi[employer] risk of stroke or heart attack in 10 years is low at 2.5% compared to his  age-matched control of 1.7%.  He does not meet criteria for statin therapy at this time.          Plan:

## 2020-10-22 LAB
ASCENDING AORTA: 2.7 CM
BH CV ECHO MEAS - AO ACC TIME: 0.09 SEC
BH CV ECHO MEAS - AO MAX PG (FULL): 8.2 MMHG
BH CV ECHO MEAS - AO MAX PG: 11.6 MMHG
BH CV ECHO MEAS - AO MEAN PG (FULL): 4 MMHG
BH CV ECHO MEAS - AO MEAN PG: 6 MMHG
BH CV ECHO MEAS - AO ROOT AREA (BSA CORRECTED): 1.8
BH CV ECHO MEAS - AO ROOT AREA: 9.1 CM^2
BH CV ECHO MEAS - AO ROOT DIAM: 3.4 CM
BH CV ECHO MEAS - AO V2 MAX: 170 CM/SEC
BH CV ECHO MEAS - AO V2 MEAN: 112 CM/SEC
BH CV ECHO MEAS - AO V2 VTI: 31.1 CM
BH CV ECHO MEAS - ASC AORTA: 2.7 CM
BH CV ECHO MEAS - AVA(I,A): 1.9 CM^2
BH CV ECHO MEAS - AVA(I,D): 1.9 CM^2
BH CV ECHO MEAS - AVA(V,A): 1.5 CM^2
BH CV ECHO MEAS - AVA(V,D): 1.5 CM^2
BH CV ECHO MEAS - BSA(HAYCOCK): 1.9 M^2
BH CV ECHO MEAS - BSA: 1.9 M^2
BH CV ECHO MEAS - BZI_BMI: 23.9 KILOGRAMS/M^2
BH CV ECHO MEAS - BZI_METRIC_HEIGHT: 175.3 CM
BH CV ECHO MEAS - BZI_METRIC_WEIGHT: 73.5 KG
BH CV ECHO MEAS - EDV(MOD-SP2): 74 ML
BH CV ECHO MEAS - EDV(MOD-SP4): 64 ML
BH CV ECHO MEAS - EDV(TEICH): 92.4 ML
BH CV ECHO MEAS - EF(CUBED): 78.4 %
BH CV ECHO MEAS - EF(MOD-BP): 55.9 %
BH CV ECHO MEAS - EF(MOD-SP2): 55.4 %
BH CV ECHO MEAS - EF(MOD-SP4): 57.8 %
BH CV ECHO MEAS - EF(TEICH): 70.8 %
BH CV ECHO MEAS - ESV(MOD-SP2): 33 ML
BH CV ECHO MEAS - ESV(MOD-SP4): 27 ML
BH CV ECHO MEAS - ESV(TEICH): 27 ML
BH CV ECHO MEAS - FS: 40 %
BH CV ECHO MEAS - IVS/LVPW: 1.1
BH CV ECHO MEAS - IVSD: 1.2 CM
BH CV ECHO MEAS - LAT PEAK E' VEL: 10.7 CM/SEC
BH CV ECHO MEAS - LV DIASTOLIC VOL/BSA (35-75): 33.9 ML/M^2
BH CV ECHO MEAS - LV MASS(C)D: 186.4 GRAMS
BH CV ECHO MEAS - LV MASS(C)DI: 98.7 GRAMS/M^2
BH CV ECHO MEAS - LV MAX PG: 3.3 MMHG
BH CV ECHO MEAS - LV MEAN PG: 2 MMHG
BH CV ECHO MEAS - LV SYSTOLIC VOL/BSA (12-30): 14.3 ML/M^2
BH CV ECHO MEAS - LV V1 MAX: 91.2 CM/SEC
BH CV ECHO MEAS - LV V1 MEAN: 67.8 CM/SEC
BH CV ECHO MEAS - LV V1 VTI: 20.6 CM
BH CV ECHO MEAS - LVIDD: 4.5 CM
BH CV ECHO MEAS - LVIDS: 2.7 CM
BH CV ECHO MEAS - LVLD AP2: 7.3 CM
BH CV ECHO MEAS - LVLD AP4: 7.4 CM
BH CV ECHO MEAS - LVLS AP2: 6.8 CM
BH CV ECHO MEAS - LVLS AP4: 6.7 CM
BH CV ECHO MEAS - LVOT AREA (M): 2.8 CM^2
BH CV ECHO MEAS - LVOT AREA: 2.8 CM^2
BH CV ECHO MEAS - LVOT DIAM: 1.9 CM
BH CV ECHO MEAS - LVPWD: 1.1 CM
BH CV ECHO MEAS - MED PEAK E' VEL: 8.2 CM/SEC
BH CV ECHO MEAS - MV A DUR: 0.11 SEC
BH CV ECHO MEAS - MV A MAX VEL: 35.3 CM/SEC
BH CV ECHO MEAS - MV DEC SLOPE: 390.5 CM/SEC^2
BH CV ECHO MEAS - MV DEC TIME: 0.18 SEC
BH CV ECHO MEAS - MV E MAX VEL: 97.5 CM/SEC
BH CV ECHO MEAS - MV E/A: 2.8
BH CV ECHO MEAS - MV MAX PG: 5.2 MMHG
BH CV ECHO MEAS - MV MEAN PG: 2 MMHG
BH CV ECHO MEAS - MV P1/2T MAX VEL: 111.5 CM/SEC
BH CV ECHO MEAS - MV P1/2T: 83.6 MSEC
BH CV ECHO MEAS - MV V2 MAX: 114 CM/SEC
BH CV ECHO MEAS - MV V2 MEAN: 61.3 CM/SEC
BH CV ECHO MEAS - MV V2 VTI: 30.4 CM
BH CV ECHO MEAS - MVA P1/2T LCG: 2 CM^2
BH CV ECHO MEAS - MVA(P1/2T): 2.6 CM^2
BH CV ECHO MEAS - MVA(VTI): 1.9 CM^2
BH CV ECHO MEAS - PA ACC TIME: 0.12 SEC
BH CV ECHO MEAS - PA MAX PG (FULL): 2.6 MMHG
BH CV ECHO MEAS - PA MAX PG: 3.3 MMHG
BH CV ECHO MEAS - PA PR(ACCEL): 23.2 MMHG
BH CV ECHO MEAS - PA V2 MAX: 91.1 CM/SEC
BH CV ECHO MEAS - PULM A REVS DUR: 0.1 SEC
BH CV ECHO MEAS - PULM A REVS VEL: 18.8 CM/SEC
BH CV ECHO MEAS - PULM DIAS VEL: 53 CM/SEC
BH CV ECHO MEAS - PULM S/D: 0.41
BH CV ECHO MEAS - PULM SYS VEL: 21.5 CM/SEC
BH CV ECHO MEAS - PVA(V,A): 1.3 CM^2
BH CV ECHO MEAS - PVA(V,D): 1.3 CM^2
BH CV ECHO MEAS - QP/QS: 0.38
BH CV ECHO MEAS - RAP SYSTOLE: 3 MMHG
BH CV ECHO MEAS - RV MAX PG: 0.68 MMHG
BH CV ECHO MEAS - RV MEAN PG: 0 MMHG
BH CV ECHO MEAS - RV V1 MAX: 41.1 CM/SEC
BH CV ECHO MEAS - RV V1 MEAN: 28 CM/SEC
BH CV ECHO MEAS - RV V1 VTI: 7.8 CM
BH CV ECHO MEAS - RVOT AREA: 2.8 CM^2
BH CV ECHO MEAS - RVOT DIAM: 1.9 CM
BH CV ECHO MEAS - RVSP: 19 MMHG
BH CV ECHO MEAS - SI(AO): 149.5 ML/M^2
BH CV ECHO MEAS - SI(CUBED): 37.8 ML/M^2
BH CV ECHO MEAS - SI(LVOT): 30.9 ML/M^2
BH CV ECHO MEAS - SI(MOD-SP2): 21.7 ML/M^2
BH CV ECHO MEAS - SI(MOD-SP4): 19.6 ML/M^2
BH CV ECHO MEAS - SI(TEICH): 34.6 ML/M^2
BH CV ECHO MEAS - SUP REN AO DIAM: 1.8 CM
BH CV ECHO MEAS - SV(AO): 282.4 ML
BH CV ECHO MEAS - SV(CUBED): 71.4 ML
BH CV ECHO MEAS - SV(LVOT): 58.4 ML
BH CV ECHO MEAS - SV(MOD-SP2): 41 ML
BH CV ECHO MEAS - SV(MOD-SP4): 37 ML
BH CV ECHO MEAS - SV(RVOT): 22.1 ML
BH CV ECHO MEAS - SV(TEICH): 65.4 ML
BH CV ECHO MEAS - TAPSE (>1.6): 1.6 CM
BH CV ECHO MEAS - TR MAX VEL: 202 CM/SEC
BH CV ECHO MEASUREMENTS AVERAGE E/E' RATIO: 10.32
BH CV XLRA - RV BASE: 3.1 CM
BH CV XLRA - RV LENGTH: 6.1 CM
BH CV XLRA - RV MID: 2 CM
BH CV XLRA - TDI S': 7.9 CM/SEC
LEFT ATRIUM VOLUME INDEX: 20 ML/M2
MAXIMAL PREDICTED HEART RATE: 171 BPM
SINUS: 3.1 CM
STJ: 2.3 CM
STRESS TARGET HR: 145 BPM

## 2020-10-30 LAB — INR PPP: 2.5

## 2020-11-02 ENCOUNTER — ANTICOAGULATION VISIT (OUTPATIENT)
Dept: PHARMACY | Facility: HOSPITAL | Age: 50
End: 2020-11-02

## 2020-11-02 DIAGNOSIS — I48.0 PAROXYSMAL ATRIAL FIBRILLATION (HCC): ICD-10-CM

## 2020-11-02 DIAGNOSIS — Z95.2 AORTIC VALVE REPLACED: ICD-10-CM

## 2020-11-02 NOTE — PROGRESS NOTES
Anticoagulation Clinic Progress Note    Anticoagulation Summary  As of 2020    INR goal:  2.5-3.5   TTR:  90.9 % (1.9 y)   INR used for dosin.50 (10/30/2020)   Warfarin maintenance plan:  7.5 mg every Sun, Thu; 5 mg all other days   Weekly warfarin total:  40 mg   No change documented:  Cindy Mathew   Plan last modified:  José Miguel Zapata RP (10/4/2019)   Next INR check:  2020   Priority:  Maintenance   Target end date:      Indications    Aortic valve replaced [Z95.2]  Paroxysmal atrial fibrillation (CMS/HCC) [I48.0]             Anticoagulation Episode Summary     INR check location:      Preferred lab:      Send INR reminders to:   [facility]UL[facility]TE [facility]  POOL    Comments:  *Coaguchek*      Anticoagulation Care Providers     Provider Role Specialty Phone number    Juan Antonio Buenrostro MD Referring Cardiology [phone]          Clinic Interview:  No pertinent clinical findings have been reported.    INR History:  Anticoagulation Monitoring 2020 10/12/2020 2020   INR 3.00 2.80 2.50   INR Date 2020 10/10/2020 10/30/2020   INR Goal 2.5-3.5 2.5-3.5 2.5-3.5   Trend Same Same Same   Last Week Total 40 mg 40 mg 40 mg   Next Week Total 40 mg 40 mg 40 mg   Sun 7.5 mg 7.5 mg 7.5 mg   Mon 5 mg 5 mg 5 mg   Tue 5 mg 5 mg 5 mg   Wed 5 mg 5 mg 5 mg   Thu 7.5 mg 7.5 mg 7.5 mg   Fri 5 mg 5 mg 5 mg   Sat 5 mg 5 mg 5 mg   Visit Report - - -   Some recent data might be hidden       Plan:  1. INR is therapeutic today- see above in Anticoagulation Summary.    Fernie Calvofield to continue their warfarin regimen- see above in Anticoagulation Summary.  2. Follow up in 2 weeks  3. Pt has agreed to only be called if INR out of range. They have been instructed to call if any changes in medications, doses, concerns, etc. Patient expresses understanding and has no further questions at this time.    Cindy Mathew

## 2020-11-06 RX ORDER — PANTOPRAZOLE SODIUM 20 MG/1
TABLET, DELAYED RELEASE ORAL
Qty: 2 TABLET | Refills: 0 | Status: SHIPPED | OUTPATIENT
Start: 2020-11-06 | End: 2021-03-15

## 2020-11-06 RX ORDER — DIPHENHYDRAMINE HCL 25 MG
50 TABLET ORAL ONCE
Qty: 2 TABLET | Refills: 0 | Status: SHIPPED | OUTPATIENT
Start: 2020-11-06 | End: 2020-11-06

## 2020-11-06 RX ORDER — PREDNISONE 20 MG/1
TABLET ORAL
Qty: 9 TABLET | Refills: 0 | Status: SHIPPED | OUTPATIENT
Start: 2020-11-06 | End: 2021-03-15

## 2020-11-06 NOTE — TELEPHONE ENCOUNTER
Pt called stating he has a CT angiogram chest with and without contrast that needs to be scheduled but he has an allergy to contrast dye.  Pt was wanting to know if we could send in a prescription for a pre med? ([facility] on [address]).  Thanks/jlf    # [phone]

## 2020-11-12 RX ORDER — PROPRANOLOL HYDROCHLORIDE 120 MG/1
CAPSULE, EXTENDED RELEASE ORAL
Qty: 90 CAPSULE | Refills: 1 | Status: SHIPPED | OUTPATIENT
Start: 2020-11-12 | End: 2021-05-10

## 2020-11-13 ENCOUNTER — ANTICOAGULATION VISIT (OUTPATIENT)
Dept: PHARMACY | Facility: HOSPITAL | Age: 50
End: 2020-11-13

## 2020-11-13 DIAGNOSIS — Z95.2 AORTIC VALVE REPLACED: ICD-10-CM

## 2020-11-13 DIAGNOSIS — I48.0 PAROXYSMAL ATRIAL FIBRILLATION (HCC): ICD-10-CM

## 2020-11-13 LAB — INR PPP: 2.9

## 2020-11-13 NOTE — PROGRESS NOTES
Anticoagulation Clinic Progress Note    Anticoagulation Summary  As of 2020    INR goal:  2.5-3.5   TTR:  91.0 % (2 y)   INR used for dosin.90 (2020)   Warfarin maintenance plan:  7.5 mg every Sun, Thu; 5 mg all other days   Weekly warfarin total:  40 mg   No change documented:  Cindy Mathew   Plan last modified:  José Miguel Zapata [facility] (10/4/2019)   Next INR check:  2020   Priority:  Maintenance   Target end date:      Indications    Aortic valve replaced [Z95.2]  Paroxysmal atrial fibrillation (CMS/HCC) [I48.0]             Anticoagulation Episode Summary     INR check location:      Preferred lab:      Send INR reminders to:  [facility]  POOL    Comments:  *Coaguchek*      Anticoagulation Care Providers     Provider Role Specialty Phone number    Juan Antonio Buenrostro MD Referring Cardiology [phone]          Clinic Interview:  No pertinent clinical findings have been reported.    INR History:  Anticoagulation Monitoring 10/12/2020 2020 2020   INR 2.80 2.50 2.90   INR Date 10/10/2020 10/30/2020 2020   INR Goal 2.5-3.5 2.5-3.5 2.5-3.5   Trend Same Same Same   Last Week Total 40 mg 40 mg 40 mg   Next Week Total 40 mg 40 mg 40 mg   Sun 7.5 mg 7.5 mg 7.5 mg   Mon 5 mg 5 mg 5 mg   Tue 5 mg 5 mg 5 mg   Wed 5 mg 5 mg 5 mg   Thu 7.5 mg 7.5 mg 7.5 mg   Fri 5 mg 5 mg 5 mg   Sat 5 mg 5 mg 5 mg   Visit Report - - -   Some recent data might be hidden       Plan:  1. INR is therapeutic today- see above in Anticoagulation Summary.    Fernie CORY Porter to continue their warfarin regimen- see above in Anticoagulation Summary.  2. Follow up in 2 weeks  3. Pt has agreed to only be called if INR out of range. They have been instructed to call if any changes in medications, doses, concerns, etc. Patient expresses understanding and has no further questions at this time.    Cindy Mathew

## 2020-11-16 ENCOUNTER — TELEPHONE (OUTPATIENT)
Dept: CARDIOLOGY | Facility: CLINIC | Age: 50
End: 2020-11-16

## 2020-11-16 NOTE — TELEPHONE ENCOUNTER
We received pt's CTA chest results.  I placed the results in your inbox./[facility]    # [phone]

## 2020-11-28 LAB — INR PPP: 2.6

## 2020-12-02 ENCOUNTER — TELEMEDICINE (OUTPATIENT)
Dept: INTERNAL MEDICINE | Facility: CLINIC | Age: 50
End: 2020-12-02

## 2020-12-02 VITALS — DIASTOLIC BLOOD PRESSURE: 65 MMHG | HEART RATE: 74 BPM | SYSTOLIC BLOOD PRESSURE: 130 MMHG

## 2020-12-02 DIAGNOSIS — Z95.2 AORTIC VALVE REPLACED: ICD-10-CM

## 2020-12-02 DIAGNOSIS — I48.0 PAROXYSMAL ATRIAL FIBRILLATION (HCC): ICD-10-CM

## 2020-12-02 DIAGNOSIS — I10 ESSENTIAL HYPERTENSION: Primary | ICD-10-CM

## 2020-12-02 DIAGNOSIS — G43.109 MIGRAINE WITH AURA AND WITHOUT STATUS MIGRAINOSUS, NOT INTRACTABLE: ICD-10-CM

## 2020-12-02 PROCEDURE — 99213 OFFICE O/P EST LOW 20 MIN: CPT | Performed by: INTERNAL MEDICINE

## 2020-12-02 NOTE — PROGRESS NOTES
Subjective   Fernie Porter is a 50 y.o. male.     Chief Complaint   Patient presents with   • Hypertension   • Migraine   • Aortic Stenosis   • Atrial Fibrillation         Hypertension  This is a chronic problem. The current episode started more than 1 year ago. The problem is unchanged. The problem is controlled. Associated symptoms include headaches. Pertinent negatives include no orthopnea or peripheral edema. There are no associated agents to hypertension. Risk factors for coronary artery disease include male gender. Current antihypertension treatment includes alpha 1 blockers, diuretics and beta blockers. The current treatment provides significant improvement. There are no compliance problems.  There is no history of angina, kidney disease, CAD/MI, CVA or heart failure.   Migraine   This is a chronic problem. The current episode started more than 1 year ago. The problem occurs constantly. The problem has been waxing and waning. The pain is located in the left unilateral region. The pain does not radiate. The pain quality is similar to prior headaches. The quality of the pain is described as throbbing. The pain is moderate. Pertinent negatives include no abdominal pain, coughing, fever, nausea or vomiting. Nothing aggravates the symptoms. He has tried NSAIDs for the symptoms. The treatment provided significant relief.   Atrial Fibrillation  Presents for follow-up visit. The symptoms have been stable. Past medical history includes atrial fibrillation. There are no medication compliance problems.   Headache   This is a chronic problem. The current episode started more than 1 year ago. The problem occurs constantly. Pertinent negatives include no abdominal pain, coughing, fever, nausea or vomiting.        The following portions of the patient's history were reviewed and updated as appropriate: allergies, current medications, past social history and problem list.    Outpatient Medications Marked as Taking for  the 12/2/20 encounter (Telemedicine) with Juan Antonio Ford MD   Medication Sig Dispense Refill   • amLODIPine (NORVASC) 5 MG tablet TAKE 1 TABLET BY MOUTH EVERY DAY 90 tablet 3   • amoxicillin (AMOXIL) 500 MG capsule Take 4 tablets by mouth 1 hr prior to dental procedures 20 capsule 0   • cloNIDine (CATAPRES) 0.2 MG tablet TAKE ONE TABLET 2 TIMES A  tablet 1   • fluticasone (FLONASE) 50 MCG/ACT nasal spray 2 SPRAYS INTO THE NOSTRIL(S) AS DIRECTED BY PROVIDER DAILY. 48 mL 2   • hydroCHLOROthiazide (MICROZIDE) 12.5 MG capsule TAKE 1 CAPSULE BY MOUTH EVERY DAY 90 capsule 1   • ibuprofen (ADVIL,MOTRIN) 200 MG tablet Take 200 mg by mouth Every 6 (Six) Hours As Needed for Mild Pain .     • imipramine (TOFRANIL) 50 MG tablet TAKE 2 TABLETS BY MOUTH AT BEDTIME 180 tablet 1   • pantoprazole (Protonix) 20 MG EC tablet Take 1 tablet the evening before the procedure and take 1 the morning of procedure. 2 tablet 0   • predniSONE (DELTASONE) 20 MG tablet 3 tabs the evening before procedure at 6p then 3 tabs approx midnight and 3 tabs morning of procedure 9 tablet 0   • propranolol LA (INDERAL LA) 120 MG 24 hr capsule TAKE 1 CAPSULE BY MOUTH EVERY DAY 90 capsule 1   • warfarin (COUMADIN) 5 MG tablet TAKE 1.5 TABLETS BY MOUTH SUN AND THURS, AND TAKE 1 TABLET BY MOUTH ALL OTHER DAYS OR AS DIRECTED. 105 tablet 0       Review of Systems   Constitutional: Negative for chills, fatigue and fever.   Respiratory: Negative for cough and wheezing.    Cardiovascular: Negative for orthopnea and leg swelling.   Gastrointestinal: Negative for abdominal pain, constipation, diarrhea, nausea and vomiting.   Neurological: Positive for headaches.       Objective   There were no vitals filed for this visit.   There were no vitals filed for this visit. [unfilled]  There is no height or weight on file to calculate BMI.      Physical Exam   Cardiovascular: Normal pulses.   Abdominal: Normal appearance.   Neurological: He is alert.    Psychiatric: His behavior is normal. Mood, judgment and thought content normal.         Problems Addressed this Visit        Cardiovascular and Mediastinum    Paroxysmal atrial fibrillation (CMS/HCC)    Hypertension - Primary    Migraine       Other    Aortic valve replaced      Diagnoses       Codes Comments    Essential hypertension    -  Primary ICD-10-CM: I10  ICD-9-CM: 401.9     Migraine with aura and without status migrainosus, not intractable     ICD-10-CM: G43.109  ICD-9-CM: 346.00     Paroxysmal atrial fibrillation (CMS/HCC)     ICD-10-CM: I48.0  ICD-9-CM: 427.31     Aortic valve replaced     ICD-10-CM: Z95.2  ICD-9-CM: V43.3         Assessment/Plan   In for recheck of hypertension, AF, aortic stenosis, and migraines.  Migraines persist.  They're doing pretty well to present time.  Gets 3 or 4 per month.  Has failed several drugs in the past including Topamax.  Blood pressure control is excellent.  Atrial fib is controlled.  Gets annual lab work May 2020 including CBC, CMP, urinalysis.  Continue to monitor every 3 months.  Certainly no severe migraine since he started Inderal LA.  He would like to do a little better job with his migraines.  If not we'll consider trying Depakote.  Perhaps Emgality.  He thinks he failed Zonegran in the past.  He is due for colonoscopy.  Is going to have to bridge off of Coumadin on the Lovenox perioperatively.  He will discuss that with Dr. Buenrostro.  He will let me know when he is ready to schedule this.  We will plan annual preventive exam March 2020 and get hep C antibody at that time.         Dragon disclaimer:   Much of this encounter note is an electronic transcription/translation of spoken language to printed text. The electronic translation of spoken language may permit erroneous, or at times, nonsensical words or phrases to be inadvertently transcribed; Although I have reviewed the note for such errors, some may still exist.

## 2020-12-08 RX ORDER — IMIPRAMINE HCL 50 MG
TABLET ORAL
Qty: 180 TABLET | Refills: 1 | Status: SHIPPED | OUTPATIENT
Start: 2020-12-08 | End: 2021-06-09

## 2020-12-08 RX ORDER — WARFARIN SODIUM 5 MG/1
TABLET ORAL
Qty: 105 TABLET | Refills: 0 | Status: SHIPPED | OUTPATIENT
Start: 2020-12-08 | End: 2021-03-09 | Stop reason: SDUPTHER

## 2020-12-09 ENCOUNTER — ANTICOAGULATION VISIT (OUTPATIENT)
Dept: PHARMACY | Facility: HOSPITAL | Age: 50
End: 2020-12-09

## 2020-12-09 DIAGNOSIS — Z95.2 AORTIC VALVE REPLACED: ICD-10-CM

## 2020-12-09 DIAGNOSIS — I48.0 PAROXYSMAL ATRIAL FIBRILLATION (HCC): ICD-10-CM

## 2020-12-09 NOTE — PROGRESS NOTES
Anticoagulation Clinic Progress Note    Anticoagulation Summary  As of 2020    INR goal:  2.5-3.5   TTR:  91.2 % (2 y)   INR used for dosin.60 (2020)   Warfarin maintenance plan:  7.5 mg every Sun, Thu; 5 mg all other days   Weekly warfarin total:  40 mg   No change documented:  Brayan Patterson RPH   Plan last modified:  José Miguel Zapata RPH (10/4/2019)   Next INR check:  2020   Priority:  Maintenance   Target end date:      Indications    Aortic valve replaced [Z95.2]  Paroxysmal atrial fibrillation (CMS/HCC) [I48.0]             Anticoagulation Episode Summary     INR check location:      Preferred lab:      Send INR reminders to:   [facility]  POOL    Comments:  *Coaguchek*      Anticoagulation Care Providers     Provider Role Specialty Phone number    Juan Antonio Buenrostro MD Referring Cardiology [phone]          Clinic Interview:  No pertinent clinical findings have been reported.    INR History:  Anticoagulation Monitoring 2020   INR 2.50 2.90 2.60   INR Date 10/30/2020 2020 2020   INR Goal 2.5-3.5 2.5-3.5 2.5-3.5   Trend Same Same Same   Last Week Total 40 mg 40 mg 40 mg   Next Week Total 40 mg 40 mg 40 mg   Sun 7.5 mg 7.5 mg -   Mon 5 mg 5 mg -   Tue 5 mg 5 mg -   Wed 5 mg 5 mg 5 mg   Thu 7.5 mg 7.5 mg 7.5 mg   Fri 5 mg 5 mg -   Sat 5 mg 5 mg -   Visit Report - - -   Some recent data might be hidden       Plan:  1. INR was therapeutic 20 (received today)- see above in Anticoagulation Summary.    Fernie CORY Porter to continue their warfarin regimen- see above in Anticoagulation Summary.  2. Follow up 2 weeks from last INR  3. Pt has agreed to only be called if INR out of range. They have been instructed to call if any changes in medications, doses, concerns, etc. Patient expresses understanding and has no further questions at this time.    Brayan Patterson RPH

## 2020-12-21 ENCOUNTER — ANTICOAGULATION VISIT (OUTPATIENT)
Dept: PHARMACY | Facility: HOSPITAL | Age: 50
End: 2020-12-21

## 2020-12-21 DIAGNOSIS — Z95.2 AORTIC VALVE REPLACED: ICD-10-CM

## 2020-12-21 DIAGNOSIS — I48.0 PAROXYSMAL ATRIAL FIBRILLATION (HCC): ICD-10-CM

## 2020-12-21 LAB — INR PPP: 3.1

## 2020-12-21 NOTE — PROGRESS NOTES
Anticoagulation Clinic Progress Note    Anticoagulation Summary  As of 12/21/2020    INR goal:  2.5-3.5   TTR:  91.4 % (2.1 y)   INR used for dosing:  3.10 (12/18/2020)   Warfarin maintenance plan:  7.5 mg every Sun, Thu; 5 mg all other days   Weekly warfarin total:  40 mg   No change documented:  Rosa Newman   Plan last modified:  José Miguel Zapata [facility] (10/4/2019)   Next INR check:  1/4/2021   Priority:  Maintenance   Target end date:      Indications    Aortic valve replaced [Z95.2]  Paroxysmal atrial fibrillation (CMS/HCC) [I48.0]             Anticoagulation Episode Summary     INR check location:      Preferred lab:      Send INR reminders to:   [facility]  POOL    Comments:  *Coaguchek*      Anticoagulation Care Providers     Provider Role Specialty Phone number    Juan Antonio Buenrostro MD Referring Cardiology [phone]          Clinic Interview:  No pertinent clinical findings have been reported.    INR History:  Anticoagulation Monitoring 11/13/2020 12/9/2020 12/21/2020   INR 2.90 2.60 3.10   INR Date 11/13/2020 11/28/2020 12/18/2020   INR Goal 2.5-3.5 2.5-3.5 2.5-3.5   Trend Same Same Same   Last Week Total 40 mg 40 mg 40 mg   Next Week Total 40 mg 40 mg 40 mg   Sun 7.5 mg - 7.5 mg   Mon 5 mg - 5 mg   Tue 5 mg - 5 mg   Wed 5 mg 5 mg 5 mg   Thu 7.5 mg 7.5 mg 7.5 mg   Fri 5 mg - 5 mg   Sat 5 mg - 5 mg   Visit Report - - -   Some recent data might be hidden       Plan:  1. INR is therapeutic today- see above in Anticoagulation Summary.    Fernie Porter to continue their warfarin regimen- see above in Anticoagulation Summary.  2. Follow up in 2 weeks  3. Pt has agreed to only be called if INR out of range. They have been instructed to call if any changes in medications, doses, concerns, etc. Patient expresses understanding and has no further questions at this time.    Rosa Newman

## 2021-01-04 ENCOUNTER — ANTICOAGULATION VISIT (OUTPATIENT)
Dept: PHARMACY | Facility: HOSPITAL | Age: 51
End: 2021-01-04

## 2021-01-04 DIAGNOSIS — I48.0 PAROXYSMAL ATRIAL FIBRILLATION (HCC): ICD-10-CM

## 2021-01-04 DIAGNOSIS — Z95.2 AORTIC VALVE REPLACED: ICD-10-CM

## 2021-01-04 LAB — INR PPP: 2.9

## 2021-01-04 NOTE — PROGRESS NOTES
Anticoagulation Clinic Progress Note    Anticoagulation Summary  As of 2021    INR goal:  2.5-3.5   TTR:  91.6 % (2.1 y)   INR used for dosin.90 (2021)   Warfarin maintenance plan:  7.5 mg every Sun, Thu; 5 mg all other days   Weekly warfarin total:  40 mg   No change documented:  Rosa Newman   Plan last modified:  José Miguel Zapata [facility] (10/4/2019)   Next INR check:  1/15/2021   Priority:  Maintenance   Target end date:      Indications    Aortic valve replaced [Z95.2]  Paroxysmal atrial fibrillation (CMS/HCC) [I48.0]             Anticoagulation Episode Summary     INR check location:      Preferred lab:      [facility]    Comments:  *Coaguchek*      Anticoagulation Care Providers     Provider Role Specialty Phone number    Juan Antonio Buenrostro MD Referring Cardiology [phone]          Clinic Interview:  No pertinent clinical findings have been reported.    INR History:  Anticoagulation Monitoring 2020   INR 2.60 3.10 2.90   INR Date 2020   INR Goal 2.5-3.5 2.5-3.5 2.5-3.5   Trend Same Same Same   Last Week Total 40 mg 40 mg 40 mg   Next Week Total 40 mg 40 mg 40 mg   Sun - 7.5 mg 7.5 mg   Mon - 5 mg 5 mg   Tue - 5 mg 5 mg   Wed 5 mg 5 mg 5 mg   Thu 7.5 mg 7.5 mg 7.5 mg   Fri - 5 mg 5 mg   Sat - 5 mg 5 mg   Visit Report - - -   Some recent data might be hidden       Plan:  1. INR is therapeutic today- see above in Anticoagulation Summary.    Fernie CORY Porter to continue their warfarin regimen- see above in Anticoagulation Summary.  2. Follow up in 2 weeks  3. Pt has agreed to only be called if INR out of range. They have been instructed to call if any changes in medications, doses, concerns, etc. Patient expresses understanding and has no further questions at this time.    Brayan Patterson [facility]

## 2021-01-23 LAB — INR PPP: 3

## 2021-01-25 ENCOUNTER — ANTICOAGULATION VISIT (OUTPATIENT)
Dept: PHARMACY | Facility: HOSPITAL | Age: 51
End: 2021-01-25

## 2021-01-25 DIAGNOSIS — I48.0 PAROXYSMAL ATRIAL FIBRILLATION (HCC): ICD-10-CM

## 2021-01-25 DIAGNOSIS — Z95.2 AORTIC VALVE REPLACED: ICD-10-CM

## 2021-01-25 NOTE — PROGRESS NOTES
Anticoagulation Clinic Progress Note    Anticoagulation Summary  As of 1/25/2021    INR goal:  2.5-3.5   TTR:  91.8 % (2.2 y)   INR used for dosing:  3.00 (1/23/2021)   Warfarin maintenance plan:  7.5 mg every Sun, Thu; 5 mg all other days   Weekly warfarin total:  40 mg   No change documented:  Georgie Gaming   Plan last modified:  José Miguel Zapata [facility] (10/4/2019)   Next INR check:  2/6/2021   Priority:  Maintenance   Target end date:      Indications    Aortic valve replaced [Z95.2]  Paroxysmal atrial fibrillation (CMS/HCC) [I48.0]             Anticoagulation Episode Summary     INR check location:      Preferred lab:      Send INR reminders to:   [facility]  POOL    Comments:  *Coaguchek*      Anticoagulation Care Providers     Provider Role Specialty Phone number    Juan Antonio Buenrostro MD Referring Cardiology [phone]          Clinic Interview:  No pertinent clinical findings have been reported.    INR History:  Anticoagulation Monitoring 12/21/2020 1/4/2021 1/25/2021   INR 3.10 2.90 3.00   INR Date 12/18/2020 1/2/2021 1/23/2021   INR Goal 2.5-3.5 2.5-3.5 2.5-3.5   Trend Same Same Same   Last Week Total 40 mg 40 mg 40 mg   Next Week Total 40 mg 40 mg 40 mg   Sun 7.5 mg 7.5 mg 7.5 mg   Mon 5 mg 5 mg 5 mg   Tue 5 mg 5 mg 5 mg   Wed 5 mg 5 mg 5 mg   Thu 7.5 mg 7.5 mg 7.5 mg   Fri 5 mg 5 mg 5 mg   Sat 5 mg 5 mg 5 mg   Visit Report - - -   Some recent data might be hidden       Plan:  1. INR is therapeutic today- see above in Anticoagulation Summary.    Fernie Porter to continue their warfarin regimen- see above in Anticoagulation Summary.  2. Follow up in 2 weeks  3. Pt has agreed to only be called if INR out of range. They have been instructed to call if any changes in medications, doses, concerns, etc. Patient expresses understanding and has no further questions at this time.    Georgie Gaming

## 2021-02-03 ENCOUNTER — TELEPHONE (OUTPATIENT)
Dept: INTERNAL MEDICINE | Facility: CLINIC | Age: 51
End: 2021-02-03

## 2021-02-03 NOTE — TELEPHONE ENCOUNTER
Caller: Fernie Porter    Relationship to patient: Self    Best call back number: [phone]    Patient is needing: PATIENT HAS LA PAPERWORK THAT IS FILLED OUT AND IS JUST NEEDING A SIGNATURE. HE WOULD LIKE TO BRING BY TODAY.  PLEASE ADVISE.          
Patient dropped off paperwork. Will have Dr. Ford sign and return to patient.   
No

## 2021-02-15 LAB — INR PPP: 2.7

## 2021-02-18 ENCOUNTER — ANTICOAGULATION VISIT (OUTPATIENT)
Dept: PHARMACY | Facility: HOSPITAL | Age: 51
End: 2021-02-18

## 2021-02-18 DIAGNOSIS — I48.0 PAROXYSMAL ATRIAL FIBRILLATION (HCC): ICD-10-CM

## 2021-02-18 DIAGNOSIS — Z95.2 AORTIC VALVE REPLACED: ICD-10-CM

## 2021-02-18 NOTE — PROGRESS NOTES
Anticoagulation Clinic Progress Note    Anticoagulation Summary  As of 2021    INR goal:  2.5-3.5   TTR:  92.1 % (2.2 y)   INR used for dosin.70 (2/15/2021)   Warfarin maintenance plan:  7.5 mg every Sun, Thu; 5 mg all other days   Weekly warfarin total:  40 mg   No change documented:  Georgie Gaming   Plan last modified:  José Miguel Zapata [facility] (10/4/2019)   Next INR check:  3/1/2021   Priority:  Maintenance   Target end date:      Indications    Aortic valve replaced [Z95.2]  Paroxysmal atrial fibrillation (CMS/HCC) [I48.0]             Anticoagulation Episode Summary     INR check location:      Preferred lab:      [facility]    Comments:  *Coaguchek*      Anticoagulation Care Providers     Provider Role Specialty Phone number    Juan Antonio Buenrostro MD Referring Cardiology [phone]          Clinic Interview:  No pertinent clinical findings have been reported.    INR History:  Anticoagulation Monitoring 2021   INR 2.90 3.00 2.70   INR Date 2021 2021 2/15/2021   INR Goal 2.5-3.5 2.5-3.5 2.5-3.5   Trend Same Same Same   Last Week Total 40 mg 40 mg 40 mg   Next Week Total 40 mg 40 mg 40 mg   Sun 7.5 mg 7.5 mg 7.5 mg   Mon 5 mg 5 mg 5 mg   Tue 5 mg 5 mg 5 mg   Wed 5 mg 5 mg 5 mg   Thu 7.5 mg 7.5 mg 7.5 mg   Fri 5 mg 5 mg 5 mg   Sat 5 mg 5 mg 5 mg   Visit Report - - -   Some recent data might be hidden       Plan:  1. INR is therapeutic today- see above in Anticoagulation Summary.    Fernie CORY Porter to continue their warfarin regimen- see above in Anticoagulation Summary.  2. Follow up in 2 weeks  3. Pt has agreed to only be called if INR out of range. They have been instructed to call if any changes in medications, doses, concerns, etc. Patient expresses understanding and has no further questions at this time.    Georgie Gaming

## 2021-03-01 ENCOUNTER — ANTICOAGULATION VISIT (OUTPATIENT)
Dept: PHARMACY | Facility: HOSPITAL | Age: 51
End: 2021-03-01

## 2021-03-01 DIAGNOSIS — Z95.2 AORTIC VALVE REPLACED: ICD-10-CM

## 2021-03-01 DIAGNOSIS — I48.0 PAROXYSMAL ATRIAL FIBRILLATION (HCC): ICD-10-CM

## 2021-03-01 LAB — INR PPP: 2.5

## 2021-03-01 NOTE — PROGRESS NOTES
Anticoagulation Clinic Progress Note    Anticoagulation Summary  As of 3/1/2021    INR goal:  2.5-3.5   TTR:  92.2 % (2.3 y)   INR used for dosin.50 (2021)   Warfarin maintenance plan:  7.5 mg every Sun, Thu; 5 mg all other days   Weekly warfarin total:  40 mg   No change documented:  Rosa Newman   Plan last modified:  José Miguel Zapata [facility] (10/4/2019)   Next INR check:  3/15/2021   Priority:  Maintenance   Target end date:      Indications    Aortic valve replaced [Z95.2]  Paroxysmal atrial fibrillation (CMS/HCC) [I48.0]             Anticoagulation Episode Summary     INR check location:      Preferred lab:      [facility]    Comments:  *Coaguchek*      Anticoagulation Care Providers     Provider Role Specialty Phone number    Juan Antonio Buenrostro MD Referring Cardiology [phone]          Clinic Interview:  No pertinent clinical findings have been reported.    INR History:  Anticoagulation Monitoring 2021 2021 3/1/2021   INR 3.00 2.70 2.50   INR Date 2021 2/15/2021 2021   INR Goal 2.5-3.5 2.5-3.5 2.5-3.5   Trend Same Same Same   Last Week Total 40 mg 40 mg 40 mg   Next Week Total 40 mg 40 mg 40 mg   Sun 7.5 mg 7.5 mg 7.5 mg   Mon 5 mg 5 mg 5 mg   Tue 5 mg 5 mg 5 mg   Wed 5 mg 5 mg 5 mg   Thu 7.5 mg 7.5 mg 7.5 mg   Fri 5 mg 5 mg 5 mg   Sat 5 mg 5 mg 5 mg   Visit Report - - -   Some recent data might be hidden       Plan:  1. INR is therapeutic today- see above in Anticoagulation Summary.    Fernie CORY Porter to continue their warfarin regimen- see above in Anticoagulation Summary.  2. Follow up in 2 weeks  3. Pt has agreed to only be called if INR out of range. They have been instructed to call if any changes in medications, doses, concerns, etc. Patient expresses understanding and has no further questions at this time.    Rosa Newman

## 2021-03-09 RX ORDER — WARFARIN SODIUM 5 MG/1
TABLET ORAL
Qty: 105 TABLET | Refills: 0 | Status: SHIPPED | OUTPATIENT
Start: 2021-03-09 | End: 2021-03-11

## 2021-03-11 RX ORDER — WARFARIN SODIUM 5 MG/1
TABLET ORAL
Qty: 105 TABLET | Refills: 1 | OUTPATIENT
Start: 2021-03-11 | End: 2021-08-23 | Stop reason: SDUPTHER

## 2021-03-15 ENCOUNTER — OFFICE VISIT (OUTPATIENT)
Dept: INTERNAL MEDICINE | Facility: CLINIC | Age: 51
End: 2021-03-15

## 2021-03-15 VITALS
OXYGEN SATURATION: 99 % | RESPIRATION RATE: 18 BRPM | BODY MASS INDEX: 24.44 KG/M2 | WEIGHT: 165 LBS | TEMPERATURE: 97.3 F | SYSTOLIC BLOOD PRESSURE: 112 MMHG | HEART RATE: 67 BPM | DIASTOLIC BLOOD PRESSURE: 70 MMHG | HEIGHT: 69 IN

## 2021-03-15 DIAGNOSIS — Z00.00 ENCOUNTER FOR PREVENTIVE HEALTH EXAMINATION: Primary | ICD-10-CM

## 2021-03-15 DIAGNOSIS — Z95.2 AORTIC VALVE REPLACED: Chronic | ICD-10-CM

## 2021-03-15 DIAGNOSIS — I10 ESSENTIAL HYPERTENSION: ICD-10-CM

## 2021-03-15 DIAGNOSIS — Z11.59 NEED FOR HEPATITIS C SCREENING TEST: ICD-10-CM

## 2021-03-15 LAB
PH UR: 6 [PH] (ref 5–8)
PROT UR STRIP-MCNC: NEGATIVE MG/DL
RBC # UR STRIP: NEGATIVE /UL
SP GR UR: 1.02 (ref 1–1.03)

## 2021-03-15 PROCEDURE — 99396 PREV VISIT EST AGE 40-64: CPT | Performed by: INTERNAL MEDICINE

## 2021-03-15 PROCEDURE — 81003 URINALYSIS AUTO W/O SCOPE: CPT | Performed by: INTERNAL MEDICINE

## 2021-03-15 NOTE — PROGRESS NOTES
Subjective   Fernie Porter is a 50 y.o. male.     Chief Complaint   Patient presents with   • Annual Exam         In for annual preventative exam.  Sleep is good.  Gets 7 hours at night.  Exercises 3-4 days per week.  Energy is good.  Diet is well-balanced.    Hypertension  This is a chronic problem. The current episode started more than 1 year ago. The problem is unchanged. The problem is controlled. Associated symptoms include headaches. Pertinent negatives include no chest pain, orthopnea, palpitations, peripheral edema or shortness of breath. There are no associated agents to hypertension. Risk factors for coronary artery disease include male gender. Current antihypertension treatment includes alpha 1 blockers, diuretics and beta blockers. The current treatment provides significant improvement. There are no compliance problems.  There is no history of angina, kidney disease, CAD/MI, CVA or heart failure.   Atrial Fibrillation  Presents for follow-up visit. Symptoms are negative for chest pain, dizziness, palpitations, shortness of breath and weakness. The symptoms have been stable. Past medical history includes atrial fibrillation. There are no medication compliance problems.   Migraine   This is a chronic problem. The current episode started more than 1 year ago. The problem occurs constantly. The problem has been waxing and waning. The pain is located in the left unilateral region. The pain does not radiate. The pain quality is similar to prior headaches. The quality of the pain is described as throbbing. The pain is moderate. Pertinent negatives include no abdominal pain, back pain, coughing, dizziness, ear pain, fever, nausea, rhinorrhea, sore throat, vomiting or weakness. Nothing aggravates the symptoms. He has tried NSAIDs for the symptoms. The treatment provided significant relief.        The following portions of the patient's history were reviewed and updated as appropriate: allergies, current  medications, past social history and problem list.    Outpatient Medications Marked as Taking for the 3/15/21 encounter (Office Visit) with Juan Antonio Ford MD   Medication Sig Dispense Refill   • amLODIPine (NORVASC) 5 MG tablet TAKE 1 TABLET BY MOUTH EVERY DAY 90 tablet 3   • cloNIDine (CATAPRES) 0.2 MG tablet TAKE ONE TABLET 2 TIMES A  tablet 1   • fluticasone (FLONASE) 50 MCG/ACT nasal spray 2 SPRAYS INTO THE NOSTRIL(S) AS DIRECTED BY PROVIDER DAILY. 48 mL 2   • hydroCHLOROthiazide (MICROZIDE) 12.5 MG capsule TAKE 1 CAPSULE BY MOUTH EVERY DAY 90 capsule 1   • ibuprofen (ADVIL,MOTRIN) 200 MG tablet Take 200 mg by mouth Every 6 (Six) Hours As Needed for Mild Pain .     • imipramine (TOFRANIL) 50 MG tablet TAKE 2 TABLETS BY MOUTH AT BEDTIME 180 tablet 1   • propranolol LA (INDERAL LA) 120 MG 24 hr capsule TAKE 1 CAPSULE BY MOUTH EVERY DAY 90 capsule 1   • warfarin (COUMADIN) 5 MG tablet Take 7.5mg (1 and 1/2 Tablets) on Sunday and Thursday; Take 5mg (1 Tablet) all other days OR as directed by the [facility]. 105 tablet 1       Review of Systems   Constitutional: Negative for chills, diaphoresis, fatigue, fever and unexpected weight change.   HENT: Negative for congestion, ear pain, nosebleeds, rhinorrhea, sore throat and voice change.    Eyes: Negative for discharge, itching and visual disturbance.   Respiratory: Negative for cough, chest tightness, shortness of breath and wheezing.    Cardiovascular: Negative for chest pain, palpitations, orthopnea and leg swelling.   Gastrointestinal: Negative for abdominal pain, anal bleeding, constipation, diarrhea, nausea and vomiting.   Endocrine: Negative for cold intolerance, heat intolerance and polyuria.   Genitourinary: Negative for difficulty urinating, dysuria, frequency, hematuria and urgency.   Musculoskeletal: Negative for arthralgias (thumb ache bilateral), back pain and myalgias.   Skin: Negative for rash and wound.   Allergic/Immunologic:  Positive for environmental allergies.   Neurological: Positive for headaches. Negative for dizziness, syncope, weakness and light-headedness.   Hematological: Negative for adenopathy. Does not bruise/bleed easily.   Psychiatric/Behavioral: Negative for confusion, dysphoric mood and sleep disturbance. The patient is not nervous/anxious.        Objective   Vitals:    03/15/21 1441   BP: 112/70   Pulse: 67   Resp: 18   Temp: 97.3 °F (36.3 °C)   SpO2: 99%          03/15/21  1441   Weight: 74.8 kg (165 lb)    [unfilled]  Body mass index is 24.37 kg/m².      Physical Exam   Constitutional: He is oriented to person, place, and time. He appears well-developed. No distress.   HENT:   Head: Normocephalic and atraumatic.   Right Ear: External ear normal.   Left Ear: External ear normal.   Nose: Nose normal.   Eyes: Pupils are equal, round, and reactive to light. Conjunctivae are normal. No scleral icterus.   Neck: No JVD present. Carotid bruit is not present. No thyromegaly present.   Cardiovascular: Normal rate and regular rhythm. Exam reveals no gallop and no friction rub.   Murmur heard.   Crescendo decrescendo systolic murmur is present with a grade of 2/6.  Prosthetic heart sounds.  Grade 2/6 JOHNNIE loudest at the pulmonic area.   Pulmonary/Chest: Effort normal and breath sounds normal. No respiratory distress. He has no wheezes. He has no rales.   Abdominal: Soft. Bowel sounds are normal. He exhibits no distension and no mass. There is no abdominal tenderness. There is no rebound and no guarding. No hernia.   Musculoskeletal: Normal range of motion.   Lymphadenopathy:     He has no cervical adenopathy.   Neurological: He is alert and oriented to person, place, and time. He has normal reflexes. He displays normal reflexes.   Skin: Skin is warm and dry.   Psychiatric: His behavior is normal. Judgment and thought content normal.   Nursing note and vitals reviewed.        Problems Addressed this Visit        Cardiac and  Vasculature    Aortic valve replaced (Chronic)    Hypertension (Chronic)      Other Visit Diagnoses     Encounter for preventive health examination    -  Primary    Relevant Orders    POC Urinalysis Dipstick, Automated      Diagnoses       Codes Comments    Encounter for preventive health examination    -  Primary ICD-10-CM: Z00.00  ICD-9-CM: V70.0     Essential hypertension     ICD-10-CM: I10  ICD-9-CM: 401.9     Aortic valve replaced     ICD-10-CM: Z95.2  ICD-9-CM: V43.3         Assessment/Plan   In for annual preventative exam and recheck of hypertension, AF, aortic stenosis, and migraines.  Migraines persist.  They're doing pretty well to present time.  Gets 3 or 4 per month.  Has failed several drugs in the past including Topamax.  Blood pressure control is excellent.  Atrial fib is controlled.  Gets annual lab work today March 2021 including CBC, CMP, lipids, hep C antibody and urinalysis.  He works for [employer] so will not start colonoscopy until age 51.  Continue to monitor every 3 months.  He is 2.5 HPP today.    Prevention counseling was performed today. The counseling performed was routine health maintenance topics.    The above information was reviewed again today 03/15/21.  It continues to be accurate as reflected above and is unchanged.  History, physical and review of systems all reviewed and are unchanged.  Medications were reviewed today and continue the current dosing.    PPE today includes face mask and eye shield.          Dragon disclaimer:   Much of this encounter note is an electronic transcription/translation of spoken language to printed text. The electronic translation of spoken language may permit erroneous, or at times, nonsensical words or phrases to be inadvertently transcribed; Although I have reviewed the note for such errors, some may still exist.

## 2021-03-16 LAB
ALBUMIN SERPL-MCNC: 4.3 G/DL (ref 3.5–5.2)
ALBUMIN/GLOB SERPL: 2 G/DL
ALP SERPL-CCNC: 55 U/L (ref 39–117)
ALT SERPL-CCNC: 14 U/L (ref 1–41)
AST SERPL-CCNC: 23 U/L (ref 1–40)
BASOPHILS # BLD AUTO: 0.06 10*3/MM3 (ref 0–0.2)
BASOPHILS NFR BLD AUTO: 1.1 % (ref 0–1.5)
BILIRUB SERPL-MCNC: 0.5 MG/DL (ref 0–1.2)
BUN SERPL-MCNC: 8 MG/DL (ref 6–20)
BUN/CREAT SERPL: 9.1 (ref 7–25)
CALCIUM SERPL-MCNC: 9.5 MG/DL (ref 8.6–10.5)
CHLORIDE SERPL-SCNC: 99 MMOL/L (ref 98–107)
CHOLEST SERPL-MCNC: 173 MG/DL (ref 0–200)
CHOLEST/HDLC SERPL: 4.22 {RATIO}
CO2 SERPL-SCNC: 30.6 MMOL/L (ref 22–29)
CREAT SERPL-MCNC: 0.88 MG/DL (ref 0.76–1.27)
EOSINOPHIL # BLD AUTO: 0.17 10*3/MM3 (ref 0–0.4)
EOSINOPHIL NFR BLD AUTO: 3.1 % (ref 0.3–6.2)
ERYTHROCYTE [DISTWIDTH] IN BLOOD BY AUTOMATED COUNT: 12.5 % (ref 12.3–15.4)
GLOBULIN SER CALC-MCNC: 2.1 GM/DL
GLUCOSE SERPL-MCNC: 81 MG/DL (ref 65–99)
HCT VFR BLD AUTO: 47.9 % (ref 37.5–51)
HCV AB S/CO SERPL IA: <0.1 S/CO RATIO (ref 0–0.9)
HDLC SERPL-MCNC: 41 MG/DL (ref 40–60)
HGB BLD-MCNC: 16 G/DL (ref 13–17.7)
IMM GRANULOCYTES # BLD AUTO: 0.02 10*3/MM3 (ref 0–0.05)
IMM GRANULOCYTES NFR BLD AUTO: 0.4 % (ref 0–0.5)
LDLC SERPL CALC-MCNC: 105 MG/DL (ref 0–100)
LYMPHOCYTES # BLD AUTO: 0.88 10*3/MM3 (ref 0.7–3.1)
LYMPHOCYTES NFR BLD AUTO: 15.9 % (ref 19.6–45.3)
MCH RBC QN AUTO: 30.3 PG (ref 26.6–33)
MCHC RBC AUTO-ENTMCNC: 33.4 G/DL (ref 31.5–35.7)
MCV RBC AUTO: 90.7 FL (ref 79–97)
MONOCYTES # BLD AUTO: 0.49 10*3/MM3 (ref 0.1–0.9)
MONOCYTES NFR BLD AUTO: 8.9 % (ref 5–12)
NEUTROPHILS # BLD AUTO: 3.9 10*3/MM3 (ref 1.7–7)
NEUTROPHILS NFR BLD AUTO: 70.6 % (ref 42.7–76)
NRBC BLD AUTO-RTO: 0 /100 WBC (ref 0–0.2)
PLATELET # BLD AUTO: 224 10*3/MM3 (ref 140–450)
POTASSIUM SERPL-SCNC: 4.4 MMOL/L (ref 3.5–5.2)
PROT SERPL-MCNC: 6.4 G/DL (ref 6–8.5)
RBC # BLD AUTO: 5.28 10*6/MM3 (ref 4.14–5.8)
SODIUM SERPL-SCNC: 139 MMOL/L (ref 136–145)
TRIGL SERPL-MCNC: 152 MG/DL (ref 0–150)
VLDLC SERPL CALC-MCNC: 27 MG/DL (ref 5–40)
WBC # BLD AUTO: 5.52 10*3/MM3 (ref 3.4–10.8)

## 2021-03-19 ENCOUNTER — ANTICOAGULATION VISIT (OUTPATIENT)
Dept: PHARMACY | Facility: HOSPITAL | Age: 51
End: 2021-03-19

## 2021-03-19 DIAGNOSIS — I48.0 PAROXYSMAL ATRIAL FIBRILLATION (HCC): ICD-10-CM

## 2021-03-19 DIAGNOSIS — Z95.2 AORTIC VALVE REPLACED: ICD-10-CM

## 2021-03-19 LAB — INR PPP: 2.5

## 2021-03-19 NOTE — PROGRESS NOTES
Anticoagulation Clinic Progress Note    Anticoagulation Summary  As of 3/19/2021    INR goal:  2.5-3.5   TTR:  92.4 % (2.3 y)   INR used for dosin.50 (3/19/2021)   Warfarin maintenance plan:  7.5 mg every Sun, Thu; 5 mg all other days   Weekly warfarin total:  40 mg   No change documented:  Georgie Gaming   Plan last modified:  José Miguel Zapata [facility] (10/4/2019)   Next INR check:  2021   Priority:  Maintenance   Target end date:      Indications    Aortic valve replaced [Z95.2]  Paroxysmal atrial fibrillation (CMS/HCC) [I48.0]             Anticoagulation Episode Summary     INR check location:      Preferred lab:      Send INR reminders to:   JULIETTE [facility]  POOL    Comments:  *Coaguchek*      Anticoagulation Care Providers     Provider Role Specialty Phone number    Juan Antonio Buenrostro MD Referring Cardiology [phone]          Clinic Interview:  No pertinent clinical findings have been reported.    INR History:  Anticoagulation Monitoring 2021 3/1/2021 3/19/2021   INR 2.70 2.50 2.50   INR Date 2/15/2021 2021 3/19/2021   INR Goal 2.5-3.5 2.5-3.5 2.5-3.5   Trend Same Same Same   Last Week Total 40 mg 40 mg 40 mg   Next Week Total 40 mg 40 mg 40 mg   Sun 7.5 mg 7.5 mg 7.5 mg   Mon 5 mg 5 mg 5 mg   Tue 5 mg 5 mg 5 mg   Wed 5 mg 5 mg 5 mg   Thu 7.5 mg 7.5 mg 7.5 mg   Fri 5 mg 5 mg 5 mg   Sat 5 mg 5 mg 5 mg   Visit Report - - -   Some recent data might be hidden       Plan:  1. INR is therapeutic today- see above in Anticoagulation Summary.    Fernie CORY Porter to continue their warfarin regimen- see above in Anticoagulation Summary.  2. Follow up in 2 weeks  3. Pt has agreed to only be called if INR out of range. They have been instructed to call if any changes in medications, doses, concerns, etc. Patient expresses understanding and has no further questions at this time.    Georgie Gaming

## 2021-04-03 LAB — INR PPP: 2.2

## 2021-04-05 ENCOUNTER — ANTICOAGULATION VISIT (OUTPATIENT)
Dept: PHARMACY | Facility: HOSPITAL | Age: 51
End: 2021-04-05

## 2021-04-05 DIAGNOSIS — I48.0 PAROXYSMAL ATRIAL FIBRILLATION (HCC): ICD-10-CM

## 2021-04-05 DIAGNOSIS — Z95.2 AORTIC VALVE REPLACED: ICD-10-CM

## 2021-04-05 NOTE — PROGRESS NOTES
Anticoagulation Clinic Progress Note    Anticoagulation Summary  As of 2021    INR goal:  2.5-3.5   TTR:  90.8 % (2.4 y)   INR used for dosin.20 (4/3/2021)   Warfarin maintenance plan:  7.5 mg every Sun, Thu; 5 mg all other days   Weekly warfarin total:  40 mg   Plan last modified:  José Miguel Zapata RP (10/4/2019)   Next INR check:  2021   Priority:  Maintenance   Target end date:      Indications    Aortic valve replaced [Z95.2]  Paroxysmal atrial fibrillation (CMS/HCC) [I48.0]             Anticoagulation Episode Summary     INR check location:      Preferred lab:      Send INR reminders to:  [facility]  POOL    Comments:  *Coaguchek*      Anticoagulation Care Providers     Provider Role Specialty Phone number    Juan Antonio Buenrostro MD Referring Cardiology [phone]          Clinic Interview:  Patient Findings     Positives:  Change in diet/appetite    Comments:  Likely more greens in diet lately      INR History:  Anticoagulation Monitoring 3/1/2021 3/19/2021 2021   INR 2.50 2.50 2.20   INR Date 2021 3/19/2021 4/3/2021   INR Goal 2.5-3.5 2.5-3.5 2.5-3.5   Trend Same Same Same   Last Week Total 40 mg 40 mg 40 mg   Next Week Total 40 mg 40 mg 40 mg   Sun 7.5 mg 7.5 mg 7.5 mg   Mon 5 mg 5 mg 5 mg   Tue 5 mg 5 mg 5 mg   Wed 5 mg 5 mg 5 mg   Thu 7.5 mg 7.5 mg 7.5 mg   Fri 5 mg 5 mg 5 mg   Sat 5 mg 5 mg 5 mg   Visit Report - - -   Some recent data might be hidden       Plan:  1. INR is Subtherapeutic - see above in Anticoagulation Summary.   Will instruct Ferniesreekanth Porter to Continue their warfarin regimen- see above in Anticoagulation Summary.  2. Follow up in 1 week  3. Spoke with pt today. They have been instructed to call if any changes in medications, doses, concerns, etc. Patient expresses understanding and has no further questions at this time.    Brittany Lockhart [facility]

## 2021-04-13 ENCOUNTER — ANTICOAGULATION VISIT (OUTPATIENT)
Dept: PHARMACY | Facility: HOSPITAL | Age: 51
End: 2021-04-13

## 2021-04-13 DIAGNOSIS — I48.0 PAROXYSMAL ATRIAL FIBRILLATION (HCC): ICD-10-CM

## 2021-04-13 DIAGNOSIS — Z95.2 AORTIC VALVE REPLACED: Primary | ICD-10-CM

## 2021-04-13 LAB — INR PPP: 2.4

## 2021-04-13 NOTE — PROGRESS NOTES
INR actually subtherapeutic to goal 2.5-3.5.  Left vm with instructions to increase to 7.5mg SuTuTh, 5mg AOD.  Pt to call back with ?s or updates.  Lexy 2w.

## 2021-04-13 NOTE — PROGRESS NOTES
Anticoagulation Clinic Progress Note    Anticoagulation Summary  As of 2021    INR goal:  2.5-3.5   TTR:  89.7 % (2.4 y)   INR used for dosin.40 (2021)   Warfarin maintenance plan:  7.5 mg every Sun, Thu; 5 mg all other days   Weekly warfarin total:  40 mg   No change documented:  Cindy Mathew   Plan last modified:  José Miguel Zapata [facility] (10/4/2019)   Next INR check:  2021   Priority:  Maintenance   Target end date:      Indications    Aortic valve replaced [Z95.2]  Paroxysmal atrial fibrillation (CMS/HCC) [I48.0]             Anticoagulation Episode Summary     INR check location:      Preferred lab:      Send INR reminders to:  [facility]  POOL    Comments:  *Coaguchek*      Anticoagulation Care Providers     Provider Role Specialty Phone number    Juan Antonio Buenrostro MD Referring Cardiology [phone]          Clinic Interview:  No pertinent clinical findings have been reported.    INR History:  Anticoagulation Monitoring 3/19/2021 2021 2021   INR 2.50 2.20 2.40   INR Date 3/19/2021 4/3/2021 2021   INR Goal 2.5-3.5 2.5-3.5 2.5-3.5   Trend Same Same Same   Last Week Total 40 mg 40 mg 40 mg   Next Week Total 40 mg 40 mg 40 mg   Sun 7.5 mg 7.5 mg 7.5 mg   Mon 5 mg 5 mg 5 mg   Tue 5 mg 5 mg 5 mg   Wed 5 mg 5 mg 5 mg   Thu 7.5 mg 7.5 mg 7.5 mg   Fri 5 mg 5 mg 5 mg   Sat 5 mg 5 mg 5 mg   Visit Report - - -   Some recent data might be hidden       Plan:  1. INR is therapeutic today- see above in Anticoagulation Summary.    Fernie Porter to continue their warfarin regimen- see above in Anticoagulation Summary.  2. Follow up in 2 weeks  3. Pt has agreed to only be called if INR out of range. They have been instructed to call if any changes in medications, doses, concerns, etc. Patient expresses understanding and has no further questions at this time.    Cindy Mathew

## 2021-04-19 RX ORDER — HYDROCHLOROTHIAZIDE 12.5 MG/1
CAPSULE, GELATIN COATED ORAL
Qty: 90 CAPSULE | Refills: 1 | Status: SHIPPED | OUTPATIENT
Start: 2021-04-19 | End: 2021-10-15

## 2021-04-19 RX ORDER — CLONIDINE HYDROCHLORIDE 0.2 MG/1
TABLET ORAL
Qty: 180 TABLET | Refills: 1 | Status: SHIPPED | OUTPATIENT
Start: 2021-04-19 | End: 2021-10-15

## 2021-05-01 LAB — INR PPP: 2.5

## 2021-05-03 ENCOUNTER — ANTICOAGULATION VISIT (OUTPATIENT)
Dept: PHARMACY | Facility: HOSPITAL | Age: 51
End: 2021-05-03

## 2021-05-03 DIAGNOSIS — I48.0 PAROXYSMAL ATRIAL FIBRILLATION (HCC): ICD-10-CM

## 2021-05-03 DIAGNOSIS — Z95.2 AORTIC VALVE REPLACED: Primary | ICD-10-CM

## 2021-05-04 NOTE — PROGRESS NOTES
Anticoagulation Clinic Progress Note    Anticoagulation Summary  As of 5/3/2021    INR goal:  2.5-3.5   TTR:  87.9 % (2.4 y)   INR used for dosin.5 (2021)   Warfarin maintenance plan:  7.5 mg every Sun, Tue, Thu; 5 mg all other days   Weekly warfarin total:  42.5 mg   No change documented:  Rosa Newman   Plan last modified:  Katie Schwarz [facility] (2021)   Next INR check:  2021   Priority:  Maintenance   Target end date:      Indications    Aortic valve replaced [Z95.2]  Paroxysmal atrial fibrillation (CMS/HCC) [I48.0]             Anticoagulation Episode Summary     INR check location:      Preferred lab:      Send INR reminders to:  [facility]  POOL    Comments:  *Coaguchek*      Anticoagulation Care Providers     Provider Role Specialty Phone number    Juan Antonio Buenrostro MD Referring Cardiology [phone]          Clinic Interview:  No pertinent clinical findings have been reported.    INR History:  Anticoagulation Monitoring 2021 2021 5/3/2021   INR 2.20 2.40 2.5   INR Date 4/3/2021 2021 2021   INR Goal 2.5-3.5 2.5-3.5 2.5-3.5   Trend Same Up Same   Last Week Total 40 mg 40 mg 42.5 mg   Next Week Total 40 mg 42.5 mg 42.5 mg   Sun 7.5 mg 7.5 mg 7.5 mg   Mon 5 mg 5 mg 5 mg   Tue 5 mg 7.5 mg 7.5 mg   Wed 5 mg 5 mg 5 mg   Thu 7.5 mg 7.5 mg 7.5 mg   Fri 5 mg 5 mg 5 mg   Sat 5 mg 5 mg 5 mg   Visit Report - - -   Some recent data might be hidden       Plan:  1. INR was therapeutic - see above in Anticoagulation Summary. Spoke with Fernie Porter, instructed to continue their warfarin regimen- see above in Anticoagulation Summary.  2. Follow up in 2 weeks  3. Pt has agreed to only be called if INR out of range. They have been instructed to call if any changes in medications, doses, concerns, etc. Patient expresses understanding and has no further questions at this time.    Alexandrea Snyder [facility]

## 2021-05-10 RX ORDER — PROPRANOLOL HYDROCHLORIDE 120 MG/1
CAPSULE, EXTENDED RELEASE ORAL
Qty: 90 CAPSULE | Refills: 1 | Status: SHIPPED | OUTPATIENT
Start: 2021-05-10 | End: 2021-10-29

## 2021-05-22 LAB — INR PPP: 2.5

## 2021-05-24 ENCOUNTER — ANTICOAGULATION VISIT (OUTPATIENT)
Dept: PHARMACY | Facility: HOSPITAL | Age: 51
End: 2021-05-24

## 2021-05-24 DIAGNOSIS — Z95.2 AORTIC VALVE REPLACED: Primary | ICD-10-CM

## 2021-05-24 DIAGNOSIS — I48.0 PAROXYSMAL ATRIAL FIBRILLATION (HCC): ICD-10-CM

## 2021-05-24 NOTE — PROGRESS NOTES
Anticoagulation Clinic Progress Note    Anticoagulation Summary  As of 2021    INR goal:  2.5-3.5   TTR:  88.2 % (2.5 y)   INR used for dosin.50 (2021)   Warfarin maintenance plan:  7.5 mg every Sun, Tue, Thu; 5 mg all other days   Weekly warfarin total:  42.5 mg   No change documented:  Georgie Gaming   Plan last modified:  Katie Schwarz RP (2021)   Next INR check:  2021   Priority:  Maintenance   Target end date:      Indications    Aortic valve replaced [Z95.2]  Paroxysmal atrial fibrillation (CMS/HCC) [I48.0]             Anticoagulation Episode Summary     INR check location:      Preferred lab:      Send INR reminders to:  [facility]  POOL    Comments:  *Coaguchek*      Anticoagulation Care Providers     Provider Role Specialty Phone number    Juan Antonio Buenrostro MD Referring Cardiology [phone]          Clinic Interview:  No pertinent clinical findings have been reported.    INR History:  Anticoagulation Monitoring 2021 5/3/2021 2021   INR 2.40 2.5 2.50   INR Date 2021   INR Goal 2.5-3.5 2.5-3.5 2.5-3.5   Trend Up Same Same   Last Week Total 40 mg 42.5 mg 42.5 mg   Next Week Total 42.5 mg 42.5 mg 42.5 mg   Sun 7.5 mg 7.5 mg 7.5 mg   Mon 5 mg 5 mg 5 mg   Tue 7.5 mg 7.5 mg 7.5 mg   Wed 5 mg 5 mg 5 mg   Thu 7.5 mg 7.5 mg 7.5 mg   Fri 5 mg 5 mg 5 mg   Sat 5 mg 5 mg 5 mg   Visit Report - - -   Some recent data might be hidden       Plan:  1. INR is therapeutic today- see above in Anticoagulation Summary.    Fernie Porter to continue their warfarin regimen- see above in Anticoagulation Summary.  2. Follow up in 2 weeks  3. Pt has agreed to only be called if INR out of range. They have been instructed to call if any changes in medications, doses, concerns, etc. Patient expresses understanding and has no further questions at this time.    Georgie Gaming

## 2021-06-09 RX ORDER — IMIPRAMINE HCL 50 MG
TABLET ORAL
Qty: 180 TABLET | Refills: 1 | Status: SHIPPED | OUTPATIENT
Start: 2021-06-09 | End: 2021-11-24

## 2021-06-11 LAB — INR PPP: 3.1

## 2021-06-14 ENCOUNTER — ANTICOAGULATION VISIT (OUTPATIENT)
Dept: PHARMACY | Facility: HOSPITAL | Age: 51
End: 2021-06-14

## 2021-06-14 DIAGNOSIS — Z95.2 AORTIC VALVE REPLACED: Primary | ICD-10-CM

## 2021-06-14 DIAGNOSIS — I48.0 PAROXYSMAL ATRIAL FIBRILLATION (HCC): ICD-10-CM

## 2021-06-14 NOTE — PROGRESS NOTES
Anticoagulation Clinic Progress Note    Anticoagulation Summary  As of 6/14/2021    INR goal:  2.5-3.5   TTR:  88.4 % (2.5 y)   INR used for dosing:  3.10 (6/11/2021)   Warfarin maintenance plan:  7.5 mg every Sun, Tue, Thu; 5 mg all other days   Weekly warfarin total:  42.5 mg   Plan last modified:  Katie Schwarz RPH (4/13/2021)   Next INR check:  6/25/2021   Priority:  Maintenance   Target end date:      Indications    Aortic valve replaced [Z95.2]  Paroxysmal atrial fibrillation (CMS/HCC) [I48.0]             Anticoagulation Episode Summary     INR check location:      Preferred lab:      Send INR reminders to:   [facility]  PO[facility]    Comments:  *Coaguchek*      Anticoagulation Care Providers     Provider Role Specialty Phone number    Juan Antonio Buenrostro MD Referring Cardiology [phone]          Clinic Interview:  Patient Findings     Negatives:  Signs/symptoms of thrombosis, Signs/symptoms of bleeding,   Laboratory test error suspected, Change in health, Change in alcohol use,   Change in activity, Upcoming invasive procedure, Emergency department   visit, Upcoming dental procedure, Missed doses, Extra doses, Change in   medications, Change in diet/appetite, Hospital admission, Bruising, Other   complaints      Clinical Outcomes     Negatives:  Major bleeding event, Thromboembolic event,   Anticoagulation-related hospital admission, Anticoagulation-related ED   visit, Anticoagulation-related fatality        INR History:  Anticoagulation Monitoring 5/3/2021 5/24/2021 6/14/2021   INR 2.5 2.50 3.10   INR Date 5/1/2021 5/22/2021 6/11/2021   INR Goal 2.5-3.5 2.5-3.5 2.5-3.5   Trend Same Same Same   Last Week Total 42.5 mg 42.5 mg 42.5 mg   Next Week Total 42.5 mg 42.5 mg 42.5 mg   Sun 7.5 mg 7.5 mg 7.5 mg   Mon 5 mg 5 mg 5 mg   Tue 7.5 mg 7.5 mg 7.5 mg   Wed 5 mg 5 mg 5 mg   Thu 7.5 mg 7.5 mg 7.5 mg   Fri 5 mg 5 mg 5 mg   Sat 5 mg 5 mg 5 mg   Visit Report - - -   Some recent data might be hidden        Plan:  1. INR is Therapeutic today- see above in Anticoagulation Summary.   Will instruct Fernie Porter to Continue their warfarin regimen- see above in Anticoagulation Summary.  2. Follow up in 2 weeks.  3. They have been instructed to call if any changes in medications, doses, concerns, etc. Patient expresses understanding and has no further questions at this time.    Katie Schwarz, [facility]

## 2021-06-25 LAB — INR PPP: 2.6

## 2021-06-28 ENCOUNTER — ANTICOAGULATION VISIT (OUTPATIENT)
Dept: PHARMACY | Facility: HOSPITAL | Age: 51
End: 2021-06-28

## 2021-06-28 DIAGNOSIS — Z95.2 AORTIC VALVE REPLACED: Primary | ICD-10-CM

## 2021-06-28 DIAGNOSIS — I48.0 PAROXYSMAL ATRIAL FIBRILLATION (HCC): ICD-10-CM

## 2021-06-28 NOTE — PROGRESS NOTES
Anticoagulation Clinic Progress Note    Anticoagulation Summary  As of 2021    INR goal:  2.5-3.5   TTR:  88.6 % (2.6 y)   INR used for dosin.60 (2021)   Warfarin maintenance plan:  7.5 mg every Sun, Tue, Thu; 5 mg all other days   Weekly warfarin total:  42.5 mg   No change documented:  Georgie Gaming   Plan last modified:  Katie Schwarz RP (2021)   Next INR check:  2021   Priority:  Maintenance   Target end date:      Indications    Aortic valve replaced [Z95.2]  Paroxysmal atrial fibrillation (CMS/HCC) [I48.0]             Anticoagulation Episode Summary     INR check location:      Preferred lab:      Send INR reminders to:  [facility]  POOL    Comments:  *Coaguchek*      Anticoagulation Care Providers     Provider Role Specialty Phone number    Juan Antonio Buenrostro MD Referring Cardiology [phone]          Clinic Interview:  No pertinent clinical findings have been reported.    INR History:  Anticoagulation Monitoring 2021   INR 2.50 3.10 2.60   INR Date 2021   INR Goal 2.5-3.5 2.5-3.5 2.5-3.5   Trend Same Same Same   Last Week Total 42.5 mg 42.5 mg 42.5 mg   Next Week Total 42.5 mg 42.5 mg 42.5 mg   Sun 7.5 mg 7.5 mg 7.5 mg   Mon 5 mg 5 mg 5 mg   Tue 7.5 mg 7.5 mg 7.5 mg   Wed 5 mg 5 mg 5 mg   Thu 7.5 mg 7.5 mg 7.5 mg   Fri 5 mg 5 mg 5 mg   Sat 5 mg 5 mg 5 mg   Visit Report - - -   Some recent data might be hidden       Plan:  1. INR is therapeutic today- see above in Anticoagulation Summary.    Fernie Porter to continue their warfarin regimen- see above in Anticoagulation Summary.  2. Follow up in 2 weeks  3. Pt has agreed to only be called if INR out of range. They have been instructed to call if any changes in medications, doses, concerns, etc. Patient expresses understanding and has no further questions at this time.    Georgie Gaming

## 2021-07-12 LAB — INR PPP: 3

## 2021-07-13 ENCOUNTER — ANTICOAGULATION VISIT (OUTPATIENT)
Dept: PHARMACY | Facility: HOSPITAL | Age: 51
End: 2021-07-13

## 2021-07-13 DIAGNOSIS — Z95.2 AORTIC VALVE REPLACED: Primary | ICD-10-CM

## 2021-07-13 DIAGNOSIS — I48.0 PAROXYSMAL ATRIAL FIBRILLATION (HCC): ICD-10-CM

## 2021-07-13 NOTE — PROGRESS NOTES
Anticoagulation Clinic Progress Note    Anticoagulation Summary  As of 7/13/2021    INR goal:  2.5-3.5   TTR:  88.8 % (2.6 y)   INR used for dosing:  3.00 (7/12/2021)   Warfarin maintenance plan:  7.5 mg every Sun, Tue, Thu; 5 mg all other days   Weekly warfarin total:  42.5 mg   No change documented:  Cindy Mathew   Plan last modified:  Katie Schwarz [facility] (4/13/2021)   Next INR check:  7/26/2021   Priority:  Maintenance   Target end date:      Indications    Aortic valve replaced [Z95.2]  Paroxysmal atrial fibrillation (CMS/HCC) [I48.0]             Anticoagulation Episode Summary     INR check location:      Preferred lab:      Send INR reminders to:  [facility]  POOL    Comments:  *Coaguchek*      Anticoagulation Care Providers     Provider Role Specialty Phone number    Juan Antonio Buenrostro MD Referring Cardiology [phone]          Clinic Interview:  No pertinent clinical findings have been reported.    INR History:  Anticoagulation Monitoring 6/14/2021 6/28/2021 7/13/2021   INR 3.10 2.60 3.00   INR Date 6/11/2021 6/25/2021 7/12/2021   INR Goal 2.5-3.5 2.5-3.5 2.5-3.5   Trend Same Same Same   Last Week Total 42.5 mg 42.5 mg 42.5 mg   Next Week Total 42.5 mg 42.5 mg 42.5 mg   Sun 7.5 mg 7.5 mg 7.5 mg   Mon 5 mg 5 mg 5 mg   Tue 7.5 mg 7.5 mg 7.5 mg   Wed 5 mg 5 mg 5 mg   Thu 7.5 mg 7.5 mg 7.5 mg   Fri 5 mg 5 mg 5 mg   Sat 5 mg 5 mg 5 mg   Visit Report - - -   Some recent data might be hidden       Plan:  1. INR is therapeutic today- see above in Anticoagulation Summary.    Fernie Porter to continue their warfarin regimen- see above in Anticoagulation Summary.  2. Follow up in 2 weeks  3. Pt has agreed to only be called if INR out of range. They have been instructed to call if any changes in medications, doses, concerns, etc. Patient expresses understanding and has no further questions at this time.    Cindy Mathew

## 2021-07-16 RX ORDER — FLUTICASONE PROPIONATE 50 MCG
2 SPRAY, SUSPENSION (ML) NASAL DAILY
Qty: 48 ML | Refills: 2 | Status: SHIPPED | OUTPATIENT
Start: 2021-07-16

## 2021-08-02 ENCOUNTER — ANTICOAGULATION VISIT (OUTPATIENT)
Dept: PHARMACY | Facility: HOSPITAL | Age: 51
End: 2021-08-02

## 2021-08-02 DIAGNOSIS — Z95.2 AORTIC VALVE REPLACED: Primary | ICD-10-CM

## 2021-08-02 DIAGNOSIS — I48.0 PAROXYSMAL ATRIAL FIBRILLATION (HCC): ICD-10-CM

## 2021-08-02 LAB — INR PPP: 3.3

## 2021-08-02 NOTE — PROGRESS NOTES
Anticoagulation Clinic Progress Note    Anticoagulation Summary  As of 8/2/2021    INR goal:  2.5-3.5   TTR:  89.0 % (2.7 y)   INR used for dosing:  3.30 (7/30/2021)   Warfarin maintenance plan:  7.5 mg every Sun, Tue, Thu; 5 mg all other days   Weekly warfarin total:  42.5 mg   No change documented:  Rosa Newman   Plan last modified:  Katie Schwarz [facility] (4/13/2021)   Next INR check:  8/16/2021   Priority:  Maintenance   Target end date:      Indications    Aortic valve replaced [Z95.2]  Paroxysmal atrial fibrillation (CMS/HCC) [I48.0]             Anticoagulation Episode Summary     INR check location:      Preferred lab:      Send INR reminders to:  [facility]  POOL    Comments:  *Coaguchek*      Anticoagulation Care Providers     Provider Role Specialty Phone number    Juan Antonio Buenrostro MD Referring Cardiology [phone]          Clinic Interview:  No pertinent clinical findings have been reported.    INR History:  Anticoagulation Monitoring 6/28/2021 7/13/2021 8/2/2021   INR 2.60 3.00 3.30   INR Date 6/25/2021 7/12/2021 7/30/2021   INR Goal 2.5-3.5 2.5-3.5 2.5-3.5   Trend Same Same Same   Last Week Total 42.5 mg 42.5 mg 42.5 mg   Next Week Total 42.5 mg 42.5 mg 42.5 mg   Sun 7.5 mg 7.5 mg 7.5 mg   Mon 5 mg 5 mg 5 mg   Tue 7.5 mg 7.5 mg 7.5 mg   Wed 5 mg 5 mg 5 mg   Thu 7.5 mg 7.5 mg 7.5 mg   Fri 5 mg 5 mg 5 mg   Sat 5 mg 5 mg 5 mg   Visit Report - - -   Some recent data might be hidden       Plan:  1. INR is therapeutic today- see above in Anticoagulation Summary.    Fernie Porter to continue their warfarin regimen- see above in Anticoagulation Summary.  2. Follow up in 2 weeks  3. Pt has agreed to only be called if INR out of range. They have been instructed to call if any changes in medications, doses, concerns, etc. Patient expresses understanding and has no further questions at this time.    Rosa Newman

## 2021-08-16 ENCOUNTER — ANTICOAGULATION VISIT (OUTPATIENT)
Dept: PHARMACY | Facility: HOSPITAL | Age: 51
End: 2021-08-16

## 2021-08-16 DIAGNOSIS — Z95.2 AORTIC VALVE REPLACED: Primary | ICD-10-CM

## 2021-08-16 DIAGNOSIS — I48.0 PAROXYSMAL ATRIAL FIBRILLATION (HCC): ICD-10-CM

## 2021-08-16 LAB — INR PPP: 2.5

## 2021-08-16 NOTE — PROGRESS NOTES
Anticoagulation Clinic Progress Note    Anticoagulation Summary  As of 2021    INR goal:  2.5-3.5   TTR:  89.2 % (2.7 y)   INR used for dosin.50 (2021)   Warfarin maintenance plan:  7.5 mg every Sun, Tue, Thu; 5 mg all other days   Weekly warfarin total:  42.5 mg   No change documented:  Rosa Newman   Plan last modified:  Katie Schwarz [facility] (2021)   Next INR check:  2021   Priority:  Maintenance   Target end date:      Indications    Aortic valve replaced [Z95.2]  Paroxysmal atrial fibrillation (CMS/HCC) [I48.0]             Anticoagulation Episode Summary     INR check location:      Preferred lab:      Send INR reminders to:  [facility]  POOL    Comments:  *Coaguchek*      Anticoagulation Care Providers     Provider Role Specialty Phone number    Juan Antonio Buenrostro MD Referring Cardiology [phone]          Clinic Interview:  No pertinent clinical findings have been reported.    INR History:  Anticoagulation Monitoring 2021   INR 3.00 3.30 2.50   INR Date 2021   INR Goal 2.5-3.5 2.5-3.5 2.5-3.5   Trend Same Same Same   Last Week Total 42.5 mg 42.5 mg 42.5 mg   Next Week Total 42.5 mg 42.5 mg 42.5 mg   Sun 7.5 mg 7.5 mg 7.5 mg   Mon 5 mg 5 mg 5 mg   Tue 7.5 mg 7.5 mg 7.5 mg   Wed 5 mg 5 mg 5 mg   Thu 7.5 mg 7.5 mg 7.5 mg   Fri 5 mg 5 mg 5 mg   Sat 5 mg 5 mg 5 mg   Visit Report - - -   Some recent data might be hidden       Plan:  1. INR is therapeutic today- see above in Anticoagulation Summary.    Fernie Porter to continue their warfarin regimen- see above in Anticoagulation Summary.  2. Follow up in 2 weeks  3. Pt has agreed to only be called if INR out of range. They have been instructed to call if any changes in medications, doses, concerns, etc. Patient expresses understanding and has no further questions at this time.    Rosa Newman

## 2021-08-17 ENCOUNTER — OFFICE VISIT (OUTPATIENT)
Dept: INTERNAL MEDICINE | Facility: CLINIC | Age: 51
End: 2021-08-17

## 2021-08-17 VITALS
HEART RATE: 66 BPM | DIASTOLIC BLOOD PRESSURE: 50 MMHG | SYSTOLIC BLOOD PRESSURE: 110 MMHG | HEIGHT: 69 IN | BODY MASS INDEX: 24.73 KG/M2 | OXYGEN SATURATION: 99 % | WEIGHT: 167 LBS | TEMPERATURE: 96.4 F | RESPIRATION RATE: 16 BRPM

## 2021-08-17 DIAGNOSIS — G43.109 MIGRAINE WITH AURA AND WITHOUT STATUS MIGRAINOSUS, NOT INTRACTABLE: ICD-10-CM

## 2021-08-17 DIAGNOSIS — I48.0 PAROXYSMAL ATRIAL FIBRILLATION (HCC): Chronic | ICD-10-CM

## 2021-08-17 DIAGNOSIS — I10 ESSENTIAL HYPERTENSION: Primary | Chronic | ICD-10-CM

## 2021-08-17 PROCEDURE — 99214 OFFICE O/P EST MOD 30 MIN: CPT | Performed by: INTERNAL MEDICINE

## 2021-08-17 NOTE — PATIENT INSTRUCTIONS
Colorectal Cancer Screening    Colorectal cancer screening is a group of tests that are used to check for colorectal cancer before symptoms develop. Colorectal refers to the colon and rectum. The colon and rectum are located at the end of the digestive tract and carry bowel movements out of the body.  Who should have screening?  All adults starting at age 50 until age 75 should have screening. Your health care provider may recommend screening at age 45. You will have tests every 1-10 years, depending on your results and the type of screening test.  You may have screening tests starting at an earlier age, or more frequently than other people, if you have any of the following risk factors:  · A personal or family history of colorectal cancer or abnormal growths (polyps).  · Inflammatory bowel disease, such as ulcerative colitis or Crohn's disease.  · A history of having radiation treatment to the abdomen or pelvic area for cancer.  · Colorectal cancer symptoms, such as changes in bowel habits or blood in your stool.  · A type of colon cancer syndrome that is passed from parent to child (hereditary), such as:  ? Thomason syndrome.  ? Familial adenomatous polyposis.  ? Turcot syndrome.  ? Peutz-Jeghers syndrome.  Screening recommendations for adults who are 75-85 years old vary depending on health.  How is screening done?  There are several types of colorectal screening tests. You may have one or more of the following:  · Guaiac-based fecal occult blood testing. For this test, a stool (feces) sample is checked for hidden (occult) blood, which could be a sign of colorectal cancer.  · Fecal immunochemical test (FIT). For this test, a stool sample is checked for blood, which could be a sign of colorectal cancer.  · Stool DNA test. For this test, a stool sample is checked for blood and changes in DNA that could lead to colorectal cancer.  · Sigmoidoscopy. During this test, a thin, flexible tube with a camera on the end  (sigmoidoscope) is used to examine the rectum and the lower colon.  · Colonoscopy. During this test, a long, flexible tube with a camera on the end (colonoscope) is used to examine the entire colon and rectum. With a colonoscopy, it is possible to take a sample of tissue (biopsy) and remove small polyps during the test.  · Virtual colonoscopy. Instead of a colonoscope, this type of colonoscopy uses X-rays (CT scan) and computers to produce images of the colon and rectum.  What are the benefits of screening?  Screening reduces your risk for colorectal cancer and can help identify cancer at an early stage, when the cancer can be removed or treated more easily. It is common for polyps to form in the lining of the colon, especially as you age. These polyps may be cancerous or become cancerous over time. Screening can identify these polyps.  What are the risks of screening?  Each screening test may have different risks.  · Stool sample tests have fewer risks than other types of screening tests. However, you may need more tests to confirm results from a stool sample test.  · Screening tests that involve X-rays expose you to low levels of radiation, which may slightly increase your cancer risk. The benefit of detecting cancer outweighs the slight increase in risk.  · Screening tests such as sigmoidoscopy and colonoscopy may place you at risk for bleeding, intestinal damage, infection, or a reaction to medicines given during the exam.  Talk with your health care provider to understand your risk for colorectal cancer and to make a screening plan that is right for you.  Questions to ask your health care provider  · When should I start colorectal cancer screening?  · What is my risk for colorectal cancer?  · How often do I need screening?  · Which screening tests do I need?  · How do I get my test results?  · What do my results mean?  Where to find more information  Learn more about colorectal cancer screening from:  · The  American Cancer Society: www.cancer.org  · The National Cancer [place]: www.cancer.gov  Summary  · Colorectal cancer screening is a group of tests used to check for colorectal cancer before symptoms develop.  · Screening reduces your risk for colorectal cancer and can help identify cancer at an early stage, when the cancer can be removed or treated more easily.  · All adults starting at age 50 until age 75 should have screening. Your health care provider may recommend screening at age 45.  · You may have screening tests starting at an earlier age, or more frequently than other people, if you have certain risk factors.  · Talk with your health care provider to understand your risk for colorectal cancer and to make a screening plan that is right for you.  This information is not intended to replace advice given to you by your health care provider. Make sure you discuss any questions you have with your health care provider.  Document Revised: 04/08/2020 Document Reviewed: 09/19/2018  Elsevier Patient Education © 2021 [employer] Inc[employer]

## 2021-08-17 NOTE — PROGRESS NOTES
Subjective   Fernie Porter is a 50 y.o. male.     Chief Complaint   Patient presents with   • Migraine         Migraine   This is a chronic problem. The current episode started more than 1 year ago. The problem occurs constantly. The problem has been waxing and waning. The pain is located in the left unilateral region. The pain does not radiate. The pain quality is similar to prior headaches. The quality of the pain is described as throbbing. The pain is moderate. Pertinent negatives include no abdominal pain or coughing. Nothing aggravates the symptoms. He has tried NSAIDs for the symptoms. The treatment provided significant relief.   Hypertension  This is a chronic problem. The current episode started more than 1 year ago. The problem is unchanged. The problem is controlled. Associated symptoms include headaches. Pertinent negatives include no chest pain, orthopnea, palpitations, peripheral edema or shortness of breath. There are no associated agents to hypertension. Risk factors for coronary artery disease include male gender. Current antihypertension treatment includes alpha 1 blockers, diuretics and beta blockers. The current treatment provides significant improvement. There are no compliance problems.  There is no history of angina, kidney disease, CAD/MI, CVA or heart failure.   Atrial Fibrillation  Presents for follow-up visit. Symptoms are negative for chest pain, palpitations and shortness of breath. The symptoms have been stable. Past medical history includes atrial fibrillation. There are no medication compliance problems.   Headache   This is a chronic problem. The current episode started more than 1 year ago. The problem occurs constantly. Pertinent negatives include no abdominal pain or coughing.        The following portions of the patient's history were reviewed and updated as appropriate: allergies, current medications, past social history and problem list.    Outpatient Medications Marked as  Taking for the 8/17/21 encounter (Office Visit) with Juan Antonio Ford MD   Medication Sig Dispense Refill   • amLODIPine (NORVASC) 5 MG tablet TAKE 1 TABLET BY MOUTH EVERY DAY 90 tablet 3   • cloNIDine (CATAPRES) 0.2 MG tablet TAKE ONE TABLET 2 TIMES A  tablet 1   • fluticasone (FLONASE) 50 MCG/ACT nasal spray 2 SPRAYS INTO THE NOSTRIL(S) AS DIRECTED BY PROVIDER DAILY. 48 mL 2   • hydroCHLOROthiazide (MICROZIDE) 12.5 MG capsule TAKE 1 CAPSULE BY MOUTH EVERY DAY 90 capsule 1   • ibuprofen (ADVIL,MOTRIN) 200 MG tablet Take 200 mg by mouth Every 6 (Six) Hours As Needed for Mild Pain .     • imipramine (TOFRANIL) 50 MG tablet TAKE 2 TABLETS BY MOUTH AT BEDTIME 180 tablet 1   • propranolol LA (INDERAL LA) 120 MG 24 hr capsule TAKE 1 CAPSULE BY MOUTH EVERY DAY 90 capsule 1   • warfarin (COUMADIN) 5 MG tablet Take 7.5mg (1 and 1/2 Tablets) on Sunday and Thursday; Take 5mg (1 Tablet) all other days OR as directed by the [facility]. (Patient taking differently: Taking 5mg 4 times weekly 7.5 mg 3 times weekly.) 105 tablet 1       Review of Systems   Respiratory: Negative for cough, shortness of breath and wheezing.    Cardiovascular: Negative for chest pain, palpitations, orthopnea and leg swelling.   Gastrointestinal: Negative for abdominal pain, constipation and diarrhea.   Neurological: Positive for headaches.       Objective   Vitals:    08/17/21 1420   BP: 110/50   Pulse: 66   Resp: 16   Temp: 96.4 °F (35.8 °C)   SpO2: 99%          08/17/21  1420   Weight: 75.8 kg (167 lb)    [unfilled]  Body mass index is 24.65 kg/m².      Physical Exam   Constitutional: He appears well-developed.   Cardiovascular: Normal rate and regular rhythm. Exam reveals no gallop.   Murmur heard.   Crescendo decrescendo systolic murmur is present with a grade of 2/6.  Prosthetic heart sounds.  Grade 2/6 JOHNNIE loudest at the pulmonic area.   Pulmonary/Chest: Effort normal and breath sounds normal. No respiratory distress. He has  no wheezes. He has no rales.   Abdominal: Soft. Normal appearance and bowel sounds are normal. He exhibits no mass. There is no abdominal tenderness. There is no guarding.   Neurological: He is alert.         Problems Addressed this Visit        Cardiac and Vasculature    Paroxysmal atrial fibrillation (CMS/HCC) (Chronic)    Hypertension - Primary (Chronic)       Neuro    Migraine      Diagnoses       Codes Comments    Essential hypertension    -  Primary ICD-10-CM: I10  ICD-9-CM: 401.9     Paroxysmal atrial fibrillation (CMS/HCC)     ICD-10-CM: I48.0  ICD-9-CM: 427.31     Migraine with aura and without status migrainosus, not intractable     ICD-10-CM: G43.109  ICD-9-CM: 346.00         Assessment/Plan   In for recheck of hypertension, AF, aortic stenosis, and migraines.  Migraines persist.  They're doing pretty well to present time.  Gets 3 or 4 per month.  Has failed several drugs in the past including Topamax.  Blood pressure control is excellent.  Atrial fib is controlled.  Gets annual lab work March 2021 including CBC, CMP, urinalysis.  Continue to monitor every 3 months.  Certainly no severe migraine since he started Inderal LA.  He would like to do a little better job with his migraines.  Previously failed Depakote.  He thinks he failed Zonegran in the past.  He has been on triptan's in the past without great success.  Perhaps an SSRI.  Does not want to push his imipramine dose.  Perhaps Emgality.  He is due for colonoscopy.  Is going to have to bridge off of Coumadin on the Lovenox perioperatively.  He will discuss that with Dr. Buenrostro.  He will let me know when he is ready to schedule this.     PPE today included the face mask and eye shield.         Dragon disclaimer:   Much of this encounter note is an electronic transcription/translation of spoken language to printed text. The electronic translation of spoken language may permit erroneous, or at times, nonsensical words or phrases to be inadvertently  transcribed; Although I have reviewed the note for such errors, some may still exist.

## 2021-08-20 ENCOUNTER — TELEPHONE (OUTPATIENT)
Dept: INTERNAL MEDICINE | Facility: CLINIC | Age: 51
End: 2021-08-20

## 2021-08-20 DIAGNOSIS — G43.109 MIGRAINE WITH AURA AND WITHOUT STATUS MIGRAINOSUS, NOT INTRACTABLE: Primary | ICD-10-CM

## 2021-08-20 NOTE — TELEPHONE ENCOUNTER
See if we can find out which one would be covered by his insurance so I can get instructions for the dosing.  I would be happy with either 1 of these alternatives.

## 2021-08-20 NOTE — TELEPHONE ENCOUNTER
Caller: Fernie Porter    Relationship: Self    Best call back number:[phone] (H)    What medication are you requesting: AIMOVIG OR EMGALITY    What are your current symptoms: MIGRAINES    How long have you been experiencing symptoms: N/A    Have you had these symptoms before:    [x] Yes  [] No    Have you been treated for these symptoms before:   [x] Yes  [] No    If a prescription is needed, what is your preferred pharmacy and phone number: [facility]/PHARMACY #[id number] - [address] - [phone] [facility] [phone] FX     Additional notes: PATIENT IS REQUESTING THE PRESCRIPTION BE CALLED INTO PHARMACY AS PREVIOUSLY DISCUSSED WITH DR ARCHER AT LAST APPOINTMENT ON 08/17/2021, PATIENT STATED EITHER MEDICATION AS PREFERRED BY DR ARCHER, PLEASE ADVISE PATIENT WHEN SENT TO PHARMACY

## 2021-08-23 RX ORDER — WARFARIN SODIUM 5 MG/1
TABLET ORAL
Qty: 105 TABLET | Refills: 1 | OUTPATIENT
Start: 2021-08-23 | End: 2022-03-02 | Stop reason: SDUPTHER

## 2021-08-23 RX ORDER — FREMANEZUMAB-VFRM 225 MG/1.5ML
225 INJECTION SUBCUTANEOUS
Qty: 3 PEN | Refills: 3 | Status: SHIPPED | OUTPATIENT
Start: 2021-08-23 | End: 2022-08-04

## 2021-08-23 NOTE — TELEPHONE ENCOUNTER
Caller: Fernie Porter    Relationship: Self    Best call back number: [phone]*    What is the best time to reach you: ANYTIME    Who are you requesting to speak with (clinical staff, provider,  specific staff member): CLINICAL STAFF MEMBER    What was the call regarding: THE PATIENT CALLED BACK AND STATED THAT HIS INSURANCE WILL COVER THE EMGALITY AND AJOVY, BUT WILL NOT COVER AIMOVIG.    Do you require a callback: YES

## 2021-08-27 LAB — INR PPP: 2.9

## 2021-08-30 ENCOUNTER — ANTICOAGULATION VISIT (OUTPATIENT)
Dept: PHARMACY | Facility: HOSPITAL | Age: 51
End: 2021-08-30

## 2021-08-30 DIAGNOSIS — Z95.2 AORTIC VALVE REPLACED: Primary | ICD-10-CM

## 2021-08-30 DIAGNOSIS — I48.0 PAROXYSMAL ATRIAL FIBRILLATION (HCC): ICD-10-CM

## 2021-08-30 NOTE — PROGRESS NOTES
Anticoagulation Clinic Progress Note    Anticoagulation Summary  As of 2021    INR goal:  2.5-3.5   TTR:  89.3 % (2.8 y)   INR used for dosin.90 (2021)   Warfarin maintenance plan:  7.5 mg every Sun, Tue, Thu; 5 mg all other days   Weekly warfarin total:  42.5 mg   No change documented:  Cindy Mtahew   Plan last modified:  Katie Schwarz [facility] (2021)   Next INR check:  9/10/2021   Priority:  Maintenance   Target end date:      Indications    Aortic valve replaced [Z95.2]  Paroxysmal atrial fibrillation (CMS/HCC) [I48.0]             Anticoagulation Episode Summary     INR check location:      Preferred lab:      Send INR reminders to:  [facility]  POOL    Comments:  *Coaguchek*      Anticoagulation Care Providers     Provider Role Specialty Phone number    Juan Antonio Buenrostro MD Referring Cardiology [phone]          Clinic Interview:  No pertinent clinical findings have been reported.    INR History:  Anticoagulation Monitoring 2021   INR 3.30 2.50 2.90   INR Date 2021   INR Goal 2.5-3.5 2.5-3.5 2.5-3.5   Trend Same Same Same   Last Week Total 42.5 mg 42.5 mg 42.5 mg   Next Week Total 42.5 mg 42.5 mg 42.5 mg   Sun 7.5 mg 7.5 mg 7.5 mg   Mon 5 mg 5 mg 5 mg   Tue 7.5 mg 7.5 mg 7.5 mg   Wed 5 mg 5 mg 5 mg   Thu 7.5 mg 7.5 mg 7.5 mg   Fri 5 mg 5 mg 5 mg   Sat 5 mg 5 mg 5 mg   Visit Report - - -   Some recent data might be hidden       Plan:  1. INR is therapeutic today- see above in Anticoagulation Summary.    Fernie Porter to continue their warfarin regimen- see above in Anticoagulation Summary.  2. Follow up in 2 weeks  3. Pt has agreed to only be called if INR out of range. They have been instructed to call if any changes in medications, doses, concerns, etc. Patient expresses understanding and has no further questions at this time.    Cindy Mathew

## 2021-09-10 ENCOUNTER — ANTICOAGULATION VISIT (OUTPATIENT)
Dept: PHARMACY | Facility: HOSPITAL | Age: 51
End: 2021-09-10

## 2021-09-10 DIAGNOSIS — I48.0 PAROXYSMAL ATRIAL FIBRILLATION (HCC): ICD-10-CM

## 2021-09-10 DIAGNOSIS — Z95.2 AORTIC VALVE REPLACED: Primary | ICD-10-CM

## 2021-09-10 LAB — INR PPP: 3.3

## 2021-09-10 NOTE — PROGRESS NOTES
Anticoagulation Clinic Progress Note    Anticoagulation Summary  As of 9/10/2021    INR goal:  2.5-3.5   TTR:  89.5 % (2.8 y)   INR used for dosing:  3.30 (9/10/2021)   Warfarin maintenance plan:  7.5 mg every Sun, Tue, Thu; 5 mg all other days   Weekly warfarin total:  42.5 mg   No change documented:  Rosa Newman   Plan last modified:  Katie Schwarz [facility] (4/13/2021)   Next INR check:  9/24/2021   Priority:  Maintenance   Target end date:      Indications    Aortic valve replaced [Z95.2]  Paroxysmal atrial fibrillation (CMS/HCC) [I48.0]             Anticoagulation Episode Summary     INR check location:      Preferred lab:      Send INR reminders to:  [facility]  POOL    Comments:  *Coaguchek*      Anticoagulation Care Providers     Provider Role Specialty Phone number    Juan Antonio Buenrostro MD Referring Cardiology [phone]          Clinic Interview:  No pertinent clinical findings have been reported.    INR History:  Anticoagulation Monitoring 8/16/2021 8/30/2021 9/10/2021   INR 2.50 2.90 3.30   INR Date 8/16/2021 8/27/2021 9/10/2021   INR Goal 2.5-3.5 2.5-3.5 2.5-3.5   Trend Same Same Same   Last Week Total 42.5 mg 42.5 mg 42.5 mg   Next Week Total 42.5 mg 42.5 mg 42.5 mg   Sun 7.5 mg 7.5 mg 7.5 mg   Mon 5 mg 5 mg 5 mg   Tue 7.5 mg 7.5 mg 7.5 mg   Wed 5 mg 5 mg 5 mg   Thu 7.5 mg 7.5 mg 7.5 mg   Fri 5 mg 5 mg 5 mg   Sat 5 mg 5 mg 5 mg   Visit Report - - -   Some recent data might be hidden       Plan:  1. INR is therapeutic today- see above in Anticoagulation Summary.    Fernie Porter to continue their warfarin regimen- see above in Anticoagulation Summary.  2. Follow up in 2 weeks  3. Pt has agreed to only be called if INR out of range. They have been instructed to call if any changes in medications, doses, concerns, etc. Patient expresses understanding and has no further questions at this time.    Rosa Newman

## 2021-09-24 LAB — INR PPP: 3.1

## 2021-09-27 ENCOUNTER — TELEPHONE (OUTPATIENT)
Dept: CARDIOLOGY | Facility: CLINIC | Age: 51
End: 2021-09-27

## 2021-09-27 ENCOUNTER — ANTICOAGULATION VISIT (OUTPATIENT)
Dept: PHARMACY | Facility: HOSPITAL | Age: 51
End: 2021-09-27

## 2021-09-27 DIAGNOSIS — Z95.2 AORTIC VALVE REPLACED: Primary | ICD-10-CM

## 2021-09-27 DIAGNOSIS — I48.0 PAROXYSMAL ATRIAL FIBRILLATION (HCC): ICD-10-CM

## 2021-09-27 DIAGNOSIS — Z95.2 STATUS POST MECHANICAL AORTIC VALVE REPLACEMENT: ICD-10-CM

## 2021-09-27 NOTE — PROGRESS NOTES
Anticoagulation Clinic Progress Note    Anticoagulation Summary  As of 9/27/2021    INR goal:  2.5-3.5   TTR:  89.6 % (2.8 y)   INR used for dosing:  3.10 (9/24/2021)   Warfarin maintenance plan:  7.5 mg every Sun, Tue, Thu; 5 mg all other days   Weekly warfarin total:  42.5 mg   No change documented:  Cindy Mathew   Plan last modified:  Katie Schwarz [facility] (4/13/2021)   Next INR check:  10/8/2021   Priority:  Maintenance   Target end date:      Indications    Aortic valve replaced [Z95.2]  Paroxysmal atrial fibrillation (CMS/HCC) [I48.0]             Anticoagulation Episode Summary     INR check location:      Preferred lab:      Send INR reminders to:  [facility]  POOL    Comments:  *Coaguchek*      Anticoagulation Care Providers     Provider Role Specialty Phone number    Juan Antonio Buenrostro MD Referring Cardiology [phone]          Clinic Interview:  No pertinent clinical findings have been reported.    INR History:  Anticoagulation Monitoring 8/30/2021 9/10/2021 9/27/2021   INR 2.90 3.30 3.10   INR Date 8/27/2021 9/10/2021 9/24/2021   INR Goal 2.5-3.5 2.5-3.5 2.5-3.5   Trend Same Same Same   Last Week Total 42.5 mg 42.5 mg 42.5 mg   Next Week Total 42.5 mg 42.5 mg 42.5 mg   Sun 7.5 mg 7.5 mg 7.5 mg   Mon 5 mg 5 mg 5 mg   Tue 7.5 mg 7.5 mg 7.5 mg   Wed 5 mg 5 mg 5 mg   Thu 7.5 mg 7.5 mg 7.5 mg   Fri 5 mg 5 mg 5 mg   Sat 5 mg 5 mg 5 mg   Visit Report - - -   Some recent data might be hidden       Plan:  1. INR is therapeutic today- see above in Anticoagulation Summary.    Fernie Porter to continue their warfarin regimen- see above in Anticoagulation Summary.  2. Follow up in 2 weeks  3. Pt has agreed to only be called if INR out of range. They have been instructed to call if any changes in medications, doses, concerns, etc. Patient expresses understanding and has no further questions at this time.    Cindy Mathew

## 2021-09-27 NOTE — TELEPHONE ENCOUNTER
This pt used to see dr. Buenrostro along with having an echo done before seeing the doctor. I got him an appt w/ you, Do you want to see him first before scheduling an echo or go ahead put the orders in for echo before seeing you?      Thanks,  Jasmin :)

## 2021-10-07 ENCOUNTER — OFFICE VISIT (OUTPATIENT)
Dept: CARDIOLOGY | Facility: CLINIC | Age: 51
End: 2021-10-07

## 2021-10-07 VITALS
BODY MASS INDEX: 24.29 KG/M2 | HEART RATE: 59 BPM | HEIGHT: 69 IN | WEIGHT: 164 LBS | DIASTOLIC BLOOD PRESSURE: 60 MMHG | SYSTOLIC BLOOD PRESSURE: 120 MMHG

## 2021-10-07 DIAGNOSIS — Z95.2 STATUS POST MECHANICAL AORTIC VALVE REPLACEMENT: ICD-10-CM

## 2021-10-07 DIAGNOSIS — I10 ESSENTIAL HYPERTENSION: ICD-10-CM

## 2021-10-07 DIAGNOSIS — Q25.1 COARCTATION OF AORTA: ICD-10-CM

## 2021-10-07 DIAGNOSIS — Z95.2 AORTIC VALVE REPLACED: Primary | ICD-10-CM

## 2021-10-07 PROCEDURE — 99214 OFFICE O/P EST MOD 30 MIN: CPT | Performed by: NURSE PRACTITIONER

## 2021-10-07 PROCEDURE — 93000 ELECTROCARDIOGRAM COMPLETE: CPT | Performed by: NURSE PRACTITIONER

## 2021-10-07 NOTE — PROGRESS NOTES
Date of Office Visit: 10/07/21  Encounter Provider: ELLA Montemayor  Place of Service: [facility] CARDIOLOGY  Patient Name: Fernie Porter  :1970    Chief Complaint   Patient presents with   • Aortic valve replaced   • Follow-up   :     HPI: Fernie Porter is a 51 y.o. 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  He was followed by Dr. Buenrostro.  I will visit with him for the first time today and have reviewed his medical record.    He had repair of coarctation of the aorta approximately age 5 but later developed a pseudoaneurysm and he had a second open heart surgery approximately age 20.        He ultimately had aortic valve mechanical replacement with a valve conduit for bicuspid aortic valve with severe aortic valve stenosis and ascending aortic enlargement in .  He had cryo-maze procedure and left atrial appendage ligation.  He later was found to have abnormal fluid collection around the aortic graft which was felt to represent a seroma.  In  was found to have a small coarctation by CTA of the chest which was unchanged on repeat 2020.      He presents for annual reassessment.  He remains ride stationary bike but also does some walking on treadmill.  He does his 3 days a week up to an hour but usually closer to 30 minutes.  He has no exertional symptoms with that.  He has an occasional skipped beat sensation that is of no major concern to him.  He has no syncope, syncope, edema, chest pain or shortness of breath.    Allergies   Allergen Reactions   • Contrast Dye Itching   • Iodinated Diagnostic Agents Itching   • Tagamet [Cimetidine] Itching           Family and social history reviewed.     ROS  All other systems were reviewed and are negative          Objective:     Vitals:    10/07/21 1419   BP: 120/60   BP Location:  "Left arm   Patient Position: Sitting   Pulse: 59   Weight: 74.4 kg (164 lb)   Height: 175.3 cm (69\")     Body mass index is 24.22 kg/m².    PHYSICAL EXAM:  Constitutional:       General: Not in acute distress.     Appearance: Well-developed. Not diaphoretic.   HENT:      Head: Normocephalic.   Pulmonary:      Effort: Pulmonary effort is normal. No respiratory distress.      Breath sounds: Normal breath sounds. No wheezing. No rhonchi. No rales.   Cardiovascular:      Normal rate. Regular rhythm.      Murmurs: There is a systolic murmur.      Comments: Art S2  Pulses:     Radial: 2+ bilaterally.  Skin:     General: Skin is warm and dry. There is no cyanosis.      Findings: No rash.   Neurological:      Mental Status: Alert and oriented to person, place, and time.   Psychiatric:         Behavior: Behavior normal.         Thought Content: Thought content normal.         Judgment: Judgment normal.           ECG 12 Lead    Date/Time: 10/7/2021 6:24 PM  Performed by: Crissy Mejia APRN  Authorized by: Crissy Mejia APRN   Comparison: compared with previous ECG   Similar to previous ECG  Rhythm: sinus rhythm              Current Outpatient Medications   Medication Sig Dispense Refill   • amLODIPine (NORVASC) 5 MG tablet TAKE 1 TABLET BY MOUTH EVERY DAY 90 tablet 3   • cloNIDine (CATAPRES) 0.2 MG tablet TAKE ONE TABLET 2 TIMES A  tablet 1   • fluticasone (FLONASE) 50 MCG/ACT nasal spray 2 SPRAYS INTO THE NOSTRIL(S) AS DIRECTED BY PROVIDER DAILY. 48 mL 2   • Fremanezumab-vfrm (Ajovy) 225 MG/1.5ML solution auto-injector Inject 225 mg under the skin into the appropriate area as directed Every 30 (Thirty) Days. 3 pen 3   • hydroCHLOROthiazide (MICROZIDE) 12.5 MG capsule TAKE 1 CAPSULE BY MOUTH EVERY DAY 90 capsule 1   • ibuprofen (ADVIL,MOTRIN) 200 MG tablet Take 200 mg by mouth Every 6 (Six) Hours As Needed for Mild Pain .     • imipramine (TOFRANIL) 50 MG tablet TAKE 2 TABLETS BY MOUTH AT BEDTIME 180 tablet 1   • " propranolol LA (INDERAL LA) 120 MG 24 hr capsule TAKE 1 CAPSULE BY MOUTH EVERY DAY 90 capsule 1   • warfarin (COUMADIN) 5 MG tablet Take 7.5mg (1 and 1/2 Tablets) on Sunday, Tuesday, and Thursday; Take 5mg (1 Tablet) all other days OR as directed by the [facility]. 105 tablet 1     No current facility-administered medications for this visit.     Assessment:       Diagnosis Plan   1. Aortic valve replaced     2. Status post mechanical aortic valve replacement     3. Essential hypertension     4. Coarctation of aorta          No orders of the defined types were placed in this encounter.        Plan:      1. This is a 51-year-old gentleman with a history of bicuspid aortic valve with severe aortic stenosis, ascending aortic enlargement.  He is now status post aortic valve replacement with a valve conduit using a mechanical St. Vikash valve with reimplantation of the coronary arteries. Echocardiogram  October 2020 was stable and he is to have another echocardiogram in approximately 2 weeks.  2. 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  He is status post patch repair of that.   Follow-up CT scan in 2015 appears to be minimal evidence of coarctation.  CTA 11/2020 unchanged  3. Paroxysmal atrial fibrillation.  This was prior to his valve surgery, but he now has cryoMaze procedure and appendage ligation.  No recurrent episodes off medication continue the same.  4. Hypertension.  His blood pressure is at goal.  I encouraged that he follow this more closely at home  5. Abnormal fluid collection around the aortic graft.  Felt previously to represent a seroma.  This was determined benign per Dr. Casanova.  Dr. Buenrostro previously felt that this might be what we are seeing on his echo.  6.  Hyperlipidemia.  He does not meet criteria for statin therapy at this time.                  It has been a pleasure to participate in this patient's care.      Thank you,  ELLA Montemayor      **I used Dragon to  dictate this note:**

## 2021-10-08 LAB — INR PPP: 2.6

## 2021-10-11 ENCOUNTER — ANTICOAGULATION VISIT (OUTPATIENT)
Dept: PHARMACY | Facility: HOSPITAL | Age: 51
End: 2021-10-11

## 2021-10-11 DIAGNOSIS — Z95.2 AORTIC VALVE REPLACED: Primary | ICD-10-CM

## 2021-10-11 DIAGNOSIS — I48.0 PAROXYSMAL ATRIAL FIBRILLATION (HCC): ICD-10-CM

## 2021-10-11 NOTE — PROGRESS NOTES
Anticoagulation Clinic Progress Note    Anticoagulation Summary  As of 10/11/2021    INR goal:  2.5-3.5   TTR:  89.7 % (2.9 y)   INR used for dosin.60 (10/8/2021)   Warfarin maintenance plan:  7.5 mg every Sun, Tue, Thu; 5 mg all other days   Weekly warfarin total:  42.5 mg   No change documented:  Georgie Gaming   Plan last modified:  Katie Schwarz RP (2021)   Next INR check:  10/22/2021   Priority:  Maintenance   Target end date:      Indications    Aortic valve replaced [Z95.2]  Paroxysmal atrial fibrillation (HCC) [I48.0]             Anticoagulation Episode Summary     INR check location:      Preferred lab:      Send INR reminders to:  [facility]  POOL    Comments:  *Coaguchek*      Anticoagulation Care Providers     Provider Role Specialty Phone number    Juan Antonio Buenrostro MD Referring Cardiology [phone]          Clinic Interview:  No pertinent clinical findings have been reported.    INR History:  Anticoagulation Monitoring 9/10/2021 2021 10/11/2021   INR 3.30 3.10 2.60   INR Date 9/10/2021 2021 10/8/2021   INR Goal 2.5-3.5 2.5-3.5 2.5-3.5   Trend Same Same Same   Last Week Total 42.5 mg 42.5 mg 42.5 mg   Next Week Total 42.5 mg 42.5 mg 42.5 mg   Sun 7.5 mg 7.5 mg 7.5 mg   Mon 5 mg 5 mg 5 mg   Tue 7.5 mg 7.5 mg 7.5 mg   Wed 5 mg 5 mg 5 mg   Thu 7.5 mg 7.5 mg 7.5 mg   Fri 5 mg 5 mg 5 mg   Sat 5 mg 5 mg 5 mg   Visit Report - - -   Some recent data might be hidden       Plan:  1. INR is therapeutic today- see above in Anticoagulation Summary.    Fernie Porter to continue their warfarin regimen- see above in Anticoagulation Summary.  2. Follow up in 2 weeks  3. Pt has agreed to only be called if INR out of range. They have been instructed to call if any changes in medications, doses, concerns, etc. Patient expresses understanding and has no further questions at this time.    Georgie Gaming

## 2021-10-14 RX ORDER — AMLODIPINE BESYLATE 5 MG/1
TABLET ORAL
Qty: 90 TABLET | Refills: 3 | Status: SHIPPED | OUTPATIENT
Start: 2021-10-14 | End: 2022-10-14

## 2021-10-15 RX ORDER — HYDROCHLOROTHIAZIDE 12.5 MG/1
CAPSULE, GELATIN COATED ORAL
Qty: 90 CAPSULE | Refills: 1 | Status: SHIPPED | OUTPATIENT
Start: 2021-10-15 | End: 2022-03-03

## 2021-10-15 RX ORDER — CLONIDINE HYDROCHLORIDE 0.2 MG/1
TABLET ORAL
Qty: 180 TABLET | Refills: 1 | Status: SHIPPED | OUTPATIENT
Start: 2021-10-15 | End: 2022-03-03

## 2021-10-20 ENCOUNTER — HOSPITAL ENCOUNTER (OUTPATIENT)
Dept: CARDIOLOGY | Facility: HOSPITAL | Age: 51
Discharge: HOME | End: 2021-10-20
Admitting: NURSE PRACTITIONER

## 2021-10-20 DIAGNOSIS — Z95.2 STATUS POST MECHANICAL AORTIC VALVE REPLACEMENT: ICD-10-CM

## 2021-10-20 DIAGNOSIS — Z95.2 AORTIC VALVE REPLACED: ICD-10-CM

## 2021-10-20 PROCEDURE — 93306 TTE W/DOPPLER COMPLETE: CPT

## 2021-10-20 PROCEDURE — 93306 TTE W/DOPPLER COMPLETE: CPT | Performed by: INTERNAL MEDICINE

## 2021-10-21 LAB
ASCENDING AORTA: 2.8 CM
BH CV ECHO MEAS - AO ACC TIME: 0.08 SEC
BH CV ECHO MEAS - AO MAX PG (FULL): 10.4 MMHG
BH CV ECHO MEAS - AO MAX PG: 12.4 MMHG
BH CV ECHO MEAS - AO MEAN PG (FULL): 5.6 MMHG
BH CV ECHO MEAS - AO MEAN PG: 6.7 MMHG
BH CV ECHO MEAS - AO ROOT AREA (BSA CORRECTED): 1.9
BH CV ECHO MEAS - AO ROOT AREA: 10.1 CM^2
BH CV ECHO MEAS - AO ROOT DIAM: 3.6 CM
BH CV ECHO MEAS - AO V2 MAX: 176.2 CM/SEC
BH CV ECHO MEAS - AO V2 MEAN: 121.3 CM/SEC
BH CV ECHO MEAS - AO V2 VTI: 32.7 CM
BH CV ECHO MEAS - ASC AORTA: 2.8 CM
BH CV ECHO MEAS - AVA(I,A): 1.3 CM^2
BH CV ECHO MEAS - AVA(I,D): 1.3 CM^2
BH CV ECHO MEAS - AVA(V,A): 1.3 CM^2
BH CV ECHO MEAS - AVA(V,D): 1.3 CM^2
BH CV ECHO MEAS - BSA(HAYCOCK): 1.9 M^2
BH CV ECHO MEAS - BSA: 1.9 M^2
BH CV ECHO MEAS - BZI_BMI: 24.2 KILOGRAMS/M^2
BH CV ECHO MEAS - BZI_METRIC_HEIGHT: 175.3 CM
BH CV ECHO MEAS - BZI_METRIC_WEIGHT: 74.4 KG
BH CV ECHO MEAS - EDV(MOD-SP2): 69 ML
BH CV ECHO MEAS - EDV(MOD-SP4): 72 ML
BH CV ECHO MEAS - EDV(TEICH): 114 ML
BH CV ECHO MEAS - EF(CUBED): 79.7 %
BH CV ECHO MEAS - EF(MOD-BP): 56.7 %
BH CV ECHO MEAS - EF(MOD-SP2): 56.5 %
BH CV ECHO MEAS - EF(MOD-SP4): 55.6 %
BH CV ECHO MEAS - EF(TEICH): 71.9 %
BH CV ECHO MEAS - ESV(MOD-SP2): 30 ML
BH CV ECHO MEAS - ESV(MOD-SP4): 32 ML
BH CV ECHO MEAS - ESV(TEICH): 32 ML
BH CV ECHO MEAS - FS: 41.2 %
BH CV ECHO MEAS - IVS/LVPW: 1.1
BH CV ECHO MEAS - IVSD: 1.4 CM
BH CV ECHO MEAS - LAT PEAK E' VEL: 12.9 CM/SEC
BH CV ECHO MEAS - LV DIASTOLIC VOL/BSA (35-75): 37.9 ML/M^2
BH CV ECHO MEAS - LV MASS(C)D: 276.4 GRAMS
BH CV ECHO MEAS - LV MASS(C)DI: 145.6 GRAMS/M^2
BH CV ECHO MEAS - LV MAX PG: 2 MMHG
BH CV ECHO MEAS - LV MEAN PG: 1.1 MMHG
BH CV ECHO MEAS - LV SYSTOLIC VOL/BSA (12-30): 16.9 ML/M^2
BH CV ECHO MEAS - LV V1 MAX: 71.1 CM/SEC
BH CV ECHO MEAS - LV V1 MEAN: 47 CM/SEC
BH CV ECHO MEAS - LV V1 VTI: 13.5 CM
BH CV ECHO MEAS - LVIDD: 4.9 CM
BH CV ECHO MEAS - LVIDS: 2.9 CM
BH CV ECHO MEAS - LVLD AP2: 7.4 CM
BH CV ECHO MEAS - LVLD AP4: 7.2 CM
BH CV ECHO MEAS - LVLS AP2: 6.7 CM
BH CV ECHO MEAS - LVLS AP4: 6.7 CM
BH CV ECHO MEAS - LVOT AREA (M): 3.1 CM^2
BH CV ECHO MEAS - LVOT AREA: 3.1 CM^2
BH CV ECHO MEAS - LVOT DIAM: 2 CM
BH CV ECHO MEAS - LVPWD: 1.3 CM
BH CV ECHO MEAS - MED PEAK E' VEL: 8.8 CM/SEC
BH CV ECHO MEAS - MR MAX PG: 28 MMHG
BH CV ECHO MEAS - MR MAX VEL: 264.7 CM/SEC
BH CV ECHO MEAS - MV A DUR: 0.12 SEC
BH CV ECHO MEAS - MV A MAX VEL: 28.3 CM/SEC
BH CV ECHO MEAS - MV DEC SLOPE: 648.8 CM/SEC^2
BH CV ECHO MEAS - MV DEC TIME: 0.21 SEC
BH CV ECHO MEAS - MV E MAX VEL: 117.8 CM/SEC
BH CV ECHO MEAS - MV E/A: 4.2
BH CV ECHO MEAS - MV MAX PG: 6.9 MMHG
BH CV ECHO MEAS - MV MEAN PG: 2 MMHG
BH CV ECHO MEAS - MV P1/2T MAX VEL: 130.3 CM/SEC
BH CV ECHO MEAS - MV P1/2T: 58.8 MSEC
BH CV ECHO MEAS - MV V2 MAX: 130.9 CM/SEC
BH CV ECHO MEAS - MV V2 MEAN: 60.7 CM/SEC
BH CV ECHO MEAS - MV V2 VTI: 29.7 CM
BH CV ECHO MEAS - MVA P1/2T LCG: 1.7 CM^2
BH CV ECHO MEAS - MVA(P1/2T): 3.7 CM^2
BH CV ECHO MEAS - MVA(VTI): 1.4 CM^2
BH CV ECHO MEAS - PA ACC TIME: 0.12 SEC
BH CV ECHO MEAS - PA MAX PG (FULL): 1.6 MMHG
BH CV ECHO MEAS - PA MAX PG: 2.3 MMHG
BH CV ECHO MEAS - PA PR(ACCEL): 26.7 MMHG
BH CV ECHO MEAS - PA V2 MAX: 75.6 CM/SEC
BH CV ECHO MEAS - PULM A REVS DUR: 0.07 SEC
BH CV ECHO MEAS - PULM A REVS VEL: 18 CM/SEC
BH CV ECHO MEAS - PULM DIAS VEL: 62.6 CM/SEC
BH CV ECHO MEAS - PULM S/D: 0.34
BH CV ECHO MEAS - PULM SYS VEL: 21 CM/SEC
BH CV ECHO MEAS - PVA(V,A): 2 CM^2
BH CV ECHO MEAS - PVA(V,D): 2 CM^2
BH CV ECHO MEAS - QP/QS: 0.68
BH CV ECHO MEAS - RAP SYSTOLE: 3 MMHG
BH CV ECHO MEAS - RV MAX PG: 0.66 MMHG
BH CV ECHO MEAS - RV MEAN PG: 0.31 MMHG
BH CV ECHO MEAS - RV V1 MAX: 40.7 CM/SEC
BH CV ECHO MEAS - RV V1 MEAN: 25.9 CM/SEC
BH CV ECHO MEAS - RV V1 VTI: 7.9 CM
BH CV ECHO MEAS - RVOT AREA: 3.7 CM^2
BH CV ECHO MEAS - RVOT DIAM: 2.2 CM
BH CV ECHO MEAS - RVSP: 29 MMHG
BH CV ECHO MEAS - SI(AO): 173.2 ML/M^2
BH CV ECHO MEAS - SI(CUBED): 50.1 ML/M^2
BH CV ECHO MEAS - SI(LVOT): 22.4 ML/M^2
BH CV ECHO MEAS - SI(MOD-SP2): 20.5 ML/M^2
BH CV ECHO MEAS - SI(MOD-SP4): 21.1 ML/M^2
BH CV ECHO MEAS - SI(TEICH): 43.2 ML/M^2
BH CV ECHO MEAS - SV(AO): 328.7 ML
BH CV ECHO MEAS - SV(CUBED): 95.1 ML
BH CV ECHO MEAS - SV(LVOT): 42.6 ML
BH CV ECHO MEAS - SV(MOD-SP2): 39 ML
BH CV ECHO MEAS - SV(MOD-SP4): 40 ML
BH CV ECHO MEAS - SV(RVOT): 28.9 ML
BH CV ECHO MEAS - SV(TEICH): 82 ML
BH CV ECHO MEAS - TAPSE (>1.6): 1.9 CM
BH CV ECHO MEAS - TR MAX VEL: 254.5 CM/SEC
BH CV ECHO MEASUREMENTS AVERAGE E/E' RATIO: 10.86
BH CV XLRA - RV BASE: 3.4 CM
BH CV XLRA - RV LENGTH: 6.8 CM
BH CV XLRA - RV MID: 2.2 CM
BH CV XLRA - TDI S': 9.7 CM/SEC
LEFT ATRIUM VOLUME INDEX: 16 ML/M2
MAXIMAL PREDICTED HEART RATE: 170 BPM
SINUS: 3.4 CM
STJ: 2.4 CM
STRESS TARGET HR: 145 BPM

## 2021-10-22 LAB — INR PPP: 2.6

## 2021-10-25 ENCOUNTER — ANTICOAGULATION VISIT (OUTPATIENT)
Dept: PHARMACY | Facility: HOSPITAL | Age: 51
End: 2021-10-25

## 2021-10-25 DIAGNOSIS — I48.0 PAROXYSMAL ATRIAL FIBRILLATION (HCC): ICD-10-CM

## 2021-10-25 DIAGNOSIS — Z95.2 AORTIC VALVE REPLACED: Primary | ICD-10-CM

## 2021-10-25 NOTE — PROGRESS NOTES
Anticoagulation Clinic Progress Note    Anticoagulation Summary  As of 10/25/2021    INR goal:  2.5-3.5   TTR:  89.9 % (2.9 y)   INR used for dosin.60 (10/22/2021)   Warfarin maintenance plan:  7.5 mg every Sun, Tue, Thu; 5 mg all other days   Weekly warfarin total:  42.5 mg   No change documented:  Georgie Gaming   Plan last modified:  Katie Schwarz RP (2021)   Next INR check:  2021   Priority:  Maintenance   Target end date:      Indications    Aortic valve replaced [Z95.2]  Paroxysmal atrial fibrillation (HCC) [I48.0]             Anticoagulation Episode Summary     INR check location:      Preferred lab:      Send INR reminders to:  [facility]  POOL    Comments:  *Coaguchek*      Anticoagulation Care Providers     Provider Role Specialty Phone number    Juan Antonio Buenrostro MD Referring Cardiology [phone]          Clinic Interview:  No pertinent clinical findings have been reported.    INR History:  Anticoagulation Monitoring 2021 10/11/2021 10/25/2021   INR 3.10 2.60 2.60   INR Date 2021 10/8/2021 10/22/2021   INR Goal 2.5-3.5 2.5-3.5 2.5-3.5   Trend Same Same Same   Last Week Total 42.5 mg 42.5 mg 42.5 mg   Next Week Total 42.5 mg 42.5 mg 42.5 mg   Sun 7.5 mg 7.5 mg 7.5 mg   Mon 5 mg 5 mg 5 mg   Tue 7.5 mg 7.5 mg 7.5 mg   Wed 5 mg 5 mg 5 mg   Thu 7.5 mg 7.5 mg 7.5 mg   Fri 5 mg 5 mg 5 mg   Sat 5 mg 5 mg 5 mg   Visit Report - - -   Some recent data might be hidden       Plan:  1. INR is therapeutic today- see above in Anticoagulation Summary.    Fernie Porter to continue their warfarin regimen- see above in Anticoagulation Summary.  2. Follow up in 2 weeks  3. Pt has agreed to only be called if INR out of range. They have been instructed to call if any changes in medications, doses, concerns, etc. Patient expresses understanding and has no further questions at this time.    Georgie Gaming

## 2021-10-29 RX ORDER — PROPRANOLOL HYDROCHLORIDE 120 MG/1
CAPSULE, EXTENDED RELEASE ORAL
Qty: 90 CAPSULE | Refills: 1 | Status: SHIPPED | OUTPATIENT
Start: 2021-10-29 | End: 2022-03-03

## 2021-11-06 LAB — INR PPP: 3.1

## 2021-11-09 ENCOUNTER — ANTICOAGULATION VISIT (OUTPATIENT)
Dept: PHARMACY | Facility: HOSPITAL | Age: 51
End: 2021-11-09

## 2021-11-09 DIAGNOSIS — Z95.2 AORTIC VALVE REPLACED: Primary | ICD-10-CM

## 2021-11-09 DIAGNOSIS — I48.0 PAROXYSMAL ATRIAL FIBRILLATION (HCC): ICD-10-CM

## 2021-11-09 NOTE — PROGRESS NOTES
Anticoagulation Clinic Progress Note    Anticoagulation Summary  As of 11/9/2021    INR goal:  2.5-3.5   TTR:  90.0 % (2.9 y)   INR used for dosing:  3.10 (11/6/2021)   Warfarin maintenance plan:  7.5 mg every Sun, Tue, Thu; 5 mg all other days   Weekly warfarin total:  42.5 mg   No change documented:  Georgie Gaming   Plan last modified:  Katie Schwarz RP (4/13/2021)   Next INR check:  11/20/2021   Priority:  Maintenance   Target end date:      Indications    Aortic valve replaced [Z95.2]  Paroxysmal atrial fibrillation (HCC) [I48.0]             Anticoagulation Episode Summary     INR check location:      Preferred lab:      Send INR reminders to:  [facility]  POOL    Comments:  *Coaguchek*      Anticoagulation Care Providers     Provider Role Specialty Phone number    Juan Antonio Buenrostro MD Referring Cardiology [phone]          Clinic Interview:  No pertinent clinical findings have been reported.    INR History:  Anticoagulation Monitoring 10/11/2021 10/25/2021 11/9/2021   INR 2.60 2.60 3.10   INR Date 10/8/2021 10/22/2021 11/6/2021   INR Goal 2.5-3.5 2.5-3.5 2.5-3.5   Trend Same Same Same   Last Week Total 42.5 mg 42.5 mg 42.5 mg   Next Week Total 42.5 mg 42.5 mg 42.5 mg   Sun 7.5 mg 7.5 mg 7.5 mg   Mon 5 mg 5 mg 5 mg   Tue 7.5 mg 7.5 mg 7.5 mg   Wed 5 mg 5 mg 5 mg   Thu 7.5 mg 7.5 mg 7.5 mg   Fri 5 mg 5 mg 5 mg   Sat 5 mg 5 mg 5 mg   Visit Report - - -   Some recent data might be hidden       Plan:  1. INR is therapeutic today- see above in Anticoagulation Summary.    Fernie Porter to continue their warfarin regimen- see above in Anticoagulation Summary.  2. Follow up in 2 weeks  3. Pt has agreed to only be called if INR out of range. They have been instructed to call if any changes in medications, doses, concerns, etc. Patient expresses understanding and has no further questions at this time.    Georgie Gaming

## 2021-11-19 LAB — INR PPP: 3

## 2021-11-23 ENCOUNTER — ANTICOAGULATION VISIT (OUTPATIENT)
Dept: PHARMACY | Facility: HOSPITAL | Age: 51
End: 2021-11-23

## 2021-11-23 DIAGNOSIS — I48.0 PAROXYSMAL ATRIAL FIBRILLATION (HCC): ICD-10-CM

## 2021-11-23 DIAGNOSIS — Z95.2 AORTIC VALVE REPLACED: Primary | ICD-10-CM

## 2021-11-23 NOTE — PROGRESS NOTES
Anticoagulation Clinic Progress Note    Anticoagulation Summary  As of 11/23/2021    INR goal:  2.5-3.5   TTR:  90.1 % (3 y)   INR used for dosing:  3.00 (11/19/2021)   Warfarin maintenance plan:  7.5 mg every Sun, Tue, Thu; 5 mg all other days   Weekly warfarin total:  42.5 mg   No change documented:  Georgie Gaming   Plan last modified:  Katie Schwarz [facility] (4/13/2021)   Next INR check:  12/3/2021   Priority:  Maintenance   Target end date:      Indications    Aortic valve replaced [Z95.2]  Paroxysmal atrial fibrillation (HCC) [I48.0]             Anticoagulation Episode Summary     INR check location:      Preferred lab:      [facility]  [facility]    Comments:  *Coaguchek*      Anticoagulation Care Providers     Provider Role Specialty Phone number    Juan Antonio Buenrostro MD Referring Cardiology [phone]          Clinic Interview:  No pertinent clinical findings have been reported.    INR History:  Anticoagulation Monitoring 10/25/2021 11/9/2021 11/23/2021   INR 2.60 3.10 3.00   INR Date 10/22/2021 11/6/2021 11/19/2021   INR Goal 2.5-3.5 2.5-3.5 2.5-3.5   Trend Same Same Same   Last Week Total 42.5 mg 42.5 mg 42.5 mg   Next Week Total 42.5 mg 42.5 mg 42.5 mg   Sun 7.5 mg 7.5 mg 7.5 mg   Mon 5 mg 5 mg 5 mg   Tue 7.5 mg 7.5 mg 7.5 mg   Wed 5 mg 5 mg 5 mg   Thu 7.5 mg 7.5 mg 7.5 mg   Fri 5 mg 5 mg 5 mg   Sat 5 mg 5 mg 5 mg   Visit Report - - -   Some recent data might be hidden       Plan:  1. INR is therapeutic today- see above in Anticoagulation Summary.    Fernie Porter to continue their warfarin regimen- see above in Anticoagulation Summary.  2. Follow up in 2 weeks  3. Pt has agreed to only be called if INR out of range. They have been instructed to call if any changes in medications, doses, concerns, etc. Patient expresses understanding and has no further questions at this time.    Georgie Gaming

## 2021-11-24 RX ORDER — IMIPRAMINE HCL 50 MG
TABLET ORAL
Qty: 180 TABLET | Refills: 1 | Status: SHIPPED | OUTPATIENT
Start: 2021-11-24 | End: 2022-05-02

## 2021-11-30 ENCOUNTER — OFFICE VISIT (OUTPATIENT)
Dept: INTERNAL MEDICINE | Facility: CLINIC | Age: 51
End: 2021-11-30

## 2021-11-30 VITALS
HEART RATE: 64 BPM | WEIGHT: 164 LBS | BODY MASS INDEX: 24.29 KG/M2 | DIASTOLIC BLOOD PRESSURE: 62 MMHG | TEMPERATURE: 97.5 F | HEIGHT: 69 IN | SYSTOLIC BLOOD PRESSURE: 120 MMHG | RESPIRATION RATE: 16 BRPM | OXYGEN SATURATION: 100 %

## 2021-11-30 DIAGNOSIS — Z12.11 ENCOUNTER FOR SCREENING COLONOSCOPY: ICD-10-CM

## 2021-11-30 DIAGNOSIS — I10 PRIMARY HYPERTENSION: Chronic | ICD-10-CM

## 2021-11-30 DIAGNOSIS — G43.109 MIGRAINE WITH AURA AND WITHOUT STATUS MIGRAINOSUS, NOT INTRACTABLE: ICD-10-CM

## 2021-11-30 DIAGNOSIS — I48.0 PAROXYSMAL ATRIAL FIBRILLATION (HCC): Chronic | ICD-10-CM

## 2021-11-30 DIAGNOSIS — Z95.2 AORTIC VALVE REPLACED: Chronic | ICD-10-CM

## 2021-11-30 DIAGNOSIS — Z23 NEED FOR VACCINATION: Primary | ICD-10-CM

## 2021-11-30 PROCEDURE — 90686 IIV4 VACC NO PRSV 0.5 ML IM: CPT | Performed by: INTERNAL MEDICINE

## 2021-11-30 PROCEDURE — 90471 IMMUNIZATION ADMIN: CPT | Performed by: INTERNAL MEDICINE

## 2021-11-30 PROCEDURE — 99214 OFFICE O/P EST MOD 30 MIN: CPT | Performed by: INTERNAL MEDICINE

## 2021-11-30 NOTE — PROGRESS NOTES
Subjective   Fernie Porter is a 51 y.o. male.     Chief Complaint   Patient presents with   • Hypertension   • Atrial Fibrillation         Hypertension  This is a chronic problem. The current episode started more than 1 year ago. The problem is unchanged. The problem is controlled. Associated symptoms include headaches. Pertinent negatives include no chest pain, orthopnea, palpitations, peripheral edema or shortness of breath. There are no associated agents to hypertension.   Atrial Fibrillation  Presents for follow-up visit. Symptoms are negative for chest pain, palpitations and shortness of breath. The symptoms have been stable. Past medical history includes atrial fibrillation.   Migraine   This is a chronic problem. The current episode started more than 1 year ago. The problem occurs constantly. The problem has been waxing and waning. The pain is located in the left unilateral region. The pain does not radiate. The pain quality is similar to prior headaches.   Headache   This is a chronic problem. The current episode started more than 1 year ago. The problem occurs constantly.        The following portions of the patient's history were reviewed and updated as appropriate: allergies, current medications, past social history and problem list.    Outpatient Medications Marked as Taking for the 11/30/21 encounter (Office Visit) with Juan Antonio Ford MD   Medication Sig Dispense Refill   • amLODIPine (NORVASC) 5 MG tablet TAKE 1 TABLET BY MOUTH EVERY DAY 90 tablet 3   • cloNIDine (CATAPRES) 0.2 MG tablet TAKE ONE TABLET 2 TIMES A  tablet 1   • fluticasone (FLONASE) 50 MCG/ACT nasal spray 2 SPRAYS INTO THE NOSTRIL(S) AS DIRECTED BY PROVIDER DAILY. 48 mL 2   • Fremanezumab-vfrm (Ajovy) 225 MG/1.5ML solution auto-injector Inject 225 mg under the skin into the appropriate area as directed Every 30 (Thirty) Days. 3 pen 3   • hydroCHLOROthiazide (MICROZIDE) 12.5 MG capsule TAKE 1 CAPSULE BY MOUTH EVERY DAY 90  capsule 1   • ibuprofen (ADVIL,MOTRIN) 200 MG tablet Take 200 mg by mouth Every 6 (Six) Hours As Needed for Mild Pain .     • imipramine (TOFRANIL) 50 MG tablet TAKE 2 TABLETS BY MOUTH AT BEDTIME 180 tablet 1   • propranolol LA (INDERAL LA) 120 MG 24 hr capsule TAKE 1 CAPSULE BY MOUTH EVERY DAY 90 capsule 1   • warfarin (COUMADIN) 5 MG tablet Take 7.5mg (1 and 1/2 Tablets) on Sunday, Tuesday, and Thursday; Take 5mg (1 Tablet) all other days OR as directed by the [facility]. 105 tablet 1       Review of Systems   Respiratory: Negative for shortness of breath and wheezing.    Cardiovascular: Negative for chest pain, palpitations, orthopnea and leg swelling.   Gastrointestinal: Negative for constipation and diarrhea.   Neurological: Positive for headaches.       Objective   Vitals:    11/30/21 1531   BP: 120/62   Pulse: 64   Resp: 16   Temp: 97.5 °F (36.4 °C)   SpO2: 100%          11/30/21  1531   Weight: 74.4 kg (164 lb)    [unfilled]  Body mass index is 24.21 kg/m².      Physical Exam   Constitutional: He appears well-developed.   Cardiovascular: Normal rate and regular rhythm. Exam reveals no gallop.   Murmur heard.   Crescendo decrescendo systolic murmur is present with a grade of 2/6.  Prosthetic heart sounds.  Grade 2/6 JOHNNIE loudest at the pulmonic area.   Pulmonary/Chest: Effort normal and breath sounds normal. No respiratory distress. He has no wheezes. He has no rales.   Abdominal: Soft. Normal appearance and bowel sounds are normal. He exhibits no mass. There is no abdominal tenderness. There is no guarding.   Neurological: He is alert.         Problems Addressed this Visit        Cardiac and Vasculature    Aortic valve replaced (Chronic)    Paroxysmal atrial fibrillation (HCC) (Chronic)    Hypertension (Chronic)       Neuro    Migraine      Other Visit Diagnoses     Need for vaccination    -  Primary    Relevant Orders    FluLaval/Fluarix/Fluzone >6 Months (5509-8908) (Completed)    Encounter  for screening colonoscopy        Relevant Orders    Ambulatory Referral to Gastroenterology      Diagnoses       Codes Comments    Need for vaccination    -  Primary ICD-10-CM: Z23  ICD-9-CM: V05.9     Encounter for screening colonoscopy     ICD-10-CM: Z12.11  ICD-9-CM: V76.51     Primary hypertension     ICD-10-CM: I10  ICD-9-CM: 401.9     Aortic valve replaced     ICD-10-CM: Z95.2  ICD-9-CM: V43.3     Paroxysmal atrial fibrillation (HCC)     ICD-10-CM: I48.0  ICD-9-CM: 427.31     Migraine with aura and without status migrainosus, not intractable     ICD-10-CM: G43.109  ICD-9-CM: 346.00         Assessment/Plan   In for recheck of hypertension, AF, aortic stenosis, and migraines.  Migraines persist.  They're doing pretty well to present time.  Gets 3 or 4 per month.  Has failed several drugs in the past including Topamax.  Currently on Ajovy injections once monthly and they have been working pretty well right now.  Blood pressure control is excellent.  Atrial fib is controlled.  On propranolol LA, HCTZ, amlodipine, and clonidine for hypertension.  Those are reviewed today.  Gets annual lab work March 2021 including CBC, CMP, urinalysis.  Continue to monitor every 3 months.  Certainly no severe migraine since he started Inderal LA.   He is due for colonoscopy.  Is going to have to bridge off of Coumadin on the Lovenox perioperatively.  He will discuss that with Dr. Buenrostro.  Due for Shingrix.    The above information was reviewed again today 11/30/21.  It continues to be accurate as reflected above and is unchanged.  History, physical and review of systems all reviewed and are unchanged.  Medications were reviewed today and continue the current dosing.    PPE today includes face mask and eye shield.         Dragon disclaimer:   Much of this encounter note is an electronic transcription/translation of spoken language to printed text. The electronic translation of spoken language may permit erroneous, or at times,  nonsensical words or phrases to be inadvertently transcribed; Although I have reviewed the note for such errors, some may still exist.

## 2021-11-30 NOTE — PATIENT INSTRUCTIONS
Influenza (Flu) Vaccine (Inactivated or Recombinant): What You Need to Know  1. Why get vaccinated?  Influenza vaccine can prevent influenza (flu).  Flu is a contagious disease that spreads around the [place] every year, usually between October and May. Anyone can get the flu, but it is more dangerous for some people. Infants and young children, people 65 years of age and older, pregnant women, and people with certain health conditions or a weakened immune system are at greatest risk of flu complications.  Pneumonia, bronchitis, sinus infections and ear infections are examples of flu-related complications. If you have a medical condition, such as heart disease, cancer or diabetes, flu can make it worse.  Flu can cause fever and chills, sore throat, muscle aches, fatigue, cough, headache, and runny or stuffy nose. Some people may have vomiting and diarrhea, though this is more common in children than adults.  Each year thousands of people in the [place] die from flu, and many more are hospitalized. Flu vaccine prevents millions of illnesses and flu-related visits to the doctor each year.  2. Influenza vaccine  CDC recommends everyone 6 months of age and older get vaccinated every flu season. Children 6 months through 8 years of age may need 2 doses during a single flu season. Everyone else needs only 1 dose each flu season.  It takes about 2 weeks for protection to develop after vaccination.  There are many flu viruses, and they are always changing. Each year a new flu vaccine is made to protect against three or four viruses that are likely to cause disease in the upcoming flu season. Even when the vaccine doesn't exactly match these viruses, it may still provide some protection.  Influenza vaccine does not cause flu.  Influenza vaccine may be given at the same time as other vaccines.  3. Talk with your health care provider  Tell your vaccine provider if the person getting the vaccine:  · Has had an  allergic reaction after a previous dose of influenza vaccine, or has any severe, life-threatening allergies.  · Has ever had Guillain-Barré Syndrome (also called GBS).  In some cases, your health care provider may decide to postpone influenza vaccination to a future visit.  People with minor illnesses, such as a cold, may be vaccinated. People who are moderately or severely ill should usually wait until they recover before getting influenza vaccine.  Your health care provider can give you more information.  4. Risks of a vaccine reaction  · Soreness, redness, and swelling where shot is given, fever, muscle aches, and headache can happen after influenza vaccine.  · There may be a very small increased risk of Guillain-Barré Syndrome (GBS) after inactivated influenza vaccine (the flu shot).  Young children who get the flu shot along with pneumococcal vaccine (PCV13), and/or DTaP vaccine at the same time might be slightly more likely to have a seizure caused by fever. Tell your health care provider if a child who is getting flu vaccine has ever had a seizure.  People sometimes faint after medical procedures, including vaccination. Tell your provider if you feel dizzy or have vision changes or ringing in the ears.  As with any medicine, there is a very remote chance of a vaccine causing a severe allergic reaction, other serious injury, or death.  5. What if there is a serious problem?  An allergic reaction could occur after the vaccinated person leaves the clinic. If you see signs of a severe allergic reaction (hives, swelling of the face and throat, difficulty breathing, a fast heartbeat, dizziness, or weakness), call 9-1-1 and get the person to the nearest hospital.  For other signs that concern you, call your health care provider.  Adverse reactions should be reported to the Vaccine Adverse Event Reporting System (VAERS). Your health care provider will usually file this report, or you can do it yourself. Visit the  "VAERS website at www.vaers.hhs.gov or call 1-707.457.6785. VAERS is only for reporting reactions, and VAERS staff do not give medical advice.  6. The National Vaccine Injury Compensation Program  The National Vaccine Injury Compensation Program (VICP) is a federal program that was created to compensate people who may have been injured by certain vaccines. Visit the VICP website at www.[place].gov/vaccinecompensation or call 1-640.901.5281 to learn about the program and about filing a claim. There is a time limit to file a claim for compensation.  7. How can I learn more?  · Ask your healthcare provider.  · Call your local or state health department.  · Contact the Centers for Disease Control and Prevention (CDC):  ? Call 1-426.743.5962 (3-338-JDA-INFO) or  ? Visit CDC's www.cdc.gov/flu  Vaccine Information Statement (Interim) Inactivated Influenza Vaccine (8/15/2019)  This information is not intended to replace advice given to you by your health care provider. Make sure you discuss any questions you have with your health care provider.  Document Revised: 12/09/2020 Document Reviewed: 12/09/2020  [employer] Patient Education © 2021 [employer]    https://www.cancer.org/cancer/colon-rectal-cancer/causes-risks-prevention/risk-factors.html\">   Colorectal Cancer Screening    Colorectal cancer screening is a group of tests that are used to check for colorectal cancer before symptoms develop. Colorectal refers to the colon and rectum. The colon and rectum are located at the end of the digestive tract and carry stool (feces) out of the body.  Who should have screening?  All adults who are 45-75 years old should have screening. Your health care provider may recommend screening before age 45. You will have tests every 1-10 years, depending on your results and the type of screening test. Screening recommendations for adults who are 76-85 years old vary depending on a person's health. People older than age 85 should no longer get " colorectal cancer screening.  You may have screening tests starting before age 45, or more often than other people, if you have any of these risk factors:  · A personal or family history of colorectal cancer or abnormal growths known as polyps in your colon.  · Inflammatory bowel disease, such as ulcerative colitis or Crohn's disease.  · A history of having radiation treatment to the abdomen or the area between the hip bones (pelvic area) for cancer.  · A type of genetic syndrome that is passed from parent to child (hereditary), such as:  ? Thomason syndrome.  ? Familial adenomatous polyposis.  ? Turcot syndrome.  ? Peutz-Jeghers syndrome.  ? MUTYH-associated polyposis (MAP).  · A personal history of diabetes.  Types of tests  There are several types of colorectal screening tests. You may have one or more of the following:  · Guaiac-based fecal occult blood testing. For this test, a stool sample is checked for hidden (occult) blood, which could be a sign of colorectal cancer.  · Fecal immunochemical test (FIT). For this test, a stool sample is checked for blood, which could be a sign of colorectal cancer.  · Stool DNA test. For this test, a stool sample is checked for blood and changes in DNA that could lead to colorectal cancer.  · Sigmoidoscopy. During this test, a thin, flexible tube with a camera on the end, called a sigmoidoscope, is used to examine the rectum and the lower colon.  · Colonoscopy. During this test, a long, flexible tube with a camera on the end, called a colonoscope, is used to examine the entire colon and rectum. Also, sometimes a tissue sample is taken to be looked at under a microscope (biopsy) or small polyps are removed during this test.  · Virtual colonoscopy. Instead of a colonoscope, this type of colonoscopy uses a CT scan to take pictures of the colon and rectum. A CT scan is a type of X-ray that is made using computers.  What are the benefits of screening?  Screening reduces your risk for  colorectal cancer and can help identify cancer at an early stage, when the cancer can be removed or treated more easily. It is common for polyps to form in the lining of the colon, especially as you age. These polyps may be cancerous or become cancerous over time. Screening can identify these polyps.  What are the risks of screening?  Generally, these are safe tests. However, problems may occur, including:  · The need for more tests to confirm results from a stool sample test. Stool sample tests have fewer risks than other types of screening tests.  · Being exposed to low levels of radiation, if you had a test involving X-rays. This may slightly increase your cancer risk. The benefit of detecting cancer outweighs the slight increase in risk.  · Bleeding, damage to the intestine, or infection caused by a sigmoidoscopy or colonoscopy.  · A reaction to medicines given during a sigmoidoscopy or colonoscopy.  Talk with your health care provider to understand your risk for colorectal cancer and to make a screening plan that is right for you.  Questions to ask your health care provider  · When should I start colorectal cancer screening?  · What is my risk for colorectal cancer?  · How often do I need screening?  · Which screening tests do I need?  · How do I get my test results?  · What do my results mean?  Where to find more information  Learn more about colorectal cancer screening from:  · The American Cancer Society: cancer.org  · National Cancer [place]: cancer.gov  Summary  · Colorectal cancer screening is a group of tests used to check for colorectal cancer before symptoms develop.  · All adults who are 45-75 years old should have screening. Your health care provider may recommend screening before age 45.  · You may have screening tests starting before age 45, or more often than other people, if you have certain risk factors.  · Screening reduces your risk for colorectal cancer and can help identify cancer at an  early stage, when the cancer can be removed or treated more easily.  · Talk with your health care provider to understand your risk for colorectal cancer and to make a screening plan that is right for you.  This information is not intended to replace advice given to you by your health care provider. Make sure you discuss any questions you have with your health care provider.  Document Revised: 04/07/2021 Document Reviewed: 04/07/2021  [employer] Patient Education © 2021 [employer]  Recombinant Zoster (Shingles) Vaccine: What You Need to Know  1. Why get vaccinated?  Recombinant zoster (shingles) vaccine can prevent shingles.  Shingles (also called herpes zoster, or just zoster) is a painful skin rash, usually with blisters. In addition to the rash, shingles can cause fever, headache, chills, or upset stomach. More rarely, shingles can lead to pneumonia, hearing problems, blindness, brain inflammation (encephalitis), or death.  The most common complication of shingles is long-term nerve pain called postherpetic neuralgia (PHN). PHN occurs in the areas where the shingles rash was, even after the rash clears up. It can last for months or years after the rash goes away. The pain from PHN can be severe and debilitating.  About 10 to 18% of people who get shingles will experience PHN. The risk of PHN increases with age. An older adult with shingles is more likely to develop PHN and have longer lasting and more severe pain than a younger person with shingles.  Shingles is caused by the varicella zoster virus, the same virus that causes chickenpox. After you have chickenpox, the virus stays in your body and can cause shingles later in life. Shingles cannot be passed from one person to another, but the virus that causes shingles can spread and cause chickenpox in someone who had never had chickenpox or received chickenpox vaccine.  2. Recombinant shingles vaccine  Recombinant shingles vaccine provides strong protection  against shingles. By preventing shingles, recombinant shingles vaccine also protects against PHN.  Recombinant shingles vaccine is the preferred vaccine for the prevention of shingles. However, a different vaccine, live shingles vaccine, may be used in some circumstances.  The recombinant shingles vaccine is recommended for adults 50 years and older without serious immune problems. It is given as a two-dose series.  This vaccine is also recommended for people who have already gotten another type of shingles vaccine, the live shingles vaccine. There is no live virus in this vaccine.  Shingles vaccine may be given at the same time as other vaccines.  3. Talk with your health care provider  Tell your vaccine provider if the person getting the vaccine:  · Has had an allergic reaction after a previous dose of recombinant shingles vaccine, or has any severe, life-threatening allergies.  · Is pregnant or breastfeeding.  · Is currently experiencing an episode of shingles.  In some cases, your health care provider may decide to postpone shingles vaccination to a future visit.  People with minor illnesses, such as a cold, may be vaccinated. People who are moderately or severely ill should usually wait until they recover before getting recombinant shingles vaccine.  Your health care provider can give you more information.  4. Risks of a vaccine reaction  · A sore arm with mild or moderate pain is very common after recombinant shingles vaccine, affecting about 80% of vaccinated people. Redness and swelling can also happen at the site of the injection.  · Tiredness, muscle pain, headache, shivering, fever, stomach pain, and nausea happen after vaccination in more than half of people who receive recombinant shingles vaccine.  In clinical trials, about 1 out of 6 people who got recombinant zoster vaccine experienced side effects that prevented them from doing regular activities. Symptoms usually went away on their own in 2 to 3  days.  You should still get the second dose of recombinant zoster vaccine even if you had one of these reactions after the first dose.  People sometimes faint after medical procedures, including vaccination. Tell your provider if you feel dizzy or have vision changes or ringing in the ears.  As with any medicine, there is a very remote chance of a vaccine causing a severe allergic reaction, other serious injury, or death.  5. What if there is a serious problem?  An allergic reaction could occur after the vaccinated person leaves the clinic. If you see signs of a severe allergic reaction (hives, swelling of the face and throat, difficulty breathing, a fast heartbeat, dizziness, or weakness), call 9-1-1 and get the person to the nearest hospital.  For other signs that concern you, call your health care provider.  Adverse reactions should be reported to the Vaccine Adverse Event Reporting System (VAERS). Your health care provider will usually file this report, or you can do it yourself. Visit the VAERS website at www.vaers.[facility].gov or call 1-474.978.8893. VAERS is only for reporting reactions, and VAERS staff do not give medical advice.  6. How can I learn more?  · Ask your health care provider.  · Call your local or state health department.  · Contact the Centers for Disease Control and Prevention (CDC):  ? Call 1-709.397.1627 (8-140-XOB-INFO) or  ? Visit CDC's website at www.cdc.gov/vaccines  Vaccine Information Statement Recombinant Zoster Vaccine (10/30/2019)  This information is not intended to replace advice given to you by your health care provider. Make sure you discuss any questions you have with your health care provider.  Document Revised: 12/09/2020 Document Reviewed: 12/09/2020  [employer] Patient Education © 2021 [employer]

## 2021-12-05 LAB — INR PPP: 3.4

## 2021-12-09 ENCOUNTER — ANTICOAGULATION VISIT (OUTPATIENT)
Dept: PHARMACY | Facility: HOSPITAL | Age: 51
End: 2021-12-09

## 2021-12-09 DIAGNOSIS — Z95.2 AORTIC VALVE REPLACED: Primary | ICD-10-CM

## 2021-12-09 DIAGNOSIS — I48.0 PAROXYSMAL ATRIAL FIBRILLATION (HCC): ICD-10-CM

## 2021-12-09 NOTE — PROGRESS NOTES
Anticoagulation Clinic Progress Note    Anticoagulation Summary  As of 12/9/2021    INR goal:  2.5-3.5   TTR:  90.3 % (3 y)   INR used for dosing:  3.40 (12/5/2021)   Warfarin maintenance plan:  7.5 mg every Sun, Tue, Thu; 5 mg all other days   Weekly warfarin total:  42.5 mg   No change documented:  Cindy Mathew   Plan last modified:  Katie Schwarz [facility] (4/13/2021)   Next INR check:  12/19/2021   Priority:  Maintenance   Target end date:      Indications    Aortic valve replaced [Z95.2]  Paroxysmal atrial fibrillation (HCC) [I48.0]             Anticoagulation Episode Summary     INR check location:      Preferred lab:      Send INR reminders to:  [facility]  POOL    Comments:  *Coag[place]k*      Anticoagulation Care Providers     Provider Role Specialty Phone number    Juan Antonio Buenrostro MD Referring Cardiology [phone]          Clinic Interview:  No pertinent clinical findings have been reported.    INR History:  Anticoagulation Monitoring 11/9/2021 11/23/2021 12/9/2021   INR 3.10 3.00 3.40   INR Date 11/6/2021 11/19/2021 12/5/2021   INR Goal 2.5-3.5 2.5-3.5 2.5-3.5   Trend Same Same Same   Last Week Total 42.5 mg 42.5 mg 42.5 mg   Next Week Total 42.5 mg 42.5 mg 42.5 mg   Sun 7.5 mg 7.5 mg 7.5 mg   Mon 5 mg 5 mg 5 mg   Tue 7.5 mg 7.5 mg 7.5 mg   Wed 5 mg 5 mg 5 mg   Thu 7.5 mg 7.5 mg 7.5 mg   Fri 5 mg 5 mg 5 mg   Sat 5 mg 5 mg 5 mg   Visit Report - - -   Some recent data might be hidden       Plan:  1. INR is therapeutic today- see above in Anticoagulation Summary.    Fernie Porter to continue their warfarin regimen- see above in Anticoagulation Summary.  2. Follow up in 2 weeks  3. Pt has agreed to only be called if INR out of range. They have been instructed to call if any changes in medications, doses, concerns, etc. Patient expresses understanding and has no further questions at this time.    Cindy Mathew

## 2021-12-20 LAB — INR PPP: 3.3

## 2021-12-23 ENCOUNTER — ANTICOAGULATION VISIT (OUTPATIENT)
Dept: PHARMACY | Facility: HOSPITAL | Age: 51
End: 2021-12-23

## 2021-12-23 DIAGNOSIS — I48.0 PAROXYSMAL ATRIAL FIBRILLATION (HCC): ICD-10-CM

## 2021-12-23 DIAGNOSIS — Z95.2 AORTIC VALVE REPLACED: Primary | ICD-10-CM

## 2021-12-23 NOTE — PROGRESS NOTES
Anticoagulation Clinic Progress Note    Anticoagulation Summary  As of 12/23/2021    INR goal:  2.5-3.5   TTR:  90.4 % (3.1 y)   INR used for dosing:  3.30 (12/20/2021)   Warfarin maintenance plan:  7.5 mg every Sun, Tue, Thu; 5 mg all other days   Weekly warfarin total:  42.5 mg   No change documented:  Cindy Mathew   Plan last modified:  Katie Schwarz formerly [facility] (4/13/2021)   Next INR check:  1/3/2022   Priority:  Maintenance   Target end date:      Indications    Aortic valve replaced [Z95.2]  Paroxysmal atrial fibrillation (HCC) [I48.0]             Anticoagulation Episode Summary     INR check location:      Preferred lab:      [facility]    Comments:  *Coaguchek*      Anticoagulation Care Providers     Provider Role Specialty Phone number    Juan Antonio Buenrostro MD Referring Cardiology [phone]          Clinic Interview:  No pertinent clinical findings have been reported.    INR History:  Anticoagulation Monitoring 11/23/2021 12/9/2021 12/23/2021   INR 3.00 3.40 3.30   INR Date 11/19/2021 12/5/2021 12/20/2021   INR Goal 2.5-3.5 2.5-3.5 2.5-3.5   Trend Same Same Same   Last Week Total 42.5 mg 42.5 mg 42.5 mg   Next Week Total 42.5 mg 42.5 mg 42.5 mg   Sun 7.5 mg 7.5 mg 7.5 mg   Mon 5 mg 5 mg 5 mg   Tue 7.5 mg 7.5 mg 7.5 mg   Wed 5 mg 5 mg 5 mg   Thu 7.5 mg 7.5 mg 7.5 mg   Fri 5 mg 5 mg 5 mg   Sat 5 mg 5 mg 5 mg   Visit Report - - -   Some recent data might be hidden       Plan:  1. INR is therapeutic today- see above in Anticoagulation Summary.    Fernie Porter to continue their warfarin regimen- see above in Anticoagulation Summary.  2. Follow up in 2 weeks  3. Pt has agreed to only be called if INR out of range. They have been instructed to call if any changes in medications, doses, concerns, etc. Patient expresses understanding and has no further questions at this time.    Cindy Mathew

## 2022-01-03 LAB — INR PPP: 2.8

## 2022-01-05 ENCOUNTER — ANTICOAGULATION VISIT (OUTPATIENT)
Dept: PHARMACY | Facility: HOSPITAL | Age: 52
End: 2022-01-05

## 2022-01-05 DIAGNOSIS — I48.0 PAROXYSMAL ATRIAL FIBRILLATION: ICD-10-CM

## 2022-01-05 DIAGNOSIS — Z95.2 AORTIC VALVE REPLACED: Primary | ICD-10-CM

## 2022-01-17 LAB — INR PPP: 2.7

## 2022-01-18 ENCOUNTER — ANTICOAGULATION VISIT (OUTPATIENT)
Dept: PHARMACY | Facility: HOSPITAL | Age: 52
End: 2022-01-18

## 2022-01-18 DIAGNOSIS — I48.0 PAROXYSMAL ATRIAL FIBRILLATION: ICD-10-CM

## 2022-01-18 DIAGNOSIS — Z95.2 AORTIC VALVE REPLACED: Primary | ICD-10-CM

## 2022-01-18 NOTE — PROGRESS NOTES
Anticoagulation Clinic Progress Note    Anticoagulation Summary  As of 2022    INR goal:  2.5-3.5   TTR:  90.6 % (3.1 y)   INR used for dosin.70 (2022)   Warfarin maintenance plan:  7.5 mg every Sun, Tue, Thu; 5 mg all other days   Weekly warfarin total:  42.5 mg   No change documented:  Georgie Gaming   Plan last modified:  Katie Schwarz RP (2021)   Next INR check:  2022   Priority:  Maintenance   Target end date:      Indications    Aortic valve replaced [Z95.2]  Paroxysmal atrial fibrillation (HCC) [I48.0]             Anticoagulation Episode Summary     INR check location:      Preferred lab:      Send INR reminders to:  [facility] [place]  POOL    Comments:  *Coaguchek*      Anticoagulation Care Providers     Provider Role Specialty Phone number    Juan Antonio Buenrostro MD Referring Cardiology [phone]          Clinic Interview:  No pertinent clinical findings have been reported.    INR History:  Anticoagulation Monitoring 2021   INR 3.30 2.80 2.70   INR Date 2021 1/3/2022 2022   INR Goal 2.5-3.5 2.5-3.5 2.5-3.5   Trend Same Same Same   Last Week Total 42.5 mg 42.5 mg 42.5 mg   Next Week Total 42.5 mg 42.5 mg 42.5 mg   Sun 7.5 mg 7.5 mg 7.5 mg   Mon 5 mg 5 mg 5 mg   Tue 7.5 mg 7.5 mg 7.5 mg   Wed 5 mg 5 mg 5 mg   Thu 7.5 mg 7.5 mg 7.5 mg   Fri 5 mg 5 mg 5 mg   Sat 5 mg 5 mg 5 mg   Visit Report - - -   Some recent data might be hidden       Plan:  1. INR is therapeutic today- see above in Anticoagulation Summary.    Fernie Porter to continue their warfarin regimen- see above in Anticoagulation Summary.  2. Follow up in 2 weeks  3. Pt has agreed to only be called if INR out of range. They have been instructed to call if any changes in medications, doses, concerns, etc. Patient expresses understanding and has no further questions at this time.    Georgie Gaming

## 2022-01-31 LAB — INR PPP: 2.6

## 2022-02-01 ENCOUNTER — ANTICOAGULATION VISIT (OUTPATIENT)
Dept: PHARMACY | Facility: HOSPITAL | Age: 52
End: 2022-02-01

## 2022-02-01 DIAGNOSIS — I48.0 PAROXYSMAL ATRIAL FIBRILLATION: ICD-10-CM

## 2022-02-01 DIAGNOSIS — Z95.2 AORTIC VALVE REPLACED: Primary | ICD-10-CM

## 2022-02-01 NOTE — PROGRESS NOTES
Anticoagulation Clinic Progress Note    Anticoagulation Summary  As of 2022    INR goal:  2.5-3.5   TTR:  90.8 % (3.2 y)   INR used for dosin.60 (2022)   Warfarin maintenance plan:  7.5 mg every Sun, Tue, Thu; 5 mg all other days   Weekly warfarin total:  42.5 mg   No change documented:  Cindy Mathew   Plan last modified:  Katie Schwarz, PharmD (2021)   Next INR check:  2022   Priority:  Maintenance   Target end date:      Indications    Aortic valve replaced [Z95.2]  Paroxysmal atrial fibrillation (HCC) [I48.0]             Anticoagulation Episode Summary     INR check location:      Preferred lab:      [facility]    Comments:  *Coaguchek*      Anticoagulation Care Providers     Provider Role Specialty Phone number    Jaun Antonio Buenrostro MD Referring Cardiology [phone]          Clinic Interview:  No pertinent clinical findings have been reported.    INR History:  Anticoagulation Monitoring 2022   INR 2.80 2.70 2.60   INR Date 1/3/2022 2022 2022   INR Goal 2.5-3.5 2.5-3.5 2.5-3.5   Trend Same Same Same   Last Week Total 42.5 mg 42.5 mg 42.5 mg   Next Week Total 42.5 mg 42.5 mg 42.5 mg   Sun 7.5 mg 7.5 mg 7.5 mg   Mon 5 mg 5 mg 5 mg   Tue 7.5 mg 7.5 mg 7.5 mg   Wed 5 mg 5 mg 5 mg   Thu 7.5 mg 7.5 mg 7.5 mg   Fri 5 mg 5 mg 5 mg   Sat 5 mg 5 mg 5 mg   Visit Report - - -   Some recent data might be hidden       Plan:  1. INR is therapeutic today- see above in Anticoagulation Summary.    Fernie Porter to continue their warfarin regimen- see above in Anticoagulation Summary.  2. Follow up in 2 weeks  3. Pt has agreed to only be called if INR out of range. They have been instructed to call if any changes in medications, doses, concerns, etc. Patient expresses understanding and has no further questions at this time.    Cindy Mathew

## 2022-02-07 ENCOUNTER — OFFICE VISIT (OUTPATIENT)
Dept: INTERNAL MEDICINE | Facility: CLINIC | Age: 52
End: 2022-02-07

## 2022-02-07 VITALS
SYSTOLIC BLOOD PRESSURE: 102 MMHG | DIASTOLIC BLOOD PRESSURE: 60 MMHG | TEMPERATURE: 97.7 F | WEIGHT: 166 LBS | HEART RATE: 76 BPM | BODY MASS INDEX: 24.59 KG/M2 | RESPIRATION RATE: 18 BRPM | HEIGHT: 69 IN | OXYGEN SATURATION: 99 %

## 2022-02-07 DIAGNOSIS — M79.675 PAIN OF TOE OF LEFT FOOT: Primary | ICD-10-CM

## 2022-02-07 PROCEDURE — 99213 OFFICE O/P EST LOW 20 MIN: CPT | Performed by: INTERNAL MEDICINE

## 2022-02-07 NOTE — PROGRESS NOTES
Subjective   Fernie Porter is a 51 y.o. male.     Chief Complaint   Patient presents with   • Headache   • Lt. Big Toe Pain         In with left great toe pain for about 6 months.  Started gradually.  Seems to come and go.  Sometimes he will feel a popping sensation in the toe.  No warmth.  No swelling.       The following portions of the patient's history were reviewed and updated as appropriate: allergies, current medications, past social history and problem list.    Outpatient Medications Marked as Taking for the 2/7/22 encounter (Office Visit) with Juan Antonio Ford MD   Medication Sig Dispense Refill   • amLODIPine (NORVASC) 5 MG tablet TAKE 1 TABLET BY MOUTH EVERY DAY 90 tablet 3   • cloNIDine (CATAPRES) 0.2 MG tablet TAKE ONE TABLET 2 TIMES A  tablet 1   • fluticasone (FLONASE) 50 MCG/ACT nasal spray 2 SPRAYS INTO THE NOSTRIL(S) AS DIRECTED BY PROVIDER DAILY. 48 mL 2   • Fremanezumab-vfrm (Ajovy) 225 MG/1.5ML solution auto-injector Inject 225 mg under the skin into the appropriate area as directed Every 30 (Thirty) Days. 3 pen 3   • hydroCHLOROthiazide (MICROZIDE) 12.5 MG capsule TAKE 1 CAPSULE BY MOUTH EVERY DAY 90 capsule 1   • ibuprofen (ADVIL,MOTRIN) 200 MG tablet Take 200 mg by mouth Every 6 (Six) Hours As Needed for Mild Pain .     • imipramine (TOFRANIL) 50 MG tablet TAKE 2 TABLETS BY MOUTH AT BEDTIME 180 tablet 1   • propranolol LA (INDERAL LA) 120 MG 24 hr capsule TAKE 1 CAPSULE BY MOUTH EVERY DAY 90 capsule 1   • warfarin (COUMADIN) 5 MG tablet Take 7.5mg (1 and 1/2 Tablets) on Sunday, Tuesday, and Thursday; Take 5mg (1 Tablet) all other days OR as directed by the [facility]. 105 tablet 1       Review of Systems   Musculoskeletal: Positive for arthralgias ( Left great toe).       Objective   Vitals:    02/07/22 0820   BP: 102/60   Pulse: 76   Resp: 18   Temp: 97.7 °F (36.5 °C)   SpO2: 99%      Wt Readings from Last 3 Encounters:   02/07/22 75.3 kg (166 lb)   11/30/21 74.4  kg (164 lb)   10/07/21 74.4 kg (164 lb)    Body mass index is 24.51 kg/m².      Physical Exam  Musculoskeletal:         General: No swelling or tenderness. Normal range of motion.      Left lower leg: No edema.           Problems Addressed this Visit     None      Visit Diagnoses     Pain of toe of left foot    -  Primary      Diagnoses       Codes Comments    Pain of toe of left foot    -  Primary ICD-10-CM: M79.675  ICD-9-CM: 729.5         Assessment/Plan   In with pain left toe MTPJ.  His exam is perfectly normal.  The right be a little restriction of dorsiflexion.  This would be more in line with hallux minimus rather than hallux rigidus.  Irregardless of symptoms are not consistent with gout which was his primary concern today.  We will get some x-rays of the left foot for additional diagnostic confirmation.  We will plan a referral to Dr. Contreras Diaz.      The above information was reviewed again today 02/07/22.  It continues to be accurate as reflected above and is unchanged.  History, physical and review of systems all reviewed and are unchanged.  Medications were reviewed today and continue the current dosing.    PPE today includes face mask and eye shield.             Dragon disclaimer:   Much of this encounter note is an electronic transcription/translation of spoken language to printed text. The electronic translation of spoken language may permit erroneous, or at times, nonsensical words or phrases to be inadvertently transcribed; Although I have reviewed the note for such errors, some may still exist.

## 2022-02-17 ENCOUNTER — PREP FOR SURGERY (OUTPATIENT)
Dept: OTHER | Facility: HOSPITAL | Age: 52
End: 2022-02-17

## 2022-02-17 ENCOUNTER — PRE-PROCEDURE SCREENING (OUTPATIENT)
Dept: GASTROENTEROLOGY | Facility: CLINIC | Age: 52
End: 2022-02-17

## 2022-02-17 DIAGNOSIS — Z12.11 ENCOUNTER FOR SCREENING FOR MALIGNANT NEOPLASM OF COLON: Primary | ICD-10-CM

## 2022-02-17 RX ORDER — SODIUM CHLORIDE, SODIUM LACTATE, POTASSIUM CHLORIDE, CALCIUM CHLORIDE 600; 310; 30; 20 MG/100ML; MG/100ML; MG/100ML; MG/100ML
30 INJECTION, SOLUTION INTRAVENOUS CONTINUOUS
Status: CANCELLED | OUTPATIENT
Start: 2022-05-23

## 2022-02-17 NOTE — TELEPHONE ENCOUNTER
Once Mr. Porter is scheduled for his colonoscopy and the date of the procedure approaches, we will review plan with him. Thank you!

## 2022-02-17 NOTE — TELEPHONE ENCOUNTER
He is cleared to proceed from cardiac standpoint. I recommend INR is checked 5-7 days prior and further recommendation for warfarin and possible lovenox bridge will be made pending INR.     Anticoagulation clinic- please note and manage.         ELLA Montemayor  [facility]   [address] [place] [address] [address]  [place] [address]  Ph: [phone]  Fax: [phone]

## 2022-02-17 NOTE — TELEPHONE ENCOUNTER
Colonoscopy screening-- No personal history of polyps-- No family history of polyps or colon cancer--Coumadin--Medications:        amLODIPine (NORVASC) 5 MG tablet  cloNIDine (CATAPRES) 0.2 MG tablet  fluticasone (FLONASE) 50 MCG/ACT nasal spray  Fremanezumab-vfrm (Ajovy) 225 MG/1.5ML solution auto-injector  hydroCHLOROthiazide (MICROZIDE) 12.5 MG capsule  imipramine (TOFRANIL) 50 MG tablet  propranolol LA (INDERAL LA) 120 MG 24 hr capsule  warfarin (COUMADIN) 5 MG tablet           Pt verbalized and understood that it would be few weeks before been schedule

## 2022-02-17 NOTE — TELEPHONE ENCOUNTER
Case request for screening colonoscopy submitted.  Patient has a history of aortic valve replacement and is on warfarin, we will need to reach out to his cardiologist and the anticoagulation clinic for recommendations regarding perioperative management of his warfarin.

## 2022-02-19 LAB — INR PPP: 2.3

## 2022-02-21 ENCOUNTER — ANTICOAGULATION VISIT (OUTPATIENT)
Dept: PHARMACY | Facility: HOSPITAL | Age: 52
End: 2022-02-21

## 2022-02-21 DIAGNOSIS — Z95.2 AORTIC VALVE REPLACED: Primary | ICD-10-CM

## 2022-02-21 DIAGNOSIS — I48.0 PAROXYSMAL ATRIAL FIBRILLATION: ICD-10-CM

## 2022-02-21 NOTE — PROGRESS NOTES
Anticoagulation Clinic Progress Note    Anticoagulation Summary  As of 2022    INR goal:  2.5-3.5   TTR:  89.8 % (3.2 y)   INR used for dosin.30 (2022)   Warfarin maintenance plan:  7.5 mg every Sun, Tue, Thu; 5 mg all other days   Weekly warfarin total:  42.5 mg   Plan last modified:  Katie Schwarz, PharmD (2021)   Next INR check:  3/5/2022   Priority:  Maintenance   Target end date:      Indications    Aortic valve replaced [Z95.2]  Paroxysmal atrial fibrillation (HCC) [I48.0]             Anticoagulation Episode Summary     INR check location:      Preferred lab:      Send INR reminders to:   [facility]  POOL    Comments:  *Coaguchek* *Colonoscopy pending scheduling 2022 - will need to hold/bridge (see 22 Pre-Procedure Screening note)*      Anticoagulation Care Providers     Provider Role Specialty Phone number    Juan Antonio Buenrostro MD Referring Cardiology [phone]          Clinic Interview:  Patient Findings     Positives:  Upcoming invasive procedure    Negatives:  Signs/symptoms of thrombosis, Signs/symptoms of bleeding,   Laboratory test error suspected, Change in health, Change in alcohol use,   Change in activity, Emergency department visit, Upcoming dental procedure,   Missed doses, Extra doses, Change in medications, Change in diet/appetite,   Hospital admission, Bruising, Other complaints    Comments:  Upcoming colonoscopy-pending scheduling. Will need to   hold/bridge with lovenox       Clinical Outcomes     Negatives:  Major bleeding event, Thromboembolic event,   Anticoagulation-related hospital admission, Anticoagulation-related ED   visit, Anticoagulation-related fatality    Comments:  Upcoming colonoscopy-pending scheduling. Will need to   hold/bridge with lovenox         INR History:  Anticoagulation Monitoring 2022   INR 2.70 2.60 2.30   INR Date 2022   INR Goal 2.5-3.5 2.5-3.5 2.5-3.5   Trend Same Same  Same   Last Week Total 42.5 mg 42.5 mg 42.5 mg   Next Week Total 42.5 mg 42.5 mg 45 mg   Sun 7.5 mg 7.5 mg 7.5 mg   Mon 5 mg 5 mg 7.5 mg (2/21); Otherwise 5 mg   Tue 7.5 mg 7.5 mg 7.5 mg   Wed 5 mg 5 mg 5 mg   Thu 7.5 mg 7.5 mg 7.5 mg   Fri 5 mg 5 mg 5 mg   Sat 5 mg 5 mg 5 mg   Visit Report - - -   Some recent data might be hidden       Plan:  1. INR is Subtherapeutic today- see above in Anticoagulation Summary.   Will instruct Fernie Porter to Increase their warfarin regimen- see above in Anticoagulation Summary.  2. Follow up in 2 weeks  3.  They have been instructed to call if any changes in medications, doses, concerns, etc. Patient expresses understanding and has no further questions at this time.    Dorothy Leon, [facility]

## 2022-02-28 ENCOUNTER — TELEPHONE (OUTPATIENT)
Dept: GASTROENTEROLOGY | Facility: CLINIC | Age: 52
End: 2022-02-28

## 2022-03-02 RX ORDER — WARFARIN SODIUM 5 MG/1
TABLET ORAL
Qty: 105 TABLET | Refills: 1 | OUTPATIENT
Start: 2022-03-02 | End: 2022-08-26 | Stop reason: SDUPTHER

## 2022-03-03 RX ORDER — CLONIDINE HYDROCHLORIDE 0.2 MG/1
TABLET ORAL
Qty: 180 TABLET | Refills: 1 | Status: SHIPPED | OUTPATIENT
Start: 2022-03-03 | End: 2022-10-28

## 2022-03-03 RX ORDER — HYDROCHLOROTHIAZIDE 12.5 MG/1
CAPSULE, GELATIN COATED ORAL
Qty: 90 CAPSULE | Refills: 1 | Status: SHIPPED | OUTPATIENT
Start: 2022-03-03 | End: 2022-12-09

## 2022-03-03 RX ORDER — PROPRANOLOL HYDROCHLORIDE 120 MG/1
CAPSULE, EXTENDED RELEASE ORAL
Qty: 90 CAPSULE | Refills: 1 | Status: SHIPPED | OUTPATIENT
Start: 2022-03-03 | End: 2022-10-14

## 2022-03-09 PROBLEM — Z12.11 ENCOUNTER FOR SCREENING FOR MALIGNANT NEOPLASM OF COLON: Status: ACTIVE | Noted: 2022-03-09

## 2022-03-12 LAB — INR PPP: 3.1

## 2022-03-15 ENCOUNTER — ANTICOAGULATION VISIT (OUTPATIENT)
Dept: PHARMACY | Facility: HOSPITAL | Age: 52
End: 2022-03-15

## 2022-03-15 DIAGNOSIS — Z95.2 AORTIC VALVE REPLACED: Primary | ICD-10-CM

## 2022-03-15 DIAGNOSIS — I48.0 PAROXYSMAL ATRIAL FIBRILLATION: ICD-10-CM

## 2022-03-15 NOTE — PROGRESS NOTES
Anticoagulation Clinic Progress Note    Anticoagulation Summary  As of 3/15/2022    INR goal:  2.5-3.5   TTR:  89.7 % (3.3 y)   INR used for dosing:  3.10 (3/12/2022)   Warfarin maintenance plan:  7.5 mg every Sun, Tue, Thu; 5 mg all other days   Weekly warfarin total:  42.5 mg   No change documented:  Brayan Patterson, Toni   Plan last modified:  Katie Schwarz PharmD (4/13/2021)   Next INR check:  3/25/2022   Priority:  Maintenance   Target end date:      Indications    Aortic valve replaced [Z95.2]  Paroxysmal atrial fibrillation (HCC) [I48.0]             Anticoagulation Episode Summary     INR check location:      Preferred lab:      Send INR reminders to:   [facility]  PO[facility]    Comments:  *Coaguchek* *Colonoscopy pending scheduling 5/23/22 - will need to hold/bridge (see 2/17/22 Pre-Procedure Screening note)*      Anticoagulation Care Providers     Provider Role Specialty Phone number    Crissy Mejia APRN Referring Cardiology [phone]          Clinic Interview:  No pertinent clinical findings have been reported.    INR History:  Anticoagulation Monitoring 2/1/2022 2/21/2022 3/15/2022   INR 2.60 2.30 3.10   INR Date 1/31/2022 2/19/2022 3/12/2022   INR Goal 2.5-3.5 2.5-3.5 2.5-3.5   Trend Same Same Same   Last Week Total 42.5 mg 42.5 mg 42.5 mg   Next Week Total 42.5 mg 45 mg 42.5 mg   Sun 7.5 mg 7.5 mg 7.5 mg   Mon 5 mg 7.5 mg (2/21); Otherwise 5 mg 5 mg   Tue 7.5 mg 7.5 mg 7.5 mg   Wed 5 mg 5 mg 5 mg   Thu 7.5 mg 7.5 mg 7.5 mg   Fri 5 mg 5 mg 5 mg   Sat 5 mg 5 mg 5 mg   Visit Report - - -   Some recent data might be hidden       Plan:  1. INR is therapeutic today- see above in Anticoagulation Summary.    Fernie Porter to continue their warfarin regimen- see above in Anticoagulation Summary.  2. Follow up in 2 weeks  3. Pt has agreed to only be called if INR out of range. Left secure voicemail with instructions. They have been instructed to call if any changes in medications, doses,  concerns, etc.     Brayan Patterson, PharmD

## 2022-03-26 LAB — INR PPP: 2.8

## 2022-03-28 ENCOUNTER — ANTICOAGULATION VISIT (OUTPATIENT)
Dept: PHARMACY | Facility: HOSPITAL | Age: 52
End: 2022-03-28

## 2022-03-28 DIAGNOSIS — I48.0 PAROXYSMAL ATRIAL FIBRILLATION: ICD-10-CM

## 2022-03-28 DIAGNOSIS — Z95.2 AORTIC VALVE REPLACED: Primary | ICD-10-CM

## 2022-03-28 NOTE — PROGRESS NOTES
Anticoagulation Clinic Progress Note    Anticoagulation Summary  As of 3/28/2022    INR goal:  2.5-3.5   TTR:  89.8 % (3.3 y)   INR used for dosin.80 (3/26/2022)   Warfarin maintenance plan:  7.5 mg every Sun, Tue, Thu; 5 mg all other days   Weekly warfarin total:  42.5 mg   No change documented:  Georgie Gaming   Plan last modified:  Katie Schwarz, PharmD (2021)   Next INR check:  2022   Priority:  Maintenance   Target end date:      Indications    Aortic valve replaced [Z95.2]  Paroxysmal atrial fibrillation (HCC) [I48.0]             Anticoagulation Episode Summary     INR check location:      Preferred lab:      Send INR reminders to:   [facility]  PO[facility]    Comments:  *Coaguchek* *Colonoscopy pending scheduling 22 - will need to hold/bridge (see 22 Pre-Procedure Screening note)*      Anticoagulation Care Providers     Provider Role Specialty Phone number    Crissy MejiaELLA Referring Cardiology [phone]          Clinic Interview:  No pertinent clinical findings have been reported.    INR History:  Anticoagulation Monitoring 2022 3/15/2022 3/28/2022   INR 2.30 3.10 2.80   INR Date 2022 3/12/2022 3/26/2022   INR Goal 2.5-3.5 2.5-3.5 2.5-3.5   Trend Same Same Same   Last Week Total 42.5 mg 42.5 mg 42.5 mg   Next Week Total 45 mg 42.5 mg 42.5 mg   Sun 7.5 mg 7.5 mg 7.5 mg   Mon 7.5 mg (); Otherwise 5 mg 5 mg 5 mg   Tue 7.5 mg 7.5 mg 7.5 mg   Wed 5 mg 5 mg 5 mg   Thu 7.5 mg 7.5 mg 7.5 mg   Fri 5 mg 5 mg 5 mg   Sat 5 mg 5 mg 5 mg   Visit Report - - -   Some recent data might be hidden       Plan:  1. INR is therapeutic today- see above in Anticoagulation Summary.    Fernie Porter to continue their warfarin regimen- see above in Anticoagulation Summary.  2. Follow up in 2 weeks  3. Pt has agreed to only be called if INR out of range. They have been instructed to call if any changes in medications, doses, concerns, etc. Patient expresses  understanding and has no further questions at this time.    Georgie Gaming

## 2022-04-15 ENCOUNTER — TELEPHONE (OUTPATIENT)
Dept: PHARMACY | Facility: HOSPITAL | Age: 52
End: 2022-04-15

## 2022-04-15 LAB — INR PPP: 3.4

## 2022-04-18 ENCOUNTER — ANTICOAGULATION VISIT (OUTPATIENT)
Dept: PHARMACY | Facility: HOSPITAL | Age: 52
End: 2022-04-18

## 2022-04-18 DIAGNOSIS — I48.0 PAROXYSMAL ATRIAL FIBRILLATION: ICD-10-CM

## 2022-04-18 DIAGNOSIS — Z95.2 AORTIC VALVE REPLACED: Primary | ICD-10-CM

## 2022-04-18 NOTE — PROGRESS NOTES
Anticoagulation Clinic Progress Note    Anticoagulation Summary  As of 4/18/2022    INR goal:  2.5-3.5   TTR:  89.9 % (3.4 y)   INR used for dosing:  3.40 (4/15/2022)   Warfarin maintenance plan:  7.5 mg every Sun, Tue, Thu; 5 mg all other days   Weekly warfarin total:  42.5 mg   No change documented:  Brayan Patterson PharmD   Plan last modified:  Katie Schwarz PharmD (4/13/2021)   Next INR check:  4/29/2022   Priority:  Maintenance   Target end date:      Indications    Aortic valve replaced [Z95.2]  Paroxysmal atrial fibrillation (HCC) [I48.0]             Anticoagulation Episode Summary     INR check location:      Preferred lab:      Send INR reminders to:   [facility]  POOL    Comments:  *Coaguchek* *Colonoscopy pending scheduling 5/23/22 - will need to hold/bridge (see 2/17/22 Pre-Procedure Screening note)*      Anticoagulation Care Providers     Provider Role Specialty Phone number    Crissy Mejia APRDIMPLE Referring Cardiology [phone]          Clinic Interview:  No pertinent clinical findings have been reported.    INR History:  Anticoagulation Monitoring 3/15/2022 3/28/2022 4/18/2022   INR 3.10 2.80 3.40   INR Date 3/12/2022 3/26/2022 4/15/2022   INR Goal 2.5-3.5 2.5-3.5 2.5-3.5   Trend Same Same Same   Last Week Total 42.5 mg 42.5 mg 42.5 mg   Next Week Total 42.5 mg 42.5 mg 42.5 mg   Sun 7.5 mg 7.5 mg 7.5 mg   Mon 5 mg 5 mg 5 mg   Tue 7.5 mg 7.5 mg 7.5 mg   Wed 5 mg 5 mg 5 mg   Thu 7.5 mg 7.5 mg 7.5 mg   Fri 5 mg 5 mg 5 mg   Sat 5 mg 5 mg 5 mg   Visit Report - - -   Some recent data might be hidden       Plan:  1. INR is therapeutic today- see above in Anticoagulation Summary.    Fernie Porter to continue their warfarin regimen- see above in Anticoagulation Summary.  2. Follow up in 2 weeks  3. Pt has agreed to only be called if INR out of range. They have been instructed to call if any changes in medications, doses, concerns, etc. Patient expresses understanding and has no further  questions at this time.    Brayan Patterson, PharmD

## 2022-04-30 LAB — INR PPP: 3.2

## 2022-05-02 ENCOUNTER — ANTICOAGULATION VISIT (OUTPATIENT)
Dept: PHARMACY | Facility: HOSPITAL | Age: 52
End: 2022-05-02

## 2022-05-02 DIAGNOSIS — Z95.2 AORTIC VALVE REPLACED: Primary | ICD-10-CM

## 2022-05-02 DIAGNOSIS — I48.0 PAROXYSMAL ATRIAL FIBRILLATION: ICD-10-CM

## 2022-05-02 RX ORDER — IMIPRAMINE HCL 50 MG
TABLET ORAL
Qty: 180 TABLET | Refills: 1 | Status: SHIPPED | OUTPATIENT
Start: 2022-05-02 | End: 2022-12-12

## 2022-05-02 NOTE — PROGRESS NOTES
Anticoagulation Clinic Progress Note    Anticoagulation Summary  As of 5/2/2022    INR goal:  2.5-3.5   TTR:  90.1 % (3.4 y)   INR used for dosing:  3.20 (4/30/2022)   Warfarin maintenance plan:  7.5 mg every Sun, Tue, Thu; 5 mg all other days   Weekly warfarin total:  42.5 mg   No change documented:  Georgie Gaming   Plan last modified:  Katie Schwarz, PharmD (4/13/2021)   Next INR check:  5/9/2022   Priority:  Maintenance   Target end date:      Indications    Aortic valve replaced [Z95.2]  Paroxysmal atrial fibrillation (HCC) [I48.0]             Anticoagulation Episode Summary     INR check location:      Preferred lab:      Send INR reminders to:   [facility]  POOL    Comments:  *Coaguchek* *Colonoscopy pending scheduling 5/23/22 - will need to hold/bridge (see 2/17/22 Pre-Procedure Screening note)*      Anticoagulation Care Providers     Provider Role Specialty Phone number    Crissy MejiaELLA Referring Cardiology [phone]          Clinic Interview:  No pertinent clinical findings have been reported.    INR History:  Anticoagulation Monitoring 3/28/2022 4/18/2022 5/2/2022   INR 2.80 3.40 3.20   INR Date 3/26/2022 4/15/2022 4/30/2022   INR Goal 2.5-3.5 2.5-3.5 2.5-3.5   Trend Same Same Same   Last Week Total 42.5 mg 42.5 mg 42.5 mg   Next Week Total 42.5 mg 42.5 mg 42.5 mg   Sun 7.5 mg 7.5 mg 7.5 mg   Mon 5 mg 5 mg 5 mg   Tue 7.5 mg 7.5 mg 7.5 mg   Wed 5 mg 5 mg 5 mg   Thu 7.5 mg 7.5 mg 7.5 mg   Fri 5 mg 5 mg 5 mg   Sat 5 mg 5 mg 5 mg   Visit Report - - -   Some recent data might be hidden       Plan:  1. INR is therapeutic today- see above in Anticoagulation Summary.    Fernie Porter to continue their warfarin regimen- see above in Anticoagulation Summary.  2. Follow up in 1 week  3. Pt has agreed to only be called if INR out of range. They have been instructed to call if any changes in medications, doses, concerns, etc. Patient expresses understanding and has no further  questions at this time.    Georgie Gaming

## 2022-05-09 ENCOUNTER — OFFICE VISIT (OUTPATIENT)
Dept: INTERNAL MEDICINE | Facility: CLINIC | Age: 52
End: 2022-05-09

## 2022-05-09 VITALS
WEIGHT: 170 LBS | HEART RATE: 69 BPM | RESPIRATION RATE: 16 BRPM | HEIGHT: 69 IN | BODY MASS INDEX: 25.18 KG/M2 | DIASTOLIC BLOOD PRESSURE: 82 MMHG | TEMPERATURE: 96.3 F | OXYGEN SATURATION: 98 % | SYSTOLIC BLOOD PRESSURE: 128 MMHG

## 2022-05-09 DIAGNOSIS — Z79.899 MEDICATION MANAGEMENT: ICD-10-CM

## 2022-05-09 DIAGNOSIS — I48.0 PAROXYSMAL ATRIAL FIBRILLATION: Chronic | ICD-10-CM

## 2022-05-09 DIAGNOSIS — Z12.5 SCREENING FOR PROSTATE CANCER: ICD-10-CM

## 2022-05-09 DIAGNOSIS — I10 PRIMARY HYPERTENSION: Primary | Chronic | ICD-10-CM

## 2022-05-09 DIAGNOSIS — G43.109 MIGRAINE WITH AURA AND WITHOUT STATUS MIGRAINOSUS, NOT INTRACTABLE: ICD-10-CM

## 2022-05-09 PROCEDURE — 99214 OFFICE O/P EST MOD 30 MIN: CPT | Performed by: INTERNAL MEDICINE

## 2022-05-09 NOTE — PROGRESS NOTES
Subjective   Fernie Porter is a 51 y.o. male.     Chief Complaint   Patient presents with   • Headache     Patient states he is doing well today         Hypertension  This is a chronic problem. The current episode started more than 1 year ago. The problem is unchanged. The problem is controlled. Associated symptoms include headaches and palpitations. Pertinent negatives include no chest pain, orthopnea, peripheral edema or shortness of breath. There are no associated agents to hypertension.   Atrial Fibrillation  Presents for follow-up visit. Symptoms include palpitations. Symptoms are negative for chest pain and shortness of breath. The symptoms have been stable. Past medical history includes atrial fibrillation.   Migraine       The following portions of the patient's history were reviewed and updated as appropriate: allergies, current medications, past social history and problem list.    Outpatient Medications Marked as Taking for the 5/9/22 encounter (Office Visit) with Juan Antonio Ford MD   Medication Sig Dispense Refill   • amLODIPine (NORVASC) 5 MG tablet TAKE 1 TABLET BY MOUTH EVERY DAY 90 tablet 3   • cloNIDine (CATAPRES) 0.2 MG tablet TAKE 1 TABLET BY MOUTH TWICE A  tablet 1   • fluticasone (FLONASE) 50 MCG/ACT nasal spray 2 SPRAYS INTO THE NOSTRIL(S) AS DIRECTED BY PROVIDER DAILY. 48 mL 2   • Fremanezumab-vfrm (Ajovy) 225 MG/1.5ML solution auto-injector Inject 225 mg under the skin into the appropriate area as directed Every 30 (Thirty) Days. 3 pen 3   • hydroCHLOROthiazide (MICROZIDE) 12.5 MG capsule TAKE 1 CAPSULE BY MOUTH EVERY DAY 90 capsule 1   • ibuprofen (ADVIL,MOTRIN) 200 MG tablet Take 200 mg by mouth Every 6 (Six) Hours As Needed for Mild Pain .     • imipramine (TOFRANIL) 50 MG tablet TAKE 2 TABLETS BY MOUTH AT BEDTIME 180 tablet 1   • propranolol LA (INDERAL LA) 120 MG 24 hr capsule TAKE 1 CAPSULE BY MOUTH EVERY DAY 90 capsule 1   • warfarin (COUMADIN) 5 MG tablet Take 7.5mg (1 and  1/2 Tablets) on Sunday, Tuesday, and Thursday; Take 5mg (1 Tablet) all other days OR as directed by the [facility]. 105 tablet 1       Review of Systems   Respiratory: Negative for shortness of breath and wheezing.    Cardiovascular: Positive for palpitations. Negative for chest pain, orthopnea and leg swelling.   Gastrointestinal: Negative for constipation and diarrhea.   Neurological: Positive for headaches.       Objective   Vitals:    05/09/22 1404   BP: 128/82   Pulse: 69   Resp: 16   Temp: 96.3 °F (35.7 °C)   SpO2: 98%          05/09/22  1404   Weight: 77.1 kg (170 lb)    [unfilled]  Body mass index is 25.09 kg/m².      Physical Exam   Constitutional: He appears well-developed.   Cardiovascular: Normal rate and regular rhythm. Exam reveals no gallop.   Murmur heard.   Crescendo decrescendo systolic murmur is present with a grade of 2/6.  Prosthetic heart sounds.  Grade 2/6 JOHNNIE loudest at the pulmonic area.   Pulmonary/Chest: Effort normal and breath sounds normal. No respiratory distress. He has no wheezes. He has no rales.   Abdominal: Soft. Normal appearance and bowel sounds are normal. He exhibits no mass. There is no abdominal tenderness. There is no guarding.   Neurological: He is alert.         Problems Addressed this Visit        Cardiac and Vasculature    Paroxysmal atrial fibrillation (HCC) (Chronic)    Hypertension - Primary (Chronic)       Neuro    Migraine      Diagnoses       Codes Comments    Primary hypertension    -  Primary ICD-10-CM: I10  ICD-9-CM: 401.9     Paroxysmal atrial fibrillation (HCC)     ICD-10-CM: I48.0  ICD-9-CM: 427.31     Migraine with aura and without status migrainosus, not intractable     ICD-10-CM: G43.109  ICD-9-CM: 346.00         Assessment/Plan   In for recheck of hypertension, AF, aortic stenosis, and migraines.  Migraines persist.  They're doing pretty well to present time.  Gets 3 or 4 per month.  Has failed several drugs in the past including Topamax.   Currently on Ajovy injections once monthly and they have been working pretty well right now.  Blood pressure control is excellent.  Atrial fib is controlled.  On propranolol  mg daily, HCTZ 12.5 mg daily, amlodipine 5 mg daily, and clonidine 0.2 mg twice daily for hypertension.  Those are reviewed today.  Gets annual lab work today May 2022 including CBC, CMP, PSA urinalysis.  Continue to monitor every 3 months.  Certainly no severe migraine since he started Inderal LA.   He is due for colonoscopy.  Is going to have to bridge off of Coumadin on the Lovenox perioperatively.  Due for annual preventive exam.  Due for Shingrix.  Due for TDA P but declines that for now.  Has a colonoscopy scheduled.  He is 1 HPP today.    The above information was reviewed again today 05/09/22.  It continues to be accurate as reflected above and is unchanged.  History, physical and review of systems all reviewed and are unchanged.  Medications were reviewed today and continue the current dosing.    PPE today includes face mask and eye shield.           Dragon disclaimer:   Much of this encounter note is an electronic transcription/translation of spoken language to printed text. The electronic translation of spoken language may permit erroneous, or at times, nonsensical words or phrases to be inadvertently transcribed; Although I have reviewed the note for such errors, some may still exist.

## 2022-05-10 LAB
ALBUMIN SERPL-MCNC: 4.6 G/DL (ref 4–5)
ALBUMIN/GLOB SERPL: 2.3 {RATIO} (ref 1.2–2.2)
ALP SERPL-CCNC: 59 IU/L (ref 44–121)
ALT SERPL-CCNC: 23 IU/L (ref 0–44)
AST SERPL-CCNC: 30 IU/L (ref 0–40)
BASOPHILS # BLD AUTO: 0.1 X10E3/UL (ref 0–0.2)
BASOPHILS NFR BLD AUTO: 1 %
BILIRUB SERPL-MCNC: 0.4 MG/DL (ref 0–1.2)
BUN SERPL-MCNC: 10 MG/DL (ref 6–24)
BUN/CREAT SERPL: 13 (ref 9–20)
CALCIUM SERPL-MCNC: 9.5 MG/DL (ref 8.7–10.2)
CHLORIDE SERPL-SCNC: 99 MMOL/L (ref 96–106)
CO2 SERPL-SCNC: 26 MMOL/L (ref 20–29)
CREAT SERPL-MCNC: 0.8 MG/DL (ref 0.76–1.27)
EGFRCR SERPLBLD CKD-EPI 2021: 108 ML/MIN/1.73
EOSINOPHIL # BLD AUTO: 0.2 X10E3/UL (ref 0–0.4)
EOSINOPHIL NFR BLD AUTO: 4 %
ERYTHROCYTE [DISTWIDTH] IN BLOOD BY AUTOMATED COUNT: 12.3 % (ref 11.6–15.4)
GLOBULIN SER CALC-MCNC: 2 G/DL (ref 1.5–4.5)
GLUCOSE SERPL-MCNC: 76 MG/DL (ref 65–99)
HCT VFR BLD AUTO: 47.4 % (ref 37.5–51)
HGB BLD-MCNC: 15.5 G/DL (ref 13–17.7)
IMM GRANULOCYTES # BLD AUTO: 0 X10E3/UL (ref 0–0.1)
IMM GRANULOCYTES NFR BLD AUTO: 0 %
LYMPHOCYTES # BLD AUTO: 1.2 X10E3/UL (ref 0.7–3.1)
LYMPHOCYTES NFR BLD AUTO: 21 %
MCH RBC QN AUTO: 29.2 PG (ref 26.6–33)
MCHC RBC AUTO-ENTMCNC: 32.7 G/DL (ref 31.5–35.7)
MCV RBC AUTO: 89 FL (ref 79–97)
MONOCYTES # BLD AUTO: 0.5 X10E3/UL (ref 0.1–0.9)
MONOCYTES NFR BLD AUTO: 10 %
NEUTROPHILS # BLD AUTO: 3.4 X10E3/UL (ref 1.4–7)
NEUTROPHILS NFR BLD AUTO: 64 %
PLATELET # BLD AUTO: 252 X10E3/UL (ref 150–450)
POTASSIUM SERPL-SCNC: 4 MMOL/L (ref 3.5–5.2)
PROT SERPL-MCNC: 6.6 G/DL (ref 6–8.5)
PSA SERPL-MCNC: 1.4 NG/ML (ref 0–4)
RBC # BLD AUTO: 5.31 X10E6/UL (ref 4.14–5.8)
SODIUM SERPL-SCNC: 141 MMOL/L (ref 134–144)
WBC # BLD AUTO: 5.4 X10E3/UL (ref 3.4–10.8)

## 2022-05-13 LAB — INR PPP: 2.8

## 2022-05-16 ENCOUNTER — TELEPHONE (OUTPATIENT)
Dept: GASTROENTEROLOGY | Facility: CLINIC | Age: 52
End: 2022-05-16

## 2022-05-16 ENCOUNTER — ANTICOAGULATION VISIT (OUTPATIENT)
Dept: PHARMACY | Facility: HOSPITAL | Age: 52
End: 2022-05-16

## 2022-05-16 DIAGNOSIS — I48.0 PAROXYSMAL ATRIAL FIBRILLATION: ICD-10-CM

## 2022-05-16 DIAGNOSIS — Z95.2 AORTIC VALVE REPLACED: Primary | ICD-10-CM

## 2022-05-16 NOTE — TELEPHONE ENCOUNTER
Detailed vm left, on identified line to call the pharmacist at the [facility] for further direction on warfarin and upcoming scope    Brayan pt is scheduled for 5/23/22, I asked the pt to reach out to you ab this  Thanks  Ella KIRAN

## 2022-05-16 NOTE — TELEPHONE ENCOUNTER
Good afternoon,     Mr. Porter submitted an INR of 2.8 (goal 2.5-3.5) over the weekend. He is scheduled for colonoscopy on 5/23/22.     He is prescribed warfarin for history of mechanical aortic valve replacement and paroxysmal atrial fibrillation (CHADS-VASc score is 1). No history of stroke documented.     Would recommend holding warfarin 4 days prior to colonoscopy without enoxaparin bridge with plan for extra warfarin his first 2-3 days following procedure to quickly achieve therapeutic INR. However, please advise if you have alternative recommendations, and I will be happy to instruct Mr. Porter accordingly.     Thank you in advance!  Brayan

## 2022-05-16 NOTE — TELEPHONE ENCOUNTER
----- Message from Priscila Barber CMA sent at 5/13/2022  3:03 PM EDT -----  Regarding: INR  Contact: [phone]  Patient has questions about warfarin and INR for scope.

## 2022-05-17 NOTE — PROGRESS NOTES
Anticoagulation Clinic Progress Note    Anticoagulation Summary  As of 2022    INR goal:  2.5-3.5   TTR:  90.2 % (3.5 y)   INR used for dosin.80 (2022)   Warfarin maintenance plan:  7.5 mg every Sun, e, Thu; 5 mg all other days   Weekly warfarin total:  42.5 mg   Plan last modified:  Katie Schwarz, PharmD (2021)   Next INR check:  2022   Priority:  Maintenance   Target end date:      Indications    Aortic valve replaced [Z95.2]  Paroxysmal atrial fibrillation (HCC) [I48.0]             Anticoagulation Episode Summary     INR check location:      Preferred lab:      Send INR reminders to:   [facility]  POOL    Comments:  *Coaguchek* *Colonoscopy pending scheduling 22 - will need to hold/bridge (see 22 Pre-Procedure Screening note)*      Anticoagulation Care Providers     Provider Role Specialty Phone number    Jackie ELLA Woodward Referring Cardiology [phone]          Clinic Interview:  Patient Findings     Positives:  Upcoming invasive procedure    Negatives:  Signs/symptoms of thrombosis, Signs/symptoms of bleeding,   Laboratory test error suspected, Change in health, Change in alcohol use,   Change in activity, Emergency department visit, Upcoming dental procedure,   Missed doses, Extra doses, Change in medications, Change in diet/appetite,   Hospital admission, Bruising, Other complaints    Comments:  Colonoscopy scheduled for - cardio approved holding x 4   days without bridging.       Clinical Outcomes     Negatives:  Major bleeding event, Thromboembolic event,   Anticoagulation-related hospital admission, Anticoagulation-related ED   visit, Anticoagulation-related fatality    Comments:  Colonoscopy scheduled for - cardio approved holding x 4   days without bridging.         INR History:  Anticoagulation Monitoring 2022   INR 3.40 3.20 2.80   INR Date 4/15/2022 2022 2022   INR Goal 2.5-3.5 2.5-3.5 2.5-3.5   Trend  Same Same Same   Last Week Total 42.5 mg 42.5 mg 42.5 mg   Next Week Total 42.5 mg 42.5 mg 17.5 mg   Sun 7.5 mg 7.5 mg Hold (5/22)   Mon 5 mg 5 mg 10 mg (5/23); Otherwise 5 mg   Tue 7.5 mg 7.5 mg 10 mg (5/24); Otherwise 7.5 mg   Wed 5 mg 5 mg 5 mg   Thu 7.5 mg 7.5 mg Hold (5/19); Otherwise 7.5 mg   Fri 5 mg 5 mg Hold (5/20)   Sat 5 mg 5 mg Hold (5/21)   Visit Report - - -   Some recent data might be hidden       Plan:  1. INR is Therapeutic today- see above in Anticoagulation Summary.   Will instruct Fernie Porter to Change their warfarin regimen- see above in Anticoagulation Summary.  2. Follow up on 27th (a few days post procedure)   3. Pt has agreed to only be called if INR out of range. They have been instructed to call if any changes in medications, doses, concerns, etc. Patient expresses understanding and has no further questions at this time.    Dorothy Leon, [facility]

## 2022-05-23 ENCOUNTER — ANESTHESIA (OUTPATIENT)
Dept: GASTROENTEROLOGY | Facility: HOSPITAL | Age: 52
End: 2022-05-23

## 2022-05-23 ENCOUNTER — ANESTHESIA EVENT (OUTPATIENT)
Dept: GASTROENTEROLOGY | Facility: HOSPITAL | Age: 52
End: 2022-05-23

## 2022-05-23 ENCOUNTER — HOSPITAL ENCOUNTER (OUTPATIENT)
Facility: HOSPITAL | Age: 52
Setting detail: HOSPITAL OUTPATIENT SURGERY
Discharge: HOME | End: 2022-05-23
Attending: INTERNAL MEDICINE | Admitting: INTERNAL MEDICINE

## 2022-05-23 VITALS
DIASTOLIC BLOOD PRESSURE: 68 MMHG | SYSTOLIC BLOOD PRESSURE: 107 MMHG | RESPIRATION RATE: 18 BRPM | BODY MASS INDEX: 24.04 KG/M2 | HEART RATE: 69 BPM | WEIGHT: 162.3 LBS | HEIGHT: 69 IN | OXYGEN SATURATION: 99 %

## 2022-05-23 DIAGNOSIS — Z12.11 ENCOUNTER FOR SCREENING FOR MALIGNANT NEOPLASM OF COLON: ICD-10-CM

## 2022-05-23 PROCEDURE — 25010000002 PROPOFOL 10 MG/ML EMULSION: Performed by: ANESTHESIOLOGY

## 2022-05-23 PROCEDURE — 45378 DIAGNOSTIC COLONOSCOPY: CPT | Performed by: INTERNAL MEDICINE

## 2022-05-23 PROCEDURE — 25010000002 PHENYLEPHRINE 10 MG/ML SOLUTION 5 ML VIAL: Performed by: ANESTHESIOLOGY

## 2022-05-23 RX ORDER — LIDOCAINE HYDROCHLORIDE 20 MG/ML
INJECTION, SOLUTION INFILTRATION; PERINEURAL AS NEEDED
Status: DISCONTINUED | OUTPATIENT
Start: 2022-05-23 | End: 2022-05-23 | Stop reason: SURG

## 2022-05-23 RX ORDER — PROPOFOL 10 MG/ML
VIAL (ML) INTRAVENOUS AS NEEDED
Status: DISCONTINUED | OUTPATIENT
Start: 2022-05-23 | End: 2022-05-23 | Stop reason: SURG

## 2022-05-23 RX ORDER — SODIUM CHLORIDE, SODIUM LACTATE, POTASSIUM CHLORIDE, CALCIUM CHLORIDE 600; 310; 30; 20 MG/100ML; MG/100ML; MG/100ML; MG/100ML
30 INJECTION, SOLUTION INTRAVENOUS CONTINUOUS PRN
Status: DISCONTINUED | OUTPATIENT
Start: 2022-05-23 | End: 2022-05-23 | Stop reason: HOSPADM

## 2022-05-23 RX ORDER — SODIUM CHLORIDE, SODIUM LACTATE, POTASSIUM CHLORIDE, CALCIUM CHLORIDE 600; 310; 30; 20 MG/100ML; MG/100ML; MG/100ML; MG/100ML
30 INJECTION, SOLUTION INTRAVENOUS CONTINUOUS
Status: DISCONTINUED | OUTPATIENT
Start: 2022-05-23 | End: 2022-05-23 | Stop reason: HOSPADM

## 2022-05-23 RX ADMIN — PROPOFOL 100 MCG/KG/MIN: 10 INJECTION, EMULSION INTRAVENOUS at 09:26

## 2022-05-23 RX ADMIN — PROPOFOL 140 MCG/KG/MIN: 10 INJECTION, EMULSION INTRAVENOUS at 09:18

## 2022-05-23 RX ADMIN — PROPOFOL 150 MG: 10 INJECTION, EMULSION INTRAVENOUS at 09:12

## 2022-05-23 RX ADMIN — PHENYLEPHRINE HYDROCHLORIDE 100 MG: 10 INJECTION INTRAVENOUS at 09:32

## 2022-05-23 RX ADMIN — LIDOCAINE HYDROCHLORIDE 30 MG: 20 INJECTION, SOLUTION INFILTRATION; PERINEURAL at 09:12

## 2022-05-23 RX ADMIN — SODIUM CHLORIDE, POTASSIUM CHLORIDE, SODIUM LACTATE AND CALCIUM CHLORIDE: 600; 310; 30; 20 INJECTION, SOLUTION INTRAVENOUS at 09:11

## 2022-05-23 RX ADMIN — SODIUM CHLORIDE, POTASSIUM CHLORIDE, SODIUM LACTATE AND CALCIUM CHLORIDE 30 ML/HR: 600; 310; 30; 20 INJECTION, SOLUTION INTRAVENOUS at 09:07

## 2022-05-23 NOTE — ANESTHESIA POSTPROCEDURE EVALUATION
"Patient: Fernie Porter    Procedure Summary     Date: 05/23/22 Room / Location: [facility] ENDOSCOPY 10 /  [facility]    Anesthesia Start: 0911 Anesthesia Stop: 0943    Procedure: COLONOSCOPY INTO CECUM (N/A ) Diagnosis:       Encounter for screening for malignant neoplasm of colon      (Encounter for screening for malignant neoplasm of colon [Z12.11])    Surgeons: Fernie Leal MD Provider: Kimberly Medrano MD    Anesthesia Type: MAC ASA Status: 3          Anesthesia Type: MAC    Vitals  Vitals Value Taken Time   /68 05/23/22 1007   Temp     Pulse 69 05/23/22 0958   Resp 18 05/23/22 0958   SpO2 99 % 05/23/22 0958           Post Anesthesia Care and Evaluation    Patient location during evaluation: PHASE II  Patient participation: complete - patient participated  Level of consciousness: awake and alert  Pain management: adequate  Airway patency: patent  Anesthetic complications: No anesthetic complications    Cardiovascular status: acceptable  Respiratory status: acceptable  Hydration status: acceptable    Comments: /68   Pulse 69   Resp 18   Ht 175.3 cm (69\")   Wt 73.6 kg (162 lb 4.8 oz)   SpO2 99%   BMI 23.97 kg/m²         "

## 2022-05-23 NOTE — ANESTHESIA PREPROCEDURE EVALUATION
Anesthesia Evaluation                  Airway   Mallampati: I  TM distance: >3 FB  Neck ROM: full  Dental      Comment: Cap upper front    Pulmonary    (-) shortness of breath, recent URI, not a smoker  Cardiovascular     (+) hypertension, valvular problems/murmurs (s/p AVR), dysrhythmias Atrial Fib,   (-) CAD, angina    ROS comment: Repaired aortic aneurysm    Neuro/Psych  GI/Hepatic/Renal/Endo    (+)   renal disease stones,   (-) liver disease    Musculoskeletal     Abdominal    Substance History      OB/GYN          Other                      Anesthesia Plan    ASA 3     MAC     intravenous induction     Anesthetic plan, all risks, benefits, and alternatives have been provided, discussed and informed consent has been obtained with: patient.        CODE STATUS:

## 2022-05-24 NOTE — H&P
"[facility]  Pre Procedure History & Physical    Chief Complaint:   Time for my colonoscopy    Subjective     HPI:   51 y.o. male presenting for average risk screening.  No prior cscope.  No current GI issues.  No family hx of colon cancer or polyps.        Past Medical History:   Past Medical History:   Diagnosis Date   • AAA (abdominal aortic aneurysm) (HCC) 1992    repaired, subclaven stenosis subsequently   • Allergic 8/2011    Contrast dye   • Aortic stenosis    • Bicuspid aortic valve    • Coarctation of aorta    • Headache 1977   • Heart murmur 1971   • Hypertension    • Kidney stone 2010   • PAF (paroxysmal atrial fibrillation) (HCC)        Family History:  Family History   Problem Relation Age of Onset   • Hypertension Mother    • Stroke Mother    • No Known Problems Father    • No Known Problems Sister    • Cancer Maternal Grandfather    • Cancer Paternal Grandmother        Social History:   reports that he has never smoked. He has never used smokeless tobacco. He reports that he does not drink alcohol and does not use drugs.    Medications:   No medications prior to admission.       Allergies:  Contrast dye, Iodinated diagnostic agents, and Tagamet [cimetidine]    ROS:    Pertinent items are noted in HPI     Objective     Blood pressure 107/68, pulse 69, resp. rate 18, height 175.3 cm (69\"), weight 73.6 kg (162 lb 4.8 oz), SpO2 99 %.    Physical Exam   Constitutional: Pt is oriented to person, place, and time and well-developed, well-nourished, and in no distress.   Abdominal: Soft.   Psychiatric: Mood, memory, affect and judgment normal.     Assessment & Plan     Diagnosis:  Encounter for screening for colon cancer    Anticipated Surgical Procedure:  Colonoscopy    The risks, benefits, and alternatives of this procedure have been discussed with the patient or the responsible party- the patient understands and agrees to " proceed.

## 2022-05-27 ENCOUNTER — ANTICOAGULATION VISIT (OUTPATIENT)
Dept: PHARMACY | Facility: HOSPITAL | Age: 52
End: 2022-05-27

## 2022-05-27 DIAGNOSIS — Z95.2 AORTIC VALVE REPLACED: Primary | ICD-10-CM

## 2022-05-27 DIAGNOSIS — I48.0 PAROXYSMAL ATRIAL FIBRILLATION: ICD-10-CM

## 2022-05-27 LAB — INR PPP: 1.8

## 2022-05-27 NOTE — PROGRESS NOTES
Anticoagulation Clinic Progress Note    Anticoagulation Summary  As of 2022    INR goal:  2.5-3.5   TTR:  89.5 % (3.5 y)   INR used for dosin.80 (2022)   Warfarin maintenance plan:  7.5 mg every Sun, Tue, Thu; 5 mg all other days   Weekly warfarin total:  42.5 mg   Plan last modified:  Katie Schwarz, PharmD (2021)   Next INR check:  2022   Priority:  Maintenance   Target end date:      Indications    Aortic valve replaced [Z95.2]  Paroxysmal atrial fibrillation (HCC) [I48.0]             Anticoagulation Episode Summary     INR check location:      Preferred lab:      [facility]    Comments:  *Coaguchek* *Colonoscopy pending scheduling 22 - will need to hold/bridge (see 22 Pre-Procedure Screening note)*      Anticoagulation Care Providers     Provider Role Specialty Phone number    Crissy Mejia APRN Referring Cardiology [phone]          Clinic Interview:  Patient Findings     Negatives:  Signs/symptoms of thrombosis, Signs/symptoms of bleeding,   Laboratory test error suspected, Change in health, Change in alcohol use,   Change in activity, Upcoming invasive procedure, Emergency department   visit, Upcoming dental procedure, Missed doses, Extra doses, Change in   medications, Change in diet/appetite, Hospital admission, Bruising, Other   complaints      Clinical Outcomes     Negatives:  Major bleeding event, Thromboembolic event,   Anticoagulation-related hospital admission, Anticoagulation-related ED   visit, Anticoagulation-related fatality        INR History:  Anticoagulation Monitoring 2022   INR 3.20 2.80 1.80   INR Date 2022   INR Goal 2.5-3.5 2.5-3.5 2.5-3.5   Trend Same Same Same   Last Week Total 42.5 mg 42.5 mg 32.5 mg   Next Week Total 42.5 mg 17.5 mg 47.5 mg   Sun 7.5 mg Hold () 7.5 mg   Mon 5 mg 10 mg (); Otherwise 5 mg 5 mg   Tue 7.5 mg 10 mg (); Otherwise 7.5  mg 7.5 mg   Wed 5 mg 5 mg -   Thu 7.5 mg Hold (5/19); Otherwise 7.5 mg -   Fri 5 mg Hold (5/20) 10 mg (5/27)   Sat 5 mg Hold (5/21) 5 mg   Visit Report - - -   Some recent data might be hidden       Plan:  1. INR is Subtherapeutic today- see above in Anticoagulation Summary.   Will instruct Fernie CORY Porter to Change their warfarin regimen- see above in Anticoagulation Summary.  Take 10mg today (5/27) then resume previous regimen.   2. Follow up Wednesday, 6/1.   3. They have been instructed to call if any changes in medications, doses, concerns, etc. Patient expresses understanding and has no further questions at this time.    Irma Mustafa, PharmD

## 2022-06-01 LAB — INR PPP: 3.3

## 2022-06-02 ENCOUNTER — ANTICOAGULATION VISIT (OUTPATIENT)
Dept: PHARMACY | Facility: HOSPITAL | Age: 52
End: 2022-06-02

## 2022-06-02 DIAGNOSIS — Z95.2 AORTIC VALVE REPLACED: Primary | ICD-10-CM

## 2022-06-02 DIAGNOSIS — I48.0 PAROXYSMAL ATRIAL FIBRILLATION: ICD-10-CM

## 2022-06-02 NOTE — PROGRESS NOTES
Anticoagulation Clinic Progress Note    Anticoagulation Summary  As of 6/2/2022    INR goal:  2.5-3.5   TTR:  89.4 % (3.5 y)   INR used for dosing:  3.30 (6/1/2022)   Warfarin maintenance plan:  7.5 mg every Sun, Tue, Thu; 5 mg all other days   Weekly warfarin total:  42.5 mg   No change documented:  Dorothy Leon, [place]   Plan last modified:  Katie Schwarz, PharmD (4/13/2021)   Next INR check:  6/15/2022   Priority:  Maintenance   Target end date:      Indications    Aortic valve replaced [Z95.2]  Paroxysmal atrial fibrillation (HCC) [I48.0]             Anticoagulation Episode Summary     INR check location:      Preferred lab:      Send INR reminders to:   [facility]  POOL    Comments:  *Coaguchek* *Colonoscopy pending scheduling 5/23/22 - will need to hold/bridge (see 2/17/22 Pre-Procedure Screening note)*      Anticoagulation Care Providers     Provider Role Specialty Phone number    Crissy MejiaELLA Referring Cardiology [phone]          Clinic Interview:  No pertinent clinical findings have been reported.    INR History:  Anticoagulation Monitoring 5/16/2022 5/27/2022 6/2/2022   INR 2.80 1.80 3.30   INR Date 5/13/2022 5/27/2022 6/1/2022   INR Goal 2.5-3.5 2.5-3.5 2.5-3.5   Trend Same Same Same   Last Week Total 42.5 mg 32.5 mg 47.5 mg   Next Week Total 17.5 mg 47.5 mg 42.5 mg   Sun Hold (5/22) 7.5 mg 7.5 mg   Mon 10 mg (5/23); Otherwise 5 mg 5 mg 5 mg   Tue 10 mg (5/24); Otherwise 7.5 mg 7.5 mg 7.5 mg   Wed 5 mg - 5 mg   Thu Hold (5/19); Otherwise 7.5 mg - 7.5 mg   Fri Hold (5/20) 10 mg (5/27) 5 mg   Sat Hold (5/21) 5 mg 5 mg   Visit Report - - -   Some recent data might be hidden       Plan:  1. INR is therapeutic today- see above in Anticoagulation Summary.    Fernie Porter to continue their warfarin regimen- see above in Anticoagulation Summary.  2. Follow up in 2 weeks  3.  They have been instructed to call if any changes in medications, doses, concerns, etc. Patient expresses  understanding and has no further questions at this time.    Dorothy Leon, RP

## 2022-06-14 LAB — INR PPP: 2.8

## 2022-06-15 ENCOUNTER — ANTICOAGULATION VISIT (OUTPATIENT)
Dept: PHARMACY | Facility: HOSPITAL | Age: 52
End: 2022-06-15

## 2022-06-15 DIAGNOSIS — I48.0 PAROXYSMAL ATRIAL FIBRILLATION: ICD-10-CM

## 2022-06-15 DIAGNOSIS — Z95.2 AORTIC VALVE REPLACED: Primary | ICD-10-CM

## 2022-06-15 NOTE — PROGRESS NOTES
Anticoagulation Clinic Progress Note    Anticoagulation Summary  As of 6/15/2022    INR goal:  2.5-3.5   TTR:  89.5 % (3.5 y)   INR used for dosin.80 (2022)   Warfarin maintenance plan:  7.5 mg every Sun, Tue, Thu; 5 mg all other days   Weekly warfarin total:  42.5 mg   No change documented:  Georgie Gaming   Plan last modified:  Katie Schwarz, PharmD (2021)   Next INR check:  2022   Priority:  Maintenance   Target end date:      Indications    Aortic valve replaced [Z95.2]  Paroxysmal atrial fibrillation (HCC) [I48.0]             Anticoagulation Episode Summary     INR check location:      Preferred lab:      Send INR reminders to:   [facility]  POOL    Comments:  *Coaguchek* *Colonoscopy pending scheduling 22 - will need to hold/bridge (see 22 Pre-Procedure Screening note)*      Anticoagulation Care Providers     Provider Role Specialty Phone number    Crissy MejiaELLA Referring Cardiology [phone]          Clinic Interview:  No pertinent clinical findings have been reported.    INR History:  Anticoagulation Monitoring 2022 2022 6/15/2022   INR 1.80 3.30 2.80   INR Date 2022   INR Goal 2.5-3.5 2.5-3.5 2.5-3.5   Trend Same Same Same   Last Week Total 32.5 mg 47.5 mg 42.5 mg   Next Week Total 47.5 mg 42.5 mg 42.5 mg   Sun 7.5 mg 7.5 mg 7.5 mg   Mon 5 mg 5 mg 5 mg   Tue 7.5 mg 7.5 mg 7.5 mg   Wed - 5 mg 5 mg   Thu - 7.5 mg 7.5 mg   Fri 10 mg () 5 mg 5 mg   Sat 5 mg 5 mg 5 mg   Visit Report - - -   Some recent data might be hidden       Plan:  1. INR is therapeutic today- see above in Anticoagulation Summary.    Fernie Porter to continue their warfarin regimen- see above in Anticoagulation Summary.  2. Follow up in 2 weeks  3. Pt has agreed to only be called if INR out of range. They have been instructed to call if any changes in medications, doses, concerns, etc. Patient expresses understanding and has no further  questions at this time.    Georgie Gaming

## 2022-06-28 LAB — INR PPP: 3.2

## 2022-06-29 ENCOUNTER — ANTICOAGULATION VISIT (OUTPATIENT)
Dept: PHARMACY | Facility: HOSPITAL | Age: 52
End: 2022-06-29

## 2022-06-29 DIAGNOSIS — I48.0 PAROXYSMAL ATRIAL FIBRILLATION: ICD-10-CM

## 2022-06-29 DIAGNOSIS — Z95.2 AORTIC VALVE REPLACED: Primary | ICD-10-CM

## 2022-06-29 NOTE — PROGRESS NOTES
Anticoagulation Clinic Progress Note    Anticoagulation Summary  As of 6/29/2022    INR goal:  2.5-3.5   TTR:  89.6 % (3.6 y)   INR used for dosing:  3.20 (6/28/2022)   Warfarin maintenance plan:  7.5 mg every Sun, Tue, Thu; 5 mg all other days   Weekly warfarin total:  42.5 mg   No change documented:  Georgie Gaming   Plan last modified:  Katie Schwarz, PharmD (4/13/2021)   Next INR check:  7/12/2022   Priority:  Maintenance   Target end date:      Indications    Aortic valve replaced [Z95.2]  Paroxysmal atrial fibrillation (HCC) [I48.0]             Anticoagulation Episode Summary     INR check location:      Preferred lab:      Send INR reminders to:   [facility]  POOL    Comments:  *Coaguchek* *Colonoscopy pending scheduling 5/23/22 - will need to hold/bridge (see 2/17/22 Pre-Procedure Screening note)*      Anticoagulation Care Providers     Provider Role Specialty Phone number    Crissy MejiaELLA Referring Cardiology [phone]          Clinic Interview:  No pertinent clinical findings have been reported.    INR History:  Anticoagulation Monitoring 6/2/2022 6/15/2022 6/29/2022   INR 3.30 2.80 3.20   INR Date 6/1/2022 6/14/2022 6/28/2022   INR Goal 2.5-3.5 2.5-3.5 2.5-3.5   Trend Same Same Same   Last Week Total 47.5 mg 42.5 mg 42.5 mg   Next Week Total 42.5 mg 42.5 mg 42.5 mg   Sun 7.5 mg 7.5 mg 7.5 mg   Mon 5 mg 5 mg 5 mg   Tue 7.5 mg 7.5 mg 7.5 mg   Wed 5 mg 5 mg 5 mg   Thu 7.5 mg 7.5 mg 7.5 mg   Fri 5 mg 5 mg 5 mg   Sat 5 mg 5 mg 5 mg   Visit Report - - -   Some recent data might be hidden       Plan:  1. INR is therapeutic today- see above in Anticoagulation Summary.    Fernie Porter to continue their warfarin regimen- see above in Anticoagulation Summary.  2. Follow up in 2 weeks  3. Pt has agreed to only be called if INR out of range. They have been instructed to call if any changes in medications, doses, concerns, etc. Patient expresses understanding and has no further  questions at this time.    Georgie Gaming

## 2022-07-16 LAB — INR PPP: 3.3

## 2022-07-18 ENCOUNTER — ANTICOAGULATION VISIT (OUTPATIENT)
Dept: PHARMACY | Facility: HOSPITAL | Age: 52
End: 2022-07-18

## 2022-07-18 DIAGNOSIS — I48.0 PAROXYSMAL ATRIAL FIBRILLATION: ICD-10-CM

## 2022-07-18 DIAGNOSIS — Z95.2 AORTIC VALVE REPLACED: Primary | ICD-10-CM

## 2022-07-18 NOTE — PROGRESS NOTES
Anticoagulation Clinic Progress Note    Anticoagulation Summary  As of 7/18/2022    INR goal:  2.5-3.5   TTR:  89.7 % (3.6 y)   INR used for dosing:  3.30 (7/16/2022)   Warfarin maintenance plan:  7.5 mg every Sun, Tue, Thu; 5 mg all other days   Weekly warfarin total:  42.5 mg   No change documented:  Corry Ramirez, Pharmacy Technician   Plan last modified:  Katie Schwarz, PharmD (4/13/2021)   Next INR check:  8/1/2022   Priority:  Maintenance   Target end date:      Indications    Aortic valve replaced [Z95.2]  Paroxysmal atrial fibrillation (HCC) [I48.0]             Anticoagulation Episode Summary     INR check location:      Preferred lab:      Send INR reminders to:   [facility]  POOL    Comments:  *Coaguchek* *Colonoscopy pending scheduling 5/23/22 - will need to hold/bridge (see 2/17/22 Pre-Procedure Screening note)*      Anticoagulation Care Providers     Provider Role Specialty Phone number    Crissy MejiaELLA Referring Cardiology [phone]          Clinic Interview:  No pertinent clinical findings have been reported.    INR History:  Anticoagulation Monitoring 6/15/2022 6/29/2022 7/18/2022   INR 2.80 3.20 3.30   INR Date 6/14/2022 6/28/2022 7/16/2022   INR Goal 2.5-3.5 2.5-3.5 2.5-3.5   Trend Same Same Same   Last Week Total 42.5 mg 42.5 mg 42.5 mg   Next Week Total 42.5 mg 42.5 mg 42.5 mg   Sun 7.5 mg 7.5 mg 7.5 mg   Mon 5 mg 5 mg 5 mg   Tue 7.5 mg 7.5 mg 7.5 mg   Wed 5 mg 5 mg 5 mg   Thu 7.5 mg 7.5 mg 7.5 mg   Fri 5 mg 5 mg 5 mg   Sat 5 mg 5 mg 5 mg   Visit Report - - -   Some recent data might be hidden       Plan:  1. INR is therapeutic today- see above in Anticoagulation Summary.    Fernie Porter to continue their warfarin regimen- see above in Anticoagulation Summary.  2. Follow up in 2 weeks  3. They have been instructed to call if any changes in medications, doses, concerns, etc. Patient expresses understanding and has no further questions at this time.    Corry Ramirez,  Pharmacy Technician

## 2022-08-03 DIAGNOSIS — G43.109 MIGRAINE WITH AURA AND WITHOUT STATUS MIGRAINOSUS, NOT INTRACTABLE: ICD-10-CM

## 2022-08-04 RX ORDER — FREMANEZUMAB-VFRM 225 MG/1.5ML
225 INJECTION SUBCUTANEOUS
Qty: 4.5 ML | Refills: 3 | Status: SHIPPED | OUTPATIENT
Start: 2022-08-04

## 2022-08-07 LAB — INR PPP: 2.6

## 2022-08-08 ENCOUNTER — OFFICE VISIT (OUTPATIENT)
Dept: INTERNAL MEDICINE | Facility: CLINIC | Age: 52
End: 2022-08-08

## 2022-08-08 VITALS
DIASTOLIC BLOOD PRESSURE: 70 MMHG | HEART RATE: 59 BPM | HEIGHT: 69 IN | BODY MASS INDEX: 24.59 KG/M2 | OXYGEN SATURATION: 98 % | SYSTOLIC BLOOD PRESSURE: 101 MMHG | TEMPERATURE: 97.2 F | RESPIRATION RATE: 16 BRPM | WEIGHT: 166 LBS

## 2022-08-08 DIAGNOSIS — I10 PRIMARY HYPERTENSION: Chronic | ICD-10-CM

## 2022-08-08 DIAGNOSIS — Z95.2 AORTIC VALVE REPLACED: Chronic | ICD-10-CM

## 2022-08-08 DIAGNOSIS — I48.0 PAROXYSMAL ATRIAL FIBRILLATION: Chronic | ICD-10-CM

## 2022-08-08 DIAGNOSIS — Z00.00 ENCOUNTER FOR PREVENTIVE HEALTH EXAMINATION: Primary | ICD-10-CM

## 2022-08-08 PROCEDURE — 99396 PREV VISIT EST AGE 40-64: CPT | Performed by: INTERNAL MEDICINE

## 2022-08-08 NOTE — PROGRESS NOTES
Subjective   Fernie Porter is a 51 y.o. male.     Chief Complaint   Patient presents with   • Annual Exam   • Migraine   • Hypertension         In for annual preventative exam.  Sleep is good.  Gets 7 hours at night.  Exercises 3-4 days per week.  Energy is good.  Diet is well-balanced.    Hypertension  This is a chronic problem. The current episode started more than 1 year ago. The problem is unchanged. The problem is controlled. Associated symptoms include headaches. Pertinent negatives include no chest pain, orthopnea, palpitations, peripheral edema or shortness of breath. There are no associated agents to hypertension. Risk factors for coronary artery disease include male gender. Current antihypertension treatment includes alpha 1 blockers, diuretics and beta blockers. The current treatment provides significant improvement. There are no compliance problems.  There is no history of angina, kidney disease, CAD/MI, CVA or heart failure.   Atrial Fibrillation  Presents for follow-up visit. Symptoms are negative for chest pain, dizziness, palpitations, shortness of breath and weakness. The symptoms have been stable. Past medical history includes atrial fibrillation. There are no medication compliance problems.   Migraine  Alternative treatments tried:  NSAIDs       The following portions of the patient's history were reviewed and updated as appropriate: allergies, current medications, past social history and problem list.    Outpatient Medications Marked as Taking for the 8/8/22 encounter (Office Visit) with Juan Antonio Ford MD   Medication Sig Dispense Refill   • Ajovy 225 MG/1.5ML solution auto-injector INJECT 225 MG UNDER THE SKIN INTO THE APPROPRIATE AREA AS DIRECTED EVERY 30 (THIRTY) DAYS. 4.5 mL 3   • amLODIPine (NORVASC) 5 MG tablet TAKE 1 TABLET BY MOUTH EVERY DAY 90 tablet 3   • cloNIDine (CATAPRES) 0.2 MG tablet TAKE 1 TABLET BY MOUTH TWICE A  tablet 1   • fluticasone (FLONASE) 50 MCG/ACT nasal  spray 2 SPRAYS INTO THE NOSTRIL(S) AS DIRECTED BY PROVIDER DAILY. 48 mL 2   • hydroCHLOROthiazide (MICROZIDE) 12.5 MG capsule TAKE 1 CAPSULE BY MOUTH EVERY DAY 90 capsule 1   • ibuprofen (ADVIL,MOTRIN) 200 MG tablet Take 200 mg by mouth Every 6 (Six) Hours As Needed for Mild Pain .     • imipramine (TOFRANIL) 50 MG tablet TAKE 2 TABLETS BY MOUTH AT BEDTIME 180 tablet 1   • propranolol LA (INDERAL LA) 120 MG 24 hr capsule TAKE 1 CAPSULE BY MOUTH EVERY DAY 90 capsule 1   • warfarin (COUMADIN) 5 MG tablet Take 7.5mg (1 and 1/2 Tablets) on Sunday, Tuesday, and Thursday; Take 5mg (1 Tablet) all other days OR as directed by the [facility]. 105 tablet 1       Review of Systems   Constitutional: Negative for chills, diaphoresis, fatigue, fever and unexpected weight change.   Respiratory: Negative for cough, chest tightness, shortness of breath and wheezing.    Cardiovascular: Negative for chest pain, palpitations, orthopnea and leg swelling.   Gastrointestinal: Negative for abdominal pain, anal bleeding, blood in stool, constipation, diarrhea, nausea, rectal pain and vomiting.   Endocrine: Negative for cold intolerance, heat intolerance and polyuria.   Genitourinary: Negative for difficulty urinating, dysuria, frequency, hematuria and urgency.   Musculoskeletal: Negative for arthralgias (thumb ache bilateral), back pain and myalgias.   Allergic/Immunologic: Positive for environmental allergies.   Neurological: Positive for headaches. Negative for dizziness, syncope, weakness and light-headedness.   Hematological: Negative for adenopathy. Does not bruise/bleed easily.   Psychiatric/Behavioral: Negative for confusion, dysphoric mood and sleep disturbance. The patient is not nervous/anxious.        Objective   Vitals:    08/08/22 1425   BP: 101/70   Pulse: 59   Resp: 16   Temp: 97.2 °F (36.2 °C)   SpO2: 98%          08/08/22  1425   Weight: 75.3 kg (166 lb)    [unfilled]  Body mass index is 24.5  kg/m².      Physical Exam   Constitutional: He is oriented to person, place, and time. He appears well-developed. No distress.   HENT:   Head: Normocephalic and atraumatic.   Right Ear: External ear normal.   Left Ear: External ear normal.   Nose: Nose normal.   Eyes: Pupils are equal, round, and reactive to light. Conjunctivae are normal. No scleral icterus.   Neck: No JVD present. Carotid bruit is not present. No thyromegaly present.   Cardiovascular: Normal rate and regular rhythm. Exam reveals no gallop and no friction rub.   Murmur heard.   Crescendo decrescendo systolic murmur is present with a grade of 2/6.  Prosthetic heart sounds.  Grade 2/6 JOHNNIE loudest at the pulmonic area.   Pulmonary/Chest: Effort normal and breath sounds normal. No respiratory distress. He has no wheezes. He has no rales.   Abdominal: Soft. Normal appearance and bowel sounds are normal. He exhibits no distension and no mass. There is no abdominal tenderness. There is no rebound and no guarding. No hernia.   Musculoskeletal: Normal range of motion.   Lymphadenopathy:     He has no cervical adenopathy.   Neurological: He is alert and oriented to person, place, and time. He has normal reflexes. He displays normal reflexes.   Skin: Skin is warm and dry.   Psychiatric: His behavior is normal. Mood, judgment and thought content normal.   Nursing note and vitals reviewed.        Problems Addressed this Visit        Cardiac and Vasculature    Aortic valve replaced (Chronic)    Paroxysmal atrial fibrillation (HCC) (Chronic)    Hypertension (Chronic)      Other Visit Diagnoses     Encounter for preventive health examination    -  Primary      Diagnoses       Codes Comments    Encounter for preventive health examination    -  Primary ICD-10-CM: Z00.00  ICD-9-CM: V70.0     Paroxysmal atrial fibrillation (HCC)     ICD-10-CM: I48.0  ICD-9-CM: 427.31     Primary hypertension     ICD-10-CM: I10  ICD-9-CM: 401.9     Aortic valve replaced     ICD-10-CM:  Z95.2  ICD-9-CM: V43.3         Assessment & Plan   In for annual preventive exam and recheck of hypertension, AF, aortic stenosis and migraines.  Migraines persist.  They're doing pretty well to present time.  Gets 3 or 4 per month.  Has failed several drugs in the past including Topamax.  Blood pressure control is excellent.  Atrial fib is controlled.  Got annual lab work May 2022 including CBC, CMP, lipids and urinalysis.  Continue to monitor every 3 months.  Still due for Shingrix.  Due for TDA P.  He declines rectal exam today but we do have an up-to-date PSA and an up-to-date colonoscopy.  He is 2.5 HPP today.    Prevention counseling was performed today. The counseling performed was routine health maintenance topics including BMI and exercise.    The above information was reviewed again today 08/08/22.  It continues to be accurate as reflected above and is unchanged.  History, physical and review of systems all reviewed and are unchanged.  Medications were reviewed today and continue the current dosing.    PPE today includes face mask and eye shield.           Dragon disclaimer:   Much of this encounter note is an electronic transcription/translation of spoken language to printed text. The electronic translation of spoken language may permit erroneous, or at times, nonsensical words or phrases to be inadvertently transcribed; Although I have reviewed the note for such errors, some may still exist.

## 2022-08-09 ENCOUNTER — ANTICOAGULATION VISIT (OUTPATIENT)
Dept: PHARMACY | Facility: HOSPITAL | Age: 52
End: 2022-08-09

## 2022-08-09 DIAGNOSIS — I48.0 PAROXYSMAL ATRIAL FIBRILLATION: ICD-10-CM

## 2022-08-09 DIAGNOSIS — Z95.2 AORTIC VALVE REPLACED: Primary | ICD-10-CM

## 2022-08-09 NOTE — PROGRESS NOTES
Anticoagulation Clinic Progress Note    Anticoagulation Summary  As of 2022    INR goal:  2.5-3.5   TTR:  89.9 % (3.7 y)   INR used for dosin.60 (2022)   Warfarin maintenance plan:  7.5 mg every Sun, Tue, Thu; 5 mg all other days   Weekly warfarin total:  42.5 mg   No change documented:  Georgie Gaming   Plan last modified:  Katie Schwarz, PharmD (2021)   Next INR check:  2022   Priority:  Maintenance   Target end date:      Indications    Aortic valve replaced [Z95.2]  Paroxysmal atrial fibrillation (HCC) [I48.0]             Anticoagulation Episode Summary     INR check location:      Preferred lab:      Send INR reminders to:   [facility]  POOL    Comments:  *Coaguchek* *Colonoscopy pending scheduling 22 - will need to hold/bridge (see 22 Pre-Procedure Screening note)*      Anticoagulation Care Providers     Provider Role Specialty Phone number    Crissy MejiaELLA Referring Cardiology [phone]          Clinic Interview:  No pertinent clinical findings have been reported.    INR History:  Anticoagulation Monitoring 2022   INR 3.20 3.30 2.60   INR Date 2022   INR Goal 2.5-3.5 2.5-3.5 2.5-3.5   Trend Same Same Same   Last Week Total 42.5 mg 42.5 mg 42.5 mg   Next Week Total 42.5 mg 42.5 mg 42.5 mg   Sun 7.5 mg 7.5 mg 7.5 mg   Mon 5 mg 5 mg 5 mg   Tue 7.5 mg 7.5 mg 7.5 mg   Wed 5 mg 5 mg 5 mg   Thu 7.5 mg 7.5 mg 7.5 mg   Fri 5 mg 5 mg 5 mg   Sat 5 mg 5 mg 5 mg   Visit Report - - -   Some recent data might be hidden       Plan:  1. INR is therapeutic today- see above in Anticoagulation Summary.    Fernie Porter to continue their warfarin regimen- see above in Anticoagulation Summary.  2. Follow up in 2 weeks  3. Pt has agreed to only be called if INR out of range. They have been instructed to call if any changes in medications, doses, concerns, etc. Patient expresses understanding and has no further  questions at this time.    Georgie Gaming

## 2022-08-22 ENCOUNTER — ANTICOAGULATION VISIT (OUTPATIENT)
Dept: PHARMACY | Facility: HOSPITAL | Age: 52
End: 2022-08-22

## 2022-08-22 DIAGNOSIS — I48.0 PAROXYSMAL ATRIAL FIBRILLATION: ICD-10-CM

## 2022-08-22 DIAGNOSIS — Z95.2 AORTIC VALVE REPLACED: Primary | ICD-10-CM

## 2022-08-22 LAB — INR PPP: 3.5

## 2022-08-22 NOTE — PROGRESS NOTES
Anticoagulation Clinic Progress Note    Anticoagulation Summary  As of 8/22/2022    INR goal:  2.5-3.5   TTR:  90.0 % (3.7 y)   INR used for dosing:  3.50 (8/22/2022)   Warfarin maintenance plan:  7.5 mg every Sun, Tue, Thu; 5 mg all other days   Weekly warfarin total:  42.5 mg   No change documented:  Georgie Gaming   Plan last modified:  Katie Schwarz, PharmD (4/13/2021)   Next INR check:  9/5/2022   Priority:  Maintenance   Target end date:      Indications    Aortic valve replaced [Z95.2]  Paroxysmal atrial fibrillation (HCC) [I48.0]             Anticoagulation Episode Summary     INR check location:      Preferred lab:      Send INR reminders to:   [facility]  POOL    Comments:  *Coaguchek* *Colonoscopy pending scheduling 5/23/22 - will need to hold/bridge (see 2/17/22 Pre-Procedure Screening note)*      Anticoagulation Care Providers     Provider Role Specialty Phone number    Crissy MejiaELLA Referring Cardiology [phone]          Clinic Interview:  No pertinent clinical findings have been reported.    INR History:  Anticoagulation Monitoring 7/18/2022 8/9/2022 8/22/2022   INR 3.30 2.60 3.50   INR Date 7/16/2022 8/7/2022 8/22/2022   INR Goal 2.5-3.5 2.5-3.5 2.5-3.5   Trend Same Same Same   Last Week Total 42.5 mg 42.5 mg 42.5 mg   Next Week Total 42.5 mg 42.5 mg 42.5 mg   Sun 7.5 mg 7.5 mg 7.5 mg   Mon 5 mg 5 mg 5 mg   Tue 7.5 mg 7.5 mg 7.5 mg   Wed 5 mg 5 mg 5 mg   Thu 7.5 mg 7.5 mg 7.5 mg   Fri 5 mg 5 mg 5 mg   Sat 5 mg 5 mg 5 mg   Visit Report - - -   Some recent data might be hidden       Plan:  1. INR is therapeutic today- see above in Anticoagulation Summary.    Fernie Porter to continue their warfarin regimen- see above in Anticoagulation Summary.  2. Follow up in 2 weeks  3. Pt has agreed to only be called if INR out of range. They have been instructed to call if any changes in medications, doses, concerns, etc. Patient expresses understanding and has no further  questions at this time.    Georgie Gaming

## 2022-08-26 RX ORDER — WARFARIN SODIUM 5 MG/1
TABLET ORAL
Qty: 105 TABLET | Refills: 1 | OUTPATIENT
Start: 2022-08-26 | End: 2022-09-01 | Stop reason: SDUPTHER

## 2022-09-01 RX ORDER — WARFARIN SODIUM 5 MG/1
TABLET ORAL
Qty: 105 TABLET | Refills: 1 | OUTPATIENT
Start: 2022-09-01 | End: 2022-09-01

## 2022-09-01 RX ORDER — WARFARIN SODIUM 5 MG/1
TABLET ORAL
Qty: 105 TABLET | Refills: 1 | Status: SHIPPED | OUTPATIENT
Start: 2022-09-01 | End: 2022-09-01

## 2022-09-01 RX ORDER — WARFARIN SODIUM 5 MG/1
TABLET ORAL
Qty: 105 TABLET | Refills: 1 | Status: SHIPPED | OUTPATIENT
Start: 2022-09-01

## 2022-09-06 LAB — INR PPP: 2.9

## 2022-09-08 ENCOUNTER — ANTICOAGULATION VISIT (OUTPATIENT)
Dept: PHARMACY | Facility: HOSPITAL | Age: 52
End: 2022-09-08

## 2022-09-08 DIAGNOSIS — Z95.2 AORTIC VALVE REPLACED: Primary | ICD-10-CM

## 2022-09-08 DIAGNOSIS — I48.0 PAROXYSMAL ATRIAL FIBRILLATION: ICD-10-CM

## 2022-09-08 NOTE — PROGRESS NOTES
Anticoagulation Clinic Progress Note    Anticoagulation Summary  As of 2022    INR goal:  2.5-3.5   TTR:  90.1 % (3.8 y)   INR used for dosin.90 (2022)   Warfarin maintenance plan:  7.5 mg every Sun, Tue, Thu; 5 mg all other days   Weekly warfarin total:  42.5 mg   No change documented:  Georgie Gaming   Plan last modified:  Katie Schwarz, PharmD (2021)   Next INR check:  2022   Priority:  Maintenance   Target end date:      Indications    Aortic valve replaced [Z95.2]  Paroxysmal atrial fibrillation (HCC) [I48.0]             Anticoagulation Episode Summary     INR check location:      Preferred lab:      Send INR reminders to:   [facility]  POOL    Comments:  *Coaguchek* *Colonoscopy pending scheduling 22 - will need to hold/bridge (see 22 Pre-Procedure Screening note)*      Anticoagulation Care Providers     Provider Role Specialty Phone number    Crissy MejiaELLA Referring Cardiology [phone]          Clinic Interview:  No pertinent clinical findings have been reported.    INR History:  Anticoagulation Monitoring 2022   INR 2.60 3.50 2.90   INR Date 2022   INR Goal 2.5-3.5 2.5-3.5 2.5-3.5   Trend Same Same Same   Last Week Total 42.5 mg 42.5 mg 42.5 mg   Next Week Total 42.5 mg 42.5 mg 42.5 mg   Sun 7.5 mg 7.5 mg 7.5 mg   Mon 5 mg 5 mg 5 mg   Tue 7.5 mg 7.5 mg 7.5 mg   Wed 5 mg 5 mg 5 mg   Thu 7.5 mg 7.5 mg 7.5 mg   Fri 5 mg 5 mg 5 mg   Sat 5 mg 5 mg 5 mg   Visit Report - - -   Some recent data might be hidden       Plan:  1. INR is therapeutic today- see above in Anticoagulation Summary.    Fernie Porter to continue their warfarin regimen- see above in Anticoagulation Summary.  2. Follow up in 2 weeks  3. Pt has agreed to only be called if INR out of range. They have been instructed to call if any changes in medications, doses, concerns, etc. Patient expresses understanding and has no further  questions at this time.    Georgie Gaming

## 2022-09-23 ENCOUNTER — ANTICOAGULATION VISIT (OUTPATIENT)
Dept: PHARMACY | Facility: HOSPITAL | Age: 52
End: 2022-09-23

## 2022-09-23 DIAGNOSIS — I48.0 PAROXYSMAL ATRIAL FIBRILLATION: ICD-10-CM

## 2022-09-23 DIAGNOSIS — Z95.2 AORTIC VALVE REPLACED: Primary | ICD-10-CM

## 2022-09-23 LAB — INR PPP: 2.7

## 2022-09-23 NOTE — PROGRESS NOTES
Anticoagulation Clinic Progress Note    Anticoagulation Summary  As of 2022    INR goal:  2.5-3.5   TTR:  90.3 % (3.8 y)   INR used for dosin.70 (2022)   Warfarin maintenance plan:  7.5 mg every Sun, Tue, Thu; 5 mg all other days   Weekly warfarin total:  42.5 mg   No change documented:  Georgie Gaming   Plan last modified:  Katie Schwarz, PharmD (2021)   Next INR check:  10/7/2022   Priority:  Maintenance   Target end date:      Indications    Aortic valve replaced [Z95.2]  Paroxysmal atrial fibrillation (HCC) [I48.0]             Anticoagulation Episode Summary     INR check location:      Preferred lab:      Send INR reminders to:   [facility]  POOL    Comments:  *Coaguchek* *Colonoscopy pending scheduling 22 - will need to hold/bridge (see 22 Pre-Procedure Screening note)*      Anticoagulation Care Providers     Provider Role Specialty Phone number    Crissy MejiaELLA Referring Cardiology [phone]          Clinic Interview:  No pertinent clinical findings have been reported.    INR History:  Anticoagulation Monitoring 2022   INR 3.50 2.90 2.70   INR Date 2022   INR Goal 2.5-3.5 2.5-3.5 2.5-3.5   Trend Same Same Same   Last Week Total 42.5 mg 42.5 mg 42.5 mg   Next Week Total 42.5 mg 42.5 mg 42.5 mg   Sun 7.5 mg 7.5 mg 7.5 mg   Mon 5 mg 5 mg 5 mg   Tue 7.5 mg 7.5 mg 7.5 mg   Wed 5 mg 5 mg 5 mg   Thu 7.5 mg 7.5 mg 7.5 mg   Fri 5 mg 5 mg 5 mg   Sat 5 mg 5 mg 5 mg   Visit Report - - -   Some recent data might be hidden       Plan:  1. INR is therapeutic today- see above in Anticoagulation Summary.    Fernie Porter to continue their warfarin regimen- see above in Anticoagulation Summary.  2. Follow up in 2 weeks  3. Pt has agreed to only be called if INR out of range. They have been instructed to call if any changes in medications, doses, concerns, etc. Patient expresses understanding and has no further  questions at this time.    Georgie Gaming

## 2022-10-10 ENCOUNTER — ANTICOAGULATION VISIT (OUTPATIENT)
Dept: PHARMACY | Facility: HOSPITAL | Age: 52
End: 2022-10-10

## 2022-10-10 DIAGNOSIS — I48.0 PAROXYSMAL ATRIAL FIBRILLATION: Chronic | ICD-10-CM

## 2022-10-10 DIAGNOSIS — Z95.2 AORTIC VALVE REPLACED: Primary | Chronic | ICD-10-CM

## 2022-10-10 LAB — INR PPP: 3

## 2022-10-10 NOTE — PROGRESS NOTES
Anticoagulation Clinic Progress Note    Anticoagulation Summary  As of 10/10/2022    INR goal:  2.5-3.5   TTR:  90.4 % (3.9 y)   INR used for dosing:  3.00 (10/10/2022)   Warfarin maintenance plan:  7.5 mg every Sun, Tue, Thu; 5 mg all other days   Weekly warfarin total:  42.5 mg   No change documented:  Corry Ramirez, Pharmacy Technician   Plan last modified:  Katie Schwarz, PharmD (4/13/2021)   Next INR check:  10/24/2022   Priority:  Maintenance   Target end date:      Indications    Aortic valve replaced [Z95.2]  Paroxysmal atrial fibrillation (HCC) [I48.0]             Anticoagulation Episode Summary     INR check location:      Preferred lab:      Send INR reminders to:   [facility]  POOL    Comments:  *Coaguchek* *Colonoscopy pending scheduling 5/23/22 - will need to hold/bridge (see 2/17/22 Pre-Procedure Screening note)*      Anticoagulation Care Providers     Provider Role Specialty Phone number    Crissy MejiaELLA Referring Cardiology [phone]          Clinic Interview:  No pertinent clinical findings have been reported.    INR History:  Anticoagulation Monitoring 9/8/2022 9/23/2022 10/10/2022   INR 2.90 2.70 3.00   INR Date 9/6/2022 9/23/2022 10/10/2022   INR Goal 2.5-3.5 2.5-3.5 2.5-3.5   Trend Same Same Same   Last Week Total 42.5 mg 42.5 mg 42.5 mg   Next Week Total 42.5 mg 42.5 mg 42.5 mg   Sun 7.5 mg 7.5 mg 7.5 mg   Mon 5 mg 5 mg 5 mg   Tue 7.5 mg 7.5 mg 7.5 mg   Wed 5 mg 5 mg 5 mg   Thu 7.5 mg 7.5 mg 7.5 mg   Fri 5 mg 5 mg 5 mg   Sat 5 mg 5 mg 5 mg   Visit Report - - -   Some recent data might be hidden       Plan:  1. INR is therapeutic today- see above in Anticoagulation Summary.    Fernie Porter to continue their warfarin regimen- see above in Anticoagulation Summary.  2. Follow up in 2 weeks  3. Pt has agreed to only be called if INR out of range. They have been instructed to call if any changes in medications, doses, concerns, etc. Patient expresses understanding and  has no further questions at this time.    Corry Ramirez, Pharmacy Technician

## 2022-10-14 RX ORDER — AMLODIPINE BESYLATE 5 MG/1
TABLET ORAL
Qty: 90 TABLET | Refills: 1 | Status: SHIPPED | OUTPATIENT
Start: 2022-10-14

## 2022-10-14 RX ORDER — PROPRANOLOL HYDROCHLORIDE 120 MG/1
CAPSULE, EXTENDED RELEASE ORAL
Qty: 90 CAPSULE | Refills: 1 | Status: SHIPPED | OUTPATIENT
Start: 2022-10-14

## 2022-10-25 LAB — INR PPP: 3.2

## 2022-10-26 ENCOUNTER — ANTICOAGULATION VISIT (OUTPATIENT)
Dept: PHARMACY | Facility: HOSPITAL | Age: 52
End: 2022-10-26

## 2022-10-26 DIAGNOSIS — I48.0 PAROXYSMAL ATRIAL FIBRILLATION: Chronic | ICD-10-CM

## 2022-10-26 DIAGNOSIS — Z95.2 AORTIC VALVE REPLACED: Primary | Chronic | ICD-10-CM

## 2022-10-26 NOTE — PROGRESS NOTES
Anticoagulation Clinic Progress Note    Anticoagulation Summary  As of 10/26/2022    INR goal:  2.5-3.5   TTR:  90.5 % (3.9 y)   INR used for dosing:  3.20 (10/25/2022)   Warfarin maintenance plan:  7.5 mg every Sun, Tue, Thu; 5 mg all other days   Weekly warfarin total:  42.5 mg   No change documented:  Georgie Gaming   Plan last modified:  Katie Schwarz, PharmD (4/13/2021)   Next INR check:  11/8/2022   Priority:  Maintenance   Target end date:      Indications    Aortic valve replaced [Z95.2]  Paroxysmal atrial fibrillation (HCC) [I48.0]             Anticoagulation Episode Summary     INR check location:      Preferred lab:      Send INR reminders to:   [facility]  POOL    Comments:  *Coaguchek* *Colonoscopy pending scheduling 5/23/22 - will need to hold/bridge (see 2/17/22 Pre-Procedure Screening note)*      Anticoagulation Care Providers     Provider Role Specialty Phone number    Crissy MejiaELLA Referring Cardiology [phone]          Clinic Interview:  No pertinent clinical findings have been reported.    INR History:  Anticoagulation Monitoring 9/23/2022 10/10/2022 10/26/2022   INR 2.70 3.00 3.20   INR Date 9/23/2022 10/10/2022 10/25/2022   INR Goal 2.5-3.5 2.5-3.5 2.5-3.5   Trend Same Same Same   Last Week Total 42.5 mg 42.5 mg 42.5 mg   Next Week Total 42.5 mg 42.5 mg 42.5 mg   Sun 7.5 mg 7.5 mg 7.5 mg   Mon 5 mg 5 mg 5 mg   Tue 7.5 mg 7.5 mg 7.5 mg   Wed 5 mg 5 mg 5 mg   Thu 7.5 mg 7.5 mg 7.5 mg   Fri 5 mg 5 mg 5 mg   Sat 5 mg 5 mg 5 mg   Visit Report - - -   Some recent data might be hidden       Plan:  1. INR is therapeutic today- see above in Anticoagulation Summary.    Fernie Porter to continue their warfarin regimen- see above in Anticoagulation Summary.  2. Follow up in 2 weeks  3. Pt has agreed to only be called if INR out of range. They have been instructed to call if any changes in medications, doses, concerns, etc. Patient expresses understanding and has no  further questions at this time.    Georgie Gaming

## 2022-10-28 RX ORDER — CLONIDINE HYDROCHLORIDE 0.2 MG/1
TABLET ORAL
Qty: 180 TABLET | Refills: 1 | Status: SHIPPED | OUTPATIENT
Start: 2022-10-28

## 2022-11-07 ENCOUNTER — TELEMEDICINE (OUTPATIENT)
Dept: INTERNAL MEDICINE | Facility: CLINIC | Age: 52
End: 2022-11-07

## 2022-11-07 VITALS — DIASTOLIC BLOOD PRESSURE: 77 MMHG | SYSTOLIC BLOOD PRESSURE: 133 MMHG

## 2022-11-07 DIAGNOSIS — I10 PRIMARY HYPERTENSION: Primary | Chronic | ICD-10-CM

## 2022-11-07 DIAGNOSIS — G43.109 MIGRAINE WITH AURA AND WITHOUT STATUS MIGRAINOSUS, NOT INTRACTABLE: ICD-10-CM

## 2022-11-07 DIAGNOSIS — I48.0 PAROXYSMAL ATRIAL FIBRILLATION: Chronic | ICD-10-CM

## 2022-11-07 PROCEDURE — 99214 OFFICE O/P EST MOD 30 MIN: CPT | Performed by: INTERNAL MEDICINE

## 2022-11-07 NOTE — PROGRESS NOTES
Subjective   Fernie Porter is a 52 y.o. male.     Chief Complaint   Patient presents with   • Nasal Congestion   • Cough         Cough  This is a new problem. The current episode started in the past 7 days. The problem has been unchanged. Associated symptoms include headaches, rhinorrhea and a sore throat. Pertinent negatives include no chest pain, chills, fever, shortness of breath or wheezing.   Hypertension  This is a chronic problem. The current episode started more than 1 year ago. The problem is unchanged. The problem is controlled. Associated symptoms include headaches and palpitations. Pertinent negatives include no chest pain, orthopnea, peripheral edema or shortness of breath. There are no associated agents to hypertension.   Atrial Fibrillation  Presents for follow-up visit. Symptoms include palpitations. Symptoms are negative for chest pain and shortness of breath. The symptoms have been stable. Past medical history includes atrial fibrillation.   Migraine       The following portions of the patient's history were reviewed and updated as appropriate: allergies, current medications, past social history and problem list.    Outpatient Medications Marked as Taking for the 11/7/22 encounter (Telemedicine) with Juan Antonio Ford MD   Medication Sig Dispense Refill   • Ajovy 225 MG/1.5ML solution auto-injector INJECT 225 MG UNDER THE SKIN INTO THE APPROPRIATE AREA AS DIRECTED EVERY 30 (THIRTY) DAYS. 4.5 mL 3   • amLODIPine (NORVASC) 5 MG tablet TAKE 1 TABLET BY MOUTH EVERY DAY 90 tablet 1   • cloNIDine (CATAPRES) 0.2 MG tablet TAKE 1 TABLET BY MOUTH TWICE A  tablet 1   • fluticasone (FLONASE) 50 MCG/ACT nasal spray 2 SPRAYS INTO THE NOSTRIL(S) AS DIRECTED BY PROVIDER DAILY. 48 mL 2   • hydroCHLOROthiazide (MICROZIDE) 12.5 MG capsule TAKE 1 CAPSULE BY MOUTH EVERY DAY 90 capsule 1   • ibuprofen (ADVIL,MOTRIN) 200 MG tablet Take 200 mg by mouth Every 6 (Six) Hours As Needed for Mild Pain .     •  imipramine (TOFRANIL) 50 MG tablet TAKE 2 TABLETS BY MOUTH AT BEDTIME 180 tablet 1   • propranolol LA (INDERAL LA) 120 MG 24 hr capsule TAKE 1 CAPSULE BY MOUTH EVERY DAY 90 capsule 1   • warfarin (COUMADIN) 5 MG tablet Take 7.5mg (1 and 1/2 Tablets) on Sunday, Tuesday, and Thursday; Take 5mg (1 Tablet) all other days OR as directed by the [facility]. 105 tablet 1       Review of Systems   Constitutional: Negative for chills and fever.   HENT: Positive for rhinorrhea and sore throat. Negative for sinus pressure.    Respiratory: Positive for cough. Negative for shortness of breath and wheezing.    Cardiovascular: Positive for palpitations. Negative for chest pain, orthopnea and leg swelling.   Gastrointestinal: Negative for constipation and diarrhea.   Neurological: Positive for headaches.       Objective   There were no vitals filed for this visit.   There were no vitals filed for this visit. [unfilled]  There is no height or weight on file to calculate BMI.      Physical Exam   Pulmonary/Chest: Effort normal.   Abdominal: Normal appearance.   Neurological: He is alert.   Psychiatric: His behavior is normal. Mood, judgment and thought content normal.         Problems Addressed this Visit        Cardiac and Vasculature    Paroxysmal atrial fibrillation (HCC) (Chronic)    Hypertension - Primary (Chronic)       Neuro    Migraine   Diagnoses       Codes Comments    Primary hypertension    -  Primary ICD-10-CM: I10  ICD-9-CM: 401.9     Paroxysmal atrial fibrillation (HCC)     ICD-10-CM: I48.0  ICD-9-CM: 427.31     Migraine with aura and without status migrainosus, not intractable     ICD-10-CM: G43.109  ICD-9-CM: 346.00         Assessment & Plan   Video visit today due to COVID pandemic in for recheck of hypertension, AF, aortic stenosis and migraines today November 2022.  Migraines persist.  They're doing pretty well to present time.  Gets 3 or 4 per month.  Has failed several drugs in the past including  Topamax.  Currently on Ajovy injections once monthly and they have been working pretty well right now.  Blood pressure control is excellent.  Atrial fib is controlled.  On propranolol  mg daily, HCTZ 12.5 mg daily, amlodipine 5 mg daily, and clonidine 0.2 mg twice daily for hypertension.  Those are reviewed today.  Gets annual lab work May 2022 including CBC, CMP, PSA urinalysis.  Continue to monitor every 3 months.  Certainly no severe migraine since he started Inderal LA.   Due for annual preventive exam.  Due for Shingrix.  Due for TDA P but declines that for now.  COVID-19 booster is up-to-date.  He has had a mild cough and sore throat for 3 days.  Sounds like a typical mild URI.  He is treating with OTCs.  That sounds like the way to go for now.  Due for annual flu shot when he recovers from his URI.  [employer] platform used for the visit today.    The above information was reviewed again today 11/07/22.  It continues to be accurate as reflected above and is unchanged.  History, physical and review of systems all reviewed and are unchanged.  Medications were reviewed today and continue the current dosing.           Cassandra disclaimer:   Much of this encounter note is an electronic transcription/translation of spoken language to printed text. The electronic translation of spoken language may permit erroneous, or at times, nonsensical words or phrases to be inadvertently transcribed; Although I have reviewed the note for such errors, some may still exist.

## 2022-11-07 NOTE — PROGRESS NOTES
Mode of Visit: Video  Location of patient: home  Location of provider: [facility]  You have chosen to receive care through a telehealth visit.  Does the patient consent to use a video/audio connection for your medical care today?Yes  The visit included audio and video interaction. No technical issues occurred during this visit.

## 2022-11-09 LAB — INR PPP: 3.5

## 2022-11-10 ENCOUNTER — ANTICOAGULATION VISIT (OUTPATIENT)
Dept: PHARMACY | Facility: HOSPITAL | Age: 52
End: 2022-11-10

## 2022-11-10 DIAGNOSIS — I48.0 PAROXYSMAL ATRIAL FIBRILLATION: Chronic | ICD-10-CM

## 2022-11-10 DIAGNOSIS — Z95.2 AORTIC VALVE REPLACED: Primary | Chronic | ICD-10-CM

## 2022-11-10 NOTE — PROGRESS NOTES
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    INR History:  Anticoagulation Monitoring 10/10/2022 10/26/2022 11/10/2022   INR 3.00 3.20 3.50   INR Date 10/10/2022 10/25/2022 11/9/2022   INR Goal 2.5-3.5 2.5-3.5 2.5-3.5   Trend Same Same Same   Last Week Total 42.5 mg 42.5 mg 42.5 mg   Next Week Total 42.5 mg 42.5 mg 42.5 mg   Sun 7.5 mg 7.5 mg 7.5 mg   Mon 5 mg 5 mg 5 mg   Tue 7.5 mg 7.5 mg 7.5 mg   Wed 5 mg 5 mg 5 mg   Thu 7.5 mg 7.5 mg 7.5 mg   Fri 5 mg 5 mg 5 mg   Sat 5 mg 5 mg 5 mg   Visit Report - - -   Some recent data might be hidden       Plan:  1. INR is therapeutic today- see above in Anticoagulation Summary.    Fernie Porter to continue their warfarin regimen- see above in Anticoagulation Summary.  2. Follow up in 2 weeks  3. Pt has agreed to only be called if INR out of range. They have been instructed to call if any changes in medications, doses, concerns, etc. Patient expresses  understanding and has no further questions at this time.    Brayan Patterson, PharmD

## 2022-11-23 ENCOUNTER — ANTICOAGULATION VISIT (OUTPATIENT)
Dept: PHARMACY | Facility: HOSPITAL | Age: 52
End: 2022-11-23

## 2022-11-23 DIAGNOSIS — I48.0 PAROXYSMAL ATRIAL FIBRILLATION: Chronic | ICD-10-CM

## 2022-11-23 DIAGNOSIS — Z95.2 AORTIC VALVE REPLACED: Primary | Chronic | ICD-10-CM

## 2022-11-23 LAB — INR PPP: 3.5

## 2022-11-23 NOTE — PROGRESS NOTES
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    INR History:  Anticoagulation Monitoring 10/26/2022 11/10/2022 11/23/2022   INR 3.20 3.50 3.50   INR Date 10/25/2022 11/9/2022 11/23/2022   INR Goal 2.5-3.5 2.5-3.5 2.5-3.5   Trend Same Same Same   Last Week Total 42.5 mg 42.5 mg 42.5 mg   Next Week Total 42.5 mg 42.5 mg 42.5 mg   Sun 7.5 mg 7.5 mg 7.5 mg   Mon 5 mg 5 mg 5 mg   Tue 7.5 mg 7.5 mg 7.5 mg   Wed 5 mg 5 mg 5 mg   Thu 7.5 mg 7.5 mg 7.5 mg   Fri 5 mg 5 mg 5 mg   Sat 5 mg 5 mg 5 mg   Visit Report - - -   Some recent data might be hidden       Plan:  1. INR is therapeutic today- see above in Anticoagulation Summary.    Fernie Porter to continue their warfarin regimen- see above in Anticoagulation Summary.  2. Follow up in 2 weeks  3. Pt has agreed to only be called if INR out of range. They have been instructed to call if any changes in medications, doses, concerns, etc. Patient expresses  understanding and has no further questions at this time.    Georgie Gaming

## 2022-12-07 ENCOUNTER — ANTICOAGULATION VISIT (OUTPATIENT)
Dept: PHARMACY | Facility: HOSPITAL | Age: 52
End: 2022-12-07

## 2022-12-07 DIAGNOSIS — Z95.2 AORTIC VALVE REPLACED: Primary | Chronic | ICD-10-CM

## 2022-12-07 DIAGNOSIS — I48.0 PAROXYSMAL ATRIAL FIBRILLATION: Chronic | ICD-10-CM

## 2022-12-07 LAB — INR PPP: 3.4

## 2022-12-07 NOTE — PROGRESS NOTES
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    INR History:  Anticoagulation Monitoring 11/10/2022 11/23/2022 12/7/2022   INR 3.50 3.50 3.40   INR Date 11/9/2022 11/23/2022 12/7/2022   INR Goal 2.5-3.5 2.5-3.5 2.5-3.5   Trend Same Same Same   Last Week Total 42.5 mg 42.5 mg 42.5 mg   Next Week Total 42.5 mg 42.5 mg 42.5 mg   Sun 7.5 mg 7.5 mg 7.5 mg   Mon 5 mg 5 mg 5 mg   Tue 7.5 mg 7.5 mg 7.5 mg   Wed 5 mg 5 mg 5 mg   Thu 7.5 mg 7.5 mg 7.5 mg   Fri 5 mg 5 mg 5 mg   Sat 5 mg 5 mg 5 mg   Visit Report - - -   Some recent data might be hidden       Plan:  1. INR is therapeutic today- see above in Anticoagulation Summary.    Fernie Porter to continue their warfarin regimen- see above in Anticoagulation Summary.  2. Follow up in 2 weeks  3. Pt has agreed to only be called if INR out of range. They have been instructed to call if any changes in medications, doses, concerns, etc. Patient expresses  understanding and has no further questions at this time.    Allison Sharma, Pharmacy Technician

## 2022-12-09 RX ORDER — HYDROCHLOROTHIAZIDE 12.5 MG/1
CAPSULE, GELATIN COATED ORAL
Qty: 90 CAPSULE | Refills: 1 | Status: SHIPPED | OUTPATIENT
Start: 2022-12-09

## 2022-12-12 RX ORDER — IMIPRAMINE HCL 50 MG
TABLET ORAL
Qty: 180 TABLET | Refills: 1 | Status: SHIPPED | OUTPATIENT
Start: 2022-12-12

## 2022-12-22 ENCOUNTER — ANTICOAGULATION VISIT (OUTPATIENT)
Dept: PHARMACY | Facility: HOSPITAL | Age: 52
End: 2022-12-22

## 2022-12-22 DIAGNOSIS — I48.0 PAROXYSMAL ATRIAL FIBRILLATION: Chronic | ICD-10-CM

## 2022-12-22 DIAGNOSIS — Z95.2 AORTIC VALVE REPLACED: Primary | Chronic | ICD-10-CM

## 2022-12-22 LAB — INR PPP: 3.5

## 2022-12-22 NOTE — PROGRESS NOTES
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    INR History:  Anticoagulation Monitoring 11/23/2022 12/7/2022 12/22/2022   INR 3.50 3.40 3.50   INR Date 11/23/2022 12/7/2022 12/21/2022   INR Goal 2.5-3.5 2.5-3.5 2.5-3.5   Trend Same Same Same   Last Week Total 42.5 mg 42.5 mg 42.5 mg   Next Week Total 42.5 mg 42.5 mg 42.5 mg   Sun 7.5 mg 7.5 mg 7.5 mg   Mon 5 mg 5 mg 5 mg   Tue 7.5 mg 7.5 mg 7.5 mg   Wed 5 mg 5 mg 5 mg   Thu 7.5 mg 7.5 mg 7.5 mg   Fri 5 mg 5 mg 5 mg   Sat 5 mg 5 mg 5 mg   Visit Report - - -   Some recent data might be hidden       Plan:  1. INR is therapeutic today- see above in Anticoagulation Summary.    Fernie Porter to continue their warfarin regimen- see above in Anticoagulation Summary.  2. Follow up in 2 weeks  3. Pt has agreed to only be called if INR out of range. They have been instructed to call if any changes in medications, doses, concerns, etc. Patient  expresses understanding and has no further questions at this time.    Allison Sharma, Pharmacy Technician

## 2023-01-04 LAB — INR PPP: 2.7

## 2023-01-05 ENCOUNTER — ANTICOAGULATION VISIT (OUTPATIENT)
Dept: PHARMACY | Facility: HOSPITAL | Age: 53
End: 2023-01-05
Payer: COMMERCIAL

## 2023-01-05 DIAGNOSIS — Z95.2 AORTIC VALVE REPLACED: Primary | Chronic | ICD-10-CM

## 2023-01-05 DIAGNOSIS — I48.0 PAROXYSMAL ATRIAL FIBRILLATION: Chronic | ICD-10-CM

## 2023-01-05 NOTE — PROGRESS NOTES
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    INR History:  Anticoagulation Monitoring 2022   INR 3.40 3.50 2.70   INR Date 2022   INR Goal 2.5-3.5 2.5-3.5 2.5-3.5   Trend Same Same Same   Last Week Total 42.5 mg 42.5 mg 42.5 mg   Next Week Total 42.5 mg 42.5 mg 42.5 mg   Sun 7.5 mg 7.5 mg 7.5 mg   Mon 5 mg 5 mg 5 mg   Tue 7.5 mg 7.5 mg 7.5 mg   Wed 5 mg 5 mg 5 mg   Thu 7.5 mg 7.5 mg 7.5 mg   Fri 5 mg 5 mg 5 mg   Sat 5 mg 5 mg 5 mg   Visit Report - - -   Some recent data might be hidden       Plan:  1. INR is therapeutic today- see above in Anticoagulation Summary.    Fernie Porter to continue their warfarin regimen- see above in Anticoagulation Summary.  2. Follow up in 2 weeks  3. Pt has agreed to only be called if INR out of range. They have been instructed to call if any changes in medications, doses, concerns, etc. Patient expresses understanding and  has no further questions at this time.    Georgie Gaming

## 2023-01-18 LAB — INR PPP: 3

## 2023-01-19 ENCOUNTER — ANTICOAGULATION VISIT (OUTPATIENT)
Dept: PHARMACY | Facility: HOSPITAL | Age: 53
End: 2023-01-19
Payer: COMMERCIAL

## 2023-01-19 DIAGNOSIS — I48.0 PAROXYSMAL ATRIAL FIBRILLATION: Chronic | ICD-10-CM

## 2023-01-19 DIAGNOSIS — Z95.2 AORTIC VALVE REPLACED: Primary | Chronic | ICD-10-CM

## 2023-01-19 NOTE — PROGRESS NOTES
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    INR History:  Anticoagulation Monitoring 12/22/2022 1/5/2023 1/19/2023   INR 3.50 2.70 3.00   INR Date 12/21/2022 1/4/2023 1/18/2023   INR Goal 2.5-3.5 2.5-3.5 2.5-3.5   Trend Same Same Same   Last Week Total 42.5 mg 42.5 mg 42.5 mg   Next Week Total 42.5 mg 42.5 mg 42.5 mg   Sun 7.5 mg 7.5 mg 7.5 mg   Mon 5 mg 5 mg 5 mg   Tue 7.5 mg 7.5 mg 7.5 mg   Wed 5 mg 5 mg 5 mg   Thu 7.5 mg 7.5 mg 7.5 mg   Fri 5 mg 5 mg 5 mg   Sat 5 mg 5 mg 5 mg   Visit Report - - -   Some recent data might be hidden       Plan:  1. INR is therapeutic today- see above in Anticoagulation Summary.    Fernie Porter to continue their warfarin regimen- see above in Anticoagulation Summary.  2. Follow up in 2 weeks  3. Pt has agreed to only be called if INR out of range. They have been instructed to call if any changes in medications, doses, concerns, etc. Patient expresses understanding  and has no further questions at this time.    Georgie Gaming

## 2023-02-01 LAB — INR PPP: 2.6

## 2023-02-02 ENCOUNTER — ANTICOAGULATION VISIT (OUTPATIENT)
Dept: PHARMACY | Facility: HOSPITAL | Age: 53
End: 2023-02-02
Payer: COMMERCIAL

## 2023-02-02 DIAGNOSIS — Z95.2 AORTIC VALVE REPLACED: Primary | Chronic | ICD-10-CM

## 2023-02-02 DIAGNOSIS — I48.0 PAROXYSMAL ATRIAL FIBRILLATION: Chronic | ICD-10-CM

## 2023-02-02 NOTE — PROGRESS NOTES
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    INR History:  Anticoagulation Monitoring 2023   INR 2.70 3.00 2.60   INR Date 2023   INR Goal 2.5-3.5 2.5-3.5 2.5-3.5   Trend Same Same Same   Last Week Total 42.5 mg 42.5 mg 42.5 mg   Next Week Total 42.5 mg 42.5 mg 42.5 mg   Sun 7.5 mg 7.5 mg 7.5 mg   Mon 5 mg 5 mg 5 mg   Tue 7.5 mg 7.5 mg 7.5 mg   Wed 5 mg 5 mg 5 mg   Thu 7.5 mg 7.5 mg 7.5 mg   Fri 5 mg 5 mg 5 mg   Sat 5 mg 5 mg 5 mg   Visit Report - - -   Some recent data might be hidden       Plan:  1. INR is therapeutic today- see above in Anticoagulation Summary.    Fernie Porter to continue their warfarin regimen- see above in Anticoagulation Summary.  2. Follow up in 2 weeks  3. Pt has agreed to only be called if INR out of range. They have been instructed to call if any changes in medications, doses, concerns, etc. Patient expresses understanding and has no further questions at this time.    Georgie Gaming

## 2023-02-06 ENCOUNTER — OFFICE VISIT (OUTPATIENT)
Dept: INTERNAL MEDICINE | Facility: CLINIC | Age: 53
End: 2023-02-06
Payer: COMMERCIAL

## 2023-02-06 VITALS
HEART RATE: 66 BPM | HEIGHT: 69 IN | TEMPERATURE: 97.6 F | OXYGEN SATURATION: 99 % | SYSTOLIC BLOOD PRESSURE: 139 MMHG | RESPIRATION RATE: 16 BRPM | WEIGHT: 160.4 LBS | DIASTOLIC BLOOD PRESSURE: 70 MMHG | BODY MASS INDEX: 23.76 KG/M2

## 2023-02-06 DIAGNOSIS — I10 PRIMARY HYPERTENSION: Primary | Chronic | ICD-10-CM

## 2023-02-06 DIAGNOSIS — I48.0 PAROXYSMAL ATRIAL FIBRILLATION: Chronic | ICD-10-CM

## 2023-02-06 DIAGNOSIS — G43.109 MIGRAINE WITH AURA AND WITHOUT STATUS MIGRAINOSUS, NOT INTRACTABLE: ICD-10-CM

## 2023-02-06 PROCEDURE — 99214 OFFICE O/P EST MOD 30 MIN: CPT | Performed by: INTERNAL MEDICINE

## 2023-02-06 NOTE — PROGRESS NOTES
Subjective   Fernie Porter is a 52 y.o. male.     Chief Complaint   Patient presents with   • Atrial Fibrillation   • Hypertension         Atrial Fibrillation  Presents for follow-up visit. Symptoms are negative for chest pain, palpitations and shortness of breath. The symptoms have been stable. Past medical history includes atrial fibrillation.   Hypertension  This is a chronic problem. The current episode started more than 1 year ago. The problem is unchanged. The problem is controlled. Associated symptoms include headaches. Pertinent negatives include no chest pain, orthopnea, palpitations, peripheral edema or shortness of breath.   Migraine  Headache pattern:  Headache sometimes there, sometimes not at all       The following portions of the patient's history were reviewed and updated as appropriate: allergies, current medications, past social history and problem list.    Outpatient Medications Marked as Taking for the 2/6/23 encounter (Office Visit) with Juan Antonio Ford MD   Medication Sig Dispense Refill   • Ajovy 225 MG/1.5ML solution auto-injector INJECT 225 MG UNDER THE SKIN INTO THE APPROPRIATE AREA AS DIRECTED EVERY 30 (THIRTY) DAYS. 4.5 mL 3   • amLODIPine (NORVASC) 5 MG tablet TAKE 1 TABLET BY MOUTH EVERY DAY 90 tablet 1   • cloNIDine (CATAPRES) 0.2 MG tablet TAKE 1 TABLET BY MOUTH TWICE A  tablet 1   • fluticasone (FLONASE) 50 MCG/ACT nasal spray 2 SPRAYS INTO THE NOSTRIL(S) AS DIRECTED BY PROVIDER DAILY. 48 mL 2   • hydroCHLOROthiazide (MICROZIDE) 12.5 MG capsule TAKE 1 CAPSULE BY MOUTH EVERY DAY 90 capsule 1   • ibuprofen (ADVIL,MOTRIN) 200 MG tablet Take 200 mg by mouth Every 6 (Six) Hours As Needed for Mild Pain .     • imipramine (TOFRANIL) 50 MG tablet TAKE 2 TABLETS BY MOUTH AT BEDTIME 180 tablet 1   • propranolol LA (INDERAL LA) 120 MG 24 hr capsule TAKE 1 CAPSULE BY MOUTH EVERY DAY 90 capsule 1   • warfarin (COUMADIN) 5 MG tablet Take 7.5mg (1 and 1/2 Tablets) on Sunday, Tuesday,  and Thursday; Take 5mg (1 Tablet) all other days OR as directed by the [facility]. 105 tablet 1       Review of Systems   Respiratory: Negative for shortness of breath and wheezing.    Cardiovascular: Negative for chest pain, palpitations, orthopnea and leg swelling.   Gastrointestinal: Negative for constipation and diarrhea.   Neurological: Positive for headaches.       Objective   Vitals:    02/06/23 1529   BP: 139/70   Pulse: 66   Resp: 16   Temp: 97.6 °F (36.4 °C)   SpO2: 99%          02/06/23  1529   Weight: 72.8 kg (160 lb 6.4 oz)    [unfilled]  Body mass index is 23.68 kg/m².      Physical Exam   Constitutional: He appears well-developed.   Cardiovascular: Normal rate and regular rhythm. Exam reveals no gallop.   Murmur heard.   Crescendo decrescendo systolic murmur is present with a grade of 2/6.  Prosthetic heart sounds.  Grade 2/6 JOHNNIE loudest at the pulmonic area.   Pulmonary/Chest: Effort normal and breath sounds normal. No respiratory distress. He has no wheezes. He has no rales.   Abdominal: Soft. Normal appearance and bowel sounds are normal. He exhibits no mass. There is no abdominal tenderness. There is no guarding.   Neurological: He is alert.         Problems Addressed this Visit        Cardiac and Vasculature    Paroxysmal atrial fibrillation (HCC) (Chronic)    Hypertension - Primary (Chronic)       Neuro    Migraine (Chronic)   Diagnoses       Codes Comments    Primary hypertension    -  Primary ICD-10-CM: I10  ICD-9-CM: 401.9     Paroxysmal atrial fibrillation (HCC)     ICD-10-CM: I48.0  ICD-9-CM: 427.31     Migraine with aura and without status migrainosus, not intractable     ICD-10-CM: G43.109  ICD-9-CM: 346.00         Assessment & Plan   In for recheck of hypertension, atrial fibrillation, aortic stenosis and migraines today February 2023.  Migraines persist.  They're doing pretty well at the present time.  Gets 3 per month.  Has failed several drugs in the past including  Topamax.  Currently on Ajovy injections once monthly and they have been working pretty well right now.  Blood pressure control is excellent.  Atrial fib is controlled.  On propranolol  mg daily, HCTZ 12.5 mg daily, amlodipine 5 mg daily, and clonidine 0.2 mg twice daily for hypertension.  Those are reviewed today.  Gets annual lab work May 2022 including CBC, CMP, PSA urinalysis.  Continue to monitor every 3 months.  Certainly no severe migraine since he started Inderal LA.   Due for annual preventive exam August 2023.  Due for Shingrix.  Due for TDA P but declines that for now.  He is 1.5 HPP today.    The above information was reviewed again today 02/06/23.  It continues to be accurate as reflected above and is unchanged.  History, physical and review of systems all reviewed and are unchanged.  Medications were reviewed today and continue the current dosing.    PPE today includes face mask and eye shield.         Dragon disclaimer:   Much of this encounter note is an electronic transcription/translation of spoken language to printed text. The electronic translation of spoken language may permit erroneous, or at times, nonsensical words or phrases to be inadvertently transcribed; Although I have reviewed the note for such errors, some may still exist.

## 2023-02-16 LAB — INR PPP: 2.5

## 2023-02-17 ENCOUNTER — ANTICOAGULATION VISIT (OUTPATIENT)
Dept: PHARMACY | Facility: HOSPITAL | Age: 53
End: 2023-02-17
Payer: COMMERCIAL

## 2023-02-17 DIAGNOSIS — I48.0 PAROXYSMAL ATRIAL FIBRILLATION: Chronic | ICD-10-CM

## 2023-02-17 DIAGNOSIS — Z95.2 AORTIC VALVE REPLACED: Primary | Chronic | ICD-10-CM

## 2023-02-17 NOTE — PROGRESS NOTES
Anticoagulation Clinic Progress Note    Anticoagulation Summary  As of 2023    INR goal:  2.5-3.5   TTR:  91.2 % (4.2 y)   INR used for dosin.50 (2023)   Warfarin maintenance plan:  7.5 mg every Sun, Tue, Thu; 5 mg all other days; Starting 2023   Weekly warfarin total:  42.5 mg   No change documented:  Allison Sharma, Pharmacy Technician   Plan last modified:  Katie Schwarz, Toni (2021)   Next INR check:  3/2/2023   Priority:  Maintenance   Target end date:      Indications    Aortic valve replaced [Z95.2]  Paroxysmal atrial fibrillation (HCC) [I48.0]             Anticoagulation Episode Summary     INR check location:      Preferred lab:      [facility]    Comments:  *[place]*      Anticoagulation Care Providers     Provider Role Specialty Phone number    Crissy Mejia APRN Referring Cardiology [phone]          Clinic Interview:  No pertinent clinical findings have been reported.    INR History:  Anticoagulation Monitoring 2023   INR 3.00 2.60 2.50   INR Date 2023   INR Goal 2.5-3.5 2.5-3.5 2.5-3.5   Trend Same Same Same   Last Week Total 42.5 mg 42.5 mg 42.5 mg   Next Week Total 42.5 mg 42.5 mg 42.5 mg   Sun 7.5 mg 7.5 mg 7.5 mg   Mon 5 mg 5 mg 5 mg   Tue 7.5 mg 7.5 mg 7.5 mg   Wed 5 mg 5 mg 5 mg   Thu 7.5 mg 7.5 mg 7.5 mg   Fri 5 mg 5 mg 5 mg   Sat 5 mg 5 mg 5 mg   Visit Report - - -   Some recent data might be hidden       Plan:  1. INR is therapeutic today- see above in Anticoagulation Summary.    Fernie Porter to continue their warfarin regimen- see above in Anticoagulation Summary.  2. Follow up in 2 weeks  3. Pt has agreed to only be called if INR out of range. They have been instructed to call if any changes in medications, doses, concerns, etc. Patient expresses understanding and has no further questions at this time.    Allison Sharma, Pharmacy Technician

## 2023-03-02 LAB — INR PPP: 2.6

## 2023-03-03 ENCOUNTER — ANTICOAGULATION VISIT (OUTPATIENT)
Dept: PHARMACY | Facility: HOSPITAL | Age: 53
End: 2023-03-03
Payer: COMMERCIAL

## 2023-03-03 DIAGNOSIS — Z95.2 AORTIC VALVE REPLACED: Primary | Chronic | ICD-10-CM

## 2023-03-03 DIAGNOSIS — I48.0 PAROXYSMAL ATRIAL FIBRILLATION: Chronic | ICD-10-CM

## 2023-03-03 NOTE — PROGRESS NOTES
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    INR History:  Anticoagulation Monitoring 2023 2023 3/3/2023   INR 2.60 2.50 2.60   INR Date 2023 2023 3/2/2023   INR Goal 2.5-3.5 2.5-3.5 2.5-3.5   Trend Same Same Same   Last Week Total 42.5 mg 42.5 mg 42.5 mg   Next Week Total 42.5 mg 42.5 mg 42.5 mg   Sun 7.5 mg 7.5 mg 7.5 mg   Mon 5 mg 5 mg 5 mg   Tue 7.5 mg 7.5 mg 7.5 mg   Wed 5 mg 5 mg 5 mg   Thu 7.5 mg 7.5 mg 7.5 mg   Fri 5 mg 5 mg 5 mg   Sat 5 mg 5 mg 5 mg   Visit Report - - -   Some recent data might be hidden       Plan:  1. INR is therapeutic today- see above in Anticoagulation Summary.    Fernie Porter to continue their warfarin regimen- see above in Anticoagulation Summary.  2. Follow up in 2 weeks  3. Pt has agreed to only be called if INR out of range. They have been instructed to call if any changes in medications, doses, concerns, etc. Patient expresses understanding and has no further questions at this time.    Allison Sharma, Pharmacy Technician

## 2023-03-16 LAB — INR PPP: 2.5

## 2023-03-17 ENCOUNTER — ANTICOAGULATION VISIT (OUTPATIENT)
Dept: PHARMACY | Facility: HOSPITAL | Age: 53
End: 2023-03-17
Payer: COMMERCIAL

## 2023-03-17 DIAGNOSIS — I48.0 PAROXYSMAL ATRIAL FIBRILLATION: Chronic | ICD-10-CM

## 2023-03-17 DIAGNOSIS — Z95.2 AORTIC VALVE REPLACED: Primary | Chronic | ICD-10-CM

## 2023-03-17 NOTE — PROGRESS NOTES
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    INR History:  Anticoagulation Monitoring 2023 3/3/2023 3/17/2023   INR 2.50 2.60 2.50   INR Date 2023 3/2/2023 3/16/2023   INR Goal 2.5-3.5 2.5-3.5 2.5-3.5   Trend Same Same Same   Last Week Total 42.5 mg 42.5 mg 42.5 mg   Next Week Total 42.5 mg 42.5 mg 42.5 mg   Sun 7.5 mg 7.5 mg 7.5 mg   Mon 5 mg 5 mg 5 mg   Tue 7.5 mg 7.5 mg 7.5 mg   Wed 5 mg 5 mg 5 mg   Thu 7.5 mg 7.5 mg 7.5 mg   Fri 5 mg 5 mg 5 mg   Sat 5 mg 5 mg 5 mg   Visit Report - - -   Some recent data might be hidden       Plan:  1. INR is therapeutic today- see above in Anticoagulation Summary.    Fernie Porter to continue their warfarin regimen- see above in Anticoagulation Summary.  2. Follow up in 2 weeks  3. Pt has agreed to only be called if INR out of range. They have been instructed to call if any changes in medications, doses, concerns, etc. Patient expresses understanding and has no further questions at this time.    Georgie Gaming

## 2023-04-02 LAB — INR PPP: 3.1

## 2023-04-03 ENCOUNTER — ANTICOAGULATION VISIT (OUTPATIENT)
Dept: PHARMACY | Facility: HOSPITAL | Age: 53
End: 2023-04-03
Payer: COMMERCIAL

## 2023-04-03 DIAGNOSIS — I48.0 PAROXYSMAL ATRIAL FIBRILLATION: Chronic | ICD-10-CM

## 2023-04-03 DIAGNOSIS — Z95.2 AORTIC VALVE REPLACED: Primary | Chronic | ICD-10-CM

## 2023-04-03 NOTE — PROGRESS NOTES
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    INR History:  Anticoagulation Monitoring 3/3/2023 3/17/2023 4/3/2023   INR 2.60 2.50 3.10   INR Date 3/2/2023 3/16/2023 4/2/2023   INR Goal 2.5-3.5 2.5-3.5 2.5-3.5   Trend Same Same Same   Last Week Total 42.5 mg 42.5 mg 42.5 mg   Next Week Total 42.5 mg 42.5 mg 42.5 mg   Sun 7.5 mg 7.5 mg 7.5 mg   Mon 5 mg 5 mg 5 mg   Tue 7.5 mg 7.5 mg 7.5 mg   Wed 5 mg 5 mg 5 mg   Thu 7.5 mg 7.5 mg 7.5 mg   Fri 5 mg 5 mg 5 mg   Sat 5 mg 5 mg 5 mg   Visit Report - - -   Some recent data might be hidden       Plan:  1. INR is therapeutic today- see above in Anticoagulation Summary.    Fernie Porter to continue their warfarin regimen- see above in Anticoagulation Summary.  2. Follow up in 2 weeks  3. Pt has agreed to only be called if INR out of range. They have been instructed to call if any changes in medications, doses, concerns, etc. Patient expresses understanding and has no further questions at this time.    Georgie Gaming

## 2023-04-12 RX ORDER — CLONIDINE HYDROCHLORIDE 0.2 MG/1
TABLET ORAL
Qty: 180 TABLET | Refills: 1 | Status: SHIPPED | OUTPATIENT
Start: 2023-04-12

## 2023-04-12 RX ORDER — PROPRANOLOL HYDROCHLORIDE 120 MG/1
CAPSULE, EXTENDED RELEASE ORAL
Qty: 90 CAPSULE | Refills: 1 | Status: SHIPPED | OUTPATIENT
Start: 2023-04-12

## 2023-04-13 RX ORDER — AMLODIPINE BESYLATE 5 MG/1
TABLET ORAL
Qty: 90 TABLET | Refills: 2 | Status: SHIPPED | OUTPATIENT
Start: 2023-04-13

## 2023-04-18 LAB — INR PPP: 3.4

## 2023-04-19 ENCOUNTER — ANTICOAGULATION VISIT (OUTPATIENT)
Dept: PHARMACY | Facility: HOSPITAL | Age: 53
End: 2023-04-19
Payer: COMMERCIAL

## 2023-04-19 DIAGNOSIS — I48.0 PAROXYSMAL ATRIAL FIBRILLATION: Chronic | ICD-10-CM

## 2023-04-19 DIAGNOSIS — Z95.2 AORTIC VALVE REPLACED: Primary | Chronic | ICD-10-CM

## 2023-04-19 NOTE — PROGRESS NOTES
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    INR History:      3/3/2023     8:01 AM 3/16/2023    12:00 AM 3/17/2023     8:21 AM 4/2/2023    12:00 AM 4/3/2023    10:43 AM 4/18/2023    12:00 AM 4/19/2023     8:18 AM   Anticoagulation Monitoring   INR 2.60  2.50  3.10  3.40   INR Date 3/2/2023  3/16/2023  4/2/2023  4/18/2023   INR Goal 2.5-3.5  2.5-3.5  2.5-3.5  2.5-3.5   Trend Same  Same  Same  Same   Last Week Total 42.5 mg  42.5 mg  42.5 mg  42.5 mg   Next Week Total 42.5 mg  42.5 mg  42.5 mg  42.5 mg   Sun 7.5 mg  7.5 mg  7.5 mg  7.5 mg   Mon 5 mg  5 mg  5 mg  5 mg   Tue 7.5 mg  7.5 mg  7.5 mg  7.5 mg   Wed 5 mg  5 mg  5 mg  5 mg   Thu 7.5 mg  7.5 mg  7.5 mg  7.5 mg   Fri 5 mg  5 mg  5 mg  5 mg   Sat 5 mg  5 mg  5 mg  5 mg   Historical INR  2.50       3.10       3.40             This result is from an external source.       Plan:  1. INR is therapeutic today- see above in Anticoagulation Summary.    Fernie Porter to continue their warfarin regimen- see above in Anticoagulation Summary.  2. Follow up in 2  weeks  3. Pt has agreed to only be called if INR out of range. They have been instructed to call if any changes in medications, doses, concerns, etc. Patient expresses understanding and has no further questions at this time.    Allison Sharma, Pharmacy Technician

## 2023-05-02 LAB — INR PPP: 2.5

## 2023-05-03 ENCOUNTER — ANTICOAGULATION VISIT (OUTPATIENT)
Dept: PHARMACY | Facility: HOSPITAL | Age: 53
End: 2023-05-03
Payer: COMMERCIAL

## 2023-05-03 DIAGNOSIS — Z95.2 AORTIC VALVE REPLACED: Primary | Chronic | ICD-10-CM

## 2023-05-03 DIAGNOSIS — I48.0 PAROXYSMAL ATRIAL FIBRILLATION: Chronic | ICD-10-CM

## 2023-05-03 NOTE — PROGRESS NOTES
Anticoagulation Clinic Progress Note    Anticoagulation Summary  As of 5/3/2023    INR goal:  2.5-3.5   TTR:  91.6 % (4.4 y)   INR used for dosin.50 (2023)   Warfarin maintenance plan:  7.5 mg every Sun, Tue, Thu; 5 mg all other days; Starting 5/3/2023   Weekly warfarin total:  42.5 mg   No change documented:  Georgie Gaming   Plan last modified:  Katie Schwarz, PharmD (2021)   Next INR check:  2023   Priority:  Maintenance   Target end date:      Indications    Aortic valve replaced [Z95.2]  Paroxysmal atrial fibrillation [I48.0]             Anticoagulation Episode Summary     INR check location:      Preferred lab:      Send INR reminders to:   [facility]  POOL    Comments:  *[place]k*      Anticoagulation Care Providers     Provider Role Specialty Phone number    Crissy MejiaELLA Referring Cardiology [phone]          Clinic Interview:  No pertinent clinical findings have been reported.    INR History:      3/17/2023     8:21 AM 2023    12:00 AM 4/3/2023    10:43 AM 2023    12:00 AM 2023     8:18 AM 2023    12:00 AM 5/3/2023    11:28 AM   Anticoagulation Monitoring   INR 2.50  3.10  3.40  2.50   INR Date 3/16/2023  2023  2023  2023   INR Goal 2.5-3.5  2.5-3.5  2.5-3.5  2.5-3.5   Trend Same  Same  Same  Same   Last Week Total 42.5 mg  42.5 mg  42.5 mg  42.5 mg   Next Week Total 42.5 mg  42.5 mg  42.5 mg  42.5 mg   Sun 7.5 mg  7.5 mg  7.5 mg  7.5 mg   Mon 5 mg  5 mg  5 mg  5 mg   Tue 7.5 mg  7.5 mg  7.5 mg  7.5 mg   Wed 5 mg  5 mg  5 mg  5 mg   Thu 7.5 mg  7.5 mg  7.5 mg  7.5 mg   Fri 5 mg  5 mg  5 mg  5 mg   Sat 5 mg  5 mg  5 mg  5 mg   Historical INR  3.10       3.40       2.50             This result is from an external source.       Plan:  1. INR is therapeutic today- see above in Anticoagulation Summary.    Fernie Porter to continue their warfarin regimen- see above in Anticoagulation Summary.  2. Follow up in 2 weeks  3. Pt  has agreed to only be called if INR out of range. They have been instructed to call if any changes in medications, doses, concerns, etc. Patient expresses understanding and has no further questions at this time.    Georgie Gaming

## 2023-05-15 ENCOUNTER — OFFICE VISIT (OUTPATIENT)
Dept: INTERNAL MEDICINE | Facility: CLINIC | Age: 53
End: 2023-05-15
Payer: COMMERCIAL

## 2023-05-15 ENCOUNTER — LAB (OUTPATIENT)
Dept: LAB | Facility: HOSPITAL | Age: 53
End: 2023-05-15
Payer: COMMERCIAL

## 2023-05-15 VITALS
OXYGEN SATURATION: 100 % | HEART RATE: 67 BPM | BODY MASS INDEX: 23.89 KG/M2 | RESPIRATION RATE: 16 BRPM | TEMPERATURE: 97.3 F | HEIGHT: 69 IN | DIASTOLIC BLOOD PRESSURE: 60 MMHG | SYSTOLIC BLOOD PRESSURE: 115 MMHG | WEIGHT: 161.3 LBS

## 2023-05-15 DIAGNOSIS — I10 PRIMARY HYPERTENSION: Chronic | ICD-10-CM

## 2023-05-15 DIAGNOSIS — Z79.899 MEDICATION MANAGEMENT: ICD-10-CM

## 2023-05-15 DIAGNOSIS — G43.109 MIGRAINE WITH AURA AND WITHOUT STATUS MIGRAINOSUS, NOT INTRACTABLE: Chronic | ICD-10-CM

## 2023-05-15 DIAGNOSIS — I48.0 PAROXYSMAL ATRIAL FIBRILLATION: Primary | Chronic | ICD-10-CM

## 2023-05-15 DIAGNOSIS — Z12.5 SCREENING FOR PROSTATE CANCER: ICD-10-CM

## 2023-05-15 LAB
ALBUMIN SERPL-MCNC: 4.6 G/DL (ref 3.5–5.2)
ALBUMIN/GLOB SERPL: 1.9 G/DL
ALP SERPL-CCNC: 54 U/L (ref 39–117)
ALT SERPL W P-5'-P-CCNC: 27 U/L (ref 1–41)
ANION GAP SERPL CALCULATED.3IONS-SCNC: 8.6 MMOL/L (ref 5–15)
AST SERPL-CCNC: 28 U/L (ref 1–40)
BASOPHILS # BLD AUTO: 0.05 10*3/MM3 (ref 0–0.2)
BASOPHILS NFR BLD AUTO: 0.8 % (ref 0–1.5)
BILIRUB SERPL-MCNC: 0.4 MG/DL (ref 0–1.2)
BUN SERPL-MCNC: 12 MG/DL (ref 6–20)
BUN/CREAT SERPL: 15 (ref 7–25)
CALCIUM SPEC-SCNC: 9.9 MG/DL (ref 8.6–10.5)
CHLORIDE SERPL-SCNC: 99 MMOL/L (ref 98–107)
CHOLEST SERPL-MCNC: 205 MG/DL (ref 0–200)
CO2 SERPL-SCNC: 32.4 MMOL/L (ref 22–29)
CREAT SERPL-MCNC: 0.8 MG/DL (ref 0.76–1.27)
DEPRECATED RDW RBC AUTO: 40.8 FL (ref 37–54)
EGFRCR SERPLBLD CKD-EPI 2021: 107.1 ML/MIN/1.73
EOSINOPHIL # BLD AUTO: 0.22 10*3/MM3 (ref 0–0.4)
EOSINOPHIL NFR BLD AUTO: 3.4 % (ref 0.3–6.2)
ERYTHROCYTE [DISTWIDTH] IN BLOOD BY AUTOMATED COUNT: 12.6 % (ref 12.3–15.4)
GLOBULIN UR ELPH-MCNC: 2.4 GM/DL
GLUCOSE SERPL-MCNC: 84 MG/DL (ref 65–99)
HCT VFR BLD AUTO: 45.4 % (ref 37.5–51)
HDLC SERPL QL: 4.1
HDLC SERPL-MCNC: 50 MG/DL (ref 40–60)
HGB BLD-MCNC: 15.6 G/DL (ref 13–17.7)
IMM GRANULOCYTES # BLD AUTO: 0.02 10*3/MM3 (ref 0–0.05)
IMM GRANULOCYTES NFR BLD AUTO: 0.3 % (ref 0–0.5)
LDLC SERPL CALC-MCNC: 138 MG/DL (ref 0–100)
LYMPHOCYTES # BLD AUTO: 1.05 10*3/MM3 (ref 0.7–3.1)
LYMPHOCYTES NFR BLD AUTO: 16.2 % (ref 19.6–45.3)
MCH RBC QN AUTO: 30.8 PG (ref 26.6–33)
MCHC RBC AUTO-ENTMCNC: 34.4 G/DL (ref 31.5–35.7)
MCV RBC AUTO: 89.7 FL (ref 79–97)
MONOCYTES # BLD AUTO: 0.52 10*3/MM3 (ref 0.1–0.9)
MONOCYTES NFR BLD AUTO: 8 % (ref 5–12)
NEUTROPHILS NFR BLD AUTO: 4.63 10*3/MM3 (ref 1.7–7)
NEUTROPHILS NFR BLD AUTO: 71.3 % (ref 42.7–76)
NRBC BLD AUTO-RTO: 0 /100 WBC (ref 0–0.2)
PLATELET # BLD AUTO: 221 10*3/MM3 (ref 140–450)
PMV BLD AUTO: 11.3 FL (ref 6–12)
POTASSIUM SERPL-SCNC: 4 MMOL/L (ref 3.5–5.2)
PROT SERPL-MCNC: 7 G/DL (ref 6–8.5)
PSA SERPL-MCNC: 1.26 NG/ML (ref 0–4)
RBC # BLD AUTO: 5.06 10*6/MM3 (ref 4.14–5.8)
SODIUM SERPL-SCNC: 140 MMOL/L (ref 136–145)
TRIGL SERPL-MCNC: 92 MG/DL (ref 0–150)
VLDLC SERPL-MCNC: 17 MG/DL (ref 5–40)
WBC NRBC COR # BLD: 6.49 10*3/MM3 (ref 3.4–10.8)

## 2023-05-15 PROCEDURE — 36415 COLL VENOUS BLD VENIPUNCTURE: CPT | Performed by: INTERNAL MEDICINE

## 2023-05-15 PROCEDURE — 99214 OFFICE O/P EST MOD 30 MIN: CPT | Performed by: INTERNAL MEDICINE

## 2023-05-15 PROCEDURE — G0103 PSA SCREENING: HCPCS | Performed by: INTERNAL MEDICINE

## 2023-05-15 PROCEDURE — 85025 COMPLETE CBC W/AUTO DIFF WBC: CPT | Performed by: INTERNAL MEDICINE

## 2023-05-15 PROCEDURE — 80061 LIPID PANEL: CPT | Performed by: INTERNAL MEDICINE

## 2023-05-15 PROCEDURE — 80053 COMPREHEN METABOLIC PANEL: CPT | Performed by: INTERNAL MEDICINE

## 2023-05-15 NOTE — PROGRESS NOTES
Subjective   Fernie Porter is a 52 y.o. male.     Chief Complaint   Patient presents with   • Hypertension   • Migraine         Hypertension  This is a chronic problem. The current episode started more than 1 year ago. The problem is unchanged. The problem is controlled. Associated symptoms include headaches. Pertinent negatives include no chest pain, orthopnea, palpitations, peripheral edema or shortness of breath.   Migraine  Headache pattern:  Headache sometimes there, sometimes not at all  Atrial Fibrillation  Presents for follow-up visit. Symptoms are negative for chest pain, palpitations and shortness of breath. The symptoms have been stable. Past medical history includes atrial fibrillation.        The following portions of the patient's history were reviewed and updated as appropriate: allergies, current medications, past social history and problem list.    Outpatient Medications Marked as Taking for the 5/15/23 encounter (Office Visit) with Juan Antonio Ford MD   Medication Sig Dispense Refill   • Ajovy 225 MG/1.5ML solution auto-injector INJECT 225 MG UNDER THE SKIN INTO THE APPROPRIATE AREA AS DIRECTED EVERY 30 (THIRTY) DAYS. 4.5 mL 3   • amLODIPine (NORVASC) 5 MG tablet TAKE 1 TABLET BY MOUTH EVERY DAY 90 tablet 2   • cloNIDine (CATAPRES) 0.2 MG tablet TAKE 1 TABLET BY MOUTH TWICE A  tablet 1   • fluticasone (FLONASE) 50 MCG/ACT nasal spray 2 SPRAYS INTO THE NOSTRIL(S) AS DIRECTED BY PROVIDER DAILY. 48 mL 2   • hydroCHLOROthiazide (MICROZIDE) 12.5 MG capsule TAKE 1 CAPSULE BY MOUTH EVERY DAY 90 capsule 1   • ibuprofen (ADVIL,MOTRIN) 200 MG tablet Take 1 tablet by mouth Every 6 (Six) Hours As Needed for Mild Pain.     • imipramine (TOFRANIL) 50 MG tablet TAKE 2 TABLETS BY MOUTH AT BEDTIME 180 tablet 1   • propranolol LA (INDERAL LA) 120 MG 24 hr capsule TAKE 1 CAPSULE BY MOUTH EVERY DAY 90 capsule 1   • warfarin (COUMADIN) 5 MG tablet Take 7.5mg (1 and 1/2 Tablets) on Sunday, Tuesday, and  Thursday; Take 5mg (1 Tablet) all other days OR as directed by the [facility]. 105 tablet 1       Review of Systems   Respiratory: Negative for shortness of breath and wheezing.    Cardiovascular: Negative for chest pain, palpitations, orthopnea and leg swelling.   Gastrointestinal: Negative for constipation and diarrhea.   Neurological: Positive for headaches.       Objective   Vitals:    05/15/23 1532   BP: 115/60   Pulse: 67   Resp: 16   Temp: 97.3 °F (36.3 °C)   SpO2: 100%          05/15/23  1532   Weight: 73.2 kg (161 lb 4.8 oz)    [unfilled]  Body mass index is 23.81 kg/m².      Physical Exam   Constitutional: He appears well-developed.   Cardiovascular: Normal rate and regular rhythm. Exam reveals no gallop.   Murmur heard.   Crescendo decrescendo systolic murmur is present with a grade of 2/6.  Prosthetic heart sounds.  Grade 2/6 JOHNNIE loudest at the pulmonic area.   Pulmonary/Chest: Effort normal and breath sounds normal. No respiratory distress. He has no wheezes. He has no rales.   Abdominal: Soft. Normal appearance and bowel sounds are normal. He exhibits no mass. There is no abdominal tenderness. There is no guarding.   Neurological: He is alert.         Problems Addressed this Visit        Cardiac and Vasculature    Paroxysmal atrial fibrillation - Primary (Chronic)    Hypertension (Chronic)       Neuro    Migraine (Chronic)   Diagnoses       Codes Comments    Paroxysmal atrial fibrillation    -  Primary ICD-10-CM: I48.0  ICD-9-CM: 427.31     Primary hypertension     ICD-10-CM: I10  ICD-9-CM: 401.9     Migraine with aura and without status migrainosus, not intractable     ICD-10-CM: G43.109  ICD-9-CM: 346.00         Assessment & Plan   In for recheck of hypertension, atrial fibrillation, aortic stenosis and migraines today May 2023.  Migraines persist and are pretty much unchanged.  They're doing pretty well at the present time.  Gets 3 per month.  Has failed several drugs in the past  including Topamax.  Currently on Ajovy injections once monthly and they have been working pretty well right now.  Blood pressure control is excellent.  Atrial fib is controlled.  On propranolol  mg daily, HCTZ 12.5 mg daily, amlodipine 5 mg daily, and clonidine 0.2 mg twice daily for hypertension.  Those are reviewed today.  Gets annual lab work May 2023 including CBC, CMP, lipids, PSA urinalysis.  Continue to monitor every 3 months.  Certainly no severe migraine since he started Inderal LA.   Due for annual preventive exam August 2023.  Due for Shingrix.  Due for TDA P but declines that for now.  He is 1.5 HPP [employer] today.    The above information was reviewed again today 05/15/23.  It continues to be accurate as reflected above and is unchanged.  History, physical and review of systems all reviewed and are unchanged.  Medications were reviewed today and continue the current dosing.    PPE today includes face mask and eye shield.  The 10-year ASCVD risk score (Sana LEO, et al., 2019) is: 3.7%    Values used to calculate the score:      Age: 52 years      Sex: Male      Is Non- : No      Diabetic: No      Tobacco smoker: No      Systolic Blood Pressure: 115 mmHg      Is BP treated: Yes      HDL Cholesterol: 41 mg/dL      Total Cholesterol: 173 mg/dL\           Dragon disclaimer:   Much of this encounter note is an electronic transcription/translation of spoken language to printed text. The electronic translation of spoken language may permit erroneous, or at times, nonsensical words or phrases to be inadvertently transcribed; Although I have reviewed the note for such errors, some may still exist.

## 2023-05-18 LAB — INR PPP: 2.7

## 2023-05-19 ENCOUNTER — ANTICOAGULATION VISIT (OUTPATIENT)
Dept: PHARMACY | Facility: HOSPITAL | Age: 53
End: 2023-05-19
Payer: COMMERCIAL

## 2023-05-19 DIAGNOSIS — I48.0 PAROXYSMAL ATRIAL FIBRILLATION: Chronic | ICD-10-CM

## 2023-05-19 DIAGNOSIS — Z95.2 AORTIC VALVE REPLACED: Primary | Chronic | ICD-10-CM

## 2023-05-19 NOTE — PROGRESS NOTES
Anticoagulation Clinic Progress Note    Anticoagulation Summary  As of 2023    INR goal:  2.5-3.5   TTR:  91.6 % (4.5 y)   INR used for dosin.70 (2023)   Warfarin maintenance plan:  7.5 mg every Sun, Tue, Thu; 5 mg all other days; Starting 2023   Weekly warfarin total:  42.5 mg   No change documented:  Allison Sharma, Pharmacy Technician   Plan last modified:  Katie Schwarz, PharmFRANCES (2021)   Next INR check:  2023   Priority:  Maintenance   Target end date:      Indications    Aortic valve replaced [Z95.2]  Paroxysmal atrial fibrillation [I48.0]             Anticoagulation Episode Summary     INR check location:      Preferred lab:      [facility]   [facility]    Comments:  *Coaguchek*      Anticoagulation Care Providers     Provider Role Specialty Phone number    Crissy Mejia APRN Referring Cardiology [phone]          Clinic Interview:  No pertinent clinical findings have been reported.    INR History:      4/3/2023    10:43 AM 2023    12:00 AM 2023     8:18 AM 2023    12:00 AM 5/3/2023    11:28 AM 2023    12:00 AM 2023     8:18 AM   Anticoagulation Monitoring   INR 3.10  3.40  2.50  2.70   INR Date 2023   INR Goal 2.5-3.5  2.5-3.5  2.5-3.5  2.5-3.5   Trend Same  Same  Same  Same   Last Week Total 42.5 mg  42.5 mg  42.5 mg  42.5 mg   Next Week Total 42.5 mg  42.5 mg  42.5 mg  42.5 mg   Sun 7.5 mg  7.5 mg  7.5 mg  7.5 mg   Mon 5 mg  5 mg  5 mg  5 mg   Tue 7.5 mg  7.5 mg  7.5 mg  7.5 mg   Wed 5 mg  5 mg  5 mg  5 mg   Thu 7.5 mg  7.5 mg  7.5 mg  7.5 mg   Fri 5 mg  5 mg  5 mg  5 mg   Sat 5 mg  5 mg  5 mg  5 mg   Historical INR  3.40       2.50       2.70             This result is from an external source.       Plan:  1. INR is therapeutic today- see above in Anticoagulation Summary.    Fernie Porter to continue their warfarin regimen- see above in Anticoagulation Summary.  2. Follow up in 2  weeks  3. Pt has agreed to only be called if INR out of range. They have been instructed to call if any changes in medications, doses, concerns, etc. Patient expresses understanding and has no further questions at this time.    Allison Sharma, Pharmacy Technician

## 2023-06-01 LAB — INR PPP: 3.1

## 2023-06-02 ENCOUNTER — ANTICOAGULATION VISIT (OUTPATIENT)
Dept: PHARMACY | Facility: HOSPITAL | Age: 53
End: 2023-06-02

## 2023-06-02 ENCOUNTER — OFFICE VISIT (OUTPATIENT)
Dept: CARDIOLOGY | Facility: CLINIC | Age: 53
End: 2023-06-02

## 2023-06-02 VITALS
HEART RATE: 69 BPM | HEIGHT: 69 IN | DIASTOLIC BLOOD PRESSURE: 76 MMHG | WEIGHT: 160 LBS | BODY MASS INDEX: 23.7 KG/M2 | SYSTOLIC BLOOD PRESSURE: 130 MMHG

## 2023-06-02 DIAGNOSIS — Z95.2 AORTIC VALVE REPLACED: ICD-10-CM

## 2023-06-02 DIAGNOSIS — I10 ESSENTIAL HYPERTENSION: ICD-10-CM

## 2023-06-02 DIAGNOSIS — Z95.2 AORTIC VALVE REPLACED: Primary | Chronic | ICD-10-CM

## 2023-06-02 DIAGNOSIS — I71.21 ANEURYSM OF ASCENDING AORTA WITHOUT RUPTURE: ICD-10-CM

## 2023-06-02 DIAGNOSIS — Q25.1 COARCTATION OF AORTA: Primary | ICD-10-CM

## 2023-06-02 DIAGNOSIS — Q23.1 BICUSPID AORTIC VALVE: ICD-10-CM

## 2023-06-02 DIAGNOSIS — I48.0 PAROXYSMAL ATRIAL FIBRILLATION: Chronic | ICD-10-CM

## 2023-06-02 PROCEDURE — 93000 ELECTROCARDIOGRAM COMPLETE: CPT | Performed by: INTERNAL MEDICINE

## 2023-06-02 PROCEDURE — 99214 OFFICE O/P EST MOD 30 MIN: CPT | Performed by: INTERNAL MEDICINE

## 2023-06-02 RX ORDER — DIPHENHYDRAMINE HCL 25 MG
50 TABLET ORAL ONCE
Qty: 2 TABLET | Refills: 0 | Status: SHIPPED | OUTPATIENT
Start: 2023-06-02 | End: 2023-06-02

## 2023-06-02 RX ORDER — OMEPRAZOLE 20 MG/1
20 CAPSULE, DELAYED RELEASE ORAL 2 TIMES DAILY
Qty: 6 CAPSULE | Refills: 0 | Status: SHIPPED | OUTPATIENT
Start: 2023-06-02

## 2023-06-02 RX ORDER — PREDNISONE 50 MG/1
50 TABLET ORAL DAILY
Qty: 3 TABLET | Refills: 0 | Status: SHIPPED | OUTPATIENT
Start: 2023-06-02

## 2023-06-02 NOTE — PROGRESS NOTES
Anticoagulation Clinic Progress Note    Anticoagulation Summary  As of 6/2/2023    INR goal:  2.5-3.5   TTR:  91.7 % (4.5 y)   INR used for dosing:  3.10 (6/1/2023)   Warfarin maintenance plan:  7.5 mg every Sun, Tue, Thu; 5 mg all other days; Starting 6/2/2023   Weekly warfarin total:  42.5 mg   No change documented:  Allison Sharma, Pharmacy Technician   Plan last modified:  Katie Schwarz, PharmFRANCES (4/13/2021)   Next INR check:  6/15/2023   Priority:  Maintenance   Target end date:      Indications    Aortic valve replaced [Z95.2]  Paroxysmal atrial fibrillation [I48.0]             Anticoagulation Episode Summary     INR check location:      Preferred lab:      [facility]    Comments:  *Coaguchek*      Anticoagulation Care Providers     Provider Role Specialty Phone number    Crissy Mejia APRN Referring Cardiology [phone]          Clinic Interview:  No pertinent clinical findings have been reported.    INR History:      4/19/2023     8:18 AM 5/2/2023    12:00 AM 5/3/2023    11:28 AM 5/18/2023    12:00 AM 5/19/2023     8:18 AM 6/1/2023    12:00 AM 6/2/2023     8:24 AM   Anticoagulation Monitoring   INR 3.40  2.50  2.70  3.10   INR Date 4/18/2023 5/2/2023 5/18/2023 6/1/2023   INR Goal 2.5-3.5  2.5-3.5  2.5-3.5  2.5-3.5   Trend Same  Same  Same  Same   Last Week Total 42.5 mg  42.5 mg  42.5 mg  42.5 mg   Next Week Total 42.5 mg  42.5 mg  42.5 mg  42.5 mg   Sun 7.5 mg  7.5 mg  7.5 mg  7.5 mg   Mon 5 mg  5 mg  5 mg  5 mg   Tue 7.5 mg  7.5 mg  7.5 mg  7.5 mg   Wed 5 mg  5 mg  5 mg  5 mg   Thu 7.5 mg  7.5 mg  7.5 mg  7.5 mg   Fri 5 mg  5 mg  5 mg  5 mg   Sat 5 mg  5 mg  5 mg  5 mg   Historical INR  2.50       2.70       3.10             This result is from an external source.       Plan:  1. INR is therapeutic today- see above in Anticoagulation Summary.    Fernie Porter to continue their warfarin regimen- see above in Anticoagulation Summary.  2. Follow up in 2  weeks  3. Pt has agreed to only be called if INR out of range. They have been instructed to call if any changes in medications, doses, concerns, etc. Patient expresses understanding and has no further questions at this time.    Allison Sharma, Pharmacy Technician

## 2023-06-02 NOTE — PROGRESS NOTES
[facility]      Patient Name: Fernie Porter  :1970  Age: 52 y.o.  Encounter Provider:  Contreras Saenz Jr, MD      Chief Complaint:   Chief Complaint   Patient presents with   • aortic valve replaced         HPI  Fernie Porter is a 52 y.o. male past medical history of coarctation of the aorta, bicuspid aortic valve and thoracic aortic aneurysm who presents for initial evaluation.  Previously followed by Dr. Buenrostro and I have reviewed all pertinent electronic health records.  Patient had repair of the coarctation at age 5 but later developed a pseudoaneurysm.  He had second repair at age 20.  He developed severe aortic stenosis in  as well as thoracic aortic aneurysm which had to be addressed with surgical intervention.  He had mechanical AVR and graft repair of the ascending aorta with maze procedure and left atrial appendage ligation.  In  he was found to have mild narrowing in the region of previous coarctation repair.  He had follow-up CT scan in  which showed stable appearance of the narrowing distal to the left subclavian.  He was noted to have some mild reaction to iodinated contrast in the past he has good functional capacity with no limiting symptoms.  No orthopnea, PND or edema.  INR is managed by anticoagulation clinic and he has at home INR monitor.  No cardiac complaints at time of interview.      The following portions of the patient's history were reviewed and updated as appropriate: allergies, current medications, past family history, past medical history, past social history, past surgical history and problem list.      Review of Systems   Constitutional: Negative for chills and fever.   HENT: Negative for hoarse voice and sore throat.    Eyes: Negative for double vision and photophobia.   Cardiovascular: Negative for chest pain, leg swelling, near-syncope, orthopnea, palpitations, paroxysmal nocturnal dyspnea and syncope.   Respiratory: Negative  "for cough and wheezing.    Skin: Negative for poor wound healing and rash.   Musculoskeletal: Negative for arthritis and joint swelling.   Gastrointestinal: Negative for bloating, abdominal pain, hematemesis and hematochezia.   Neurological: Negative for dizziness and focal weakness.   Psychiatric/Behavioral: Negative for depression and suicidal ideas.       OBJECTIVE:   Vital Signs  Vitals:    06/02/23 0909   BP: 130/76   Pulse: 69     Estimated body mass index is 23.63 kg/m² as calculated from the following:    Height as of this encounter: 175.3 cm (69\").    Weight as of this encounter: 72.6 kg (160 lb).    Vitals reviewed.   Constitutional:       Appearance: Healthy appearance. Not in distress.   Neck:      Vascular: No JVR. JVD normal.   Pulmonary:      Effort: Pulmonary effort is normal.      Breath sounds: Normal breath sounds. No wheezing. No rhonchi. No rales.   Chest:      Chest wall: Not tender to palpatation.   Cardiovascular:      PMI at left midclavicular line. Normal rate. Regular rhythm. Normal S1. Normal S2.      Murmurs: There is no murmur.      No gallop. Systolic ejection click. No rub.   Pulses:     Intact distal pulses.   Edema:     Peripheral edema absent.   Abdominal:      General: Bowel sounds are normal.      Palpations: Abdomen is soft.      Tenderness: There is no abdominal tenderness.   Musculoskeletal: Normal range of motion.         General: No tenderness. Skin:     General: Skin is warm and dry.   Neurological:      General: No focal deficit present.      Mental Status: Alert and oriented to person, place and time.           ECG 12 Lead    Date/Time: 6/2/2023 9:40 AM  Performed by: Contreras Saenz Jr., MD  Authorized by: Contreras Saenz Jr., MD   Comparison: compared with previous ECG from 10/7/2021  Similar to previous ECG  Rhythm: sinus rhythm  Conduction: incomplete right bundle branch block    Clinical impression: abnormal EKG            Lipid Panel        5/15/2023    16:22   Lipid " Panel   Total Cholesterol 205     Triglycerides 92     HDL Cholesterol 50     VLDL Cholesterol 17     LDL Cholesterol  138          BUN   Date Value Ref Range Status   05/15/2023 12 6 - 20 mg/dL Final     Creatinine   Date Value Ref Range Status   05/15/2023 0.80 0.76 - 1.27 mg/dL Final     Potassium   Date Value Ref Range Status   05/15/2023 4.0 3.5 - 5.2 mmol/L Final     ALT (SGPT)   Date Value Ref Range Status   05/15/2023 27 1 - 41 U/L Final     AST (SGOT)   Date Value Ref Range Status   05/15/2023 28 1 - 40 U/L Final           ASSESSMENT:     52-year-old male with complex cardiac history presents for routine follow-up    PLAN OF CARE:     1. Coarctation of the aorta -recurrent narrowing of the aorta seems stable but it has been 3 years since last CT chest.  We will order premedication for contrast reaction.  CTA chest will be ordered.  2. Status post AVR -mechanical AVR with mild murmur today on exam.  Plan for echocardiogram.  3. Chronic anticoagulation use -no bleeding complications on warfarin.  INR managed by anticoagulation clinic.  4. PAF -no recurrent episodes since surgical intervention in 2012.  Monitor closely.  5. Seroma -he was noted to have stable fluid around the ascending aorta thought to be seroma.  CT chest ordered.  6. Mixed hyperlipidemia    Return to clinic 12 months             Discharge Medications          Accurate as of June 2, 2023  9:15 AM. If you have any questions, ask your nurse or doctor.            Continue These Medications      Instructions Start Date   Ajovy 225 MG/1.5ML solution auto-injector  Generic drug: Fremanezumab-vfrm   225 mg, Subcutaneous, Every 30 Days      amLODIPine 5 MG tablet  Commonly known as: NORVASC   TAKE 1 TABLET BY MOUTH EVERY DAY      cloNIDine 0.2 MG tablet  Commonly known as: CATAPRES   TAKE 1 TABLET BY MOUTH TWICE A DAY      fluticasone 50 MCG/ACT nasal spray  Commonly known as: FLONASE   2 sprays, Nasal, Daily      hydroCHLOROthiazide 12.5 MG  capsule  Commonly known as: MICROZIDE   TAKE 1 CAPSULE BY MOUTH EVERY DAY      ibuprofen 200 MG tablet  Commonly known as: ADVIL,MOTRIN   200 mg, Oral, Every 6 Hours PRN      imipramine 50 MG tablet  Commonly known as: TOFRANIL   TAKE 2 TABLETS BY MOUTH AT BEDTIME      propranolol  MG 24 hr capsule  Commonly known as: INDERAL LA   TAKE 1 CAPSULE BY MOUTH EVERY DAY      warfarin 5 MG tablet  Commonly known as: COUMADIN   Take 7.5mg (1 and 1/2 Tablets) on Sunday, Tuesday, and Thursday; Take 5mg (1 Tablet) all other days OR as directed by the [facility].             Thank you for allowing me to participate in the care of your patient,      Sincerely,   Contreras Saenz MD  [facility]  06/02/23  09:15 EDT

## 2023-06-06 RX ORDER — IMIPRAMINE HCL 50 MG
TABLET ORAL
Qty: 180 TABLET | Refills: 1 | Status: SHIPPED | OUTPATIENT
Start: 2023-06-06

## 2023-06-06 RX ORDER — HYDROCHLOROTHIAZIDE 12.5 MG/1
CAPSULE, GELATIN COATED ORAL
Qty: 90 CAPSULE | Refills: 1 | Status: SHIPPED | OUTPATIENT
Start: 2023-06-06

## 2023-06-15 LAB — INR PPP: 3.3

## 2023-06-16 ENCOUNTER — ANTICOAGULATION VISIT (OUTPATIENT)
Dept: PHARMACY | Facility: HOSPITAL | Age: 53
End: 2023-06-16
Payer: COMMERCIAL

## 2023-06-16 DIAGNOSIS — I48.0 PAROXYSMAL ATRIAL FIBRILLATION: Chronic | ICD-10-CM

## 2023-06-16 DIAGNOSIS — Z95.2 AORTIC VALVE REPLACED: Primary | Chronic | ICD-10-CM

## 2023-06-16 NOTE — PROGRESS NOTES
Anticoagulation Clinic Progress Note    Anticoagulation Summary  As of 6/16/2023      INR goal:  2.5-3.5   TTR:  91.8 % (4.6 y)   INR used for dosing:  3.30 (6/15/2023)   Warfarin maintenance plan:  7.5 mg every Sun, Tue, Thu; 5 mg all other days; Starting 6/16/2023   Weekly warfarin total:  42.5 mg   Plan last modified:  Katie Schwarz, PharmD (4/13/2021)   Next INR check:  6/29/2023   Priority:  Maintenance   Target end date:      Indications    Aortic valve replaced [Z95.2]  Paroxysmal atrial fibrillation [I48.0]                 Anticoagulation Episode Summary       INR check location:      Preferred lab:      Send INR reminders to:   [facility]  POOL    Comments:  *Coaguchek*          Anticoagulation Care Providers       Provider Role Specialty Phone number    Crissy Mejia APRN Referring Cardiology [phone]            Clinic Interview:  No pertinent clinical findings have been reported.    INR History:      5/3/2023    11:28 AM 5/18/2023    12:00 AM 5/19/2023     8:18 AM 6/1/2023    12:00 AM 6/2/2023     8:24 AM 6/15/2023    12:00 AM 6/16/2023     9:57 AM   Anticoagulation Monitoring   INR 2.50  2.70  3.10  3.30   INR Date 5/2/2023  5/18/2023  6/1/2023  6/15/2023   INR Goal 2.5-3.5  2.5-3.5  2.5-3.5  2.5-3.5   Trend Same  Same  Same  Same   Last Week Total 42.5 mg  42.5 mg  42.5 mg  42.5 mg   Next Week Total 42.5 mg  42.5 mg  42.5 mg  42.5 mg   Sun 7.5 mg  7.5 mg  7.5 mg  7.5 mg   Mon 5 mg  5 mg  5 mg  5 mg   Tue 7.5 mg  7.5 mg  7.5 mg  7.5 mg   Wed 5 mg  5 mg  5 mg  5 mg   Thu 7.5 mg  7.5 mg  7.5 mg  7.5 mg   Fri 5 mg  5 mg  5 mg  5 mg   Sat 5 mg  5 mg  5 mg  5 mg   Historical INR  2.70      3.10      3.30        Visit Report     Report         This result is from an external source.       Plan:  1. INR is therapeutic today- see above in Anticoagulation Summary.    Fernie Porter to continue their warfarin regimen- see above in Anticoagulation Summary.  2. Follow up in 2 weeks  3. Pt has  agreed to only be called if INR out of range. They have been instructed to call if any changes in medications, doses, concerns, etc. Patient expresses understanding and has no further questions at this time.    Georgie Gaming

## 2023-07-12 ENCOUNTER — TELEPHONE (OUTPATIENT)
Dept: CARDIOLOGY | Facility: CLINIC | Age: 53
End: 2023-07-12

## 2023-07-12 NOTE — TELEPHONE ENCOUNTER
We received a fax regarding pt's CT Angio chest.  I placed the results in your inbox.  Thanks/hafsa

## 2023-07-28 LAB — INR PPP: 3.1

## 2023-07-31 ENCOUNTER — ANTICOAGULATION VISIT (OUTPATIENT)
Dept: PHARMACY | Facility: HOSPITAL | Age: 53
End: 2023-07-31
Payer: COMMERCIAL

## 2023-07-31 DIAGNOSIS — Z95.2 AORTIC VALVE REPLACED: Primary | Chronic | ICD-10-CM

## 2023-07-31 DIAGNOSIS — I48.0 PAROXYSMAL ATRIAL FIBRILLATION: Chronic | ICD-10-CM

## 2023-08-07 ENCOUNTER — OFFICE VISIT (OUTPATIENT)
Dept: CARDIAC SURGERY | Facility: CLINIC | Age: 53
End: 2023-08-07
Payer: COMMERCIAL

## 2023-08-07 VITALS
SYSTOLIC BLOOD PRESSURE: 122 MMHG | RESPIRATION RATE: 20 BRPM | HEART RATE: 72 BPM | HEIGHT: 69 IN | OXYGEN SATURATION: 98 % | DIASTOLIC BLOOD PRESSURE: 75 MMHG | BODY MASS INDEX: 22.96 KG/M2 | TEMPERATURE: 97.5 F | WEIGHT: 155 LBS

## 2023-08-07 DIAGNOSIS — Q25.1 COARCTATION OF AORTA: ICD-10-CM

## 2023-08-07 DIAGNOSIS — Z95.2 AORTIC VALVE REPLACED: Primary | Chronic | ICD-10-CM

## 2023-08-07 DIAGNOSIS — M54.16 LUMBAR RADICULOPATHY: ICD-10-CM

## 2023-08-07 DIAGNOSIS — I10 PRIMARY HYPERTENSION: Chronic | ICD-10-CM

## 2023-08-07 NOTE — LETTER
August 10, 2023     Contreras Saenz Jr., MD  [address]  [address]  [place] [address]    Patient: Fernie Porter   YOB: 1970   Date of Visit: 8/7/2023     Dear Contreras Saenz Jr., MD:       Thank you for referring Fernie Porter to me for evaluation. Below are the relevant portions of my assessment and plan of care.    If you have questions, please do not hesitate to call me. I look forward to following Fernie along with you.         Sincerely,        Ha Casanova MD        CC: MD Edilberto Marr Sebastian, MD  08/10/23 2003  Sign when Signing Visit  8/10/2023    Seen on 8/8/2023    Reason for consultation:   Evaluation of aortic coarctation post repair    Chief Complaint   Same    History of Present Illness:       Dear Contreras and Colleagues,  It was nice to see Fernie Porter in consultation at your request. He is a 52 y.o. male with history of aortic coarctation s/p repair at age 5 with subsequent false aneurysm requiring a repair at age 20.  The patient developed a bicuspid aortopathy and had aortic stenosis in 2012 and I operated on him and perform a mechanical AVR and graft repair of the ascending aorta as well as a Maze procedure and left atrial appendage ligation.  He had a CT scan in 2015 that showed mild narrowing in the region of the previous coarctation approximate 50% of diameter.  He denies any new symptoms and he is compliant with the anticoagulation.  He was lost to follow-up in my aortic clinic.  His recent chest CT showed an area of narrowing in the distal aortic arch of approximate 50% of the diameter without residual false aneurysm.  There is no postdilatation aneurysm there is evidence of an ascending aortic replacement with a graft.  He had a 2D echocardiogram that showed a mechanical prosthesis in place with aortic valve peak and mean gradients within defined limits.  There is no evidence of paravalvular leaks.  The ejection fraction was normal.  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    ASCENDING AORTIC ANEURYSM REPAIR W/ MECHANICAL AORTIC VALVE REPLACEMENT      CARDIAC SURGERY  09/2012    AVR [facility], maize procedure, aortic root resection    CARDIAC SURGERY  1975    Coarct repair. Priscilla coarct aneurism 1989    CARDIOVERSION      COARCTATION OF AORTA EXCISION      COLONOSCOPY N/A 5/23/2022    Procedure: COLONOSCOPY INTO CECUM;  Surgeon: Fernie Leal MD;  Location: [facility] ENDOSCOPY;  Service: Gastroenterology;  Laterality: N/A;  PRE- SCREENING  POST- NORMAL    HERNIA REPAIR      INGUINAL HERNIA REPAIR         Allergies   Allergen Reactions    Contrast Dye (Echo Or Unknown Ct/Mr) Itching    Iodinated Contrast Media Itching    Tagamet [Cimetidine] Itching         Current Outpatient Medications:     Ajovy 225 MG/1.5ML solution auto-injector, INJECT 225 MG UNDER THE SKIN INTO THE APPROPRIATE AREA AS DIRECTED EVERY 30 (THIRTY) DAYS., Disp: 4.5 mL, Rfl: 3    amLODIPine (NORVASC) 5 MG tablet, TAKE 1 TABLET BY MOUTH EVERY DAY, Disp: 90 tablet, Rfl: 2    cloNIDine (CATAPRES) 0.2 MG tablet, TAKE 1 TABLET BY MOUTH TWICE A DAY, Disp: 180  tablet, Rfl: 1    fluticasone (FLONASE) 50 MCG/ACT nasal spray, 2 SPRAYS INTO THE NOSTRIL(S) AS DIRECTED BY PROVIDER DAILY., Disp: 48 mL, Rfl: 2    hydroCHLOROthiazide (MICROZIDE) 12.5 MG capsule, TAKE 1 CAPSULE BY MOUTH EVERY DAY, Disp: 90 capsule, Rfl: 1    ibuprofen (ADVIL,MOTRIN) 200 MG tablet, Take 1 tablet by mouth Every 6 (Six) Hours As Needed for Mild Pain., Disp: , Rfl:     imipramine (TOFRANIL) 50 MG tablet, TAKE 2 TABLETS BY MOUTH AT BEDTIME, Disp: 180 tablet, Rfl: 1    omeprazole (priLOSEC) 20 MG capsule, Take 1 capsule by mouth 2 (Two) Times a Day. Take 1 tablet twice daily starting the day before CT chest, Disp: 6 capsule, Rfl: 0    propranolol LA (INDERAL LA) 120 MG 24 hr capsule, TAKE 1 CAPSULE BY MOUTH EVERY DAY, Disp: 90 capsule, Rfl: 1    warfarin (COUMADIN) 5 MG tablet, Take 7.5mg (1 and 1/2 Tablets) on Sunday, Tuesday, and Thursday; Take 5mg (1 Tablet) all other days OR as directed by the [facility]., Disp: 105 tablet, Rfl: 1    Social History     Socioeconomic History    Marital status:    Tobacco Use    Smoking status: Never    Smokeless tobacco: Never   Vaping Use    Vaping Use: Never used   Substance and Sexual Activity    Alcohol use: No     Comment: occ caffeine use    Drug use: No    Sexual activity: Yes     Partners: Female     Birth control/protection: Other       Family History   Problem Relation Age of Onset    Hypertension Mother     Stroke Mother         seizure    No Known Problems Father     No Known Problems Sister     Cancer Maternal Grandfather     Cancer Paternal Grandmother      Review of Systems  As HPI, otherwise noncontributory    Physical Exam:    Vital Signs:  Weight: 70.3 kg (155 lb)   Body mass index is 22.89 kg/mý.  Temp: 97.5 øF (36.4 øC)   Heart Rate: 72   BP: 122/75     Constitutional:       Appearance: Well-developed.   Eyes:      Conjunctiva/sclera: Conjunctivae normal.      Pupils: Pupils are equal, round, and reactive to  light.   HENT:      Head: Normocephalic and atraumatic.      Nose: Nose normal.   Neck:      Thyroid: No thyromegaly.      Vascular: No JVD.      Lymphadenopathy: No cervical adenopathy.   Pulmonary:      Effort: Pulmonary effort is normal.      Breath sounds: Normal breath sounds. No rales.   Cardiovascular:      Normal rate. Regular rhythm.      Murmurs: There is no murmur.      No gallop.   click. Mechanical aortic valvular click   Pulses:     Intact distal pulses.   Abdominal:      General: Bowel sounds are normal. There is no distension.      Palpations: Abdomen is soft. There is no abdominal mass.      Tenderness: There is no abdominal tenderness.   Musculoskeletal: Normal range of motion.         General: No tenderness or deformity.      Cervical back: Normal range of motion and neck supple. Skin:     General: Skin is warm and dry.      Findings: No erythema or rash.   Neurological:      Mental Status: Alert and oriented to person, place, and time.      Deep Tendon Reflexes: Reflexes are normal and symmetric.   Psychiatric:         Behavior: Behavior normal.       Relevant studies (other than HPI)   Echocardiogram and CT scan reviewed     Assessment:     Problems Addressed this Visit          Cardiac and Vasculature    Aortic valve replaced - Primary (Chronic)    Hypertension (Chronic)    Coarctation of aorta       Neuro    Lumbar radiculopathy     Diagnoses         Codes Comments    Aortic valve replaced    -  Primary ICD-10-CM: Z95.2  ICD-9-CM: V43.3     Coarctation of aorta     ICD-10-CM: Q25.1  ICD-9-CM: 747.10     Primary hypertension     ICD-10-CM: I10  ICD-9-CM: 401.9     Lumbar radiculopathy     ICD-10-CM: M54.16  ICD-9-CM: 724.4             Assessment/recommendation:     Status post coarctation repair x 2 without symptoms of recoarctation.  Patient denies lower extremity pain or abdominal pain, no claudication.  His CT scan does not show any evidence of severe stenosis in the aortic isthmus.  Blood  pressure on the lower extremities is higher than the upper extremities.  There is decrease the blood pressure in the left arm most likely related to the second operation.  I discussed with the patient the natural history of coarctation disease post repair and the guidelines for intervention.  I recommend to continue follow-up and do a chest CT in 2 years.  Bicuspid aortopathy.  No evidence of valve failure or residual proximal aortic enlargement.  I recommend follow-up in our thoracic aortic surgical clinic in 2 years for the program too  Hypertension, well-controlled continue same regimen.    Thank you for allowing me to participate in his care.    Regards,    Ha Casanova M.D.    I spent over 35 minutes before, during and after the office visit in reviewing the records, examining the patient, reviewing interpreting myself the echocardiogram and chest CTA, discussing the findings and options with the patient, discussing the natural history disease and the catheter intervention, discussing importance of blood pressure control I spent time in documenting in the electronic record

## 2023-08-10 ENCOUNTER — PATIENT ROUNDING (BHMG ONLY) (OUTPATIENT)
Dept: CARDIAC SURGERY | Facility: CLINIC | Age: 53
End: 2023-08-10
Payer: COMMERCIAL

## 2023-08-10 NOTE — PROGRESS NOTES
8/10/2023    Seen on 8/8/2023    Reason for consultation:   Evaluation of aortic coarctation post repair    Chief Complaint   Same    History of Present Illness:       Dear Contreras and Colleagues,  It was nice to see Fernie L Charlotte in consultation at your request. He is a 52 y.o. male with history of aortic coarctation s/p repair at age 5 with subsequent false aneurysm requiring a repair at age 20.  The patient developed a bicuspid aortopathy and had aortic stenosis in 2012 and I operated on him and perform a mechanical AVR and graft repair of the ascending aorta as well as a Maze procedure and left atrial appendage ligation.  He had a CT scan in 2015 that showed mild narrowing in the region of the previous coarctation approximate 50% of diameter.  He denies any new symptoms and he is compliant with the anticoagulation.  He was lost to follow-up in my aortic clinic.  His recent chest CT showed an area of narrowing in the distal aortic arch of approximate 50% of the diameter without residual false aneurysm.  There is no postdilatation aneurysm there is evidence of an ascending aortic replacement with a graft.  He had a 2D echocardiogram that showed a mechanical prosthesis in place with aortic valve peak and mean gradients within defined limits.  There is no evidence of paravalvular leaks.  The ejection fraction was normal.  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    ASCENDING AORTIC ANEURYSM REPAIR W/ MECHANICAL AORTIC VALVE REPLACEMENT      CARDIAC SURGERY  09/2012    AVR [facility], maize procedure, aortic root resection    CARDIAC SURGERY  1975    Coarct repair. Priscilla coarct aneurism 1989    CARDIOVERSION      COARCTATION OF AORTA EXCISION      COLONOSCOPY N/A 5/23/2022    Procedure: COLONOSCOPY INTO CECUM;  Surgeon: Fernie Leal MD;  Location: [facility] ENDOSCOPY;  Service: Gastroenterology;  Laterality: N/A;  PRE- SCREENING  POST- NORMAL    HERNIA REPAIR      INGUINAL HERNIA REPAIR         Allergies   Allergen Reactions    Contrast Dye (Echo Or Unknown Ct/Mr) Itching    Iodinated Contrast Media Itching    Tagamet [Cimetidine] Itching         Current Outpatient Medications:     Ajovy 225 MG/1.5ML solution auto-injector, INJECT 225 MG UNDER THE SKIN INTO THE APPROPRIATE AREA AS DIRECTED EVERY 30 (THIRTY) DAYS., Disp: 4.5 mL, Rfl: 3    amLODIPine (NORVASC) 5 MG tablet, TAKE 1 TABLET BY MOUTH EVERY DAY, Disp: 90 tablet, Rfl: 2    cloNIDine (CATAPRES) 0.2 MG tablet, TAKE 1 TABLET BY MOUTH TWICE A DAY, Disp: 180 tablet, Rfl: 1    fluticasone (FLONASE) 50 MCG/ACT nasal spray, 2 SPRAYS INTO THE NOSTRIL(S) AS DIRECTED BY PROVIDER DAILY., Disp: 48 mL, Rfl: 2    hydroCHLOROthiazide (MICROZIDE) 12.5 MG capsule, TAKE 1 CAPSULE BY MOUTH EVERY DAY, Disp: 90 capsule, Rfl: 1    ibuprofen (ADVIL,MOTRIN) 200 MG tablet, Take 1 tablet by mouth Every 6 (Six) Hours As Needed for Mild Pain., Disp: , Rfl:     imipramine (TOFRANIL) 50 MG tablet, TAKE 2 TABLETS BY MOUTH AT BEDTIME, Disp: 180 tablet, Rfl: 1    omeprazole (priLOSEC) 20 MG capsule, Take 1 capsule by mouth 2 (Two) Times a Day. Take 1 tablet twice daily starting the day before CT chest,  Disp: 6 capsule, Rfl: 0    propranolol LA (INDERAL LA) 120 MG 24 hr capsule, TAKE 1 CAPSULE BY MOUTH EVERY DAY, Disp: 90 capsule, Rfl: 1    warfarin (COUMADIN) 5 MG tablet, Take 7.5mg (1 and 1/2 Tablets) on Sunday, Tuesday, and Thursday; Take 5mg (1 Tablet) all other days OR as directed by the [facility]., Disp: 105 tablet, Rfl: 1    Social History     Socioeconomic History    Marital status:    Tobacco Use    Smoking status: Never    Smokeless tobacco: Never   Vaping Use    Vaping Use: Never used   Substance and Sexual Activity    Alcohol use: No     Comment: occ caffeine use    Drug use: No    Sexual activity: Yes     Partners: Female     Birth control/protection: Other       Family History   Problem Relation Age of Onset    Hypertension Mother     Stroke Mother         seizure    No Known Problems Father     No Known Problems Sister     Cancer Maternal Grandfather     Cancer Paternal Grandmother      Review of Systems  As HPI, otherwise noncontributory    Physical Exam:    Vital Signs:  Weight: 70.3 kg (155 lb)   Body mass index is 22.89 kg/mý.  Temp: 97.5 øF (36.4 øC)   Heart Rate: 72   BP: 122/75     Constitutional:       Appearance: Well-developed.   Eyes:      Conjunctiva/sclera: Conjunctivae normal.      Pupils: Pupils are equal, round, and reactive to light.   HENT:      Head: Normocephalic and atraumatic.      Nose: Nose normal.   Neck:      Thyroid: No thyromegaly.      Vascular: No JVD.      Lymphadenopathy: No cervical adenopathy.   Pulmonary:      Effort: Pulmonary effort is normal.      Breath sounds: Normal breath sounds. No rales.   Cardiovascular:      Normal rate. Regular rhythm.      Murmurs: There is no murmur.      No gallop.   click. Mechanical aortic valvular click   Pulses:     Intact distal pulses.   Abdominal:      General: Bowel sounds are normal. There is no distension.      Palpations: Abdomen is soft. There is no abdominal mass.      Tenderness: There is no abdominal  tenderness.   Musculoskeletal: Normal range of motion.         General: No tenderness or deformity.      Cervical back: Normal range of motion and neck supple. Skin:     General: Skin is warm and dry.      Findings: No erythema or rash.   Neurological:      Mental Status: Alert and oriented to person, place, and time.      Deep Tendon Reflexes: Reflexes are normal and symmetric.   Psychiatric:         Behavior: Behavior normal.       Relevant studies (other than HPI)   Echocardiogram and CT scan reviewed     Assessment:     Problems Addressed this Visit          Cardiac and Vasculature    Aortic valve replaced - Primary (Chronic)    Hypertension (Chronic)    Coarctation of aorta       Neuro    Lumbar radiculopathy     Diagnoses         Codes Comments    Aortic valve replaced    -  Primary ICD-10-CM: Z95.2  ICD-9-CM: V43.3     Coarctation of aorta     ICD-10-CM: Q25.1  ICD-9-CM: 747.10     Primary hypertension     ICD-10-CM: I10  ICD-9-CM: 401.9     Lumbar radiculopathy     ICD-10-CM: M54.16  ICD-9-CM: 724.4             Assessment/recommendation:     Status post coarctation repair x 2 without symptoms of recoarctation.  Patient denies lower extremity pain or abdominal pain, no claudication.  His CT scan does not show any evidence of severe stenosis in the aortic isthmus.  Blood pressure on the lower extremities is higher than the upper extremities.  There is decrease the blood pressure in the left arm most likely related to the second operation.  I discussed with the patient the natural history of coarctation disease post repair and the guidelines for intervention.  I recommend to continue follow-up and do a chest CT in 2 years.  Bicuspid aortopathy.  No evidence of valve failure or residual proximal aortic enlargement.  I recommend follow-up in our thoracic aortic surgical clinic in 2 years for the program too  Hypertension, well-controlled continue same regimen.    Thank you for allowing me to participate in his  care.    Regards,    Ha Casanova M.D.    I spent over 45 minutes before, during and after the office visit in reviewing the records, examining the patient, reviewing interpreting myself the echocardiogram and chest CTA, discussing the findings and options with the patient, discussing the natural history disease and the catheter intervention, discussing importance of blood pressure control I spent time in documenting in the electronic record

## 2023-08-10 NOTE — PROGRESS NOTES
A My Chart message has been sent to the patient for [place] [facility] [place]

## 2023-08-14 ENCOUNTER — OFFICE VISIT (OUTPATIENT)
Dept: INTERNAL MEDICINE | Facility: CLINIC | Age: 53
End: 2023-08-14
Payer: COMMERCIAL

## 2023-08-14 VITALS
WEIGHT: 166.8 LBS | BODY MASS INDEX: 24.71 KG/M2 | RESPIRATION RATE: 16 BRPM | HEIGHT: 69 IN | HEART RATE: 68 BPM | TEMPERATURE: 97.3 F | DIASTOLIC BLOOD PRESSURE: 82 MMHG | SYSTOLIC BLOOD PRESSURE: 124 MMHG | OXYGEN SATURATION: 97 %

## 2023-08-14 DIAGNOSIS — I10 PRIMARY HYPERTENSION: Primary | Chronic | ICD-10-CM

## 2023-08-14 DIAGNOSIS — G43.109 MIGRAINE WITH AURA AND WITHOUT STATUS MIGRAINOSUS, NOT INTRACTABLE: Chronic | ICD-10-CM

## 2023-08-14 DIAGNOSIS — I48.0 PAROXYSMAL ATRIAL FIBRILLATION: Chronic | ICD-10-CM

## 2023-08-14 DIAGNOSIS — Z95.2 AORTIC VALVE REPLACED: Chronic | ICD-10-CM

## 2023-08-14 LAB — INR PPP: 3.1

## 2023-08-14 PROCEDURE — 99214 OFFICE O/P EST MOD 30 MIN: CPT | Performed by: INTERNAL MEDICINE

## 2023-08-14 NOTE — PROGRESS NOTES
Subjective   Fernie Porter is a 52 y.o. male.     Chief Complaint   Patient presents with    Hypertension    Atrial Fibrillation    Migraine         Hypertension  This is a chronic problem. The current episode started more than 1 year ago. The problem is unchanged. The problem is controlled. Associated symptoms include headaches. Pertinent negatives include no chest pain, orthopnea, palpitations, peripheral edema or shortness of breath.   Atrial Fibrillation  Presents for follow-up visit. Symptoms are negative for chest pain, palpitations and shortness of breath. The symptoms have been stable. Past medical history includes atrial fibrillation.   Migraine  Headache pattern:  Headache sometimes there, sometimes not at all     The following portions of the patient's history were reviewed and updated as appropriate: allergies, current medications, past social history and problem list.    Outpatient Medications Marked as Taking for the 8/14/23 encounter (Office Visit) with Juan Antonio Ford MD   Medication Sig Dispense Refill    Ajovy 225 MG/1.5ML solution auto-injector INJECT 225 MG UNDER THE SKIN INTO THE APPROPRIATE AREA AS DIRECTED EVERY 30 (THIRTY) DAYS. 4.5 mL 3    amLODIPine (NORVASC) 5 MG tablet TAKE 1 TABLET BY MOUTH EVERY DAY 90 tablet 2    cloNIDine (CATAPRES) 0.2 MG tablet TAKE 1 TABLET BY MOUTH TWICE A  tablet 1    fluticasone (FLONASE) 50 MCG/ACT nasal spray 2 SPRAYS INTO THE NOSTRIL(S) AS DIRECTED BY PROVIDER DAILY. 48 mL 2    hydroCHLOROthiazide (MICROZIDE) 12.5 MG capsule TAKE 1 CAPSULE BY MOUTH EVERY DAY 90 capsule 1    ibuprofen (ADVIL,MOTRIN) 200 MG tablet Take 1 tablet by mouth Every 6 (Six) Hours As Needed for Mild Pain.      imipramine (TOFRANIL) 50 MG tablet TAKE 2 TABLETS BY MOUTH AT BEDTIME 180 tablet 1    omeprazole (priLOSEC) 20 MG capsule Take 1 capsule by mouth 2 (Two) Times a Day. Take 1 tablet twice daily starting the day before CT chest 6 capsule 0    propranolol LA (INDERAL LA)  120 MG 24 hr capsule TAKE 1 CAPSULE BY MOUTH EVERY DAY 90 capsule 1    warfarin (COUMADIN) 5 MG tablet Take 7.5mg (1 and 1/2 Tablets) on Sunday, Tuesday, and Thursday; Take 5mg (1 Tablet) all other days OR as directed by the [facility]. 105 tablet 1       Review of Systems   Respiratory:  Negative for shortness of breath and wheezing.    Cardiovascular:  Negative for chest pain, palpitations, orthopnea and leg swelling.   Gastrointestinal:  Negative for constipation and diarrhea.   Neurological:  Positive for headaches.     Objective   Vitals:    08/14/23 1405   BP: 124/82   Pulse: 68   Resp: 16   Temp: 97.3 øF (36.3 øC)   SpO2: 97%          08/14/23  1405   Weight: 75.7 kg (166 lb 12.8 oz)    [unfilled]  Body mass index is 24.62 kg/mý.      Physical Exam   Constitutional: He appears well-developed.   Cardiovascular: Normal rate and regular rhythm. Exam reveals no gallop.   Murmur heard.  Crescendo decrescendo systolic murmur is present with a grade of 2/6.  Prosthetic heart sounds.  Grade 2/6 JOHNNIE loudest at the pulmonic area.   Pulmonary/Chest: Effort normal and breath sounds normal. No respiratory distress. He has no wheezes. He has no rales.   Abdominal: Soft. Normal appearance and bowel sounds are normal. He exhibits no mass. There is no abdominal tenderness. There is no guarding.   Neurological: He is alert.       Problems Addressed this Visit          Cardiac and Vasculature    Paroxysmal atrial fibrillation (Chronic)    Hypertension - Primary (Chronic)    Aortic valve replaced (Chronic)       Neuro    Migraine (Chronic)     Diagnoses         Codes Comments    Primary hypertension    -  Primary ICD-10-CM: I10  ICD-9-CM: 401.9     Paroxysmal atrial fibrillation     ICD-10-CM: I48.0  ICD-9-CM: 427.31     Aortic valve replaced     ICD-10-CM: Z95.2  ICD-9-CM: V43.3     Migraine with aura and without status migrainosus, not intractable     ICD-10-CM: G43.109  ICD-9-CM: 346.00           Assessment &  Plan   In for recheck of hypertension, atrial fibrillation, aortic stenosis and migraines today August 2023.  Migraines persist and are pretty much unchanged.  They're doing pretty well at the present time.  Gets 3 per month.  Has failed several drugs in the past including Topamax.  Currently on Ajovy injections once monthly and they have been working pretty well right now.  Blood pressure control is excellent.  Atrial fib is controlled.  On propranolol  mg daily, HCTZ 12.5 mg daily, amlodipine 5 mg daily, and clonidine 0.2 mg twice daily for hypertension.  Those are reviewed today.  Gets annual lab work May 2023 including CBC, CMP, lipids, PSA urinalysis.  Continue to monitor every 3 months.  Certainly no severe migraine since he started Inderal LA.   Due for annual preventive exam November 2023.  Due for second Shingrix in 2 days.  Due for TDA P but declines that for now.      The above information was reviewed again today 08/14/23.  It continues to be accurate as reflected above and is unchanged.  History, physical and review of systems all reviewed and are unchanged.  Medications were reviewed today and continue the current dosing.      PPE today includes face mask and eye shield.  The 10-year ASCVD risk score (Sana DK, et al., 2019) is: 4.4%    Values used to calculate the score:      Age: 52 years      Sex: Male      Is Non- : No      Diabetic: No      Tobacco smoker: No      Systolic Blood Pressure: 124 mmHg      Is BP treated: Yes      HDL Cholesterol: 50 mg/dL      Total Cholesterol: 205 mg/dL\           Dragon disclaimer:   Much of this encounter note is an electronic transcription/translation of spoken language to printed text. The electronic translation of spoken language may permit erroneous, or at times, nonsensical words or phrases to be inadvertently transcribed; Although I have reviewed the note for such errors, some may still exist.

## 2023-08-15 ENCOUNTER — ANTICOAGULATION VISIT (OUTPATIENT)
Dept: PHARMACY | Facility: HOSPITAL | Age: 53
End: 2023-08-15
Payer: COMMERCIAL

## 2023-08-15 DIAGNOSIS — Z95.2 AORTIC VALVE REPLACED: Primary | Chronic | ICD-10-CM

## 2023-08-15 DIAGNOSIS — I48.0 PAROXYSMAL ATRIAL FIBRILLATION: Chronic | ICD-10-CM

## 2023-08-15 NOTE — PROGRESS NOTES
Anticoagulation Clinic Progress Note    Anticoagulation Summary  As of 8/15/2023      INR goal:  2.5-3.5   TTR:  92.0 % (4.7 y)   INR used for dosing:  3.10 (8/14/2023)   Warfarin maintenance plan:  7.5 mg every Sun, Tue, Thu; 5 mg all other days   Weekly warfarin total:  42.5 mg   No change documented:  Allison Sharma, Pharmacy Technician   Plan last modified:  Katie Schwarz, PharmD (4/13/2021)   Next INR check:  8/28/2023   Priority:  Maintenance   Target end date:      Indications    Aortic valve replaced [Z95.2]  Paroxysmal atrial fibrillation [I48.0]                 Anticoagulation Episode Summary       INR check location:      Preferred lab:      Send INR reminders to:   JULIETTE SILVESTRE  POOL    Comments:  *[place]*          Anticoagulation Care Providers       Provider Role Specialty Phone number    Jackie ELLA Woodward Referring Cardiology [phone]            Clinic Interview:  No pertinent clinical findings have been reported.    INR History:      7/3/2023     8:29 AM 7/14/2023    12:00 AM 7/14/2023     3:01 PM 7/28/2023    12:00 AM 7/31/2023     8:54 AM 8/14/2023    12:00 AM 8/15/2023    10:27 AM   Anticoagulation Monitoring   INR 3.50  3.40  3.10  3.10   INR Date 7/1/2023 7/14/2023 7/28/2023 8/14/2023   INR Goal 2.5-3.5  2.5-3.5  2.5-3.5  2.5-3.5   Trend Same  Same  Same  Same   Last Week Total 42.5 mg  42.5 mg  42.5 mg  42.5 mg   Next Week Total 42.5 mg  42.5 mg  42.5 mg  42.5 mg   Sun 7.5 mg  7.5 mg  7.5 mg  7.5 mg   Mon 5 mg  5 mg  5 mg  5 mg   Tue 7.5 mg  7.5 mg  7.5 mg  7.5 mg   Wed 5 mg  5 mg  5 mg  5 mg   Thu 7.5 mg  7.5 mg  7.5 mg  7.5 mg   Fri 5 mg  5 mg  5 mg  5 mg   Sat 5 mg  5 mg  5 mg  5 mg   Historical INR  3.40      3.10      3.10        Visit Report      Report        This result is from an external source.       Plan:  1. INR is therapeutic today- see above in Anticoagulation Summary.    Fernie Porter to continue their warfarin regimen- see above in Anticoagulation  Summary.  2. Follow up in 2 weeks  3. Pt has agreed to only be called if INR out of range. They have been instructed to call if any changes in medications, doses, concerns, etc. Patient expresses understanding and has no further questions at this time.    Allison Sharma, Pharmacy Technician

## 2023-08-16 RX ORDER — WARFARIN SODIUM 5 MG/1
TABLET ORAL
Qty: 105 TABLET | Refills: 1 | Status: SHIPPED | OUTPATIENT
Start: 2023-08-16

## 2023-08-19 DIAGNOSIS — G43.109 MIGRAINE WITH AURA AND WITHOUT STATUS MIGRAINOSUS, NOT INTRACTABLE: ICD-10-CM

## 2023-08-21 RX ORDER — FREMANEZUMAB-VFRM 225 MG/1.5ML
225 INJECTION SUBCUTANEOUS
Qty: 4.5 ML | Refills: 3 | Status: SHIPPED | OUTPATIENT
Start: 2023-08-21

## 2023-09-04 LAB — INR PPP: 3.1

## 2023-09-05 ENCOUNTER — ANTICOAGULATION VISIT (OUTPATIENT)
Dept: PHARMACY | Facility: HOSPITAL | Age: 53
End: 2023-09-05
Payer: COMMERCIAL

## 2023-09-05 DIAGNOSIS — I48.0 PAROXYSMAL ATRIAL FIBRILLATION: Chronic | ICD-10-CM

## 2023-09-05 DIAGNOSIS — Z95.2 AORTIC VALVE REPLACED: Primary | Chronic | ICD-10-CM

## 2023-09-05 NOTE — PROGRESS NOTES
Anticoagulation Clinic Progress Note    Anticoagulation Summary  As of 9/5/2023      INR goal:  2.5-3.5   TTR:  92.1 % (4.8 y)   INR used for dosing:  3.10 (9/4/2023)   Warfarin maintenance plan:  7.5 mg every Sun, Tue, Thu; 5 mg all other days   Weekly warfarin total:  42.5 mg   No change documented:  Allison Sharma, Pharmacy Technician   Plan last modified:  Katie Schwarz, PharmD (4/13/2021)   Next INR check:  9/18/2023   Priority:  Maintenance   Target end date:      Indications    Aortic valve replaced [Z95.2]  Paroxysmal atrial fibrillation [I48.0]                 Anticoagulation Episode Summary       INR check location:      Preferred lab:      Send INR reminders to:   [facility]UL[facility]TE SILVESTRE  POOL    Comments:  *[place]*          Anticoagulation Care Providers       Provider Role Specialty Phone number    Jackie ELLA Woodward Referring Cardiology [phone]            Clinic Interview:  No pertinent clinical findings have been reported.    INR History:      7/14/2023     3:01 PM 7/28/2023    12:00 AM 7/31/2023     8:54 AM 8/14/2023    12:00 AM 8/15/2023    10:27 AM 9/4/2023    12:00 AM 9/5/2023     8:52 AM   Anticoagulation Monitoring   INR 3.40  3.10  3.10  3.10   INR Date 7/14/2023 7/28/2023 8/14/2023 9/4/2023   INR Goal 2.5-3.5  2.5-3.5  2.5-3.5  2.5-3.5   Trend Same  Same  Same  Same   Last Week Total 42.5 mg  42.5 mg  42.5 mg  42.5 mg   Next Week Total 42.5 mg  42.5 mg  42.5 mg  42.5 mg   Sun 7.5 mg  7.5 mg  7.5 mg  7.5 mg   Mon 5 mg  5 mg  5 mg  5 mg   Tue 7.5 mg  7.5 mg  7.5 mg  7.5 mg   Wed 5 mg  5 mg  5 mg  5 mg   Thu 7.5 mg  7.5 mg  7.5 mg  7.5 mg   Fri 5 mg  5 mg  5 mg  5 mg   Sat 5 mg  5 mg  5 mg  5 mg   Historical INR  3.10      3.10      3.10        Visit Report    Report          This result is from an external source.       Plan:  1. INR is therapeutic today- see above in Anticoagulation Summary.    Fernie Porter to continue their warfarin regimen- see above in Anticoagulation  Summary.  2. Follow up in 2 weeks  3. Pt has agreed to only be called if INR out of range. They have been instructed to call if any changes in medications, doses, concerns, etc. Patient expresses understanding and has no further questions at this time.    Allison Sharma, Pharmacy Technician

## 2023-09-20 LAB — INR PPP: 3.4

## 2023-09-21 ENCOUNTER — ANTICOAGULATION VISIT (OUTPATIENT)
Dept: PHARMACY | Facility: HOSPITAL | Age: 53
End: 2023-09-21
Payer: COMMERCIAL

## 2023-09-21 ENCOUNTER — TELEPHONE (OUTPATIENT)
Dept: PHARMACY | Facility: HOSPITAL | Age: 53
End: 2023-09-21
Payer: COMMERCIAL

## 2023-09-21 DIAGNOSIS — I48.0 PAROXYSMAL ATRIAL FIBRILLATION: Chronic | ICD-10-CM

## 2023-09-21 DIAGNOSIS — Z95.2 AORTIC VALVE REPLACED: Primary | Chronic | ICD-10-CM

## 2023-10-03 LAB — INR PPP: 3.1

## 2023-10-04 ENCOUNTER — ANTICOAGULATION VISIT (OUTPATIENT)
Dept: PHARMACY | Facility: HOSPITAL | Age: 53
End: 2023-10-04
Payer: COMMERCIAL

## 2023-10-04 DIAGNOSIS — Z95.2 AORTIC VALVE REPLACED: Primary | Chronic | ICD-10-CM

## 2023-10-04 DIAGNOSIS — I48.0 PAROXYSMAL ATRIAL FIBRILLATION: Chronic | ICD-10-CM

## 2023-10-04 NOTE — PROGRESS NOTES
Anticoagulation Clinic Progress Note    Anticoagulation Summary  As of 10/4/2023      INR goal:  2.5-3.5   TTR:  92.2 % (4.9 y)   INR used for dosing:  3.10 (10/3/2023)   Warfarin maintenance plan:  7.5 mg every Sun, Tue, Thu; 5 mg all other days   Weekly warfarin total:  42.5 mg   No change documented:  Allison Sharma, Pharmacy Technician   Plan last modified:  Katie Schwarz, PharmD (4/13/2021)   Next INR check:  10/17/2023   Priority:  Maintenance   Target end date:      Indications    Aortic valve replaced [Z95.2]  Paroxysmal atrial fibrillation [I48.0]                 Anticoagulation Episode Summary       INR check location:      Preferred lab:      Send INR reminders to:   JUL[facility]TE OLMEDO  POOL    Comments:  *[place]*          Anticoagulation Care Providers       Provider Role Specialty Phone number    Jackie ELLA Woodward Referring Cardiology [phone]            Clinic Interview:  No pertinent clinical findings have been reported.    INR History:      8/15/2023    10:27 AM 9/4/2023    12:00 AM 9/5/2023     8:52 AM 9/20/2023    12:00 AM 9/21/2023    12:35 PM 10/3/2023    12:00 AM 10/4/2023     7:49 AM   Anticoagulation Monitoring   INR 3.10  3.10  3.40  3.10   INR Date 8/14/2023  9/4/2023  9/20/2023  10/3/2023   INR Goal 2.5-3.5  2.5-3.5  2.5-3.5  2.5-3.5   Trend Same  Same  Same  Same   Last Week Total 42.5 mg  42.5 mg  42.5 mg  42.5 mg   Next Week Total 42.5 mg  42.5 mg  42.5 mg  42.5 mg   Sun 7.5 mg  7.5 mg  7.5 mg  7.5 mg   Mon 5 mg  5 mg  5 mg  5 mg   Tue 7.5 mg  7.5 mg  7.5 mg  7.5 mg   Wed 5 mg  5 mg  5 mg  5 mg   Thu 7.5 mg  7.5 mg  7.5 mg  7.5 mg   Fri 5 mg  5 mg  5 mg  5 mg   Sat 5 mg  5 mg  5 mg  5 mg   Historical INR  3.10      3.40      3.10            This result is from an external source.       Plan:  1. INR is therapeutic today- see above in Anticoagulation Summary.    Fernie Porter to continue their warfarin regimen- see above in Anticoagulation Summary.  2. Follow up in 2  weeks  3. Pt has agreed to only be called if INR out of range. They have been instructed to call if any changes in medications, doses, concerns, etc. Patient expresses understanding and has no further questions at this time.    Allison Sharma, Pharmacy Technician

## 2023-10-13 RX ORDER — PROPRANOLOL HYDROCHLORIDE 120 MG/1
CAPSULE, EXTENDED RELEASE ORAL
Qty: 90 CAPSULE | Refills: 1 | Status: SHIPPED | OUTPATIENT
Start: 2023-10-13

## 2023-10-13 RX ORDER — CLONIDINE HYDROCHLORIDE 0.2 MG/1
TABLET ORAL
Qty: 180 TABLET | Refills: 1 | Status: SHIPPED | OUTPATIENT
Start: 2023-10-13

## 2023-10-18 LAB — INR PPP: 3.5

## 2023-10-19 ENCOUNTER — ANTICOAGULATION VISIT (OUTPATIENT)
Dept: PHARMACY | Facility: HOSPITAL | Age: 53
End: 2023-10-19
Payer: COMMERCIAL

## 2023-10-19 DIAGNOSIS — I48.0 PAROXYSMAL ATRIAL FIBRILLATION: Chronic | ICD-10-CM

## 2023-10-19 DIAGNOSIS — Z95.2 AORTIC VALVE REPLACED: Primary | Chronic | ICD-10-CM

## 2023-10-19 NOTE — PROGRESS NOTES
Anticoagulation Clinic Progress Note    Anticoagulation Summary  As of 10/19/2023      INR goal:  2.5-3.5   TTR:  92.3% (4.9 y)   INR used for dosing:  3.50 (10/18/2023)   Warfarin maintenance plan:  7.5 mg every Sun, Tue, Thu; 5 mg all other days   Weekly warfarin total:  42.5 mg   No change documented:  Allison Sharma, Pharmacy Technician   Plan last modified:  Katie Schwarz, PharmD (4/13/2021)   Next INR check:  11/1/2023   Priority:  Maintenance   Target end date:      Indications    Aortic valve replaced [Z95.2]  Paroxysmal atrial fibrillation [I48.0]                 Anticoagulation Episode Summary       INR check location:      Preferred lab:      Send INR reminders to:   [facility] OLMEDO  POOL    Comments:  *[place]*          Anticoagulation Care Providers       Provider Role Specialty Phone number    Jackie ELLA Woodward Referring Cardiology [phone]            Clinic Interview:  No pertinent clinical findings have been reported.    INR History:      9/5/2023     8:52 AM 9/20/2023    12:00 AM 9/21/2023    12:35 PM 10/3/2023    12:00 AM 10/4/2023     7:49 AM 10/18/2023    12:00 AM 10/19/2023     7:57 AM   Anticoagulation Monitoring   INR 3.10  3.40  3.10  3.50   INR Date 9/4/2023  9/20/2023  10/3/2023  10/18/2023   INR Goal 2.5-3.5  2.5-3.5  2.5-3.5  2.5-3.5   Trend Same  Same  Same  Same   Last Week Total 42.5 mg  42.5 mg  42.5 mg  42.5 mg   Next Week Total 42.5 mg  42.5 mg  42.5 mg  42.5 mg   Sun 7.5 mg  7.5 mg  7.5 mg  7.5 mg   Mon 5 mg  5 mg  5 mg  5 mg   Tue 7.5 mg  7.5 mg  7.5 mg  7.5 mg   Wed 5 mg  5 mg  5 mg  5 mg   Thu 7.5 mg  7.5 mg  7.5 mg  7.5 mg   Fri 5 mg  5 mg  5 mg  5 mg   Sat 5 mg  5 mg  5 mg  5 mg   Historical INR  3.40      3.10      3.50            This result is from an external source.       Plan:  1. INR is therapeutic today- see above in Anticoagulation Summary.    Fernie Porter to continue their warfarin regimen- see above in Anticoagulation Summary.  2. Follow up in  2 weeks  3. Pt has agreed to only be called if INR out of range. They have been instructed to call if any changes in medications, doses, concerns, etc. Patient expresses understanding and has no further questions at this time.    Allison Sharma, Pharmacy Technician

## 2023-11-08 LAB — INR PPP: 3.3

## 2023-11-09 ENCOUNTER — ANTICOAGULATION VISIT (OUTPATIENT)
Dept: PHARMACY | Facility: HOSPITAL | Age: 53
End: 2023-11-09
Payer: COMMERCIAL

## 2023-11-09 DIAGNOSIS — Z95.2 AORTIC VALVE REPLACED: Primary | Chronic | ICD-10-CM

## 2023-11-09 DIAGNOSIS — I48.0 PAROXYSMAL ATRIAL FIBRILLATION: Chronic | ICD-10-CM

## 2023-11-09 NOTE — PROGRESS NOTES
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    INR History:      9/21/2023    12:35 PM 10/3/2023    12:00 AM 10/4/2023     7:49 AM 10/18/2023    12:00 AM 10/19/2023     7:57 AM 11/8/2023    12:00 AM 11/9/2023     8:39 AM   Anticoagulation Monitoring   INR 3.40  3.10  3.50  3.30   INR Date 9/20/2023  10/3/2023  10/18/2023  11/8/2023   INR Goal 2.5-3.5  2.5-3.5  2.5-3.5  2.5-3.5   Trend Same  Same  Same  Same   Last Week Total 42.5 mg  42.5 mg  42.5 mg  42.5 mg   Next Week Total 42.5 mg  42.5 mg  42.5 mg  42.5 mg   Sun 7.5 mg  7.5 mg  7.5 mg  7.5 mg   Mon 5 mg  5 mg  5 mg  5 mg   Tue 7.5 mg  7.5 mg  7.5 mg  7.5 mg   Wed 5 mg  5 mg  5 mg  5 mg   Thu 7.5 mg  7.5 mg  7.5 mg  7.5 mg   Fri 5 mg  5 mg  5 mg  5 mg   Sat 5 mg  5 mg  5 mg  5 mg   Historical INR  3.10      3.50      3.30            This result is from an external source.       Plan:  1. INR is therapeutic today- see above in Anticoagulation Summary.    Fernie Porter to continue their warfarin regimen- see above in Anticoagulation Summary.  2. Follow up in 2 weeks  3.  Pt has agreed to only be called if INR out of range. They have been instructed to call if any changes in medications, doses, concerns, etc. Patient expresses understanding and has no further questions at this time.    Georgie Gaming

## 2023-11-23 LAB — INR PPP: 3.2

## 2023-11-27 ENCOUNTER — ANTICOAGULATION VISIT (OUTPATIENT)
Dept: PHARMACY | Facility: HOSPITAL | Age: 53
End: 2023-11-27
Payer: COMMERCIAL

## 2023-11-27 DIAGNOSIS — I48.0 PAROXYSMAL ATRIAL FIBRILLATION: Chronic | ICD-10-CM

## 2023-11-27 DIAGNOSIS — Z95.2 AORTIC VALVE REPLACED: Primary | Chronic | ICD-10-CM

## 2023-11-27 NOTE — PROGRESS NOTES
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    INR History:      10/4/2023     7:49 AM 10/18/2023    12:00 AM 10/19/2023     7:57 AM 11/8/2023    12:00 AM 11/9/2023     8:39 AM 11/23/2023    12:00 AM 11/27/2023     9:51 AM   Anticoagulation Monitoring   INR 3.10  3.50  3.30  3.20   INR Date 10/3/2023  10/18/2023  11/8/2023  11/23/2023   INR Goal 2.5-3.5  2.5-3.5  2.5-3.5  2.5-3.5   Trend Same  Same  Same  Same   Last Week Total 42.5 mg  42.5 mg  42.5 mg  42.5 mg   Next Week Total 42.5 mg  42.5 mg  42.5 mg  42.5 mg   Sun 7.5 mg  7.5 mg  7.5 mg  7.5 mg   Mon 5 mg  5 mg  5 mg  5 mg   Tue 7.5 mg  7.5 mg  7.5 mg  7.5 mg   Wed 5 mg  5 mg  5 mg  5 mg   Thu 7.5 mg  7.5 mg  7.5 mg  7.5 mg   Fri 5 mg  5 mg  5 mg  5 mg   Sat 5 mg  5 mg  5 mg  5 mg   Historical INR  3.50      3.30      3.20            This result is from an external source.       Plan:  1. INR is therapeutic today- see above in Anticoagulation Summary.    Fernie Porter to continue their warfarin regimen- see above in Anticoagulation Summary.  2. Follow up  in 2 weeks  3. Pt has agreed to only be called if INR out of range. They have been instructed to call if any changes in medications, doses, concerns, etc. Patient expresses understanding and has no further questions at this time.    Allison Sharma, Pharmacy Technician

## 2023-12-06 RX ORDER — HYDROCHLOROTHIAZIDE 12.5 MG/1
CAPSULE, GELATIN COATED ORAL
Qty: 90 CAPSULE | Refills: 1 | Status: SHIPPED | OUTPATIENT
Start: 2023-12-06

## 2023-12-06 RX ORDER — IMIPRAMINE HCL 50 MG
TABLET ORAL
Qty: 180 TABLET | Refills: 1 | Status: SHIPPED | OUTPATIENT
Start: 2023-12-06

## 2023-12-07 LAB — INR PPP: 3.2

## 2023-12-08 ENCOUNTER — ANTICOAGULATION VISIT (OUTPATIENT)
Dept: PHARMACY | Facility: HOSPITAL | Age: 53
End: 2023-12-08
Payer: COMMERCIAL

## 2023-12-08 DIAGNOSIS — Z95.2 AORTIC VALVE REPLACED: Primary | Chronic | ICD-10-CM

## 2023-12-08 DIAGNOSIS — I48.0 PAROXYSMAL ATRIAL FIBRILLATION: Chronic | ICD-10-CM

## 2023-12-08 NOTE — PROGRESS NOTES
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    INR History:      10/19/2023     7:57 AM 11/8/2023    12:00 AM 11/9/2023     8:39 AM 11/23/2023    12:00 AM 11/27/2023     9:51 AM 12/7/2023    12:00 AM 12/8/2023     8:45 AM   Anticoagulation Monitoring   INR 3.50  3.30  3.20  3.20   INR Date 10/18/2023  11/8/2023  11/23/2023  12/7/2023   INR Goal 2.5-3.5  2.5-3.5  2.5-3.5  2.5-3.5   Trend Same  Same  Same  Same   Last Week Total 42.5 mg  42.5 mg  42.5 mg  42.5 mg   Next Week Total 42.5 mg  42.5 mg  42.5 mg  42.5 mg   Sun 7.5 mg  7.5 mg  7.5 mg  7.5 mg   Mon 5 mg  5 mg  5 mg  5 mg   Tue 7.5 mg  7.5 mg  7.5 mg  7.5 mg   Wed 5 mg  5 mg  5 mg  5 mg   Thu 7.5 mg  7.5 mg  7.5 mg  7.5 mg   Fri 5 mg  5 mg  5 mg  5 mg   Sat 5 mg  5 mg  5 mg  5 mg   Historical INR  3.30      3.20      3.20            This result is from an external source.       Plan:  1. INR is therapeutic today- see above in Anticoagulation Summary.    Fernie Porter to continue their warfarin regimen- see above in Anticoagulation Summary.  2. Follow up in  2 weeks  3. Pt has agreed to only be called if INR out of range. They have been instructed to call if any changes in medications, doses, concerns, etc. Patient expresses understanding and has no further questions at this time.    Allison Sharma, Pharmacy Technician

## 2023-12-11 ENCOUNTER — OFFICE VISIT (OUTPATIENT)
Dept: INTERNAL MEDICINE | Facility: CLINIC | Age: 53
End: 2023-12-11
Payer: COMMERCIAL

## 2023-12-11 VITALS
DIASTOLIC BLOOD PRESSURE: 60 MMHG | OXYGEN SATURATION: 99 % | HEIGHT: 69 IN | HEART RATE: 65 BPM | BODY MASS INDEX: 24.44 KG/M2 | SYSTOLIC BLOOD PRESSURE: 126 MMHG | WEIGHT: 165 LBS | RESPIRATION RATE: 18 BRPM | TEMPERATURE: 97.7 F

## 2023-12-11 DIAGNOSIS — Z95.2 AORTIC VALVE REPLACED: Chronic | ICD-10-CM

## 2023-12-11 DIAGNOSIS — I10 PRIMARY HYPERTENSION: Chronic | ICD-10-CM

## 2023-12-11 DIAGNOSIS — Z00.00 ENCOUNTER FOR PREVENTIVE HEALTH EXAMINATION: Primary | ICD-10-CM

## 2023-12-11 DIAGNOSIS — I48.0 PAROXYSMAL ATRIAL FIBRILLATION: Chronic | ICD-10-CM

## 2023-12-11 PROCEDURE — 99396 PREV VISIT EST AGE 40-64: CPT | Performed by: INTERNAL MEDICINE

## 2023-12-18 ENCOUNTER — PATIENT ROUNDING (BHMG ONLY) (OUTPATIENT)
Dept: INTERNAL MEDICINE | Facility: CLINIC | Age: 53
End: 2023-12-18
Payer: COMMERCIAL

## 2023-12-18 NOTE — PROGRESS NOTES
December 18, 2023    Hello, may I speak with Fernie Porter?    My name is April    I am  with [facility]  [address] [place] [id number] [phone].    Before we get started may I verify your date of birth? 1970    I am calling to officially welcome you to our practice and ask about your recent visit. Is this a good time to talk? yes    Tell me about your visit with us. What things went well?  Everything was good, I have no complaints.       We're always looking for ways to make our patients' experiences even better. Do you have recommendations on ways we may improve?  no    Overall were you satisfied with your first visit to our practice? yes       I appreciate you taking the time to speak with me today. Is there anything else I can do for you? no      Thank you, and have a great day.

## 2023-12-21 LAB — INR PPP: 3.5

## 2023-12-22 ENCOUNTER — ANTICOAGULATION VISIT (OUTPATIENT)
Dept: PHARMACY | Facility: HOSPITAL | Age: 53
End: 2023-12-22
Payer: COMMERCIAL

## 2023-12-22 DIAGNOSIS — Z95.2 AORTIC VALVE REPLACED: Primary | Chronic | ICD-10-CM

## 2023-12-22 DIAGNOSIS — I48.0 PAROXYSMAL ATRIAL FIBRILLATION: Chronic | ICD-10-CM

## 2023-12-22 NOTE — PROGRESS NOTES
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    INR History:      11/9/2023     8:39 AM 11/23/2023    12:00 AM 11/27/2023     9:51 AM 12/7/2023    12:00 AM 12/8/2023     8:45 AM 12/21/2023    12:00 AM 12/22/2023     8:10 AM   Anticoagulation Monitoring   INR 3.30  3.20  3.20  3.50   INR Date 11/8/2023 11/23/2023 12/7/2023 12/21/2023   INR Goal 2.5-3.5  2.5-3.5  2.5-3.5  2.5-3.5   Trend Same  Same  Same  Same   Last Week Total 42.5 mg  42.5 mg  42.5 mg  42.5 mg   Next Week Total 42.5 mg  42.5 mg  42.5 mg  42.5 mg   Sun 7.5 mg  7.5 mg  7.5 mg  7.5 mg   Mon 5 mg  5 mg  5 mg  5 mg   Tue 7.5 mg  7.5 mg  7.5 mg  7.5 mg   Wed 5 mg  5 mg  5 mg  5 mg   Thu 7.5 mg  7.5 mg  7.5 mg  7.5 mg   Fri 5 mg  5 mg  5 mg  5 mg   Sat 5 mg  5 mg  5 mg  5 mg   Historical INR  3.20      3.20      3.50            This result is from an external source.       Plan:  1. INR is therapeutic today- see above in Anticoagulation Summary.    Fernie Porter to continue their warfarin regimen- see above in Anticoagulation Summary.  2. Follow up  in 2 weeks  3. Pt has agreed to only be called if INR out of range. They have been instructed to call if any changes in medications, doses, concerns, etc. Patient expresses understanding and has no further questions at this time.    Allison Sharma, Pharmacy Technician

## 2024-01-05 ENCOUNTER — ANTICOAGULATION VISIT (OUTPATIENT)
Dept: PHARMACY | Facility: HOSPITAL | Age: 54
End: 2024-01-05
Payer: COMMERCIAL

## 2024-01-05 DIAGNOSIS — Z95.2 AORTIC VALVE REPLACED: Primary | Chronic | ICD-10-CM

## 2024-01-05 DIAGNOSIS — I48.0 PAROXYSMAL ATRIAL FIBRILLATION: Chronic | ICD-10-CM

## 2024-01-05 LAB — INR PPP: 3

## 2024-01-05 NOTE — PROGRESS NOTES
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    INR History:      11/27/2023     9:51 AM 12/7/2023    12:00 AM 12/8/2023     8:45 AM 12/21/2023    12:00 AM 12/22/2023     8:10 AM 1/5/2024    12:00 AM 1/5/2024     3:39 PM   Anticoagulation Monitoring   INR 3.20  3.20  3.50  3.00   INR Date 11/23/2023 12/7/2023 12/21/2023 1/5/2024   INR Goal 2.5-3.5  2.5-3.5  2.5-3.5  2.5-3.5   Trend Same  Same  Same  Same   Last Week Total 42.5 mg  42.5 mg  42.5 mg  42.5 mg   Next Week Total 42.5 mg  42.5 mg  42.5 mg  42.5 mg   Sun 7.5 mg  7.5 mg  7.5 mg  7.5 mg   Mon 5 mg  5 mg  5 mg  5 mg   Tue 7.5 mg  7.5 mg  7.5 mg  7.5 mg   Wed 5 mg  5 mg  5 mg  5 mg   Thu 7.5 mg  7.5 mg  7.5 mg  7.5 mg   Fri 5 mg  5 mg  5 mg  5 mg   Sat 5 mg  5 mg  5 mg  5 mg   Historical INR  3.20      3.50      3.00            This result is from an external source.       Plan:  1. INR is therapeutic today- see above in Anticoagulation Summary.    Fernie Porter to continue their warfarin regimen- see above in Anticoagulation Summary.  2. Follow up in 2  weeks  3. Pt has agreed to only be called if INR out of range. They have been instructed to call if any changes in medications, doses, concerns, etc. Patient expresses understanding and has no further questions at this time.    Allison Sharma, Pharmacy Technician

## 2024-01-12 RX ORDER — AMLODIPINE BESYLATE 5 MG/1
TABLET ORAL
Qty: 90 TABLET | Refills: 2 | Status: SHIPPED | OUTPATIENT
Start: 2024-01-12

## 2024-01-19 LAB — INR PPP: 3.2

## 2024-01-22 ENCOUNTER — ANTICOAGULATION VISIT (OUTPATIENT)
Dept: PHARMACY | Facility: HOSPITAL | Age: 54
End: 2024-01-22
Payer: COMMERCIAL

## 2024-01-22 DIAGNOSIS — I48.0 PAROXYSMAL ATRIAL FIBRILLATION: Chronic | ICD-10-CM

## 2024-01-22 DIAGNOSIS — Z95.2 AORTIC VALVE REPLACED: Primary | Chronic | ICD-10-CM

## 2024-01-22 NOTE — PROGRESS NOTES
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    INR History:      12/8/2023     8:45 AM 12/21/2023    12:00 AM 12/22/2023     8:10 AM 1/5/2024    12:00 AM 1/5/2024     3:39 PM 1/19/2024    12:00 AM 1/22/2024    10:17 AM   Anticoagulation Monitoring   INR 3.20  3.50  3.00  3.20   INR Date 12/7/2023 12/21/2023 1/5/2024 1/19/2024   INR Goal 2.5-3.5  2.5-3.5  2.5-3.5  2.5-3.5   Trend Same  Same  Same  Same   Last Week Total 42.5 mg  42.5 mg  42.5 mg  42.5 mg   Next Week Total 42.5 mg  42.5 mg  42.5 mg  42.5 mg   Sun 7.5 mg  7.5 mg  7.5 mg  7.5 mg   Mon 5 mg  5 mg  5 mg  5 mg   Tue 7.5 mg  7.5 mg  7.5 mg  7.5 mg   Wed 5 mg  5 mg  5 mg  5 mg   Thu 7.5 mg  7.5 mg  7.5 mg  7.5 mg   Fri 5 mg  5 mg  5 mg  5 mg   Sat 5 mg  5 mg  5 mg  5 mg   Historical INR  3.50      3.00      3.20            This result is from an external source.       Plan:  1. INR is therapeutic today- see above in Anticoagulation Summary.    Fernie Porter to continue their warfarin regimen- see above in Anticoagulation Summary.  2. Follow up in 2 weeks  3. Pt  has agreed to only be called if INR out of range. They have been instructed to call if any changes in medications, doses, concerns, etc. Patient expresses understanding and has no further questions at this time.    Georgie Gaming

## 2024-02-02 LAB — INR PPP: 3.2

## 2024-02-05 ENCOUNTER — ANTICOAGULATION VISIT (OUTPATIENT)
Dept: PHARMACY | Facility: HOSPITAL | Age: 54
End: 2024-02-05
Payer: COMMERCIAL

## 2024-02-05 DIAGNOSIS — Z95.2 AORTIC VALVE REPLACED: Primary | Chronic | ICD-10-CM

## 2024-02-05 DIAGNOSIS — I48.0 PAROXYSMAL ATRIAL FIBRILLATION: Chronic | ICD-10-CM

## 2024-02-05 NOTE — PROGRESS NOTES
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    INR History:      12/22/2023     8:10 AM 1/5/2024    12:00 AM 1/5/2024     3:39 PM 1/19/2024    12:00 AM 1/22/2024    10:17 AM 2/2/2024    12:00 AM 2/5/2024     8:59 AM   Anticoagulation Monitoring   INR 3.50  3.00  3.20  3.20   INR Date 12/21/2023 1/5/2024 1/19/2024 2/2/2024   INR Goal 2.5-3.5  2.5-3.5  2.5-3.5  2.5-3.5   Trend Same  Same  Same  Same   Last Week Total 42.5 mg  42.5 mg  42.5 mg  42.5 mg   Next Week Total 42.5 mg  42.5 mg  42.5 mg  42.5 mg   Sun 7.5 mg  7.5 mg  7.5 mg  7.5 mg   Mon 5 mg  5 mg  5 mg  5 mg   Tue 7.5 mg  7.5 mg  7.5 mg  7.5 mg   Wed 5 mg  5 mg  5 mg  5 mg   Thu 7.5 mg  7.5 mg  7.5 mg  7.5 mg   Fri 5 mg  5 mg  5 mg  5 mg   Sat 5 mg  5 mg  5 mg  5 mg   Historical INR  3.00      3.20      3.20            This result is from an external source.       Plan:  1. INR is therapeutic today- see above in Anticoagulation Summary.    Fernie Porter to continue their warfarin regimen- see above in Anticoagulation Summary.  2. Follow up in 2 weeks  3. Pt has  agreed to only be called if INR out of range. They have been instructed to call if any changes in medications, doses, concerns, etc. Patient expresses understanding and has no further questions at this time.    Georgie Gaming

## 2024-02-09 RX ORDER — WARFARIN SODIUM 5 MG/1
TABLET ORAL
Qty: 105 TABLET | Refills: 1 | Status: SHIPPED | OUTPATIENT
Start: 2024-02-09

## 2024-02-19 LAB — INR PPP: 3.2

## 2024-02-20 ENCOUNTER — ANTICOAGULATION VISIT (OUTPATIENT)
Dept: PHARMACY | Facility: HOSPITAL | Age: 54
End: 2024-02-20
Payer: COMMERCIAL

## 2024-02-20 DIAGNOSIS — Z95.2 AORTIC VALVE REPLACED: Primary | Chronic | ICD-10-CM

## 2024-02-20 DIAGNOSIS — I48.0 PAROXYSMAL ATRIAL FIBRILLATION: Chronic | ICD-10-CM

## 2024-02-20 NOTE — PROGRESS NOTES
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.2]  Paroxysmal atrial fibrillation [I48.0]                 Anticoagulation Episode Summary       INR check location:      Preferred lab:      Send INR reminders to:   [facility]  POOL    Comments:  *[place]ag[place]k*          Anticoagulation Care Providers       Provider Role Specialty Phone number    Arias MejiaELLA martin Referring Cardiology [phone]              Clinical Outcomes    Negatives: Major bleeding event, Thromboembolic event, Anticoagulation-related hospital admission, Anticoagulation-related ED visit, Anticoagulation-related fatality     Patient Findings    Negatives: Signs/symptoms of thrombosis, Signs/symptoms of bleeding, Laboratory test error suspected, Change in health, Change in alcohol use, Change in activity, Upcoming invasive procedure, Emergency department visit, Upcoming dental procedure, Missed doses, Extra doses, Change in medications, Change in diet/appetite, Hospital admission, Bruising, Other complaints       INR History:      1/5/2024     3:39 PM 1/19/2024    12:00 AM 1/22/2024    10:17 AM 2/2/2024    12:00 AM 2/5/2024     8:59 AM 2/19/2024    12:00 AM 2/20/2024     8:12 AM   Anticoagulation Monitoring   INR 3.00  3.20  3.20  3.20   INR Date 1/5/2024 1/19/2024 2/2/2024 2/19/2024   INR Goal 2.5-3.5  2.5-3.5  2.5-3.5  2.5-3.5   Trend Same  Same  Same  Same   Last Week Total 42.5 mg  42.5 mg  42.5 mg  42.5 mg   Next Week Total 42.5 mg  42.5 mg  42.5 mg  42.5 mg   Sun  7.5 mg  7.5 mg  7.5 mg  7.5 mg   Mon 5 mg  5 mg  5 mg  5 mg   Tue 7.5 mg  7.5 mg  7.5 mg  7.5 mg   Wed 5 mg  5 mg  5 mg  5 mg   Thu 7.5 mg  7.5 mg  7.5 mg  7.5 mg   Fri 5 mg  5 mg  5 mg  5 mg   Sat 5 mg  5 mg  5 mg  5 mg   Historical INR  3.20      3.20      3.20            This result is from an external source.       Plan:  1. INR is therapeutic today- see above in Anticoagulation Summary.    Fernie Porter to continue their warfarin regimen- see above in Anticoagulation Summary.  2. Follow up in 2 weeks  3. Pt has agreed to only be called if INR out of range. They have been instructed to call if any changes in medications, doses, concerns, etc. Patient expresses understanding and has no further questions at this time.    Allison Sharma, Pharmacy Technician

## 2024-03-06 LAB — INR PPP: 3

## 2024-03-07 ENCOUNTER — ANTICOAGULATION VISIT (OUTPATIENT)
Dept: PHARMACY | Facility: HOSPITAL | Age: 54
End: 2024-03-07
Payer: COMMERCIAL

## 2024-03-07 DIAGNOSIS — Z95.2 AORTIC VALVE REPLACED: Primary | Chronic | ICD-10-CM

## 2024-03-07 DIAGNOSIS — I48.0 PAROXYSMAL ATRIAL FIBRILLATION: Chronic | ICD-10-CM

## 2024-03-07 NOTE — PROGRESS NOTES
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    INR History:      1/22/2024    10:17 AM 2/2/2024    12:00 AM 2/5/2024     8:59 AM 2/19/2024    12:00 AM 2/20/2024     8:12 AM 3/6/2024    12:00 AM 3/7/2024     8:56 AM   Anticoagulation Monitoring   INR 3.20  3.20  3.20  3.00   INR Date 1/19/2024  2/2/2024  2/19/2024  3/6/2024   INR Goal 2.5-3.5  2.5-3.5  2.5-3.5  2.5-3.5   Trend Same  Same  Same  Same   Last Week Total 42.5 mg  42.5 mg  42.5 mg  42.5 mg   Next Week Total 42.5 mg  42.5 mg  42.5 mg  42.5 mg   Sun 7.5 mg  7.5 mg  7.5 mg  7.5 mg   Mon 5 mg  5 mg  5 mg  5 mg   Tue 7.5 mg  7.5 mg  7.5 mg  7.5 mg   Wed 5 mg  5 mg  5 mg  5 mg   Thu 7.5 mg  7.5 mg  7.5 mg  7.5 mg   Fri 5 mg  5 mg  5 mg  5 mg   Sat 5 mg  5 mg  5 mg  5 mg   Historical INR  3.20      3.20      3.00            This result is from an external source.       Plan:  1. INR is therapeutic today- see above in Anticoagulation Summary.    Fernie Porter to continue their warfarin regimen- see above in Anticoagulation Summary.  2. Follow up in 2 weeks  3. Pt has  agreed to only be called if INR out of range. They have been instructed to call if any changes in medications, doses, concerns, etc. Patient expresses understanding and has no further questions at this time.    Georgie Gaming

## 2024-03-11 ENCOUNTER — OFFICE VISIT (OUTPATIENT)
Dept: INTERNAL MEDICINE | Facility: CLINIC | Age: 54
End: 2024-03-11
Payer: COMMERCIAL

## 2024-03-11 VITALS
TEMPERATURE: 97.1 F | HEART RATE: 63 BPM | OXYGEN SATURATION: 99 % | WEIGHT: 165.8 LBS | HEIGHT: 69 IN | BODY MASS INDEX: 24.56 KG/M2 | RESPIRATION RATE: 16 BRPM | SYSTOLIC BLOOD PRESSURE: 132 MMHG | DIASTOLIC BLOOD PRESSURE: 72 MMHG

## 2024-03-11 DIAGNOSIS — I10 PRIMARY HYPERTENSION: Chronic | ICD-10-CM

## 2024-03-11 DIAGNOSIS — I48.0 PAROXYSMAL ATRIAL FIBRILLATION: Chronic | ICD-10-CM

## 2024-03-11 DIAGNOSIS — Z23 NEED FOR VACCINATION: Primary | ICD-10-CM

## 2024-03-11 DIAGNOSIS — Z95.2 AORTIC VALVE REPLACED: Chronic | ICD-10-CM

## 2024-03-11 NOTE — PROGRESS NOTES
Subjective   Fernie Porter is a 53 y.o. male.     Chief Complaint   Patient presents with    Hypertension    Atrial Fibrillation    Aortic Stenosis    Migraine         Hypertension  This is a chronic problem. The current episode started more than 1 year ago. The problem is unchanged. The problem is controlled. Associated symptoms include headaches. Pertinent negatives include no chest pain, orthopnea, palpitations, peripheral edema or shortness of breath.   Atrial Fibrillation  Presents for follow-up visit. Symptoms are negative for chest pain, palpitations and shortness of breath. The symptoms have been stable. Past medical history includes atrial fibrillation.   Migraine  Headache pattern:  Headache sometimes there, sometimes not at all       The following portions of the patient's history were reviewed and updated as appropriate: allergies, current medications, past social history and problem list.    Outpatient Medications Marked as Taking for the 3/11/24 encounter (Office Visit) with Juan Antonio Ford MD   Medication Sig Dispense Refill    amLODIPine (NORVASC) 5 MG tablet TAKE 1 TABLET BY MOUTH EVERY DAY 90 tablet 2    cloNIDine (CATAPRES) 0.2 MG tablet TAKE 1 TABLET BY MOUTH TWICE A  tablet 1    fluticasone (FLONASE) 50 MCG/ACT nasal spray 2 SPRAYS INTO THE NOSTRIL(S) AS DIRECTED BY PROVIDER DAILY. 48 mL 2    Fremanezumab-vfrm (Ajovy) 225 MG/1.5ML solution auto-injector INJECT 225 MG UNDER THE SKIN INTO THE APPROPRIATE AREA AS DIRECTED EVERY 30 (THIRTY) DAYS. 4.5 mL 3    hydroCHLOROthiazide (MICROZIDE) 12.5 MG capsule TAKE 1 CAPSULE BY MOUTH EVERY DAY 90 capsule 1    ibuprofen (ADVIL,MOTRIN) 200 MG tablet Take 1 tablet by mouth Every 6 (Six) Hours As Needed for Mild Pain.      imipramine (TOFRANIL) 50 MG tablet TAKE 2 TABLETS BY MOUTH AT BEDTIME 180 tablet 1    propranolol LA (INDERAL LA) 120 MG 24 hr capsule TAKE 1 CAPSULE BY MOUTH EVERY DAY 90 capsule 1    warfarin (COUMADIN) 5 MG tablet TAKE 7.5MG  (1 AND 1/2 TABLETS) ON SUNDAY, TUESDAY, AND THURSDAY TAKE 5MG (1 TABLET) ALL OTHER DAYS OR AS DIRECTED BY THE MED MANAGEMENT CLINIC. 105 tablet 1       Review of Systems   Respiratory:  Negative for shortness of breath and wheezing.    Cardiovascular:  Negative for chest pain, palpitations, orthopnea and leg swelling.   Gastrointestinal:  Negative for constipation and diarrhea.   Neurological:  Positive for headaches.       Objective   Vitals:    03/11/24 1459   BP: 132/72   Pulse: 63   Resp: 16   Temp: 97.1 °F (36.2 °C)   SpO2: 99%          03/11/24  1459   Weight: 75.2 kg (165 lb 12.8 oz)    [unfilled]  Body mass index is 24.47 kg/m².      Physical Exam   Constitutional: He appears well-developed.   Cardiovascular: Normal rate and regular rhythm. Exam reveals no gallop.   Murmur heard.  Crescendo decrescendo systolic murmur is present with a grade of 2/6.  Prosthetic heart sounds.  Grade 2/6 JOHNNIE loudest at the pulmonic area.   Pulmonary/Chest: Effort normal and breath sounds normal. No respiratory distress. He has no wheezes. He has no rales.   Abdominal: Soft. Normal appearance and bowel sounds are normal. He exhibits no mass. There is no abdominal tenderness. There is no guarding.   Neurological: He is alert.         Problems Addressed this Visit          Cardiac and Vasculature    Paroxysmal atrial fibrillation (Chronic)    Hypertension (Chronic)    Aortic valve replaced (Chronic)     Other Visit Diagnoses       Need for vaccination    -  Primary    Relevant Orders    Tdap Vaccine Greater Than or Equal To 6yo IM (Completed)          Diagnoses         Codes Comments    Need for vaccination    -  Primary ICD-10-CM: Z23  ICD-9-CM: V05.9     Primary hypertension     ICD-10-CM: I10  ICD-9-CM: 401.9     Paroxysmal atrial fibrillation     ICD-10-CM: I48.0  ICD-9-CM: 427.31     Aortic valve replaced     ICD-10-CM: Z95.2  ICD-9-CM: V43.3           Assessment & Plan   In for recheck of hypertension, atrial  fibrillation, aortic stenosis and migraines today March 2024.  Migraines persist and are pretty much unchanged.  They're doing pretty well at the present time.  Gets 3 per month.  Has failed several drugs in the past including Topamax.  Currently on Ajovy injections once monthly and they have been working pretty well right now.  Blood pressure control is excellent.  Atrial fib is controlled.  On propranolol  mg daily, HCTZ 12.5 mg daily, amlodipine 5 mg daily, and clonidine 0.2 mg twice daily for hypertension.  Those are reviewed today.  Gets annual lab work May 2023 including CBC, CMP, lipids, PSA urinalysis.  Continue to monitor every 3 months.  Certainly no severe migraine since he started Inderal LA.   Due for annual preventive exam November 2024.      The above information was reviewed again today 03/11/24.  It continues to be accurate as reflected above and is unchanged.  History, physical and review of systems all reviewed and are unchanged.  Medications were reviewed today and continue the current dosing.      PPE today includes face mask and eye shield.  The 10-year ASCVD risk score (Sana LEO, et al., 2019) is: 5.4%    Values used to calculate the score:      Age: 53 years      Sex: Male      Is Non- : No      Diabetic: No      Tobacco smoker: No      Systolic Blood Pressure: 132 mmHg      Is BP treated: Yes      HDL Cholesterol: 50 mg/dL      Total Cholesterol: 205 mg/dL\           Dragon disclaimer:   Much of this encounter note is an electronic transcription/translation of spoken language to printed text. The electronic translation of spoken language may permit erroneous, or at times, nonsensical words or phrases to be inadvertently transcribed; Although I have reviewed the note for such errors, some may still exist.

## 2024-03-20 LAB — INR PPP: 3.4

## 2024-03-21 ENCOUNTER — ANTICOAGULATION VISIT (OUTPATIENT)
Dept: PHARMACY | Facility: HOSPITAL | Age: 54
End: 2024-03-21
Payer: COMMERCIAL

## 2024-03-21 DIAGNOSIS — Z95.2 AORTIC VALVE REPLACED: Primary | Chronic | ICD-10-CM

## 2024-03-21 DIAGNOSIS — I48.0 PAROXYSMAL ATRIAL FIBRILLATION: Chronic | ICD-10-CM

## 2024-03-21 NOTE — PROGRESS NOTES
Anticoagulation Clinic Progress Note    Anticoagulation Summary  As of 3/21/2024      INR goal:  2.5-3.5   TTR:  92.9% (5.3 y)   INR used for dosing:  3.40 (3/20/2024)   Warfarin maintenance plan:  7.5 mg every Sun, Tue, Thu; 5 mg all other days   Weekly warfarin total:  42.5 mg   No change documented:  Brayan Patterson, Toni   Plan last modified:  Katie Schwarz PharmD (4/13/2021)   Next INR check:  4/3/2024   Priority:  Maintenance   Target end date:      Indications    Aortic valve replaced [Z95.2]  Paroxysmal atrial fibrillation [I48.0]                 Anticoagulation Episode Summary       INR check location:      Preferred lab:      Send INR reminders to:   [facility]  POOL    Comments:  *[place]*          Anticoagulation Care Providers       Provider Role Specialty Phone number    Crissy MejiaELLA Referring Cardiology [phone]            Clinic Interview:  No pertinent clinical findings have been reported.    INR History:      2/5/2024     8:59 AM 2/19/2024    12:00 AM 2/20/2024     8:12 AM 3/6/2024    12:00 AM 3/7/2024     8:56 AM 3/20/2024    12:00 AM 3/21/2024     9:21 AM   Anticoagulation Monitoring   INR 3.20  3.20  3.00  3.40   INR Date 2/2/2024  2/19/2024  3/6/2024  3/20/2024   INR Goal 2.5-3.5  2.5-3.5  2.5-3.5  2.5-3.5   Trend Same  Same  Same  Same   Last Week Total 42.5 mg  42.5 mg  42.5 mg  42.5 mg   Next Week Total 42.5 mg  42.5 mg  42.5 mg  42.5 mg   Sun 7.5 mg  7.5 mg  7.5 mg  7.5 mg   Mon 5 mg  5 mg  5 mg  5 mg   Tue 7.5 mg  7.5 mg  7.5 mg  7.5 mg   Wed 5 mg  5 mg  5 mg  5 mg   Thu 7.5 mg  7.5 mg  7.5 mg  7.5 mg   Fri 5 mg  5 mg  5 mg  5 mg   Sat 5 mg  5 mg  5 mg  5 mg   Historical INR  3.20      3.00      3.40            This result is from an external source.       Plan:  1. INR is therapeutic today- see above in Anticoagulation Summary.    Fernie Porter to continue their warfarin regimen- see above in Anticoagulation Summary.  2. Follow up in 2 weeks  3. Pt has  agreed to only be called if INR out of range. They have been instructed to call if any changes in medications, doses, concerns, etc. Patient expresses understanding and has no further questions at this time.    Brayan Patterson, PharmD

## 2024-04-04 LAB — INR PPP: 3.4

## 2024-04-05 ENCOUNTER — ANTICOAGULATION VISIT (OUTPATIENT)
Dept: PHARMACY | Facility: HOSPITAL | Age: 54
End: 2024-04-05
Payer: COMMERCIAL

## 2024-04-05 DIAGNOSIS — Z95.2 AORTIC VALVE REPLACED: Primary | Chronic | ICD-10-CM

## 2024-04-05 DIAGNOSIS — I48.0 PAROXYSMAL ATRIAL FIBRILLATION: Chronic | ICD-10-CM

## 2024-04-05 NOTE — PROGRESS NOTES
Anticoagulation Clinic Progress Note    Anticoagulation Summary  As of 4/5/2024      INR goal:  2.5-3.5   TTR:  93.0% (5.4 y)   INR used for dosing:  3.40 (4/4/2024)   Warfarin maintenance plan:  7.5 mg every Sun, Tue, Thu; 5 mg all other days   Weekly warfarin total:  42.5 mg   No change documented:  Georgie Gaming   Plan last modified:  Katie Schwarz, PharmD (4/13/2021)   Next INR check:  4/18/2024   Priority:  Maintenance   Target end date:      Indications    Aortic valve replaced [Z95.2]  Paroxysmal atrial fibrillation [I48.0]                 Anticoagulation Episode Summary       INR check location:      Preferred lab:      Send INR reminders to:   [facility]  POOL    Comments:  *[place]*          Anticoagulation Care Providers       Provider Role Specialty Phone number    Jackie, ELLA Woodward Referring Cardiology [phone]            Clinic Interview:  No pertinent clinical findings have been reported.    INR History:      2/20/2024     8:12 AM 3/6/2024    12:00 AM 3/7/2024     8:56 AM 3/20/2024    12:00 AM 3/21/2024     9:21 AM 4/4/2024    12:00 AM 4/5/2024     8:35 AM   Anticoagulation Monitoring   INR 3.20  3.00  3.40  3.40   INR Date 2/19/2024  3/6/2024  3/20/2024  4/4/2024   INR Goal 2.5-3.5  2.5-3.5  2.5-3.5  2.5-3.5   Trend Same  Same  Same  Same   Last Week Total 42.5 mg  42.5 mg  42.5 mg  42.5 mg   Next Week Total 42.5 mg  42.5 mg  42.5 mg  42.5 mg   Sun 7.5 mg  7.5 mg  7.5 mg  7.5 mg   Mon 5 mg  5 mg  5 mg  5 mg   Tue 7.5 mg  7.5 mg  7.5 mg  7.5 mg   Wed 5 mg  5 mg  5 mg  5 mg   Thu 7.5 mg  7.5 mg  7.5 mg  7.5 mg   Fri 5 mg  5 mg  5 mg  5 mg   Sat 5 mg  5 mg  5 mg  5 mg   Historical INR  3.00      3.40      3.40            This result is from an external source.       Plan:  1. INR is therapeutic today- see above in Anticoagulation Summary.    Fernie Porter to continue their warfarin regimen- see above in Anticoagulation Summary.  2. Follow up in 2 weeks  3. Pt has  agreed to only be called if INR out of range. They have been instructed to call if any changes in medications, doses, concerns, etc. Patient expresses understanding and has no further questions at this time.    Georgie Gaming

## 2024-04-09 RX ORDER — PROPRANOLOL HYDROCHLORIDE 120 MG/1
CAPSULE, EXTENDED RELEASE ORAL
Qty: 90 CAPSULE | Refills: 1 | Status: SHIPPED | OUTPATIENT
Start: 2024-04-09

## 2024-04-09 RX ORDER — CLONIDINE HYDROCHLORIDE 0.2 MG/1
TABLET ORAL
Qty: 180 TABLET | Refills: 1 | Status: SHIPPED | OUTPATIENT
Start: 2024-04-09

## 2024-04-18 ENCOUNTER — ANTICOAGULATION VISIT (OUTPATIENT)
Dept: PHARMACY | Facility: HOSPITAL | Age: 54
End: 2024-04-18
Payer: COMMERCIAL

## 2024-04-18 DIAGNOSIS — Z95.2 AORTIC VALVE REPLACED: Primary | Chronic | ICD-10-CM

## 2024-04-18 DIAGNOSIS — I48.0 PAROXYSMAL ATRIAL FIBRILLATION: Chronic | ICD-10-CM

## 2024-04-18 LAB — INR PPP: 3.1

## 2024-04-18 NOTE — PROGRESS NOTES
Anticoagulation Clinic Progress Note    Anticoagulation Summary  As of 4/18/2024      INR goal:  2.5-3.5   TTR:  93.0% (5.4 y)   INR used for dosing:  3.10 (4/18/2024)   Warfarin maintenance plan:  7.5 mg every Sun, Tue, Thu; 5 mg all other days   Weekly warfarin total:  42.5 mg   No change documented:  Georgie Gaming   Plan last modified:  Katie Schwarz, PharmD (4/13/2021)   Next INR check:  5/2/2024   Priority:  Maintenance   Target end date:      Indications    Aortic valve replaced [Z95.2]  Paroxysmal atrial fibrillation [I48.0]                 Anticoagulation Episode Summary       INR check location:      Preferred lab:      Send INR reminders to:   [facility]  POOL    Comments:  *[place]*          Anticoagulation Care Providers       Provider Role Specialty Phone number    Jackie, ELLA Woodward Referring Cardiology [phone]            Clinic Interview:  No pertinent clinical findings have been reported.    INR History:      3/7/2024     8:56 AM 3/20/2024    12:00 AM 3/21/2024     9:21 AM 4/4/2024    12:00 AM 4/5/2024     8:35 AM 4/18/2024    12:00 AM 4/18/2024     3:32 PM   Anticoagulation Monitoring   INR 3.00  3.40  3.40  3.10   INR Date 3/6/2024  3/20/2024  4/4/2024  4/18/2024   INR Goal 2.5-3.5  2.5-3.5  2.5-3.5  2.5-3.5   Trend Same  Same  Same  Same   Last Week Total 42.5 mg  42.5 mg  42.5 mg  42.5 mg   Next Week Total 42.5 mg  42.5 mg  42.5 mg  42.5 mg   Sun 7.5 mg  7.5 mg  7.5 mg  7.5 mg   Mon 5 mg  5 mg  5 mg  5 mg   Tue 7.5 mg  7.5 mg  7.5 mg  7.5 mg   Wed 5 mg  5 mg  5 mg  5 mg   Thu 7.5 mg  7.5 mg  7.5 mg  7.5 mg   Fri 5 mg  5 mg  5 mg  5 mg   Sat 5 mg  5 mg  5 mg  5 mg   Historical INR  3.40      3.40      3.10            This result is from an external source.       Plan:  1. INR is therapeutic today- see above in Anticoagulation Summary.    Fernie Porter to continue their warfarin regimen- see above in Anticoagulation Summary.  2. Follow up in 2 weeks  3. Pt has  agreed to only be called if INR out of range. They have been instructed to call if any changes in medications, doses, concerns, etc. Patient expresses understanding and has no further questions at this time.    Georgie Gaming

## 2024-05-03 LAB — INR PPP: 2.6

## 2024-05-06 ENCOUNTER — ANTICOAGULATION VISIT (OUTPATIENT)
Dept: PHARMACY | Facility: HOSPITAL | Age: 54
End: 2024-05-06
Payer: COMMERCIAL

## 2024-05-06 DIAGNOSIS — I48.0 PAROXYSMAL ATRIAL FIBRILLATION: Chronic | ICD-10-CM

## 2024-05-06 DIAGNOSIS — Z95.2 AORTIC VALVE REPLACED: Primary | Chronic | ICD-10-CM

## 2024-05-17 ENCOUNTER — ANTICOAGULATION VISIT (OUTPATIENT)
Dept: PHARMACY | Facility: HOSPITAL | Age: 54
End: 2024-05-17
Payer: COMMERCIAL

## 2024-05-17 DIAGNOSIS — Z95.2 AORTIC VALVE REPLACED: Primary | Chronic | ICD-10-CM

## 2024-05-17 DIAGNOSIS — I48.0 PAROXYSMAL ATRIAL FIBRILLATION: Chronic | ICD-10-CM

## 2024-05-17 LAB — INR PPP: 3.2

## 2024-05-17 NOTE — PROGRESS NOTES
Anticoagulation Clinic Progress Note    Anticoagulation Summary  As of 5/17/2024      INR goal:  2.5-3.5   TTR:  93.1% (5.5 y)   INR used for dosing:  3.20 (5/17/2024)   Warfarin maintenance plan:  7.5 mg every Sun, Tue, Thu; 5 mg all other days   Weekly warfarin total:  42.5 mg   No change documented:  Georgie Gaming   Plan last modified:  Katie Schwarz, PharmD (4/13/2021)   Next INR check:  5/31/2024   Priority:  Maintenance   Target end date:      Indications    Aortic valve replaced [Z95.2]  Paroxysmal atrial fibrillation [I48.0]                 Anticoagulation Episode Summary       INR check location:      Preferred lab:      Send INR reminders to:   [facility]  POOL    Comments:  *[place]*          Anticoagulation Care Providers       Provider Role Specialty Phone number    Jakcie, ELLA Woodward Referring Cardiology [phone]            Clinic Interview:  No pertinent clinical findings have been reported.    INR History:      4/5/2024     8:35 AM 4/18/2024    12:00 AM 4/18/2024     3:32 PM 5/3/2024    12:00 AM 5/6/2024     9:56 AM 5/17/2024    12:00 AM 5/17/2024     3:43 PM   Anticoagulation Monitoring   INR 3.40  3.10  2.60  3.20   INR Date 4/4/2024  4/18/2024  5/3/2024  5/17/2024   INR Goal 2.5-3.5  2.5-3.5  2.5-3.5  2.5-3.5   Trend Same  Same  Same  Same   Last Week Total 42.5 mg  42.5 mg  42.5 mg  42.5 mg   Next Week Total 42.5 mg  42.5 mg  42.5 mg  42.5 mg   Sun 7.5 mg  7.5 mg  7.5 mg  7.5 mg   Mon 5 mg  5 mg  5 mg  5 mg   Tue 7.5 mg  7.5 mg  7.5 mg  7.5 mg   Wed 5 mg  5 mg  5 mg  5 mg   Thu 7.5 mg  7.5 mg  7.5 mg  7.5 mg   Fri 5 mg  5 mg  5 mg  5 mg   Sat 5 mg  5 mg  5 mg  5 mg   Historical INR  3.10      2.60      3.20            This result is from an external source.       Plan:  1. INR is therapeutic today- see above in Anticoagulation Summary.    Fernie Porter to continue their warfarin regimen- see above in Anticoagulation Summary.  2. Follow up in 2 weeks  3. Pt has  agreed to only be called if INR out of range. They have been instructed to call if any changes in medications, doses, concerns, etc. Patient expresses understanding and has no further questions at this time.    Georgie Gaming

## 2024-06-01 LAB — INR PPP: 3.4

## 2024-06-03 ENCOUNTER — ANTICOAGULATION VISIT (OUTPATIENT)
Dept: PHARMACY | Facility: HOSPITAL | Age: 54
End: 2024-06-03
Payer: COMMERCIAL

## 2024-06-03 DIAGNOSIS — I48.0 PAROXYSMAL ATRIAL FIBRILLATION: Chronic | ICD-10-CM

## 2024-06-03 DIAGNOSIS — Z95.2 AORTIC VALVE REPLACED: Primary | Chronic | ICD-10-CM

## 2024-06-03 NOTE — PROGRESS NOTES
Anticoagulation Clinic Progress Note    Anticoagulation Summary  As of 6/3/2024      INR goal:  2.5-3.5   TTR:  93.2% (5.5 y)   INR used for dosing:  3.40 (6/1/2024)   Warfarin maintenance plan:  7.5 mg every Sun, Tue, Thu; 5 mg all other days   Weekly warfarin total:  42.5 mg   No change documented:  Allison Sharma, Pharmacy Technician   Plan last modified:  Katie Schwarz, PharmD (4/13/2021)   Next INR check:  6/15/2024   Priority:  Maintenance   Target end date:      Indications    Aortic valve replaced [Z95.2]  Paroxysmal atrial fibrillation [I48.0]                 Anticoagulation Episode Summary       INR check location:      Preferred lab:      Send INR reminders to:   [facility]  POOL    Comments:  *[place]*          Anticoagulation Care Providers       Provider Role Specialty Phone number    Jackie ELLA Woodward Referring Cardiology [phone]            Clinic Interview:  No pertinent clinical findings have been reported.    INR History:      4/18/2024     3:32 PM 5/3/2024    12:00 AM 5/6/2024     9:56 AM 5/17/2024    12:00 AM 5/17/2024     3:43 PM 6/1/2024    12:00 AM 6/3/2024     8:56 AM   Anticoagulation Monitoring   INR 3.10  2.60  3.20  3.40   INR Date 4/18/2024  5/3/2024  5/17/2024  6/1/2024   INR Goal 2.5-3.5  2.5-3.5  2.5-3.5  2.5-3.5   Trend Same  Same  Same  Same   Last Week Total 42.5 mg  42.5 mg  42.5 mg  42.5 mg   Next Week Total 42.5 mg  42.5 mg  42.5 mg  42.5 mg   Sun 7.5 mg  7.5 mg  7.5 mg  7.5 mg   Mon 5 mg  5 mg  5 mg  5 mg   Tue 7.5 mg  7.5 mg  7.5 mg  7.5 mg   Wed 5 mg  5 mg  5 mg  5 mg   Thu 7.5 mg  7.5 mg  7.5 mg  7.5 mg   Fri 5 mg  5 mg  5 mg  5 mg   Sat 5 mg  5 mg  5 mg  5 mg   Historical INR  2.60      3.20      3.40            This result is from an external source.       Plan:  1. INR is therapeutic today- see above in Anticoagulation Summary.    Fernie Porter to continue their warfarin regimen- see above in Anticoagulation Summary.  2. Follow up in 2 weeks  3.  Pt has agreed to only be called if INR out of range. They have been instructed to call if any changes in medications, doses, concerns, etc. Patient expresses understanding and has no further questions at this time.    Allison Sharma, Pharmacy Technician

## 2024-06-07 RX ORDER — HYDROCHLOROTHIAZIDE 12.5 MG/1
CAPSULE, GELATIN COATED ORAL
Qty: 90 CAPSULE | Refills: 1 | Status: SHIPPED | OUTPATIENT
Start: 2024-06-07

## 2024-06-07 RX ORDER — IMIPRAMINE HCL 50 MG
TABLET ORAL
Qty: 180 TABLET | Refills: 1 | Status: SHIPPED | OUTPATIENT
Start: 2024-06-07

## 2024-06-10 ENCOUNTER — OFFICE VISIT (OUTPATIENT)
Dept: INTERNAL MEDICINE | Facility: CLINIC | Age: 54
End: 2024-06-10
Payer: COMMERCIAL

## 2024-06-10 ENCOUNTER — LAB (OUTPATIENT)
Dept: LAB | Facility: HOSPITAL | Age: 54
End: 2024-06-10
Payer: COMMERCIAL

## 2024-06-10 VITALS
WEIGHT: 162 LBS | BODY MASS INDEX: 23.99 KG/M2 | DIASTOLIC BLOOD PRESSURE: 72 MMHG | HEART RATE: 68 BPM | RESPIRATION RATE: 16 BRPM | OXYGEN SATURATION: 98 % | HEIGHT: 69 IN | SYSTOLIC BLOOD PRESSURE: 146 MMHG | TEMPERATURE: 97.5 F

## 2024-06-10 DIAGNOSIS — Z79.899 MEDICATION MANAGEMENT: ICD-10-CM

## 2024-06-10 DIAGNOSIS — I10 PRIMARY HYPERTENSION: Primary | Chronic | ICD-10-CM

## 2024-06-10 DIAGNOSIS — I48.0 PAROXYSMAL ATRIAL FIBRILLATION: Chronic | ICD-10-CM

## 2024-06-10 DIAGNOSIS — Z12.5 SCREENING FOR PROSTATE CANCER: ICD-10-CM

## 2024-06-10 LAB
ALBUMIN SERPL-MCNC: 4.6 G/DL (ref 3.5–5.2)
ALBUMIN/GLOB SERPL: 1.9 G/DL
ALP SERPL-CCNC: 57 U/L (ref 39–117)
ALT SERPL W P-5'-P-CCNC: 14 U/L (ref 1–41)
ANION GAP SERPL CALCULATED.3IONS-SCNC: 8.3 MMOL/L (ref 5–15)
AST SERPL-CCNC: 19 U/L (ref 1–40)
BASOPHILS # BLD AUTO: 0.07 10*3/MM3 (ref 0–0.2)
BASOPHILS NFR BLD AUTO: 1.4 % (ref 0–1.5)
BILIRUB SERPL-MCNC: 0.4 MG/DL (ref 0–1.2)
BUN SERPL-MCNC: 10 MG/DL (ref 6–20)
BUN/CREAT SERPL: 11.2 (ref 7–25)
CALCIUM SPEC-SCNC: 9.6 MG/DL (ref 8.6–10.5)
CHLORIDE SERPL-SCNC: 99 MMOL/L (ref 98–107)
CHOLEST SERPL-MCNC: 184 MG/DL (ref 0–200)
CO2 SERPL-SCNC: 29.7 MMOL/L (ref 22–29)
CREAT SERPL-MCNC: 0.89 MG/DL (ref 0.76–1.27)
DEPRECATED RDW RBC AUTO: 41.6 FL (ref 37–54)
EGFRCR SERPLBLD CKD-EPI 2021: 103.1 ML/MIN/1.73
EOSINOPHIL # BLD AUTO: 0.23 10*3/MM3 (ref 0–0.4)
EOSINOPHIL NFR BLD AUTO: 4.7 % (ref 0.3–6.2)
ERYTHROCYTE [DISTWIDTH] IN BLOOD BY AUTOMATED COUNT: 12.8 % (ref 12.3–15.4)
GLOBULIN UR ELPH-MCNC: 2.4 GM/DL
GLUCOSE SERPL-MCNC: 93 MG/DL (ref 65–99)
HCT VFR BLD AUTO: 48.3 % (ref 37.5–51)
HDLC SERPL QL: 3.91
HDLC SERPL-MCNC: 47 MG/DL (ref 40–60)
HGB BLD-MCNC: 16 G/DL (ref 13–17.7)
IMM GRANULOCYTES # BLD AUTO: 0 10*3/MM3 (ref 0–0.05)
IMM GRANULOCYTES NFR BLD AUTO: 0 % (ref 0–0.5)
LDLC SERPL CALC-MCNC: 119 MG/DL (ref 0–100)
LYMPHOCYTES # BLD AUTO: 1.05 10*3/MM3 (ref 0.7–3.1)
LYMPHOCYTES NFR BLD AUTO: 21.6 % (ref 19.6–45.3)
MCH RBC QN AUTO: 29.7 PG (ref 26.6–33)
MCHC RBC AUTO-ENTMCNC: 33.1 G/DL (ref 31.5–35.7)
MCV RBC AUTO: 89.8 FL (ref 79–97)
MONOCYTES # BLD AUTO: 0.42 10*3/MM3 (ref 0.1–0.9)
MONOCYTES NFR BLD AUTO: 8.6 % (ref 5–12)
NEUTROPHILS NFR BLD AUTO: 3.1 10*3/MM3 (ref 1.7–7)
NEUTROPHILS NFR BLD AUTO: 63.7 % (ref 42.7–76)
NRBC BLD AUTO-RTO: 0 /100 WBC (ref 0–0.2)
PLATELET # BLD AUTO: 223 10*3/MM3 (ref 140–450)
PMV BLD AUTO: 10.8 FL (ref 6–12)
POTASSIUM SERPL-SCNC: 4.5 MMOL/L (ref 3.5–5.2)
PROT SERPL-MCNC: 7 G/DL (ref 6–8.5)
PSA SERPL-MCNC: 1.99 NG/ML (ref 0–4)
RBC # BLD AUTO: 5.38 10*6/MM3 (ref 4.14–5.8)
SODIUM SERPL-SCNC: 137 MMOL/L (ref 136–145)
TRIGL SERPL-MCNC: 97 MG/DL (ref 0–150)
VLDLC SERPL-MCNC: 18 MG/DL (ref 5–40)
WBC NRBC COR # BLD AUTO: 4.87 10*3/MM3 (ref 3.4–10.8)

## 2024-06-10 PROCEDURE — 80053 COMPREHEN METABOLIC PANEL: CPT | Performed by: INTERNAL MEDICINE

## 2024-06-10 PROCEDURE — 80061 LIPID PANEL: CPT | Performed by: INTERNAL MEDICINE

## 2024-06-10 PROCEDURE — 85025 COMPLETE CBC W/AUTO DIFF WBC: CPT | Performed by: INTERNAL MEDICINE

## 2024-06-10 PROCEDURE — 36415 COLL VENOUS BLD VENIPUNCTURE: CPT | Performed by: INTERNAL MEDICINE

## 2024-06-10 PROCEDURE — G0103 PSA SCREENING: HCPCS | Performed by: INTERNAL MEDICINE

## 2024-06-10 PROCEDURE — 99214 OFFICE O/P EST MOD 30 MIN: CPT | Performed by: INTERNAL MEDICINE

## 2024-06-10 NOTE — PROGRESS NOTES
Subjective   Fernie Porter is a 53 y.o. male.     Chief Complaint   Patient presents with    Hypertension    Atrial Fibrillation    Aortic Stenosis    Migraines         Hypertension  This is a chronic problem. The current episode started more than 1 year ago. The problem is unchanged. The problem is controlled. Associated symptoms include headaches. Pertinent negatives include no chest pain, orthopnea, palpitations, peripheral edema or shortness of breath.   Atrial Fibrillation  Presents for follow-up visit. Symptoms are negative for chest pain, palpitations and shortness of breath. The symptoms have been stable. Past medical history includes atrial fibrillation.        The following portions of the patient's history were reviewed and updated as appropriate: allergies, current medications, past social history and problem list.    Outpatient Medications Marked as Taking for the 6/10/24 encounter (Office Visit) with Juan Antonio Ford MD   Medication Sig Dispense Refill    amLODIPine (NORVASC) 5 MG tablet TAKE 1 TABLET BY MOUTH EVERY DAY 90 tablet 2    cloNIDine (CATAPRES) 0.2 MG tablet TAKE 1 TABLET BY MOUTH TWICE A  tablet 1    fluticasone (FLONASE) 50 MCG/ACT nasal spray 2 SPRAYS INTO THE NOSTRIL(S) AS DIRECTED BY PROVIDER DAILY. 48 mL 2    Fremanezumab-vfrm (Ajovy) 225 MG/1.5ML solution auto-injector INJECT 225 MG UNDER THE SKIN INTO THE APPROPRIATE AREA AS DIRECTED EVERY 30 (THIRTY) DAYS. 4.5 mL 3    hydroCHLOROthiazide (MICROZIDE) 12.5 MG capsule TAKE 1 CAPSULE BY MOUTH EVERY DAY 90 capsule 1    ibuprofen (ADVIL,MOTRIN) 200 MG tablet Take 1 tablet by mouth Every 6 (Six) Hours As Needed for Mild Pain.      imipramine (TOFRANIL) 50 MG tablet TAKE 2 TABLETS BY MOUTH AT BEDTIME 180 tablet 1    propranolol LA (INDERAL LA) 120 MG 24 hr capsule TAKE 1 CAPSULE BY MOUTH EVERY DAY 90 capsule 1    warfarin (COUMADIN) 5 MG tablet TAKE 7.5MG (1 AND 1/2 TABLETS) ON SUNDAY, TUESDAY, AND THURSDAY TAKE 5MG (1 TABLET) ALL  OTHER DAYS OR AS DIRECTED BY THE MED MANAGEMENT CLINIC. 105 tablet 1       Review of Systems   Respiratory:  Negative for shortness of breath and wheezing.    Cardiovascular:  Negative for chest pain, palpitations, orthopnea and leg swelling.   Gastrointestinal:  Negative for constipation and diarrhea.   Neurological:  Positive for headaches.       Objective   Vitals:    06/10/24 1449   BP: 146/72   Pulse: 68   Resp: 16   Temp: 97.5 °F (36.4 °C)   SpO2: 98%          06/10/24  1449   Weight: 73.5 kg (162 lb)    [unfilled]  Body mass index is 23.91 kg/m².      Physical Exam   Constitutional: He appears well-developed.   Cardiovascular: Normal rate and regular rhythm. Exam reveals no gallop.   Murmur heard.  Crescendo decrescendo systolic murmur is present with a grade of 2/6.  Prosthetic heart sounds.  Grade 2/6 JOHNNIE loudest at the pulmonic area.   Pulmonary/Chest: Effort normal and breath sounds normal. No respiratory distress. He has no wheezes. He has no rales.   Abdominal: Soft. Normal appearance and bowel sounds are normal. He exhibits no mass. There is no abdominal tenderness. There is no guarding.   Neurological: He is alert.         Problems Addressed this Visit          Cardiac and Vasculature    Paroxysmal atrial fibrillation (Chronic)    Hypertension - Primary (Chronic)     Diagnoses         Codes Comments    Primary hypertension    -  Primary ICD-10-CM: I10  ICD-9-CM: 401.9     Paroxysmal atrial fibrillation     ICD-10-CM: I48.0  ICD-9-CM: 427.31           Assessment & Plan   In for recheck of hypertension, atrial fibrillation, aortic stenosis and migraines today March 2024.  Migraines persist and are pretty much unchanged.  They're doing pretty well at the present time.  Gets 3 per month.  Has failed several drugs in the past including Topamax.  Currently on Ajovy injections once monthly and they have been working pretty well right now.  Blood pressure control is excellent.  Up a tad today but  typically runs normal at home.  Atrial fib is controlled.  On propranolol  mg daily, HCTZ 12.5 mg daily, amlodipine 5 mg daily, and clonidine 0.2 mg twice daily for hypertension.  Those are reviewed today.  Gets annual lab work today June including CBC, CMP, lipids, PSA urinalysis.  Continue to monitor every 3 months.  Certainly no severe migraine since he started Inderal LA.   Due for annual preventive exam November 2024.      The above information was reviewed again today 06/10/24.  It continues to be accurate as reflected above and is unchanged.  History, physical and review of systems all reviewed and are unchanged.  Medications were reviewed today and continue the current dosing.      PPE today includes face mask and eye shield.  The 10-year ASCVD risk score (Sana LEO, et al., 2019) is: 6.4%    Values used to calculate the score:      Age: 53 years      Sex: Male      Is Non- : No      Diabetic: No      Tobacco smoker: No      Systolic Blood Pressure: 146 mmHg      Is BP treated: Yes      HDL Cholesterol: 50 mg/dL      Total Cholesterol: 205 mg/dL\           Dragon disclaimer:   Much of this encounter note is an electronic transcription/translation of spoken language to printed text. The electronic translation of spoken language may permit erroneous, or at times, nonsensical words or phrases to be inadvertently transcribed; Although I have reviewed the note for such errors, some may still exist.

## 2024-06-12 ENCOUNTER — OFFICE VISIT (OUTPATIENT)
Dept: CARDIOLOGY | Facility: CLINIC | Age: 54
End: 2024-06-12
Payer: COMMERCIAL

## 2024-06-12 VITALS
DIASTOLIC BLOOD PRESSURE: 75 MMHG | BODY MASS INDEX: 23.85 KG/M2 | SYSTOLIC BLOOD PRESSURE: 138 MMHG | HEIGHT: 69 IN | HEART RATE: 59 BPM | WEIGHT: 161 LBS

## 2024-06-12 DIAGNOSIS — R00.2 PALPITATIONS: Primary | ICD-10-CM

## 2024-06-12 PROCEDURE — 99214 OFFICE O/P EST MOD 30 MIN: CPT | Performed by: INTERNAL MEDICINE

## 2024-06-12 NOTE — PROGRESS NOTES
[facility]      Patient Name: Fernie Porter  :1970  Age: 53 y.o.  Encounter Provider:  Contreras Saenz Jr, MD      Chief Complaint:   Chief Complaint   Patient presents with    Follow-up         HPI  Fernie Porter is a 53 y.o. male past medical history of coarctation of the aorta, bicuspid aortic valve and thoracic aortic aneurysm who presents for follow-up evaluation.     Last clinic visit note: Previously followed by Dr. Buenrostro and I have reviewed all pertinent electronic health records.  Patient had repair of the coarctation at age 5 but later developed a pseudoaneurysm.  He had second repair at age 20.  He developed severe aortic stenosis in  as well as thoracic aortic aneurysm which had to be addressed with surgical intervention.  He had mechanical AVR and graft repair of the ascending aorta with maze procedure and left atrial appendage ligation.  In  he was found to have mild narrowing in the region of previous coarctation repair.  He had follow-up CT scan in  which showed stable appearance of the narrowing distal to the left subclavian.  He was noted to have some mild reaction to iodinated contrast in the past he has good functional capacity with no limiting symptoms.  No orthopnea, PND or edema.  INR is managed by anticoagulation clinic and he has at home INR monitor.  No cardiac complaints at time of interview.    He is doing fairly well since last visit.  Blood pressure is a little elevated today in clinic but at home he is seeing blood pressures in the 120s over 80s.  Resting heart rate 59 bpm.  He was seen by Dr. Casanova in August for history of recurrent coarctation repair.  He was doing well at that time and Dr. Casanova will see him back in clinic in 2 years.  He has been having more palpitations and they have become more regular over the last 6 months to the point that he is experiencing these episodes daily.  They can last up to a few minutes.  No  "dizziness or syncope.  No angina.  Good functional capacity.  No cardiac complaints at time of interview.    The following portions of the patient's history were reviewed and updated as appropriate: allergies, current medications, past family history, past medical history, past social history, past surgical history and problem list.      Review of Systems   Constitutional: Negative for chills and fever.   HENT:  Negative for hoarse voice and sore throat.    Eyes:  Negative for double vision and photophobia.   Cardiovascular:  Negative for chest pain, leg swelling, near-syncope, orthopnea, palpitations, paroxysmal nocturnal dyspnea and syncope.   Respiratory:  Negative for cough and wheezing.    Skin:  Negative for poor wound healing and rash.   Musculoskeletal:  Negative for arthritis and joint swelling.   Gastrointestinal:  Negative for bloating, abdominal pain, hematemesis and hematochezia.   Neurological:  Negative for dizziness and focal weakness.   Psychiatric/Behavioral:  Negative for depression and suicidal ideas.        OBJECTIVE:   Vital Signs  Vitals:    06/12/24 1305   BP: 138/75   Pulse: 59     Estimated body mass index is 23.78 kg/m² as calculated from the following:    Height as of this encounter: 175.3 cm (69\").    Weight as of this encounter: 73 kg (161 lb).    Vitals reviewed.   Constitutional:       Appearance: Healthy appearance. Not in distress.   Neck:      Vascular: No JVR. JVD normal.   Pulmonary:      Effort: Pulmonary effort is normal.      Breath sounds: Normal breath sounds. No wheezing. No rhonchi. No rales.   Chest:      Chest wall: Not tender to palpatation.   Cardiovascular:      PMI at left midclavicular line. Normal rate. Regular rhythm. Normal S1. Normal S2.       Murmurs: There is no murmur.      No gallop.  Systolic ejection click. No rub.   Pulses:     Intact distal pulses.   Edema:     Peripheral edema absent.   Abdominal:      General: Bowel sounds are normal.      Palpations: " Abdomen is soft.      Tenderness: There is no abdominal tenderness.   Musculoskeletal: Normal range of motion.         General: No tenderness. Skin:     General: Skin is warm and dry.   Neurological:      General: No focal deficit present.      Mental Status: Alert and oriented to person, place and time.         Procedures    Lipid Panel          6/10/2024    15:43   Lipid Panel   Total Cholesterol 184    Triglycerides 97    HDL Cholesterol 47    VLDL Cholesterol 18    LDL Cholesterol  119         BUN   Date Value Ref Range Status   06/10/2024 10 6 - 20 mg/dL Final     Creatinine   Date Value Ref Range Status   06/10/2024 0.89 0.76 - 1.27 mg/dL Final     Potassium   Date Value Ref Range Status   06/10/2024 4.5 3.5 - 5.2 mmol/L Final     ALT (SGPT)   Date Value Ref Range Status   06/10/2024 14 1 - 41 U/L Final     AST (SGOT)   Date Value Ref Range Status   06/10/2024 19 1 - 40 U/L Final           ASSESSMENT:     52-year-old male with complex cardiac history presents for routine follow-up    PLAN OF CARE:     Coarctation of the aorta -recurrent narrowing of the aorta seems stable and he has follow-up with CT surgery.    Status post AVR -mechanical AVR with mild murmur today on exam.  Echo in June 2023 showed normal prosthetic valve gradients.  Last INR 3.4 managed by anticoagulation clinic.  Chronic anticoagulation use -no bleeding complications on warfarin.  INR managed by anticoagulation clinic.  PAF -no recurrent episodes since surgical intervention in 2012.  Increased frequency of palpitations.  Check Holter monitor.  Seroma -he was noted to have stable fluid around the ascending aorta thought to be seroma.  CT surgery.  Mixed hyperlipidemia    Return to clinic 12 months             Discharge Medications            Accurate as of June 12, 2024  1:47 PM. If you have any questions, ask your nurse or doctor.                Continue These Medications        Instructions Start Date   Ajovy 225 MG/1.5ML solution  auto-injector  Generic drug: Fremanezumab-vfrm   225 mg, Subcutaneous, Every 30 Days      amLODIPine 5 MG tablet  Commonly known as: NORVASC   TAKE 1 TABLET BY MOUTH EVERY DAY      cloNIDine 0.2 MG tablet  Commonly known as: CATAPRES   TAKE 1 TABLET BY MOUTH TWICE A DAY      fluticasone 50 MCG/ACT nasal spray  Commonly known as: FLONASE   2 sprays, Nasal, Daily      hydroCHLOROthiazide 12.5 MG capsule  Commonly known as: MICROZIDE   TAKE 1 CAPSULE BY MOUTH EVERY DAY      ibuprofen 200 MG tablet  Commonly known as: ADVIL,MOTRIN   200 mg, Oral, Every 6 Hours PRN      imipramine 50 MG tablet  Commonly known as: TOFRANIL   TAKE 2 TABLETS BY MOUTH AT BEDTIME      propranolol  MG 24 hr capsule  Commonly known as: INDERAL LA   TAKE 1 CAPSULE BY MOUTH EVERY DAY      warfarin 5 MG tablet  Commonly known as: COUMADIN   TAKE 7.5MG (1 AND 1/2 TABLETS) ON SUNDAY, TUESDAY, AND THURSDAY TAKE 5MG (1 TABLET) ALL OTHER DAYS OR AS DIRECTED BY THE MED MANAGEMENT CLINIC.               Thank you for allowing me to participate in the care of your patient,      Sincerely,   Contreras Sanez MD  [facility]  06/12/24  13:47 EDT

## 2024-06-18 LAB — INR PPP: 3.5

## 2024-06-19 ENCOUNTER — ANTICOAGULATION VISIT (OUTPATIENT)
Dept: PHARMACY | Facility: HOSPITAL | Age: 54
End: 2024-06-19
Payer: COMMERCIAL

## 2024-06-19 DIAGNOSIS — Z95.2 AORTIC VALVE REPLACED: Primary | Chronic | ICD-10-CM

## 2024-06-19 DIAGNOSIS — I48.0 PAROXYSMAL ATRIAL FIBRILLATION: Chronic | ICD-10-CM

## 2024-06-19 NOTE — PROGRESS NOTES
Anticoagulation Clinic Progress Note    Anticoagulation Summary  As of 6/19/2024      INR goal:  2.5-3.5   TTR:  93.2% (5.6 y)   INR used for dosing:  3.50 (6/18/2024)   Warfarin maintenance plan:  7.5 mg every Sun, Tue, Thu; 5 mg all other days   Weekly warfarin total:  42.5 mg   No change documented:  Georgie Gaming   Plan last modified:  Katie Schwarz, PharmD (4/13/2021)   Next INR check:  7/2/2024   Priority:  Maintenance   Target end date:      Indications    Aortic valve replaced [Z95.2]  Paroxysmal atrial fibrillation [I48.0]                 Anticoagulation Episode Summary       INR check location:      Preferred lab:      Send INR reminders to:   JULIETTE SILVESTRE  POOL    Comments:  *[place]*          Anticoagulation Care Providers       Provider Role Specialty Phone number    Jackie, ELLA Woodward Referring Cardiology [phone]            Clinic Interview:  No pertinent clinical findings have been reported.    INR History:      5/6/2024     9:56 AM 5/17/2024    12:00 AM 5/17/2024     3:43 PM 6/1/2024    12:00 AM 6/3/2024     8:56 AM 6/18/2024    12:00 AM 6/19/2024     1:33 PM   Anticoagulation Monitoring   INR 2.60  3.20  3.40  3.50   INR Date 5/3/2024  5/17/2024  6/1/2024  6/18/2024   INR Goal 2.5-3.5  2.5-3.5  2.5-3.5  2.5-3.5   Trend Same  Same  Same  Same   Last Week Total 42.5 mg  42.5 mg  42.5 mg  42.5 mg   Next Week Total 42.5 mg  42.5 mg  42.5 mg  42.5 mg   Sun 7.5 mg  7.5 mg  7.5 mg  7.5 mg   Mon 5 mg  5 mg  5 mg  5 mg   Tue 7.5 mg  7.5 mg  7.5 mg  7.5 mg   Wed 5 mg  5 mg  5 mg  5 mg   Thu 7.5 mg  7.5 mg  7.5 mg  7.5 mg   Fri 5 mg  5 mg  5 mg  5 mg   Sat 5 mg  5 mg  5 mg  5 mg   Historical INR  3.20      3.40      3.50            This result is from an external source.       Plan:  1. INR is therapeutic today- see above in Anticoagulation Summary.    Fernie Porter to continue their warfarin regimen- see above in Anticoagulation Summary.  2. Follow up in 2 weeks  3. Pt has  agreed to only be called if INR out of range. They have been instructed to call if any changes in medications, doses, concerns, etc. Patient expresses understanding and has no further questions at this time.    Georgie Gaming

## 2024-06-21 NOTE — PROGRESS NOTES
[facility] Inpatient History & Physical        Primary Care Physician  Kranthi Goode MD      Chief Complaint  Chief Complaint   Patient presents with    Back Pain         History of Present Illness    61 years old male with past medical history of multiple back surgeries, hypertension.  He presented with complaint of severe right lumbar paraspinal muscle pain of sudden onset while he was trying to put plastic wrap on a package.  He has been having chronic left lower extremity numbness and denies any change or any new paresthesia.  He denies bowel and bladder incontinence or focal leg weakness.  He denies fever, chills, nausea, vomiting, diarrhea, dysuria.        Review of Systems  A comprehensive review of 13 systems in detail was negative other than per history of presenting illness        Past Medical History  Past Medical History:   Diagnosis Date    Chest pain     Hypertension         Surgical History  Past Surgical History:   Procedure Laterality Date    Back surgery          Social History  Social History     Tobacco Use    Smoking status: Never    Smokeless tobacco: Never   Substance Use Topics    Alcohol use: No    Drug use: No       Family History    Family History   Problem Relation Age of Onset    Heart disease Father     Hypertension Sister     Hypertension Brother         Allergies  ALLERGIES:  Patient has no known allergies.    Home Medications  (Not in a hospital admission)        Current Medications    Current Facility-Administered Medications   Medication Dose Route Frequency Provider Last Rate Last Admin    lidocaine (LIDOCARE) 4 % patch 1 patch  1 patch Transdermal Once Johan Quan MD   1 patch at 06/21/24 8029     Current Outpatient Medications   Medication Sig Dispense Refill    lisinopril (ZESTRIL) 40 MG tablet Take 40 mg by mouth daily.      furosemide (LASIX) 20 MG tablet Take 20 mg by mouth daily.      tamsulosin (FLOMAX) 0.4 MG Cap Take 0.4 mg by mouth  Subjective   Fernie Porter is a 53 y.o. male.     Chief Complaint   Patient presents with    Annual Exam    Hypertension         History of Present Illness  In for annual preventative exam.  Sleep is good.  Gets 6-7 hours at night.  Exercises 3-4 days per week.  Energy is good.  Diet is well-balanced.  Hypertension  This is a chronic problem. The current episode started more than 1 year ago. The problem is unchanged. The problem is controlled. Associated symptoms include headaches. Pertinent negatives include no chest pain, orthopnea, palpitations, peripheral edema or shortness of breath. There are no associated agents to hypertension. Risk factors for coronary artery disease include male gender. Current antihypertension treatment includes alpha 1 blockers, diuretics and beta blockers. The current treatment provides significant improvement. There are no compliance problems.  There is no history of angina, kidney disease, CAD/MI, CVA or heart failure.   Atrial Fibrillation  Presents for follow-up visit. Symptoms are negative for chest pain, dizziness, palpitations, shortness of breath and weakness. The symptoms have been stable. There are no medication compliance problems.   Migraine  Alternative treatments tried:  NSAIDs       The following portions of the patient's history were reviewed and updated as appropriate: allergies, current medications, past social history and problem list.    Outpatient Medications Marked as Taking for the 12/11/23 encounter (Office Visit) with Juan Antonio Ford MD   Medication Sig Dispense Refill    amLODIPine (NORVASC) 5 MG tablet TAKE 1 TABLET BY MOUTH EVERY DAY 90 tablet 2    cloNIDine (CATAPRES) 0.2 MG tablet TAKE 1 TABLET BY MOUTH TWICE A  tablet 1    fluticasone (FLONASE) 50 MCG/ACT nasal spray 2 SPRAYS INTO THE NOSTRIL(S) AS DIRECTED BY PROVIDER DAILY. 48 mL 2    Fremanezumab-vfrm (Ajovy) 225 MG/1.5ML solution auto-injector INJECT 225 MG UNDER THE SKIN INTO THE  APPROPRIATE AREA AS DIRECTED EVERY 30 (THIRTY) DAYS. 4.5 mL 3    hydroCHLOROthiazide (MICROZIDE) 12.5 MG capsule TAKE 1 CAPSULE BY MOUTH EVERY DAY 90 capsule 1    ibuprofen (ADVIL,MOTRIN) 200 MG tablet Take 1 tablet by mouth Every 6 (Six) Hours As Needed for Mild Pain.      imipramine (TOFRANIL) 50 MG tablet TAKE 2 TABLETS BY MOUTH AT BEDTIME 180 tablet 1    propranolol LA (INDERAL LA) 120 MG 24 hr capsule TAKE 1 CAPSULE BY MOUTH EVERY DAY 90 capsule 1    warfarin (COUMADIN) 5 MG tablet TAKE 7.5MG (1 AND 1/2 TABLETS) ON SUNDAY, TUESDAY, AND THURSDAY TAKE 5MG (1 TABLET) ALL OTHER DAYS OR AS DIRECTED BY THE MED MANAGEMENT CLINIC. 105 tablet 1       Review of Systems   Constitutional:  Negative for chills, diaphoresis, fatigue, fever and unexpected weight change.   Respiratory:  Negative for cough, chest tightness, shortness of breath and wheezing.    Cardiovascular:  Negative for chest pain, palpitations, orthopnea and leg swelling.   Gastrointestinal:  Negative for abdominal pain, anal bleeding, blood in stool, constipation, diarrhea, nausea, rectal pain and vomiting.   Endocrine: Negative for cold intolerance, heat intolerance and polyuria.   Genitourinary:  Negative for difficulty urinating, dysuria, frequency, hematuria and urgency.   Musculoskeletal:  Negative for arthralgias (thumb ache bilateral), back pain and myalgias.   Allergic/Immunologic: Positive for environmental allergies.   Neurological:  Positive for headaches. Negative for dizziness, syncope, weakness and light-headedness.   Hematological:  Negative for adenopathy. Bruises/bleeds easily.   Psychiatric/Behavioral:  Negative for confusion, dysphoric mood and sleep disturbance. The patient is not nervous/anxious.        Objective   Vitals:    12/11/23 1421   BP: 126/60   Pulse: 65   Resp: 18   Temp: 97.7 °F (36.5 °C)   SpO2: 99%          12/11/23  1421   Weight: 74.8 kg (165 lb)    [unfilled]  Body mass index is 24.37 kg/m².      Physical Exam  daily.      amLODIPine (NORVASC) 10 MG tablet Take 1 tablet by mouth daily. 90 tablet 1    terbinafine (LamISIL) 250 MG tablet 250 mg daily.             Physical Exam     Vitals with min/max:    Vital Last Value 24 Hour Range   Temperature   No data recorded   Pulse 68 (06/21/24 0606) Pulse  Min: 65  Max: 68   Respiratory 16 (06/21/24 0606) Resp  Min: 16  Max: 18   Non-Invasive  Blood Pressure (!) 140/83 (06/21/24 0606) BP  Min: 140/83  Max: 168/98   Pulse Oximetry 95 % (06/21/24 0606) SpO2  Min: 95 %  Max: 96 %   Arterial   Blood Pressure   No data recorded      There is no height or weight on file to calculate BMI.      No intake or output data in the 24 hours ending 06/21/24 1440      General:  Alert, no acute distress   Head: Normocephalic  Ears, nose, mouth and throat:  Oral mucosa moist.  No pharyngeal erythema or exudate. Normal ears.  Eye: Pupils are equal, round and reactive to light. Normal conjunctiva. No icteric sclera   Chest wall:  No tenderness, No deformity  Cardiovascular:  Regular rate and rhythm. No murmurs. No gallop. No rubs. No peripheral edema.   Respiratory:  Chest expansion normal. Respirations are non-labored. Breath sounds are equal. Lungs are clear to auscultation.  Gastrointestinal:  Soft, Nontender, Non distended, Normal bowel sounds, No organomegaly.   Genitourinary:  No suprapubic tenderness or fullness.  No discharge, normal external genitalia.   Back: Normal alignment.    Musculoskeletal: Normal range of motion.  Straight leg raising test negative.  Skin:  Warm. No concerning rash.  Right-sided paraspinal muscle spasms noted on palpation.  Neurological:  Alert and oriented to place, person, time and circumstances. No focal deficit.  Psychiatry: Cooperative. No aggressive mood. Normal affect.        Labs         Recent Results (from the past 72 hour(s))   Pink Top Tube    Collection Time: 06/21/24  4:59 AM   Result Value Ref Range    Extra Tube Hold for Add Ons    Light Blue Top       Constitutional: He is oriented to person, place, and time. He appears well-developed. No distress.   HENT:   Head: Normocephalic and atraumatic.   Right Ear: Tympanic membrane and external ear normal.   Left Ear: Tympanic membrane and external ear normal.   Nose: Nose normal.   Eyes: Pupils are equal, round, and reactive to light. Conjunctivae are normal. No scleral icterus.   Neck: No JVD present. Carotid bruit is not present. No thyromegaly present.   Cardiovascular: Normal rate and regular rhythm. Exam reveals no gallop and no friction rub.   Murmur heard.  Crescendo decrescendo systolic murmur is present with a grade of 2/6.  Prosthetic heart sounds.  Grade 2/6 JOHNNIE loudest at the pulmonic area.   Pulmonary/Chest: Effort normal and breath sounds normal. No respiratory distress. He has no wheezes. He has no rales.   Abdominal: Soft. Normal appearance and bowel sounds are normal. He exhibits no distension and no mass. There is no abdominal tenderness. There is no rebound and no guarding. No hernia.   Musculoskeletal: Normal range of motion.   Lymphadenopathy:     He has no cervical adenopathy.   Neurological: He is alert and oriented to person, place, and time. He has normal reflexes. He displays normal reflexes.   Skin: Skin is warm and dry.   Psychiatric: His behavior is normal. Mood, judgment and thought content normal.   Nursing note and vitals reviewed.        Problems Addressed this Visit          Cardiac and Vasculature    Paroxysmal atrial fibrillation (Chronic)    Hypertension (Chronic)    Aortic valve replaced (Chronic)     Other Visit Diagnoses       Encounter for preventive health examination    -  Primary          Diagnoses         Codes Comments    Encounter for preventive health examination    -  Primary ICD-10-CM: Z00.00  ICD-9-CM: V70.0     Aortic valve replaced     ICD-10-CM: Z95.2  ICD-9-CM: V43.3     Primary hypertension     ICD-10-CM: I10  ICD-9-CM: 401.9     Paroxysmal atrial fibrillation     Collection Time: 06/21/24  5:09 AM   Result Value Ref Range    Extra Tube Hold for Add Ons    Light Green Top    Collection Time: 06/21/24  5:09 AM   Result Value Ref Range    Extra Tube Hold for Add Ons    Lavender Top    Collection Time: 06/21/24  5:09 AM   Result Value Ref Range    Extra Tube Hold for Add Ons    Gold Top    Collection Time: 06/21/24  5:09 AM   Result Value Ref Range    Extra Tube Hold for Add Ons    Pink Top Tube    Collection Time: 06/21/24  5:09 AM   Result Value Ref Range    Extra Tube Hold for Add Ons    Comprehensive Metabolic Panel    Collection Time: 06/21/24  5:09 AM   Result Value Ref Range    Fasting Status      Sodium 137 135 - 145 mmol/L    Potassium 4.8 3.4 - 5.1 mmol/L    Chloride 107 97 - 110 mmol/L    Carbon Dioxide 24 21 - 32 mmol/L    Anion Gap 11 7 - 19 mmol/L    Glucose 133 (H) 70 - 99 mg/dL    BUN 19 6 - 20 mg/dL    Creatinine 1.23 (H) 0.67 - 1.17 mg/dL    Glomerular Filtration Rate 67 >=60    BUN/Cr 15 7 - 25    Calcium 9.4 8.4 - 10.2 mg/dL    Bilirubin, Total 1.2 (H) 0.2 - 1.0 mg/dL    GOT/AST 44 (H) <=37 Units/L    GPT/ALT 50 <64 Units/L    Alkaline Phosphatase 83 45 - 117 Units/L    Albumin 4.0 3.6 - 5.1 g/dL    Protein, Total 7.3 6.4 - 8.2 g/dL    Globulin 3.3 2.0 - 4.0 g/dL    A/G Ratio 1.2 1.0 - 2.4   CBC with Automated Differential (performable only)    Collection Time: 06/21/24  5:09 AM   Result Value Ref Range    WBC 5.9 4.2 - 11.0 K/mcL    RBC 4.40 (L) 4.50 - 5.90 mil/mcL    HGB 14.1 13.0 - 17.0 g/dL    HCT 40.9 39.0 - 51.0 %    MCV 93.0 78.0 - 100.0 fl    MCH 32.0 26.0 - 34.0 pg    MCHC 34.5 32.0 - 36.5 g/dL    RDW-CV 11.9 11.0 - 15.0 %    RDW-SD 40.3 39.0 - 50.0 fL     140 - 450 K/mcL    NRBC 0 <=0 /100 WBC    Neutrophil, Percent 64 %    Lymphocytes, Percent 23 %    Mono, Percent 8 %    Eosinophils, Percent 4 %    Basophils, Percent 1 %    Immature Granulocytes 0 %    Absolute Neutrophils 3.8 1.8 - 7.7 K/mcL    Absolute Lymphocytes 1.4 1.0 - 4.0 K/mcL      ICD-10-CM: I48.0  ICD-9-CM: 427.31           Assessment & Plan   In for annual preventive exam and recheck of hypertension, AF, aortic stenosis and migraines today December 2023.  Migraines persist.  They're doing pretty well to present time.  Gets 3 or 4 per month.  Has failed several drugs in the past including Topamax.  Blood pressure control is excellent.  Atrial fib is controlled.  Got annual lab work May 2023 including CBC, CMP, lipids and urinalysis.  Continue to monitor every 3 months.  Due for TDA P.  He declines rectal exam today but we do have an up-to-date PSA and an up-to-date colonoscopy.  Follows with urologist once a year.    Prevention counseling was performed today. The counseling performed was routine health maintenance topics including BMI and exercise.    The above information was reviewed again today 12/11/23.  It continues to be accurate as reflected above and is unchanged.  History, physical and review of systems all reviewed and are unchanged.  Medications were reviewed today and continue the current dosing.         Dragon disclaimer:   Much of this encounter note is an electronic transcription/translation of spoken language to printed text. The electronic translation of spoken language may permit erroneous, or at times, nonsensical words or phrases to be inadvertently transcribed; Although I have reviewed the note for such errors, some may still exist.              Absolute Monocytes 0.5 0.3 - 0.9 K/mcL    Absolute Eosinophils  0.2 0.0 - 0.5 K/mcL    Absolute Basophils 0.0 0.0 - 0.3 K/mcL    Absolute Immature Granulocytes 0.0 0.0 - 0.2 K/mcL       Microbiology Results       None               Imaging    No orders to display         Cardiac studies:     No results found for this or any previous visit (from the past 4464 hour(s)).    LAST ECHO/ECHO STRESS:  No valid procedures specified.        Assessment and Plan    Patient Active Problem List   Diagnosis    Chest pain    Hypertension    Lumbar stenosis with neurogenic claudication    Lumbar degenerative disc disease    Back pain, unspecified back location, unspecified back pain laterality, unspecified chronicity         ......................................................................................................................    61 years old male with past medical history of multiple back surgeries, hypertension.    # Acute paraspinal lumbar back pain from muscle strain     Chronic back pain status post prior lumbar laminectomies  -No new radiculopathy..  No spinal tenderness.   -No current indication for spine imaging given no acute radiculopathy and current clinical presentation suggesting muscle strain  -Got morphine, Valium, ketorolac, methylprednisolone in ER  -Pain control with tizanidine, tylenol, lidocaine patch, prn oxycodone.  Given muscle strain, muscle relaxant is first-line treatment  -PT to evaluate    # Hypertension, essential with hypertensive kidney disease  -Resume olmesartan  -Monitor BP    # CKD 2 possibly with mild SHADY  -Creatinine 1.2 on admission.  Unclear baseline.  Creatinine was 1.0 on 02/2023  -Reported feeling dehydrated with dry oral mucosa.  Will give a round of IV fluid and continue with p.o. intake  -Avoid NSAID    # Obesity BMI 35 with HLP with prediabetes  -Atorvastatin  -On Ozempic weekly (for weight loss. Pt reported having prediabetes)  -Lifestyle modification.  Needs exercise  and weight loss      DVT prophylaxis: SCD.   Diet: cardiac  Code status:  Code Status Information       Code Status    Not on file          Activity: up ad cara  Disposition:pending clinical course  Level of care: Based on the patient's presentation on admission, I expect the patient to require at least 1 midnights of medically necessary Hospital services  Communication: discussed plan of care with nurse, patient  PCP: Kranthi Goode MD  PCP Kranthi Alva not available by [place] [facility]      MORE than 75 MINS WERE SPENT ON THIS PATIENTS CARE TODAY, more than 50% of which was spent coordinating patient care. This includes endering the following: Reviewed all vitals, medications, new orders, I/O, labs, micro, radiology, nurses notes, pertinent consultant notes which are reflected in assessment and plan.This does not include time spent on other items of care such as smoking cessation counseling, prolonged care time, and or advanced care planning if applicable.         Rebekah Elizabeth MD  [facility]  Contact by [employer]

## 2024-07-01 LAB — INR PPP: 3.3

## 2024-07-02 ENCOUNTER — ANTICOAGULATION VISIT (OUTPATIENT)
Dept: PHARMACY | Facility: HOSPITAL | Age: 54
End: 2024-07-02
Payer: COMMERCIAL

## 2024-07-02 DIAGNOSIS — I48.0 PAROXYSMAL ATRIAL FIBRILLATION: Chronic | ICD-10-CM

## 2024-07-02 DIAGNOSIS — Z95.2 AORTIC VALVE REPLACED: Primary | Chronic | ICD-10-CM

## 2024-07-02 NOTE — PROGRESS NOTES
Anticoagulation Clinic Progress Note    Anticoagulation Summary  As of 7/2/2024      INR goal:  2.5-3.5   TTR:  93.3% (5.6 y)   INR used for dosing:  3.30 (7/1/2024)   Warfarin maintenance plan:  7.5 mg every Sun, Tue, Thu; 5 mg all other days   Weekly warfarin total:  42.5 mg   No change documented:  Georgie Gaming   Plan last modified:  Katie Schwarz, PharmD (4/13/2021)   Next INR check:  7/15/2024   Priority:  Maintenance   Target end date:      Indications    Aortic valve replaced [Z95.2]  Paroxysmal atrial fibrillation [I48.0]                 Anticoagulation Episode Summary       INR check location:      Preferred lab:      Send INR reminders to:   JULI[facility]TE SILVESTRE  POOL    Comments:  *[place]*          Anticoagulation Care Providers       Provider Role Specialty Phone number    Jackie, ELLA Woodward Referring Cardiology [phone]            Clinic Interview:  No pertinent clinical findings have been reported.    INR History:      5/17/2024     3:43 PM 6/1/2024    12:00 AM 6/3/2024     8:56 AM 6/18/2024    12:00 AM 6/19/2024     1:33 PM 7/1/2024    12:00 AM 7/2/2024     8:40 AM   Anticoagulation Monitoring   INR 3.20  3.40  3.50  3.30   INR Date 5/17/2024 6/1/2024 6/18/2024 7/1/2024   INR Goal 2.5-3.5  2.5-3.5  2.5-3.5  2.5-3.5   Trend Same  Same  Same  Same   Last Week Total 42.5 mg  42.5 mg  42.5 mg  42.5 mg   Next Week Total 42.5 mg  42.5 mg  42.5 mg  42.5 mg   Sun 7.5 mg  7.5 mg  7.5 mg  7.5 mg   Mon 5 mg  5 mg  5 mg  5 mg   Tue 7.5 mg  7.5 mg  7.5 mg  7.5 mg   Wed 5 mg  5 mg  5 mg  5 mg   Thu 7.5 mg  7.5 mg  7.5 mg  7.5 mg   Fri 5 mg  5 mg  5 mg  5 mg   Sat 5 mg  5 mg  5 mg  5 mg   Historical INR  3.40      3.50      3.30            This result is from an external source.       Plan:  1. INR is therapeutic today- see above in Anticoagulation Summary.    Fernie Porter to continue their warfarin regimen- see above in Anticoagulation Summary.  2. Follow up in 2 weeks  3. Pt has  agreed to only be called if INR out of range. They have been instructed to call if any changes in medications, doses, concerns, etc. Patient expresses understanding and has no further questions at this time.    Georgie Gaming

## 2024-07-18 LAB — INR PPP: 3.3

## 2024-07-19 ENCOUNTER — ANTICOAGULATION VISIT (OUTPATIENT)
Dept: PHARMACY | Facility: HOSPITAL | Age: 54
End: 2024-07-19
Payer: COMMERCIAL

## 2024-07-19 DIAGNOSIS — Z95.2 AORTIC VALVE REPLACED: Primary | Chronic | ICD-10-CM

## 2024-07-19 DIAGNOSIS — I48.0 PAROXYSMAL ATRIAL FIBRILLATION: Chronic | ICD-10-CM

## 2024-07-19 NOTE — PROGRESS NOTES
Anticoagulation Clinic Progress Note    Anticoagulation Summary  As of 7/19/2024      INR goal:  2.5-3.5   TTR:  93.3% (5.6 y)   INR used for dosing:  3.30 (7/18/2024)   Warfarin maintenance plan:  7.5 mg every Sun, Tue, Thu; 5 mg all other days   Weekly warfarin total:  42.5 mg   No change documented:  Georgie Gaming   Plan last modified:  Katie Schwarz, PharmD (4/13/2021)   Next INR check:  8/1/2024   Priority:  Maintenance   Target end date:      Indications    Aortic valve replaced [Z95.2]  Paroxysmal atrial fibrillation [I48.0]                 Anticoagulation Episode Summary       INR check location:      Preferred lab:      Send INR reminders to:   [facility] SILVESTRE  POOL    Comments:  *[place]*          Anticoagulation Care Providers       Provider Role Specialty Phone number    Jackie, ELLA Woodward Referring Cardiology [phone]            Clinic Interview:  No pertinent clinical findings have been reported.    INR History:      6/3/2024     8:56 AM 6/18/2024    12:00 AM 6/19/2024     1:33 PM 7/1/2024    12:00 AM 7/2/2024     8:40 AM 7/18/2024    12:00 AM 7/19/2024     8:04 AM   Anticoagulation Monitoring   INR 3.40  3.50  3.30  3.30   INR Date 6/1/2024 6/18/2024 7/1/2024 7/18/2024   INR Goal 2.5-3.5  2.5-3.5  2.5-3.5  2.5-3.5   Trend Same  Same  Same  Same   Last Week Total 42.5 mg  42.5 mg  42.5 mg  42.5 mg   Next Week Total 42.5 mg  42.5 mg  42.5 mg  42.5 mg   Sun 7.5 mg  7.5 mg  7.5 mg  7.5 mg   Mon 5 mg  5 mg  5 mg  5 mg   Tue 7.5 mg  7.5 mg  7.5 mg  7.5 mg   Wed 5 mg  5 mg  5 mg  5 mg   Thu 7.5 mg  7.5 mg  7.5 mg  7.5 mg   Fri 5 mg  5 mg  5 mg  5 mg   Sat 5 mg  5 mg  5 mg  5 mg   Historical INR  3.50      3.30      3.30            This result is from an external source.       Plan:  1. INR is therapeutic today- see above in Anticoagulation Summary.    Fernie Porter to continue their warfarin regimen- see above in Anticoagulation Summary.  2. Follow up in 2 weeks  3. Pt has  agreed to only be called if INR out of range. They have been instructed to call if any changes in medications, doses, concerns, etc. Patient expresses understanding and has no further questions at this time.    Georgie Gaming

## 2024-08-05 LAB — INR PPP: 3.5

## 2024-08-06 ENCOUNTER — ANTICOAGULATION VISIT (OUTPATIENT)
Dept: PHARMACY | Facility: HOSPITAL | Age: 54
End: 2024-08-06
Payer: COMMERCIAL

## 2024-08-06 DIAGNOSIS — G43.109 MIGRAINE WITH AURA AND WITHOUT STATUS MIGRAINOSUS, NOT INTRACTABLE: ICD-10-CM

## 2024-08-06 DIAGNOSIS — Z95.2 AORTIC VALVE REPLACED: Primary | Chronic | ICD-10-CM

## 2024-08-06 DIAGNOSIS — I48.0 PAROXYSMAL ATRIAL FIBRILLATION: Chronic | ICD-10-CM

## 2024-08-06 NOTE — PROGRESS NOTES
Anticoagulation Clinic Progress Note    Anticoagulation Summary  As of 8/6/2024      INR goal:  2.5-3.5   TTR:  93.4% (5.7 y)   INR used for dosing:  3.50 (8/5/2024)   Warfarin maintenance plan:  7.5 mg every Sun, Tue, Thu; 5 mg all other days   Weekly warfarin total:  42.5 mg   No change documented:  Georgie Gaming   Plan last modified:  Katie Schwarz, PharmD (4/13/2021)   Next INR check:  8/19/2024   Priority:  Maintenance   Target end date:      Indications    Aortic valve replaced [Z95.2]  Paroxysmal atrial fibrillation [I48.0]                 Anticoagulation Episode Summary       INR check location:      Preferred lab:      Send INR reminders to:   [facility] SILVESTRE  POOL    Comments:  *[place]*          Anticoagulation Care Providers       Provider Role Specialty Phone number    Jackie, ELLA Woodward Referring Cardiology [phone]            Clinic Interview:  No pertinent clinical findings have been reported.    INR History:      6/19/2024     1:33 PM 7/1/2024    12:00 AM 7/2/2024     8:40 AM 7/18/2024    12:00 AM 7/19/2024     8:04 AM 8/5/2024    12:00 AM 8/6/2024    11:12 AM   Anticoagulation Monitoring   INR 3.50  3.30  3.30  3.50   INR Date 6/18/2024 7/1/2024 7/18/2024 8/5/2024   INR Goal 2.5-3.5  2.5-3.5  2.5-3.5  2.5-3.5   Trend Same  Same  Same  Same   Last Week Total 42.5 mg  42.5 mg  42.5 mg  42.5 mg   Next Week Total 42.5 mg  42.5 mg  42.5 mg  42.5 mg   Sun 7.5 mg  7.5 mg  7.5 mg  7.5 mg   Mon 5 mg  5 mg  5 mg  5 mg   Tue 7.5 mg  7.5 mg  7.5 mg  7.5 mg   Wed 5 mg  5 mg  5 mg  5 mg   Thu 7.5 mg  7.5 mg  7.5 mg  7.5 mg   Fri 5 mg  5 mg  5 mg  5 mg   Sat 5 mg  5 mg  5 mg  5 mg   Historical INR  3.30      3.30      3.50            This result is from an external source.       Plan:  1. INR is therapeutic today- see above in Anticoagulation Summary.    Fernie Porter to continue their warfarin regimen- see above in Anticoagulation Summary.  2. Follow up in 2 weeks  3. Pt has  agreed to only be called if INR out of range. They have been instructed to call if any changes in medications, doses, concerns, etc. Patient expresses understanding and has no further questions at this time.    Georgie Gaming

## 2024-08-07 RX ORDER — WARFARIN SODIUM 5 MG/1
TABLET ORAL
Qty: 105 TABLET | Refills: 1 | Status: SHIPPED | OUTPATIENT
Start: 2024-08-07

## 2024-08-07 RX ORDER — FREMANEZUMAB-VFRM 225 MG/1.5ML
225 INJECTION SUBCUTANEOUS
Qty: 4.5 ML | Refills: 3 | Status: SHIPPED | OUTPATIENT
Start: 2024-08-07

## 2024-08-22 LAB — INR PPP: 3.5

## 2024-08-23 ENCOUNTER — ANTICOAGULATION VISIT (OUTPATIENT)
Dept: PHARMACY | Facility: HOSPITAL | Age: 54
End: 2024-08-23
Payer: COMMERCIAL

## 2024-08-23 DIAGNOSIS — Z95.2 AORTIC VALVE REPLACED: Primary | Chronic | ICD-10-CM

## 2024-08-23 DIAGNOSIS — I48.0 PAROXYSMAL ATRIAL FIBRILLATION: Chronic | ICD-10-CM

## 2024-08-23 NOTE — PROGRESS NOTES
Anticoagulation Clinic Progress Note    Anticoagulation Summary  As of 8/23/2024      INR goal:  2.5-3.5   TTR:  93.4% (5.7 y)   INR used for dosing:  3.50 (8/22/2024)   Warfarin maintenance plan:  7.5 mg every Sun, Tue, Thu; 5 mg all other days   Weekly warfarin total:  42.5 mg   No change documented:  Georgie Gaming   Plan last modified:  Katie Schwarz, PharmD (4/13/2021)   Next INR check:  9/5/2024   Priority:  Maintenance   Target end date:      Indications    Aortic valve replaced [Z95.2]  Paroxysmal atrial fibrillation [I48.0]                 Anticoagulation Episode Summary       INR check location:      Preferred lab:      Send INR reminders to:   [facility]  POOL    Comments:  *[place]*          Anticoagulation Care Providers       Provider Role Specialty Phone number    Jackie, ELLA Woodward Referring Cardiology [phone]            Clinic Interview:  No pertinent clinical findings have been reported.    INR History:      7/2/2024     8:40 AM 7/18/2024    12:00 AM 7/19/2024     8:04 AM 8/5/2024    12:00 AM 8/6/2024    11:12 AM 8/22/2024    12:00 AM 8/23/2024     8:43 AM   Anticoagulation Monitoring   INR 3.30  3.30  3.50  3.50   INR Date 7/1/2024 7/18/2024 8/5/2024 8/22/2024   INR Goal 2.5-3.5  2.5-3.5  2.5-3.5  2.5-3.5   Trend Same  Same  Same  Same   Last Week Total 42.5 mg  42.5 mg  42.5 mg  42.5 mg   Next Week Total 42.5 mg  42.5 mg  42.5 mg  42.5 mg   Sun 7.5 mg  7.5 mg  7.5 mg  7.5 mg   Mon 5 mg  5 mg  5 mg  5 mg   Tue 7.5 mg  7.5 mg  7.5 mg  7.5 mg   Wed 5 mg  5 mg  5 mg  5 mg   Thu 7.5 mg  7.5 mg  7.5 mg  7.5 mg   Fri 5 mg  5 mg  5 mg  5 mg   Sat 5 mg  5 mg  5 mg  5 mg   Historical INR  3.30      3.50      3.50            This result is from an external source.       Plan:  1. INR is therapeutic today- see above in Anticoagulation Summary.    Fernie Porter to continue their warfarin regimen- see above in Anticoagulation Summary.  2. Follow up in 2 weeks  3. Pt has  agreed to only be called if INR out of range. They have been instructed to call if any changes in medications, doses, concerns, etc. Patient expresses understanding and has no further questions at this time.    Georgie Gaming

## 2024-08-30 ENCOUNTER — TELEPHONE (OUTPATIENT)
Dept: INTERNAL MEDICINE | Facility: CLINIC | Age: 54
End: 2024-08-30
Payer: COMMERCIAL

## 2024-08-30 RX ORDER — PROPRANOLOL HYDROCHLORIDE 160 MG/1
160 CAPSULE, EXTENDED RELEASE ORAL
Qty: 90 CAPSULE | Refills: 3 | Status: SHIPPED | OUTPATIENT
Start: 2024-08-30

## 2024-08-30 NOTE — TELEPHONE ENCOUNTER
Patient informed, agreed to increase with new RX, med list updated, highest dose I see is 120mg, please send RX

## 2024-08-30 NOTE — TELEPHONE ENCOUNTER
Lets increase his propranolol LA to 160 mg daily.  Currently he is on 120 mg.  Will need a new prescription.  #90 x 1.

## 2024-08-30 NOTE — TELEPHONE ENCOUNTER
Caller: Fernie Porter    Relationship to patient: Self    Best call back number:     Patient is needing: PATIENT STATES DR. ARCHER HAD MENTIONED IN PASSING ABOUT INCREASING HIS BLOOD PRESSURE MEDICATION.  PATIENT STATES HE HAS BEEN TRACKING HIS BP AND IT HAS SEEMED TO RUN A LITTLE HIGH.  PATIENT WOULD LIKE A CALLBACK TO LET HIM KNOW IF HE SHOULD INCREASE IT AND BY HOW MUCH.

## 2024-08-30 NOTE — TELEPHONE ENCOUNTER
Called spoke with patient, had an episode yesterday of dizzy spell, took BP reading 172/99, waited 1/2 hour and BP reading was 170/90.   BP reading today 131/66 with daily dose of all medications. No other episodes   Please Advise

## 2024-08-30 NOTE — TELEPHONE ENCOUNTER
I sent it in.  It does come as a 160 mg capsule.  I can show you how to find that if you want me to.

## 2024-09-06 LAB — INR PPP: 3.2

## 2024-09-09 ENCOUNTER — ANTICOAGULATION VISIT (OUTPATIENT)
Dept: PHARMACY | Facility: HOSPITAL | Age: 54
End: 2024-09-09
Payer: COMMERCIAL

## 2024-09-09 DIAGNOSIS — Z95.2 AORTIC VALVE REPLACED: Primary | Chronic | ICD-10-CM

## 2024-09-09 DIAGNOSIS — I48.0 PAROXYSMAL ATRIAL FIBRILLATION: Chronic | ICD-10-CM

## 2024-09-09 NOTE — PROGRESS NOTES
Anticoagulation Clinic Progress Note    Anticoagulation Summary  As of 9/9/2024      INR goal:  2.5-3.5   TTR:  93.5% (5.8 y)   INR used for dosing:  3.20 (9/6/2024)   Warfarin maintenance plan:  7.5 mg every Sun, Tue, Thu; 5 mg all other days   Weekly warfarin total:  42.5 mg   No change documented:  Georgie Gaming   Plan last modified:  Katie Schwarz, PharmD (4/13/2021)   Next INR check:  9/20/2024   Priority:  Maintenance   Target end date:      Indications    Aortic valve replaced [Z95.2]  Paroxysmal atrial fibrillation [I48.0]                 Anticoagulation Episode Summary       INR check location:      Preferred lab:      Send INR reminders to:   [facility]  PO[facility]    Comments:  *[place]*          Anticoagulation Care Providers       Provider Role Specialty Phone number    Jackie, ELLA Woodward Referring Cardiology [phone]            Clinic Interview:  No pertinent clinical findings have been reported.    INR History:      7/19/2024     8:04 AM 8/5/2024    12:00 AM 8/6/2024    11:12 AM 8/22/2024    12:00 AM 8/23/2024     8:43 AM 9/6/2024    12:00 AM 9/9/2024    12:57 PM   Anticoagulation Monitoring   INR 3.30  3.50  3.50  3.20   INR Date 7/18/2024 8/5/2024 8/22/2024 9/6/2024   INR Goal 2.5-3.5  2.5-3.5  2.5-3.5  2.5-3.5   Trend Same  Same  Same  Same   Last Week Total 42.5 mg  42.5 mg  42.5 mg  42.5 mg   Next Week Total 42.5 mg  42.5 mg  42.5 mg  42.5 mg   Sun 7.5 mg  7.5 mg  7.5 mg  7.5 mg   Mon 5 mg  5 mg  5 mg  5 mg   Tue 7.5 mg  7.5 mg  7.5 mg  7.5 mg   Wed 5 mg  5 mg  5 mg  5 mg   Thu 7.5 mg  7.5 mg  7.5 mg  7.5 mg   Fri 5 mg  5 mg  5 mg  5 mg   Sat 5 mg  5 mg  5 mg  5 mg   Historical INR  3.50      3.50      3.20            This result is from an external source.       Plan:  1. INR is therapeutic today- see above in Anticoagulation Summary.    Fernie Porter to continue their warfarin regimen- see above in Anticoagulation Summary.  2. Follow up in 2 weeks  3. Pt has  agreed to only be called if INR out of range. They have been instructed to call if any changes in medications, doses, concerns, etc. Patient expresses understanding and has no further questions at this time.    Georgie Gaming

## 2024-09-16 ENCOUNTER — OFFICE VISIT (OUTPATIENT)
Dept: INTERNAL MEDICINE | Facility: CLINIC | Age: 54
End: 2024-09-16
Payer: COMMERCIAL

## 2024-09-16 VITALS
SYSTOLIC BLOOD PRESSURE: 112 MMHG | HEIGHT: 69 IN | OXYGEN SATURATION: 98 % | HEART RATE: 62 BPM | BODY MASS INDEX: 23.7 KG/M2 | TEMPERATURE: 97.7 F | RESPIRATION RATE: 18 BRPM | DIASTOLIC BLOOD PRESSURE: 60 MMHG | WEIGHT: 160 LBS

## 2024-09-16 DIAGNOSIS — Z23 NEED FOR VACCINATION: Primary | ICD-10-CM

## 2024-09-16 DIAGNOSIS — I48.0 PAROXYSMAL ATRIAL FIBRILLATION: Chronic | ICD-10-CM

## 2024-09-16 DIAGNOSIS — I10 PRIMARY HYPERTENSION: Chronic | ICD-10-CM

## 2024-09-16 PROCEDURE — 90656 IIV3 VACC NO PRSV 0.5 ML IM: CPT | Performed by: INTERNAL MEDICINE

## 2024-09-16 PROCEDURE — 90471 IMMUNIZATION ADMIN: CPT | Performed by: INTERNAL MEDICINE

## 2024-09-16 PROCEDURE — 99214 OFFICE O/P EST MOD 30 MIN: CPT | Performed by: INTERNAL MEDICINE

## 2024-09-23 LAB — INR PPP: 3.5

## 2024-09-24 ENCOUNTER — ANTICOAGULATION VISIT (OUTPATIENT)
Dept: PHARMACY | Facility: HOSPITAL | Age: 54
End: 2024-09-24
Payer: COMMERCIAL

## 2024-09-24 DIAGNOSIS — I48.0 PAROXYSMAL ATRIAL FIBRILLATION: Chronic | ICD-10-CM

## 2024-09-24 DIAGNOSIS — Z95.2 AORTIC VALVE REPLACED: Primary | Chronic | ICD-10-CM

## 2024-10-07 LAB — INR PPP: 3.5

## 2024-10-08 ENCOUNTER — ANTICOAGULATION VISIT (OUTPATIENT)
Dept: PHARMACY | Facility: HOSPITAL | Age: 54
End: 2024-10-08
Payer: COMMERCIAL

## 2024-10-08 DIAGNOSIS — Z95.2 AORTIC VALVE REPLACED: Primary | Chronic | ICD-10-CM

## 2024-10-08 DIAGNOSIS — I48.0 PAROXYSMAL ATRIAL FIBRILLATION: Chronic | ICD-10-CM

## 2024-10-08 NOTE — PROGRESS NOTES
Anticoagulation Clinic Progress Note    Anticoagulation Summary  As of 10/8/2024      INR goal:  2.5-3.5   TTR:  93.6% (5.9 y)   INR used for dosing:  3.50 (10/7/2024)   Warfarin maintenance plan:  7.5 mg every Sun, Tue, Thu; 5 mg all other days   Weekly warfarin total:  42.5 mg   No change documented:  Allison Sharma, Pharmacy Technician   Plan last modified:  Katie Schwarz, PharmD (4/13/2021)   Next INR check:  10/21/2024   Priority:  Maintenance   Target end date:      Indications    Aortic valve replaced [Z95.2]  Paroxysmal atrial fibrillation [I48.0]                 Anticoagulation Episode Summary       INR check location:      Preferred lab:      Send INR reminders to:   [facility]  [facility]    Comments:  *[place]*          Anticoagulation Care Providers       Provider Role Specialty Phone number    Jackie ELLA Woodward Referring Cardiology [phone]            Clinic Interview:  No pertinent clinical findings have been reported.    INR History:      8/23/2024     8:43 AM 9/6/2024    12:00 AM 9/9/2024    12:57 PM 9/23/2024    12:00 AM 9/24/2024     8:12 AM 10/7/2024    12:00 AM 10/8/2024     8:22 AM   Anticoagulation Monitoring   INR 3.50  3.20  3.50  3.50   INR Date 8/22/2024  9/6/2024  9/23/2024  10/7/2024   INR Goal 2.5-3.5  2.5-3.5  2.5-3.5  2.5-3.5   Trend Same  Same  Same  Same   Last Week Total 42.5 mg  42.5 mg  42.5 mg  42.5 mg   Next Week Total 42.5 mg  42.5 mg  42.5 mg  42.5 mg   Sun 7.5 mg  7.5 mg  7.5 mg  7.5 mg   Mon 5 mg  5 mg  5 mg  5 mg   Tue 7.5 mg  7.5 mg  7.5 mg  7.5 mg   Wed 5 mg  5 mg  5 mg  5 mg   Thu 7.5 mg  7.5 mg  7.5 mg  7.5 mg   Fri 5 mg  5 mg  5 mg  5 mg   Sat 5 mg  5 mg  5 mg  5 mg   Historical INR  3.20      3.50      3.50            This result is from an external source.       Plan:  1. INR is therapeutic today- see above in Anticoagulation Summary.    Fernie Porter to continue their warfarin regimen- see above in Anticoagulation Summary.  2. Follow up in 2  weeks  3. Pt has agreed to only be called if INR out of range. They have been instructed to call if any changes in medications, doses, concerns, etc. Patient expresses understanding and has no further questions at this time.    Allison Sharma, Pharmacy Technician

## 2024-10-11 RX ORDER — PROPRANOLOL HYDROCHLORIDE 120 MG/1
CAPSULE, EXTENDED RELEASE ORAL
Qty: 90 CAPSULE | Refills: 1 | OUTPATIENT
Start: 2024-10-11

## 2024-10-17 RX ORDER — AMLODIPINE BESYLATE 5 MG/1
TABLET ORAL
Qty: 90 TABLET | Refills: 2 | Status: SHIPPED | OUTPATIENT
Start: 2024-10-17

## 2024-10-17 RX ORDER — CLONIDINE HYDROCHLORIDE 0.2 MG/1
TABLET ORAL
Qty: 180 TABLET | Refills: 1 | Status: SHIPPED | OUTPATIENT
Start: 2024-10-17

## 2024-10-21 LAB — INR PPP: 3

## 2024-10-22 ENCOUNTER — ANTICOAGULATION VISIT (OUTPATIENT)
Dept: PHARMACY | Facility: HOSPITAL | Age: 54
End: 2024-10-22
Payer: COMMERCIAL

## 2024-10-22 DIAGNOSIS — I48.0 PAROXYSMAL ATRIAL FIBRILLATION: Chronic | ICD-10-CM

## 2024-10-22 DIAGNOSIS — Z95.2 AORTIC VALVE REPLACED: Primary | Chronic | ICD-10-CM

## 2024-10-22 NOTE — PROGRESS NOTES
Anticoagulation Clinic Progress Note    Anticoagulation Summary  As of 10/22/2024      INR goal:  2.5-3.5   TTR:  93.6% (5.9 y)   INR used for dosing:  3.00 (10/21/2024)   Warfarin maintenance plan:  7.5 mg every Sun, Tue, Thu; 5 mg all other days   Weekly warfarin total:  42.5 mg   No change documented:  Georgie Gaming   Plan last modified:  Katie Schwarz, PharmD (4/13/2021)   Next INR check:  11/4/2024   Priority:  Maintenance   Target end date:  --    Indications    Aortic valve replaced [Z95.2]  Paroxysmal atrial fibrillation [I48.0]                 Anticoagulation Episode Summary       INR check location:  --    Preferred lab:  --    [facility]    Comments:  *[place]*          Anticoagulation Care Providers       Provider Role Specialty Phone number    Jackie ELLA Woodward Referring Cardiology [phone]            Clinic Interview:  No pertinent clinical findings have been reported.    INR History:      9/9/2024    12:57 PM 9/23/2024    12:00 AM 9/24/2024     8:12 AM 10/7/2024    12:00 AM 10/8/2024     8:22 AM 10/21/2024    12:00 AM 10/22/2024     8:05 AM   Anticoagulation Monitoring   INR 3.20  3.50  3.50  3.00   INR Date 9/6/2024  9/23/2024  10/7/2024  10/21/2024   INR Goal 2.5-3.5  2.5-3.5  2.5-3.5  2.5-3.5   Trend Same  Same  Same  Same   Last Week Total 42.5 mg  42.5 mg  42.5 mg  42.5 mg   Next Week Total 42.5 mg  42.5 mg  42.5 mg  42.5 mg   Sun 7.5 mg  7.5 mg  7.5 mg  7.5 mg   Mon 5 mg  5 mg  5 mg  5 mg   Tue 7.5 mg  7.5 mg  7.5 mg  7.5 mg   Wed 5 mg  5 mg  5 mg  5 mg   Thu 7.5 mg  7.5 mg  7.5 mg  7.5 mg   Fri 5 mg  5 mg  5 mg  5 mg   Sat 5 mg  5 mg  5 mg  5 mg   Historical INR  3.50      3.50      3.00            This result is from an external source.       Plan:  1. INR is therapeutic today- see above in Anticoagulation Summary.    Fernie Porter to continue their warfarin regimen- see above in Anticoagulation Summary.  2. Follow up in 2  weeks  3. Pt has agreed to only be called if INR out of range. They have been instructed to call if any changes in medications, doses, concerns, etc. Patient expresses understanding and has no further questions at this time.    Georgie Gaming

## 2024-10-31 RX ORDER — IMIPRAMINE HYDROCHLORIDE 50 MG/1
TABLET, FILM COATED ORAL
Qty: 180 TABLET | Refills: 1 | Status: SHIPPED | OUTPATIENT
Start: 2024-10-31

## 2024-10-31 RX ORDER — PROPRANOLOL HYDROCHLORIDE 120 MG/1
CAPSULE, EXTENDED RELEASE ORAL
Qty: 90 CAPSULE | Refills: 1 | OUTPATIENT
Start: 2024-10-31

## 2024-10-31 RX ORDER — HYDROCHLOROTHIAZIDE 12.5 MG/1
CAPSULE ORAL
Qty: 90 CAPSULE | Refills: 1 | Status: SHIPPED | OUTPATIENT
Start: 2024-10-31

## 2024-11-06 LAB — INR PPP: 3.3

## 2024-11-07 ENCOUNTER — ANTICOAGULATION VISIT (OUTPATIENT)
Dept: PHARMACY | Facility: HOSPITAL | Age: 54
End: 2024-11-07
Payer: COMMERCIAL

## 2024-11-07 DIAGNOSIS — Z95.2 AORTIC VALVE REPLACED: Primary | Chronic | ICD-10-CM

## 2024-11-07 DIAGNOSIS — I48.0 PAROXYSMAL ATRIAL FIBRILLATION: Chronic | ICD-10-CM

## 2024-11-07 NOTE — PROGRESS NOTES
Anticoagulation Clinic Progress Note    Anticoagulation Summary  As of 11/7/2024      INR goal:  2.5-3.5   TTR:  93.7% (6 y)   INR used for dosing:  3.30 (11/6/2024)   Warfarin maintenance plan:  7.5 mg every Sun, Tue, Thu; 5 mg all other days   Weekly warfarin total:  42.5 mg   No change documented:  Georgie Gaming   Plan last modified:  Katie Schwarz, PharmD (4/13/2021)   Next INR check:  11/20/2024   Priority:  Maintenance   Target end date:  --    Indications    Aortic valve replaced [Z95.2]  Paroxysmal atrial fibrillation [I48.0]                 Anticoagulation Episode Summary       INR check location:  --    Preferred lab:  --    Send INR reminders to:   [facility]  [facility]    Comments:  *[place]*          Anticoagulation Care Providers       Provider Role Specialty Phone number    Jackie ELLA Woodward Referring Cardiology [phone]            Clinic Interview:  No pertinent clinical findings have been reported.    INR History:      9/24/2024     8:12 AM 10/7/2024    12:00 AM 10/8/2024     8:22 AM 10/21/2024    12:00 AM 10/22/2024     8:05 AM 11/6/2024    12:00 AM 11/7/2024     8:58 AM   Anticoagulation Monitoring   INR 3.50  3.50  3.00  3.30   INR Date 9/23/2024  10/7/2024  10/21/2024  11/6/2024   INR Goal 2.5-3.5  2.5-3.5  2.5-3.5  2.5-3.5   Trend Same  Same  Same  Same   Last Week Total 42.5 mg  42.5 mg  42.5 mg  42.5 mg   Next Week Total 42.5 mg  42.5 mg  42.5 mg  42.5 mg   Sun 7.5 mg  7.5 mg  7.5 mg  7.5 mg   Mon 5 mg  5 mg  5 mg  5 mg   Tue 7.5 mg  7.5 mg  7.5 mg  7.5 mg   Wed 5 mg  5 mg  5 mg  5 mg   Thu 7.5 mg  7.5 mg  7.5 mg  7.5 mg   Fri 5 mg  5 mg  5 mg  5 mg   Sat 5 mg  5 mg  5 mg  5 mg   Historical INR  3.50      3.00      3.30            This result is from an external source.       Plan:  1. INR is therapeutic today- see above in Anticoagulation Summary.    Fernie Porter to continue their warfarin regimen- see above in Anticoagulation Summary.  2. Follow up in 2  weeks  3. Pt has agreed to only be called if INR out of range. They have been instructed to call if any changes in medications, doses, concerns, etc. Patient expresses understanding and has no further questions at this time.    Georgie Gaming

## 2024-11-20 LAB — INR PPP: 3.5

## 2024-11-21 ENCOUNTER — ANTICOAGULATION VISIT (OUTPATIENT)
Dept: PHARMACY | Facility: HOSPITAL | Age: 54
End: 2024-11-21
Payer: COMMERCIAL

## 2024-11-21 DIAGNOSIS — I48.0 PAROXYSMAL ATRIAL FIBRILLATION: Chronic | ICD-10-CM

## 2024-11-21 DIAGNOSIS — Z95.2 AORTIC VALVE REPLACED: Primary | Chronic | ICD-10-CM

## 2024-11-21 NOTE — PROGRESS NOTES
Anticoagulation Clinic Progress Note    Anticoagulation Summary  As of 11/21/2024      INR goal:  2.5-3.5   TTR:  93.7% (6 y)   INR used for dosing:  3.50 (11/20/2024)   Warfarin maintenance plan:  7.5 mg every Sun, Tue, Thu; 5 mg all other days   Weekly warfarin total:  42.5 mg   No change documented:  Georgie Gaming   Plan last modified:  Katie Schwarz, PharmD (4/13/2021)   Next INR check:  12/4/2024   Priority:  Maintenance   Target end date:  --    Indications    Aortic valve replaced [Z95.2]  Paroxysmal atrial fibrillation [I48.0]                 Anticoagulation Episode Summary       INR check location:  --    Preferred lab:  --    Send INR reminders to:   [facility]  [facility]    Comments:  *[place]*          Anticoagulation Care Providers       Provider Role Specialty Phone number    Jackie ELLA Woodward Referring Cardiology [phone]            Clinic Interview:  No pertinent clinical findings have been reported.    INR History:      10/8/2024     8:22 AM 10/21/2024    12:00 AM 10/22/2024     8:05 AM 11/6/2024    12:00 AM 11/7/2024     8:58 AM 11/20/2024    12:00 AM 11/21/2024    10:32 AM   Anticoagulation Monitoring   INR 3.50  3.00  3.30  3.50   INR Date 10/7/2024  10/21/2024  11/6/2024  11/20/2024   INR Goal 2.5-3.5  2.5-3.5  2.5-3.5  2.5-3.5   Trend Same  Same  Same  Same   Last Week Total 42.5 mg  42.5 mg  42.5 mg  42.5 mg   Next Week Total 42.5 mg  42.5 mg  42.5 mg  42.5 mg   Sun 7.5 mg  7.5 mg  7.5 mg  7.5 mg   Mon 5 mg  5 mg  5 mg  5 mg   Tue 7.5 mg  7.5 mg  7.5 mg  7.5 mg   Wed 5 mg  5 mg  5 mg  5 mg   Thu 7.5 mg  7.5 mg  7.5 mg  7.5 mg   Fri 5 mg  5 mg  5 mg  5 mg   Sat 5 mg  5 mg  5 mg  5 mg   Historical INR  3.00      3.30      3.50            This result is from an external source.       Plan:  1. INR is therapeutic today- see above in Anticoagulation Summary.    Fernie Porter to continue their warfarin regimen- see above in Anticoagulation Summary.  2. Follow up in 2  weeks  3. Pt has agreed to only be called if INR out of range. They have been instructed to call if any changes in medications, doses, concerns, etc. Patient expresses understanding and has no further questions at this time.    Georgie Gaming

## 2024-12-06 LAB — INR PPP: 3.4

## 2024-12-06 RX ORDER — WARFARIN SODIUM 5 MG/1
TABLET ORAL
Qty: 105 TABLET | Refills: 1 | Status: SHIPPED | OUTPATIENT
Start: 2024-12-06

## 2024-12-09 ENCOUNTER — ANTICOAGULATION VISIT (OUTPATIENT)
Dept: PHARMACY | Facility: HOSPITAL | Age: 54
End: 2024-12-09
Payer: COMMERCIAL

## 2024-12-09 DIAGNOSIS — Z95.2 AORTIC VALVE REPLACED: Primary | Chronic | ICD-10-CM

## 2024-12-09 DIAGNOSIS — I48.0 PAROXYSMAL ATRIAL FIBRILLATION: Chronic | ICD-10-CM

## 2024-12-09 NOTE — PROGRESS NOTES
Anticoagulation Clinic Progress Note    Anticoagulation Summary  As of 12/9/2024      INR goal:  2.5-3.5   TTR:  93.8% (6 y)   INR used for dosing:  3.40 (12/6/2024)   Warfarin maintenance plan:  7.5 mg every Sun, Tue, Thu; 5 mg all other days   Weekly warfarin total:  42.5 mg   No change documented:  Georgie Gaming   Plan last modified:  Katie Schwarz, PharmD (4/13/2021)   Next INR check:  12/20/2024   Priority:  Maintenance   Target end date:  --    Indications    Aortic valve replaced [Z95.2]  Paroxysmal atrial fibrillation [I48.0]                 Anticoagulation Episode Summary       INR check location:  --    Preferred lab:  --    Send INR reminders to:   [facility]  POOL    Comments:  *[place]*          Anticoagulation Care Providers       Provider Role Specialty Phone number    Crissy Mejia APRN Referring Cardiology [phone]            Clinic Interview:  No pertinent clinical findings have been reported.    INR History:      10/22/2024     8:05 AM 11/6/2024    12:00 AM 11/7/2024     8:58 AM 11/20/2024    12:00 AM 11/21/2024    10:32 AM 12/6/2024    12:00 AM 12/9/2024    10:52 AM   Anticoagulation Monitoring   INR 3.00  3.30  3.50  3.40   INR Date 10/21/2024  11/6/2024  11/20/2024  12/6/2024   INR Goal 2.5-3.5  2.5-3.5  2.5-3.5  2.5-3.5   Trend Same  Same  Same  Same   Last Week Total 42.5 mg  42.5 mg  42.5 mg  42.5 mg   Next Week Total 42.5 mg  42.5 mg  42.5 mg  42.5 mg   Sun 7.5 mg  7.5 mg  7.5 mg  7.5 mg   Mon 5 mg  5 mg  5 mg  5 mg   Tue 7.5 mg  7.5 mg  7.5 mg  7.5 mg   Wed 5 mg  5 mg  5 mg  5 mg   Thu 7.5 mg  7.5 mg  7.5 mg  7.5 mg   Fri 5 mg  5 mg  5 mg  5 mg   Sat 5 mg  5 mg  5 mg  5 mg   Historical INR  3.30      3.50      3.40            This result is from an external source.       Plan:  1. INR is therapeutic today- see above in Anticoagulation Summary.    Fernie Porter to continue their warfarin regimen- see above in Anticoagulation Summary.  2. Follow up in 2  weeks  3. Pt has agreed to only be called if INR out of range. They have been instructed to call if any changes in medications, doses, concerns, etc. Patient expresses understanding and has no further questions at this time.    Georgie Gaming

## 2024-12-26 LAB — INR PPP: 3.6

## 2024-12-27 ENCOUNTER — ANTICOAGULATION VISIT (OUTPATIENT)
Dept: PHARMACY | Facility: HOSPITAL | Age: 54
End: 2024-12-27
Payer: COMMERCIAL

## 2024-12-27 DIAGNOSIS — Z95.2 AORTIC VALVE REPLACED: Primary | Chronic | ICD-10-CM

## 2024-12-27 DIAGNOSIS — I48.0 PAROXYSMAL ATRIAL FIBRILLATION: Chronic | ICD-10-CM

## 2024-12-27 NOTE — PROGRESS NOTES
Anticoagulation Clinic Progress Note    Anticoagulation Summary  As of 12/27/2024      INR goal:  2.5-3.5   TTR:  93.4% (6.1 y)   INR used for dosing:  3.60 (12/26/2024)   Warfarin maintenance plan:  7.5 mg every Sun, Tue, Thu; 5 mg all other days   Weekly warfarin total:  42.5 mg   No change documented:  Brayan Patterson, Toni   Plan last modified:  Katie Schwarz PharmD (4/13/2021)   Next INR check:  1/9/2025   Priority:  Maintenance   Target end date:  --    Indications    Aortic valve replaced [Z95.2]  Paroxysmal atrial fibrillation [I48.0]                 Anticoagulation Episode Summary       INR check location:  --    Preferred lab:  --    Send INR reminders to:   [facility]  POOL    Comments:  *[place]*          Anticoagulation Care Providers       Provider Role Specialty Phone number    Jackie, ELLA Woodward Referring Cardiology [phone]            Clinic Interview:  No pertinent clinical findings have been reported.    INR History:      11/7/2024     8:58 AM 11/20/2024    12:00 AM 11/21/2024    10:32 AM 12/6/2024    12:00 AM 12/9/2024    10:52 AM 12/26/2024    12:00 AM 12/27/2024     9:25 AM   Anticoagulation Monitoring   INR 3.30  3.50  3.40  3.60   INR Date 11/6/2024 11/20/2024 12/6/2024 12/26/2024   INR Goal 2.5-3.5  2.5-3.5  2.5-3.5  2.5-3.5   Trend Same  Same  Same  Same   Last Week Total 42.5 mg  42.5 mg  42.5 mg  42.5 mg   Next Week Total 42.5 mg  42.5 mg  42.5 mg  42.5 mg   Sun 7.5 mg  7.5 mg  7.5 mg  7.5 mg   Mon 5 mg  5 mg  5 mg  5 mg   Tue 7.5 mg  7.5 mg  7.5 mg  7.5 mg   Wed 5 mg  5 mg  5 mg  5 mg   Thu 7.5 mg  7.5 mg  7.5 mg  7.5 mg   Fri 5 mg  5 mg  5 mg  5 mg   Sat 5 mg  5 mg  5 mg  5 mg   Historical INR  3.50      3.40      3.60            This result is from an external source.       Plan:  1. INR is supratherapeutic today- see above in Anticoagulation Summary.    Fernie Porter to continue their warfarin regimen- see above in Anticoagulation Summary.  2. Follow up in  1 week  3. Unsuccessful reaching by phone after multiple attempts. Left secure voicemail with instructions. They have been instructed to call if any changes in medications, doses, concerns, etc.     Brayan Patterson, HernandezD

## 2025-01-02 LAB — INR PPP: 3.4

## 2025-01-03 ENCOUNTER — ANTICOAGULATION VISIT (OUTPATIENT)
Dept: PHARMACY | Facility: HOSPITAL | Age: 55
End: 2025-01-03
Payer: COMMERCIAL

## 2025-01-03 DIAGNOSIS — I48.0 PAROXYSMAL ATRIAL FIBRILLATION: Chronic | ICD-10-CM

## 2025-01-03 DIAGNOSIS — Z95.2 AORTIC VALVE REPLACED: Primary | Chronic | ICD-10-CM

## 2025-01-03 NOTE — PROGRESS NOTES
Anticoagulation Clinic Progress Note    Anticoagulation Summary  As of 1/3/2025      INR goal:  2.5-3.5   TTR:  93.2% (6.1 y)   INR used for dosing:  3.40 (1/2/2025)   Warfarin maintenance plan:  7.5 mg every Sun, Tue, Thu; 5 mg all other days   Weekly warfarin total:  42.5 mg   No change documented:  Dortohy Leon, DAKOTA   Plan last modified:  Katie Schwarz, PharmD (4/13/2021)   Next INR check:  1/16/2025   Priority:  Maintenance   Target end date:  --    Indications    Aortic valve replaced [Z95.2]  Paroxysmal atrial fibrillation [I48.0]                 Anticoagulation Episode Summary       INR check location:  --    Preferred lab:  --    Send INR reminders to:   [facility]  POOL    Comments:  *[place]*          Anticoagulation Care Providers       Provider Role Specialty Phone number    Crissy Mejia APRN Referring Cardiology [phone]            Clinic Interview:  No pertinent clinical findings have been reported.    INR History:      11/21/2024    10:32 AM 12/6/2024    12:00 AM 12/9/2024    10:52 AM 12/26/2024    12:00 AM 12/27/2024     9:25 AM 1/2/2025    12:00 AM 1/3/2025     8:58 AM   Anticoagulation Monitoring   INR 3.50  3.40  3.60  3.40   INR Date 11/20/2024 12/6/2024 12/26/2024 1/2/2025   INR Goal 2.5-3.5  2.5-3.5  2.5-3.5  2.5-3.5   Trend Same  Same  Same  Same   Last Week Total 42.5 mg  42.5 mg  42.5 mg  42.5 mg   Next Week Total 42.5 mg  42.5 mg  42.5 mg  42.5 mg   Sun 7.5 mg  7.5 mg  7.5 mg  7.5 mg   Mon 5 mg  5 mg  5 mg  5 mg   Tue 7.5 mg  7.5 mg  7.5 mg  7.5 mg   Wed 5 mg  5 mg  5 mg  5 mg   Thu 7.5 mg  7.5 mg  -  7.5 mg   Fri 5 mg  5 mg  5 mg  5 mg   Sat 5 mg  5 mg  5 mg  5 mg   Historical INR  3.40      3.60      3.40            This result is from an external source.       Plan:  1. INR is therapeutic today- see above in Anticoagulation Summary.    Fernie Porter to continue their warfarin regimen- see above in Anticoagulation Summary.  2. Follow up in 2 weeks  3. Pt  has agreed to only be called if INR out of range. They have been instructed to call if any changes in medications, doses, concerns, etc. Patient expresses understanding and has no further questions at this time.    Dorothy Leon [facility]

## 2025-01-16 LAB — INR PPP: 3.5

## 2025-01-17 ENCOUNTER — ANTICOAGULATION VISIT (OUTPATIENT)
Dept: PHARMACY | Facility: HOSPITAL | Age: 55
End: 2025-01-17
Payer: COMMERCIAL

## 2025-01-17 DIAGNOSIS — Z95.2 AORTIC VALVE REPLACED: Primary | Chronic | ICD-10-CM

## 2025-01-17 DIAGNOSIS — I48.0 PAROXYSMAL ATRIAL FIBRILLATION: Chronic | ICD-10-CM

## 2025-01-17 NOTE — PROGRESS NOTES
Anticoagulation Clinic Progress Note    Anticoagulation Summary  As of 1/17/2025      INR goal:  2.5-3.5   TTR:  93.3% (6.1 y)   INR used for dosing:  3.50 (1/16/2025)   Warfarin maintenance plan:  7.5 mg every Sun, Tue, Thu; 5 mg all other days   Weekly warfarin total:  42.5 mg   No change documented:  Georgie Gaming   Plan last modified:  Katie Schwarz, PharmD (4/13/2021)   Next INR check:  1/30/2025   Priority:  Maintenance   Target end date:  --    Indications    Aortic valve replaced [Z95.2]  Paroxysmal atrial fibrillation [I48.0]                 Anticoagulation Episode Summary       INR check location:  --    Preferred lab:  --    [facility]    Comments:  *[place]*          Anticoagulation Care Providers       Provider Role Specialty Phone number    Jackie ELLA Woodward Referring Cardiology [phone]            Clinic Interview:  No pertinent clinical findings have been reported.    INR History:      12/9/2024    10:52 AM 12/26/2024    12:00 AM 12/27/2024     9:25 AM 1/2/2025    12:00 AM 1/3/2025     8:58 AM 1/16/2025    12:00 AM 1/17/2025    10:46 AM   Anticoagulation Monitoring   INR 3.40  3.60  3.40  3.50   INR Date 12/6/2024 12/26/2024 1/2/2025 1/16/2025   INR Goal 2.5-3.5  2.5-3.5  2.5-3.5  2.5-3.5   Trend Same  Same  Same  Same   Last Week Total 42.5 mg  42.5 mg  42.5 mg  42.5 mg   Next Week Total 42.5 mg  42.5 mg  42.5 mg  42.5 mg   Sun 7.5 mg  7.5 mg  7.5 mg  7.5 mg   Mon 5 mg  5 mg  5 mg  5 mg   Tue 7.5 mg  7.5 mg  7.5 mg  7.5 mg   Wed 5 mg  5 mg  5 mg  5 mg   Thu 7.5 mg  -  7.5 mg  7.5 mg   Fri 5 mg  5 mg  5 mg  5 mg   Sat 5 mg  5 mg  5 mg  5 mg   Historical INR  3.60      3.40      3.50            This result is from an external source.       Plan:  1. INR is therapeutic today- see above in Anticoagulation Summary.    Fernie Porter to continue their warfarin regimen- see above in Anticoagulation Summary.  2. Follow up in 2 weeks  3.  Pt has agreed to only be called if INR out of range. They have been instructed to call if any changes in medications, doses, concerns, etc. Patient expresses understanding and has no further questions at this time.    Georgie Gaming

## 2025-01-21 ENCOUNTER — OFFICE VISIT (OUTPATIENT)
Dept: INTERNAL MEDICINE | Facility: CLINIC | Age: 55
End: 2025-01-21
Payer: COMMERCIAL

## 2025-01-21 VITALS
BODY MASS INDEX: 25.03 KG/M2 | HEIGHT: 69 IN | OXYGEN SATURATION: 97 % | RESPIRATION RATE: 18 BRPM | DIASTOLIC BLOOD PRESSURE: 72 MMHG | HEART RATE: 65 BPM | SYSTOLIC BLOOD PRESSURE: 118 MMHG | TEMPERATURE: 97.6 F | WEIGHT: 169 LBS

## 2025-01-21 DIAGNOSIS — Z95.2 AORTIC VALVE REPLACED: Chronic | ICD-10-CM

## 2025-01-21 DIAGNOSIS — I10 PRIMARY HYPERTENSION: Chronic | ICD-10-CM

## 2025-01-21 DIAGNOSIS — Z00.00 ENCOUNTER FOR PREVENTIVE HEALTH EXAMINATION: Primary | ICD-10-CM

## 2025-01-21 DIAGNOSIS — I48.0 PAROXYSMAL ATRIAL FIBRILLATION: Chronic | ICD-10-CM

## 2025-01-21 PROCEDURE — 99396 PREV VISIT EST AGE 40-64: CPT | Performed by: INTERNAL MEDICINE

## 2025-01-21 NOTE — PROGRESS NOTES
Subjective   Fernie Porter is a 54 y.o. male.     Chief Complaint   Patient presents with    Annual Exam    Hypertension    Headache         History of Present Illness  In for annual preventative exam.  Sleep is good.  Gets 7 hours at night.  Exercises 3 days per week.  Energy is good.  Diet is well-balanced.  Hypertension  This is a chronic problem. The current episode started more than 1 year ago. The problem is unchanged. The problem is controlled. Associated symptoms include headaches. Pertinent negatives include no chest pain, palpitations, peripheral edema or shortness of breath. There are no associated agents to hypertension. Risk factors for coronary artery disease include male gender. Current antihypertension treatment includes alpha 1 blockers, diuretics and beta blockers. The current treatment provides significant improvement. There are no compliance problems.  There is no history of kidney disease, CAD/MI or heart failure.   Atrial Fibrillation  Presents for follow-up visit. Symptoms are negative for chest pain, dizziness, palpitations, shortness of breath and weakness. The symptoms have been stable. There are no medication compliance problems.   Migraine  Alternative treatments tried:  NSAIDs       The following portions of the patient's history were reviewed and updated as appropriate: allergies, current medications, past social history and problem list.    Outpatient Medications Marked as Taking for the 1/21/25 encounter (Office Visit) with Juan Antonio Ford MD   Medication Sig Dispense Refill    Ajovy 225 MG/1.5ML solution auto-injector INJECT 225 MG UNDER THE SKIN INTO THE APPROPRIATE AREA AS DIRECTED EVERY 30 (THIRTY) DAYS. 4.5 mL 3    amLODIPine (NORVASC) 5 MG tablet TAKE 1 TABLET BY MOUTH EVERY DAY 90 tablet 2    cloNIDine (CATAPRES) 0.2 MG tablet TAKE 1 TABLET BY MOUTH TWICE A  tablet 1    fluticasone (FLONASE) 50 MCG/ACT nasal spray 2 SPRAYS INTO THE NOSTRIL(S) AS DIRECTED BY PROVIDER  DAILY. 48 mL 2    hydroCHLOROthiazide (MICROZIDE) 12.5 MG capsule TAKE 1 CAPSULE BY MOUTH EVERY DAY 90 capsule 1    ibuprofen (ADVIL,MOTRIN) 200 MG tablet Take 1 tablet by mouth Every 6 (Six) Hours As Needed for Mild Pain.      imipramine (TOFRANIL) 50 MG tablet TAKE 2 TABLETS BY MOUTH AT BEDTIME 180 tablet 1    propranolol LA (Inderal LA) 160 MG 24 hr capsule Take 1 capsule by mouth Daily. 90 capsule 3    warfarin (COUMADIN) 5 MG tablet TAKE 7.5MG (1 AND 1/2 TABLETS) ON SUNDAY, TUESDAY, AND THURSDAY TAKE 5MG (1 TABLET) ALL OTHER DAYS OR AS DIRECTED BY THE MED MANAGEMENT CLINIC. 105 tablet 1       Review of Systems   Constitutional:  Negative for chills, diaphoresis, fatigue, fever and unexpected weight change.   Respiratory:  Negative for cough, chest tightness, shortness of breath and wheezing.    Cardiovascular:  Negative for chest pain, palpitations and leg swelling.   Gastrointestinal:  Negative for abdominal pain, anal bleeding, blood in stool, constipation, diarrhea, nausea, rectal pain and vomiting.   Endocrine: Negative for cold intolerance, heat intolerance and polyuria.   Genitourinary:  Negative for difficulty urinating, dysuria, frequency, hematuria and urgency.   Musculoskeletal:  Negative for arthralgias (thumb ache bilateral), back pain and myalgias.   Allergic/Immunologic: Positive for environmental allergies.   Neurological:  Positive for headaches. Negative for dizziness, syncope, weakness and light-headedness.   Hematological:  Negative for adenopathy. Does not bruise/bleed easily.   Psychiatric/Behavioral:  Negative for confusion, dysphoric mood and sleep disturbance. The patient is not nervous/anxious.        Objective   Vitals:    01/21/25 1418   BP: 118/72   Pulse: 65   Resp: 18   Temp: 97.6 °F (36.4 °C)   SpO2: 97%          01/21/25  1418   Weight: 76.7 kg (169 lb)    [unfilled]  Body mass index is 24.96 kg/m².      Physical Exam   Constitutional: He is oriented to person, place, and  time. He appears well-developed. No distress.   HENT:   Head: Normocephalic and atraumatic.   Right Ear: Tympanic membrane and external ear normal.   Left Ear: Tympanic membrane and external ear normal.   Nose: Nose normal.   Eyes: Pupils are equal, round, and reactive to light. Conjunctivae are normal. No scleral icterus.   Neck: No JVD present. Carotid bruit is not present. No thyromegaly present.   Cardiovascular: Normal rate and regular rhythm. Exam reveals no gallop and no friction rub.   Murmur heard.  Crescendo decrescendo systolic murmur is present with a grade of 2/6.  Prosthetic heart sounds.  Grade 2/6 JOHNNIE loudest at the pulmonic area.   Pulmonary/Chest: Effort normal. No respiratory distress.   Abdominal: Soft. Normal appearance and bowel sounds are normal. He exhibits no distension and no mass. There is no abdominal tenderness. There is no rebound and no guarding. No hernia.   Musculoskeletal: Normal range of motion.   Lymphadenopathy:     He has no cervical adenopathy.   Neurological: He is alert and oriented to person, place, and time. He has normal reflexes. He displays normal reflexes.   Skin: Skin is warm and dry.   Psychiatric: His behavior is normal. Mood, judgment and thought content normal.   Nursing note and vitals reviewed.        Problems Addressed this Visit          Cardiac and Vasculature    Paroxysmal atrial fibrillation (Chronic)    Hypertension (Chronic)    Aortic valve replaced (Chronic)     Other Visit Diagnoses       Encounter for preventive health examination    -  Primary          Diagnoses         Codes Comments    Encounter for preventive health examination    -  Primary ICD-10-CM: Z00.00  ICD-9-CM: V70.0     Primary hypertension     ICD-10-CM: I10  ICD-9-CM: 401.9     Paroxysmal atrial fibrillation     ICD-10-CM: I48.0  ICD-9-CM: 427.31     Aortic valve replaced     ICD-10-CM: Z95.2  ICD-9-CM: V43.3           Assessment & Plan   In for annual preventive exam and recheck of  hypertension, AF, aortic stenosis and migraines today January 2025.  Migraines persist.  They're doing pretty well to present time.  Gets 3 or 4 per month.  Has failed several drugs in the past including Topamax.  Blood pressure control is excellent.  Atrial fib is controlled.  Got annual lab work June 2024 including CBC, CMP, lipids and urinalysis.  Continue to monitor every 3 months.  He declines rectal exam today but we do have an up-to-date PSA and an up-to-date colonoscopy.  Follows with urologist once a year.    Prevention counseling was performed today. The counseling performed was routine health maintenance topics including BMI and exercise.    The above information was reviewed again today 01/21/25.  It continues to be accurate as reflected above and is unchanged.  History, physical and review of systems all reviewed and are unchanged.  Medications were reviewed today and continue the current dosing.           Cassandra disclaimer:   Much of this encounter note is an electronic transcription/translation of spoken language to printed text. The electronic translation of spoken language may permit erroneous, or at times, nonsensical words or phrases to be inadvertently transcribed; Although I have reviewed the note for such errors, some may still exist.

## 2025-02-03 LAB — INR PPP: 3.2

## 2025-02-04 ENCOUNTER — ANTICOAGULATION VISIT (OUTPATIENT)
Dept: PHARMACY | Facility: HOSPITAL | Age: 55
End: 2025-02-04
Payer: COMMERCIAL

## 2025-02-04 DIAGNOSIS — I48.0 PAROXYSMAL ATRIAL FIBRILLATION: Chronic | ICD-10-CM

## 2025-02-04 DIAGNOSIS — Z95.2 AORTIC VALVE REPLACED: Primary | Chronic | ICD-10-CM

## 2025-02-04 NOTE — PROGRESS NOTES
Anticoagulation Clinic Progress Note    Anticoagulation Summary  As of 2/4/2025      INR goal:  2.5-3.5   TTR:  93.3% (6.2 y)   INR used for dosing:  3.20 (2/3/2025)   Warfarin maintenance plan:  7.5 mg every Sun, Tue, Thu; 5 mg all other days   Weekly warfarin total:  42.5 mg   No change documented:  Georgie Gaming   Plan last modified:  Katie Schwarz, PharmD (4/13/2021)   Next INR check:  2/17/2025   Priority:  Maintenance   Target end date:  --    Indications    Aortic valve replaced [Z95.2]  Paroxysmal atrial fibrillation [I48.0]                 Anticoagulation Episode Summary       INR check location:  --    Preferred lab:  --    [facility]   [facility]    Comments:  *[place]*          Anticoagulation Care Providers       Provider Role Specialty Phone number    Jackie ELLA Woodward Referring Cardiology [phone]            Clinic Interview:  No pertinent clinical findings have been reported.    INR History:      12/27/2024     9:25 AM 1/2/2025    12:00 AM 1/3/2025     8:58 AM 1/16/2025    12:00 AM 1/17/2025    10:46 AM 2/3/2025    12:00 AM 2/4/2025     3:20 PM   Anticoagulation Monitoring   INR 3.60  3.40  3.50  3.20   INR Date 12/26/2024  1/2/2025  1/16/2025  2/3/2025   INR Goal 2.5-3.5  2.5-3.5  2.5-3.5  2.5-3.5   Trend Same  Same  Same  Same   Last Week Total 42.5 mg  42.5 mg  42.5 mg  42.5 mg   Next Week Total 42.5 mg  42.5 mg  42.5 mg  42.5 mg   Sun 7.5 mg  7.5 mg  7.5 mg  7.5 mg   Mon 5 mg  5 mg  5 mg  5 mg   Tue 7.5 mg  7.5 mg  7.5 mg  7.5 mg   Wed 5 mg  5 mg  5 mg  5 mg   Thu -  7.5 mg  7.5 mg  7.5 mg   Fri 5 mg  5 mg  5 mg  5 mg   Sat 5 mg  5 mg  5 mg  5 mg   Historical INR  3.40      3.50      3.20            This result is from an external source.       Plan:  1. INR is therapeutic today- see above in Anticoagulation Summary.    Fernie Porter to continue their warfarin regimen- see above in Anticoagulation Summary.  2. Follow up in 2 weeks  3. Pt has  agreed to only be called if INR out of range. They have been instructed to call if any changes in medications, doses, concerns, etc. Patient expresses understanding and has no further questions at this time.    Georgie Gaming

## 2025-02-17 ENCOUNTER — PATIENT ROUNDING (BHMG ONLY) (OUTPATIENT)
Dept: INTERNAL MEDICINE | Facility: CLINIC | Age: 55
End: 2025-02-17
Payer: COMMERCIAL

## 2025-02-17 NOTE — PROGRESS NOTES
February 17, 2025    Hello, may I speak with Fernie Porter?    My name is Naya Jauregui      I am  with [facility]  [address] [place] [id number] [phone].    Before we get started may I verify your date of birth? 1970    I am calling to officially welcome you to our practice and ask about your recent visit. Is this a good time to talk? yes    Tell me about your visit with us. What things went well?  Everything was good       We're always looking for ways to make our patients' experiences even better. Do you have recommendations on ways we may improve?  no    Overall were you satisfied with your first visit to our practice? yes       I appreciate you taking the time to speak with me today. Is there anything else I can do for you? no      Thank you, and have a great day.

## 2025-02-20 LAB — INR PPP: 3.2

## 2025-02-21 ENCOUNTER — ANTICOAGULATION VISIT (OUTPATIENT)
Dept: PHARMACY | Facility: HOSPITAL | Age: 55
End: 2025-02-21
Payer: COMMERCIAL

## 2025-02-21 DIAGNOSIS — Z95.2 AORTIC VALVE REPLACED: Primary | Chronic | ICD-10-CM

## 2025-02-21 DIAGNOSIS — I48.0 PAROXYSMAL ATRIAL FIBRILLATION: Chronic | ICD-10-CM

## 2025-03-11 LAB — INR PPP: 3.3

## 2025-03-12 ENCOUNTER — ANTICOAGULATION VISIT (OUTPATIENT)
Dept: PHARMACY | Facility: HOSPITAL | Age: 55
End: 2025-03-12
Payer: COMMERCIAL

## 2025-03-12 DIAGNOSIS — Z95.2 AORTIC VALVE REPLACED: Primary | Chronic | ICD-10-CM

## 2025-03-12 DIAGNOSIS — I48.0 PAROXYSMAL ATRIAL FIBRILLATION: Chronic | ICD-10-CM

## 2025-03-12 NOTE — PROGRESS NOTES
Anticoagulation Clinic Progress Note    Anticoagulation Summary  As of 3/12/2025      INR goal:  2.5-3.5   TTR:  93.4% (6.3 y)   INR used for dosing:  3.30 (3/11/2025)   Warfarin maintenance plan:  7.5 mg every Sun, Tue, Thu; 5 mg all other days   Weekly warfarin total:  42.5 mg   No change documented:  Georgie Gaming   Plan last modified:  Katie Schwarz, PharmD (4/13/2021)   Next INR check:  3/25/2025   Priority:  Maintenance   Target end date:  --    Indications    Aortic valve replaced [Z95.2]  Paroxysmal atrial fibrillation [I48.0]                 Anticoagulation Episode Summary       INR check location:  --    Preferred lab:  --    [facility]   [facility]  [facility]    Comments:  *[place]*          Anticoagulation Care Providers       Provider Role Specialty Phone number    Jackie ELLA Woodward Referring Cardiology [phone]            Clinic Interview:  No pertinent clinical findings have been reported.    INR History:      1/17/2025    10:46 AM 2/3/2025    12:00 AM 2/4/2025     3:20 PM 2/20/2025    12:00 AM 2/21/2025     9:51 AM 3/11/2025    12:00 AM 3/12/2025     8:22 AM   Anticoagulation Monitoring   INR 3.50  3.20  3.20  3.30   INR Date 1/16/2025  2/3/2025  2/20/2025  3/11/2025   INR Goal 2.5-3.5  2.5-3.5  2.5-3.5  2.5-3.5   Trend Same  Same  Same  Same   Last Week Total 42.5 mg  42.5 mg  42.5 mg  42.5 mg   Next Week Total 42.5 mg  42.5 mg  42.5 mg  42.5 mg   Sun 7.5 mg  7.5 mg  7.5 mg  7.5 mg   Mon 5 mg  5 mg  5 mg  5 mg   Tue 7.5 mg  7.5 mg  7.5 mg  7.5 mg   Wed 5 mg  5 mg  5 mg  5 mg   Thu 7.5 mg  7.5 mg  7.5 mg  7.5 mg   Fri 5 mg  5 mg  5 mg  5 mg   Sat 5 mg  5 mg  5 mg  5 mg   Historical INR  3.20      3.20      3.30            This result is from an external source.       Plan:  1. INR is therapeutic today- see above in Anticoagulation Summary.    Fernie Porter to continue their warfarin regimen- see above in Anticoagulation Summary.  2. Follow up in 2 weeks  3.  Pt has agreed to only be called if INR out of range. They have been instructed to call if any changes in medications, doses, concerns, etc. Patient expresses understanding and has no further questions at this time.    Georgie Gaming

## 2025-03-31 LAB — INR PPP: 3.5

## 2025-04-01 ENCOUNTER — ANTICOAGULATION VISIT (OUTPATIENT)
Dept: PHARMACY | Facility: HOSPITAL | Age: 55
End: 2025-04-01
Payer: COMMERCIAL

## 2025-04-01 DIAGNOSIS — Z95.2 AORTIC VALVE REPLACED: Primary | Chronic | ICD-10-CM

## 2025-04-01 DIAGNOSIS — I48.0 PAROXYSMAL ATRIAL FIBRILLATION: Chronic | ICD-10-CM

## 2025-04-01 NOTE — PROGRESS NOTES
Anticoagulation Clinic Progress Note    Anticoagulation Summary  As of 4/1/2025      INR goal:  2.5-3.5   TTR:  93.5% (6.3 y)   INR used for dosing:  3.50 (3/31/2025)   Warfarin maintenance plan:  7.5 mg every Sun, Tue, Thu; 5 mg all other days   Weekly warfarin total:  42.5 mg   No change documented:  Georgie Gaming   Plan last modified:  Katie Schwarz, PharmD (4/13/2021)   Next INR check:  4/14/2025   Priority:  Maintenance   Target end date:  --    Indications    Aortic valve replaced [Z95.2]  Paroxysmal atrial fibrillation [I48.0]                 Anticoagulation Episode Summary       INR check location:  --    Preferred lab:  --    [facility]   [facility]  [facility]    Comments:  *[place]k*          Anticoagulation Care Providers       Provider Role Specialty Phone number    Jackie ELLA Woodward Referring Cardiology [phone]            Clinic Interview:  No pertinent clinical findings have been reported.    INR History:      2/4/2025     3:20 PM 2/20/2025    12:00 AM 2/21/2025     9:51 AM 3/11/2025    12:00 AM 3/12/2025     8:22 AM 3/31/2025    12:00 AM 4/1/2025     9:12 AM   Anticoagulation Monitoring   INR 3.20  3.20  3.30  3.50   INR Date 2/3/2025  2/20/2025  3/11/2025  3/31/2025   INR Goal 2.5-3.5  2.5-3.5  2.5-3.5  2.5-3.5   Trend Same  Same  Same  Same   Last Week Total 42.5 mg  42.5 mg  42.5 mg  42.5 mg   Next Week Total 42.5 mg  42.5 mg  42.5 mg  42.5 mg   Sun 7.5 mg  7.5 mg  7.5 mg  7.5 mg   Mon 5 mg  5 mg  5 mg  5 mg   Tue 7.5 mg  7.5 mg  7.5 mg  7.5 mg   Wed 5 mg  5 mg  5 mg  5 mg   Thu 7.5 mg  7.5 mg  7.5 mg  7.5 mg   Fri 5 mg  5 mg  5 mg  5 mg   Sat 5 mg  5 mg  5 mg  5 mg   Historical INR  3.20      3.30      3.50            This result is from an external source.       Plan:  1. INR is therapeutic today- see above in Anticoagulation Summary.    Fernie Porter to continue their warfarin regimen- see above in Anticoagulation Summary.  2. Follow up in 2 weeks  3.  Pt has agreed to only be called if INR out of range. They have been instructed to call if any changes in medications, doses, concerns, etc. Patient expresses understanding and has no further questions at this time.    Georgie Gaming

## 2025-04-07 RX ORDER — CLONIDINE HYDROCHLORIDE 0.2 MG/1
0.2 TABLET ORAL EVERY 12 HOURS SCHEDULED
Qty: 180 TABLET | Refills: 1 | Status: SHIPPED | OUTPATIENT
Start: 2025-04-07

## 2025-04-16 LAB — INR PPP: 3.2

## 2025-04-17 ENCOUNTER — ANTICOAGULATION VISIT (OUTPATIENT)
Dept: PHARMACY | Facility: HOSPITAL | Age: 55
End: 2025-04-17
Payer: COMMERCIAL

## 2025-04-17 DIAGNOSIS — Z95.2 AORTIC VALVE REPLACED: Primary | Chronic | ICD-10-CM

## 2025-04-17 DIAGNOSIS — I48.0 PAROXYSMAL ATRIAL FIBRILLATION: Chronic | ICD-10-CM

## 2025-04-17 NOTE — PROGRESS NOTES
Anticoagulation Clinic Progress Note    Anticoagulation Summary  As of 4/17/2025      INR goal:  2.5-3.5   TTR:  93.5% (6.4 y)   INR used for dosing:  3.20 (4/16/2025)   Warfarin maintenance plan:  7.5 mg every Sun, Tue, Thu; 5 mg all other days   Weekly warfarin total:  42.5 mg   No change documented:  Ervin Byers, Pharmacy Technician   Plan last modified:  Katie Schwarz, PharmD (4/13/2021)   Next INR check:  5/1/2025   Priority:  Maintenance   Target end date:  --    Indications    Aortic valve replaced [Z95.2]  Paroxysmal atrial fibrillation [I48.0]                 Anticoagulation Episode Summary       INR check location:  --    Preferred lab:  --    Send INR reminders to:   [facility]  POOL    Comments:  *[place]ag[place]k*          Anticoagulation Care Providers       Provider Role Specialty Phone number    Crissy MejiaELLA Referring Cardiology [phone]            Clinic Interview:  No pertinent clinical findings have been reported.    INR History:      2/21/2025     9:51 AM 3/11/2025    12:00 AM 3/12/2025     8:22 AM 3/31/2025    12:00 AM 4/1/2025     9:12 AM 4/16/2025    12:00 AM 4/17/2025     3:04 PM   Anticoagulation Monitoring   INR 3.20  3.30  3.50  3.20   INR Date 2/20/2025  3/11/2025  3/31/2025  4/16/2025   INR Goal 2.5-3.5  2.5-3.5  2.5-3.5  2.5-3.5   Trend Same  Same  Same  Same   Last Week Total 42.5 mg  42.5 mg  42.5 mg  42.5 mg   Next Week Total 42.5 mg  42.5 mg  42.5 mg  42.5 mg   Sun 7.5 mg  7.5 mg  7.5 mg  7.5 mg   Mon 5 mg  5 mg  5 mg  5 mg   Tue 7.5 mg  7.5 mg  7.5 mg  7.5 mg   Wed 5 mg  5 mg  5 mg  5 mg   Thu 7.5 mg  7.5 mg  7.5 mg  7.5 mg   Fri 5 mg  5 mg  5 mg  5 mg   Sat 5 mg  5 mg  5 mg  5 mg   Historical INR  3.30      3.50      3.20            This result is from an external source.       Plan:  1. INR is therapeutic today- see above in Anticoagulation Summary.    Fernie Porter to continue their warfarin regimen- see above in Anticoagulation Summary.  2. Follow up  in 2 weeks  3. Pt has agreed to only be called if INR out of range. They have been instructed to call if any changes in medications, doses, concerns, etc. Patient expresses understanding and has no further questions at this time.    Ervin Byers, Pharmacy Technician

## 2025-04-21 ENCOUNTER — OFFICE VISIT (OUTPATIENT)
Dept: INTERNAL MEDICINE | Facility: CLINIC | Age: 55
End: 2025-04-21
Payer: COMMERCIAL

## 2025-04-21 VITALS
WEIGHT: 162 LBS | DIASTOLIC BLOOD PRESSURE: 80 MMHG | BODY MASS INDEX: 23.99 KG/M2 | HEART RATE: 60 BPM | RESPIRATION RATE: 18 BRPM | TEMPERATURE: 98.3 F | OXYGEN SATURATION: 98 % | HEIGHT: 69 IN | SYSTOLIC BLOOD PRESSURE: 128 MMHG

## 2025-04-21 DIAGNOSIS — I48.0 PAROXYSMAL ATRIAL FIBRILLATION: Chronic | ICD-10-CM

## 2025-04-21 DIAGNOSIS — Z95.2 AORTIC VALVE REPLACED: Primary | Chronic | ICD-10-CM

## 2025-04-21 DIAGNOSIS — M25.551 PAIN OF RIGHT HIP: ICD-10-CM

## 2025-04-21 DIAGNOSIS — I10 PRIMARY HYPERTENSION: Chronic | ICD-10-CM

## 2025-04-21 PROCEDURE — 99214 OFFICE O/P EST MOD 30 MIN: CPT | Performed by: INTERNAL MEDICINE

## 2025-04-21 NOTE — PROGRESS NOTES
Subjective   Fernie Porter is a 54 y.o. male.     Chief Complaint   Patient presents with    Hypertension    Atrial Fibrillation         Hypertension  This is a chronic problem. The current episode started more than 1 year ago. The problem is unchanged. The problem is controlled. Associated symptoms include headaches. Pertinent negatives include no chest pain, orthopnea, palpitations, peripheral edema or shortness of breath.   Atrial Fibrillation  Presents for follow-up visit. Symptoms are negative for chest pain, palpitations and shortness of breath. The symptoms have been stable. Past medical history includes atrial fibrillation.        The following portions of the patient's history were reviewed and updated as appropriate: allergies, current medications, past social history and problem list.    Outpatient Medications Marked as Taking for the 4/21/25 encounter (Office Visit) with Juan Antonio Ford MD   Medication Sig Dispense Refill    Ajovy 225 MG/1.5ML solution auto-injector INJECT 225 MG UNDER THE SKIN INTO THE APPROPRIATE AREA AS DIRECTED EVERY 30 (THIRTY) DAYS. 4.5 mL 3    amLODIPine (NORVASC) 5 MG tablet TAKE 1 TABLET BY MOUTH EVERY DAY 90 tablet 2    cloNIDine (CATAPRES) 0.2 MG tablet TAKE 1 TABLET BY MOUTH TWICE A  tablet 1    fluticasone (FLONASE) 50 MCG/ACT nasal spray 2 SPRAYS INTO THE NOSTRIL(S) AS DIRECTED BY PROVIDER DAILY. 48 mL 2    hydroCHLOROthiazide (MICROZIDE) 12.5 MG capsule TAKE 1 CAPSULE BY MOUTH EVERY DAY 90 capsule 1    ibuprofen (ADVIL,MOTRIN) 200 MG tablet Take 1 tablet by mouth Every 6 (Six) Hours As Needed for Mild Pain.      imipramine (TOFRANIL) 50 MG tablet TAKE 2 TABLETS BY MOUTH AT BEDTIME 180 tablet 1    propranolol LA (Inderal LA) 160 MG 24 hr capsule Take 1 capsule by mouth Daily. 90 capsule 3    warfarin (COUMADIN) 5 MG tablet TAKE 7.5MG (1 AND 1/2 TABLETS) ON SUNDAY, TUESDAY, AND THURSDAY TAKE 5MG (1 TABLET) ALL OTHER DAYS OR AS DIRECTED BY THE MED MANAGEMENT  CLINIC. 105 tablet 1       Review of Systems   Respiratory:  Negative for shortness of breath and wheezing.    Cardiovascular:  Negative for chest pain, palpitations, orthopnea and leg swelling.   Gastrointestinal:  Negative for constipation and diarrhea.   Musculoskeletal:  Positive for arthralgias (Right hip pain for 18 months starting October 2023).   Neurological:  Positive for headaches.       Objective   Vitals:    04/21/25 1422   BP: 128/80   Pulse: 60   Resp: 18   Temp: 98.3 °F (36.8 °C)   SpO2: 98%          04/21/25  1422   Weight: 73.5 kg (162 lb)    [unfilled]  Body mass index is 23.92 kg/m².      Physical Exam  Constitutional:       Appearance: Normal appearance. He is well-developed.   Neck:      Thyroid: No thyromegaly.   Cardiovascular:      Rate and Rhythm: Normal rate and regular rhythm.      Heart sounds: Murmur heard.       with a grade of 2/6.      No gallop.      Comments: Process heart sounds.  Grade 2/6 JOHNNIE loudest at the pulmonic area  Pulmonary:      Effort: Pulmonary effort is normal. No respiratory distress.      Breath sounds: Normal breath sounds. No wheezing or rales.   Abdominal:      General: Bowel sounds are normal.      Palpations: Abdomen is soft. There is no mass.      Tenderness: There is no abdominal tenderness. There is no guarding.   Neurological:      Mental Status: He is alert.            Problems Addressed this Visit          Cardiac and Vasculature    Paroxysmal atrial fibrillation (Chronic)    Hypertension (Chronic)    Aortic valve replaced - Primary (Chronic)     Diagnoses         Codes Comments      Aortic valve replaced    -  Primary ICD-10-CM: Z95.2  ICD-9-CM: V43.3       Primary hypertension     ICD-10-CM: I10  ICD-9-CM: 401.9       Paroxysmal atrial fibrillation     ICD-10-CM: I48.0  ICD-9-CM: 427.31           Assessment & Plan   In for recheck of hypertension, atrial fibrillation aortic stenosis and migraines today April 2025.  Migraines persist and are pretty  much unchanged.  They're doing pretty well at the present time.  Gets 3 per month.  Has failed several drugs in the past including Topamax.  Currently on Ajovy injections once monthly and they have been working pretty well right now.  Vertigo is much improved.  He had no associated headache with these to suggest vertiginous migraines.  Blood pressure control is excellent now.  Atrial fib is controlled.  On propranolol  mg daily, HCTZ 12.5 mg daily, amlodipine 5 mg daily, and clonidine 0.2 mg twice daily for hypertension.  Those are reviewed today.  Gets annual lab work June 2025 including CBC, CMP, lipids, PSA urinalysis.  Continue to monitor every 3 months.  Certainly no severe migraine since he started Inderal LA.   Due for annual preventive exam November 2025.  He has had some right hip pain dating back to around October 2023 when he strained during an agility exercise with his dog.  Internal/external rotation are somewhat limited today although they do not cause pain.  This sounds like a primary hip problem.  Will get some plain films to start.  He is due for COVID-vaccine as well as Prevnar 20.    The above information was reviewed again today 04/21/25.  It continues to be accurate as reflected above and is unchanged.  History, physical and review of systems all reviewed and are unchanged.  Medications were reviewed today and continue the current dosing.      PPE today includes face mask and eye shield.  The 10-year ASCVD risk score (Sana LEO, et al., 2019) is: 5.2%    Values used to calculate the score:      Age: 54 years      Sex: Male      Is Non- : No      Diabetic: No      Tobacco smoker: No      Systolic Blood Pressure: 128 mmHg      Is BP treated: Yes      HDL Cholesterol: 47 mg/dL      Total Cholesterol: 184 mg/dL           Dragon disclaimer:   Much of this encounter note is an electronic transcription/translation of spoken language to printed text. The electronic  translation of spoken language may permit erroneous, or at times, nonsensical words or phrases to be inadvertently transcribed; Although I have reviewed the note for such errors, some may still exist.

## 2025-04-23 ENCOUNTER — HOSPITAL ENCOUNTER (OUTPATIENT)
Dept: GENERAL RADIOLOGY | Facility: HOSPITAL | Age: 55
Discharge: HOME | End: 2025-04-23
Admitting: INTERNAL MEDICINE
Payer: COMMERCIAL

## 2025-04-23 DIAGNOSIS — M25.551 PAIN OF RIGHT HIP: ICD-10-CM

## 2025-04-23 PROCEDURE — 73502 X-RAY EXAM HIP UNI 2-3 VIEWS: CPT

## 2025-05-01 LAB — INR PPP: 3

## 2025-05-02 ENCOUNTER — ANTICOAGULATION VISIT (OUTPATIENT)
Dept: PHARMACY | Facility: HOSPITAL | Age: 55
End: 2025-05-02
Payer: COMMERCIAL

## 2025-05-02 DIAGNOSIS — I48.0 PAROXYSMAL ATRIAL FIBRILLATION: Chronic | ICD-10-CM

## 2025-05-02 DIAGNOSIS — Z95.2 AORTIC VALVE REPLACED: Primary | Chronic | ICD-10-CM

## 2025-05-02 NOTE — PROGRESS NOTES
Anticoagulation Clinic Progress Note    Anticoagulation Summary  As of 5/2/2025      INR goal:  2.5-3.5   TTR:  93.6% (6.4 y)   INR used for dosing:  3.00 (5/1/2025)   Warfarin maintenance plan:  7.5 mg every Sun, Tue, Thu; 5 mg all other days   Weekly warfarin total:  42.5 mg   No change documented:  Georgie Gaming   Plan last modified:  Katie Schwarz, PharmD (4/13/2021)   Next INR check:  5/15/2025   Priority:  Maintenance   Target end date:  --    Indications    Aortic valve replaced [Z95.2]  Paroxysmal atrial fibrillation [I48.0]                 Anticoagulation Episode Summary       INR check location:  --    Preferred lab:  --    [facility]    Comments:  *Coaguchek*          Anticoagulation Care Providers       Provider Role Specialty Phone number    Jackie ELLA Woodward Referring Cardiology [phone]            Clinic Interview:  No pertinent clinical findings have been reported.    INR History:      3/12/2025     8:22 AM 3/31/2025    12:00 AM 4/1/2025     9:12 AM 4/16/2025    12:00 AM 4/17/2025     3:04 PM 5/1/2025    12:00 AM 5/2/2025     9:50 AM   Anticoagulation Monitoring   INR 3.30  3.50  3.20  3.00   INR Date 3/11/2025  3/31/2025  4/16/2025  5/1/2025   INR Goal 2.5-3.5  2.5-3.5  2.5-3.5  2.5-3.5   Trend Same  Same  Same  Same   Last Week Total 42.5 mg  42.5 mg  42.5 mg  42.5 mg   Next Week Total 42.5 mg  42.5 mg  42.5 mg  42.5 mg   Sun 7.5 mg  7.5 mg  7.5 mg  7.5 mg   Mon 5 mg  5 mg  5 mg  5 mg   Tue 7.5 mg  7.5 mg  7.5 mg  7.5 mg   Wed 5 mg  5 mg  5 mg  5 mg   Thu 7.5 mg  7.5 mg  7.5 mg  7.5 mg   Fri 5 mg  5 mg  5 mg  5 mg   Sat 5 mg  5 mg  5 mg  5 mg   Historical INR  3.50      3.20      3.00            This result is from an external source.       Plan:  1. INR is therapeutic today- see above in Anticoagulation Summary.    Fernie Porter to continue their warfarin regimen- see above in Anticoagulation Summary.  2. Follow up in 2 weeks  3. Pt  has agreed to only be called if INR out of range. They have been instructed to call if any changes in medications, doses, concerns, etc. Patient expresses understanding and has no further questions at this time.    Georgie Gaming

## 2025-05-09 RX ORDER — AMLODIPINE BESYLATE 5 MG/1
5 TABLET ORAL DAILY
Qty: 90 TABLET | Refills: 2 | Status: SHIPPED | OUTPATIENT
Start: 2025-05-09

## 2025-05-09 NOTE — PROGRESS NOTES
Anticoagulation Clinic Progress Note    Anticoagulation Summary  As of 2022    INR goal:  2.5-3.5   TTR:  90.5 % (3.1 y)   INR used for dosin.80 (1/3/2022)   Warfarin maintenance plan:  7.5 mg every Sun, Tue, Thu; 5 mg all other days   Weekly warfarin total:  42.5 mg   No change documented:  Cindy Mathew   Plan last modified:  Katie Schwarz [facility] (2021)   Next INR check:  2022   Priority:  Maintenance   Target end date:      Indications    Aortic valve replaced [Z95.2]  Paroxysmal atrial fibrillation (HCC) [I48.0]             Anticoagulation Episode Summary     INR check location:      Preferred lab:      [facility]   [facility]    Comments:  *Coaguchek*      Anticoagulation Care Providers     Provider Role Specialty Phone number    Juan Antonio Buenrostro MD Referring Cardiology [phone]          Clinic Interview:  No pertinent clinical findings have been reported.    INR History:  Anticoagulation Monitoring 2021   INR 3.40 3.30 2.80   INR Date 2021 2021 1/3/2022   INR Goal 2.5-3.5 2.5-3.5 2.5-3.5   Trend Same Same Same   Last Week Total 42.5 mg 42.5 mg 42.5 mg   Next Week Total 42.5 mg 42.5 mg 42.5 mg   Sun 7.5 mg 7.5 mg 7.5 mg   Mon 5 mg 5 mg 5 mg   Tue 7.5 mg 7.5 mg 7.5 mg   Wed 5 mg 5 mg 5 mg   Thu 7.5 mg 7.5 mg 7.5 mg   Fri 5 mg 5 mg 5 mg   Sat 5 mg 5 mg 5 mg   Visit Report - - -   Some recent data might be hidden       Plan:  1. INR is therapeutic today- see above in Anticoagulation Summary.    Fernie Porter to continue their warfarin regimen- see above in Anticoagulation Summary.  2. Follow up in 2 weeks  3. Pt has agreed to only be called if INR out of range. They have been instructed to call if any changes in medications, doses, concerns, etc. Patient expresses understanding and has no further questions at this time.    Cindy Mathew   Physical Therapy    Visit Type: treatment and discharge  SUBJECTIVE  Patient / Family Goal: maximize function and return home    Pain     Location: mild sternal pain     OBJECTIVE     Cognitive Status   Level of Consciousness   - alert  Orientation    - Oriented to: person, place, time and situation  Functional Communication   - Forms of Communication: verbal  Attention Span    - Attention: intact  Following Direction   - follows all commands and directions consistently  Memory   - appears intact  Safety Awareness/Insight   - intact  Awareness of Deficits   - fully aware of deficits      Sitting Balance  (JESSICA = base of support)  Static      - Trial 1 details: independent  Dynamic      - Trial 1 details: independent    Standing Balance  (JESSICA = base of support)  Firm Surface: Double Leg      - Static, Eyes Open       - Trial 1 details: supervision, with single UE support, with double UE support and without UE support     - Dynamic, Eyes Open       - Trial 1 details: stand by assist and with double UE support       Bed Mobility  - Sit to supine: stand by assist, with verbal cues      Educated patient and daughter on log roll technique and positioning to improve comfort in bed.  Transfers  Assistive devices: gait belt, 2-wheeled walker  - Sit to stand: supervision  - Stand to sit: supervision  - Stand pivot: stand by assist  - Toilet: supervision    Ambulation / Gait  - Assistive device: gait belt and 2-wheeled walker  - Distance (feet unless otherwise indicated): 200  - Assist Level: stand by assist  - Description: decreased toi/pace      No loss of balance or unsteadiness noted  Stair Ambulation  - Assist Level: not attempted due to environmental limitations  Patient does not have stairs at home    Interventions     Training provided: bed mobility training, transfer training, use of assistive device and gait training      Toileting and hand washing at sink - no loss of balance noted.    Skilled input: Verbal  instruction/cues  Verbal Consent: Writer verbally educated and received verbal consent for hand placement, positioning of patient, and techniques to be performed today from patient for clothing adjustments for techniques as described above and how they are pertinent to the patient's plan of care.         Education:   - Present and ready to learn: patient, patient's family and with patient present  Education provided during session:  -     Patient education focused on avoiding end range of motion of bilateral shoulders to limit strain to sternal incisional area. Strongly encourage patient to utilize 'heart' pillow to reduce pain with jarring movements including coughing, sneezing, deep breathing, etc.     Educated patient on frequent use of incentive spirometry to improve lung function post surgery and energy compensatory strategies with functional mobility.    Discussed with patient the importance of ambulation post surgery with a goal of 3 times per day in hospital setting to aide in recovery. Discussed both the role of inpatient cardiac rehabilitation and physical therapy to progress mobility.    - Results of above outlined education: Verbalizes understanding    ASSESSMENT   Progress: goals met  Interfering components: decreased activity tolerance and medical status limitations    Discharge needs based on today's assessment:   - Current level of function: slightly below baseline level of function   - Therapy needs at discharge: does not require ongoing therapy   - Activities of daily living (ADLs) requiring support at discharge: bed mobility, transfers, ambulation and stairs   - Impairments that require further therapy intervention: activity tolerance and pain    AM-PAC  - Generalized Prior Level of Function: IND/MOD I ([facility] 22-24)       Key: MOD A=moderate assistance, IND/MOD I=independent/modified independent  - Generalized Current Level of Function     - Current Mobility Score: 18       AM-PAC Scoring Key= >21  Modified Independent; 20-21 Supervision; 18-19 Minimal assist; 13-17 Moderate assist; 9-12 Max assist; <9 Total assist    Therapy Diagnosis:  Other Abnormalities of Gait and Mobility      No further physical therapy. Patient has rolling walker at home which she used after previous back surgery.         PLAN (while hospitalized)  Suggestions for next session as indicated:   PT Frequency: DC PT      PT/OT Mobility Equipment for Discharge: has rolling walker    A minimum of 8 minutes per session x 1 week in the acute setting.        Agreement to plan and goals: patient agrees with goals and treatment plan        GOALS  Review Date: 5/9/2025  Long Term Goals: (to be met by time of discharge from hospital)  Sit to supine: Patient will complete sit to supine supervision.  Status: met   Supine to sit: Patient will complete supine to sit supervision.  Status: met   Sit to stand: Patient will complete sit to stand transfer with least restrictive device, supervision.   Status: met   Ambulation (even): Patient will ambulate on even surface for 150 feet with least restrictive device, supervision.   Status: met   Documented in the chart in the following areas: Assessment/Plan.      Patient at End of Session:   Location: in bed  Safety measures: alarm system in place/re-engaged, bed rails x3, call light within reach and lines intact  Handoff to: nurse (Laura)      Therapy procedure time and total treatment time can be found documented on the Time Entry flowsheet

## 2025-05-19 ENCOUNTER — ANTICOAGULATION VISIT (OUTPATIENT)
Dept: PHARMACY | Facility: HOSPITAL | Age: 55
End: 2025-05-19
Payer: COMMERCIAL

## 2025-05-19 ENCOUNTER — OFFICE VISIT (OUTPATIENT)
Dept: ORTHOPEDIC SURGERY | Facility: CLINIC | Age: 55
End: 2025-05-19
Payer: COMMERCIAL

## 2025-05-19 VITALS — HEIGHT: 68 IN | BODY MASS INDEX: 24.89 KG/M2 | WEIGHT: 164.2 LBS | TEMPERATURE: 98 F

## 2025-05-19 DIAGNOSIS — Z95.2 AORTIC VALVE REPLACED: Primary | Chronic | ICD-10-CM

## 2025-05-19 DIAGNOSIS — M16.11 PRIMARY OSTEOARTHRITIS OF RIGHT HIP: Primary | ICD-10-CM

## 2025-05-19 DIAGNOSIS — I48.0 PAROXYSMAL ATRIAL FIBRILLATION: Chronic | ICD-10-CM

## 2025-05-19 LAB — INR PPP: 3.5

## 2025-05-19 PROCEDURE — 99204 OFFICE O/P NEW MOD 45 MIN: CPT | Performed by: NURSE PRACTITIONER

## 2025-05-19 NOTE — PROGRESS NOTES
Anticoagulation Clinic Progress Note    Anticoagulation Summary  As of 5/19/2025      INR goal:  2.5-3.5   TTR:  93.6% (6.5 y)   INR used for dosing:  3.50 (5/19/2025)   Warfarin maintenance plan:  7.5 mg every Sun, Tue, Thu; 5 mg all other days   Weekly warfarin total:  42.5 mg   No change documented:  Georgie Gaming   Plan last modified:  Katie Schwarz, PharmD (4/13/2021)   Next INR check:  6/2/2025   Priority:  Maintenance   Target end date:  --    Indications    Aortic valve replaced [Z95.2]  Paroxysmal atrial fibrillation [I48.0]                 Anticoagulation Episode Summary       INR check location:  --    Preferred lab:  --    [facility]  [facility]    Comments:  *Coaguchek*          Anticoagulation Care Providers       Provider Role Specialty Phone number    Jackie ELLA Woodward Referring Cardiology [phone]            Clinic Interview:  No pertinent clinical findings have been reported.    INR History:      4/1/2025     9:12 AM 4/16/2025    12:00 AM 4/17/2025     3:04 PM 5/1/2025    12:00 AM 5/2/2025     9:50 AM 5/19/2025    12:00 AM 5/19/2025     9:48 AM   Anticoagulation Monitoring   INR 3.50  3.20  3.00  3.50   INR Date 3/31/2025  4/16/2025  5/1/2025  5/19/2025   INR Goal 2.5-3.5  2.5-3.5  2.5-3.5  2.5-3.5   Trend Same  Same  Same  Same   Last Week Total 42.5 mg  42.5 mg  42.5 mg  42.5 mg   Next Week Total 42.5 mg  42.5 mg  42.5 mg  42.5 mg   Sun 7.5 mg  7.5 mg  7.5 mg  7.5 mg   Mon 5 mg  5 mg  5 mg  5 mg   Tue 7.5 mg  7.5 mg  7.5 mg  7.5 mg   Wed 5 mg  5 mg  5 mg  5 mg   Thu 7.5 mg  7.5 mg  7.5 mg  7.5 mg   Fri 5 mg  5 mg  5 mg  5 mg   Sat 5 mg  5 mg  5 mg  5 mg   Historical INR  3.20      3.00      3.50            This result is from an external source.       Plan:  1. INR is therapeutic today- see above in Anticoagulation Summary.    Fernie Porter to continue their warfarin regimen- see above in Anticoagulation Summary.  2. Follow up in 2 weeks  3.  Pt has agreed to only be called if INR out of range. They have been instructed to call if any changes in medications, doses, concerns, etc. Patient expresses understanding and has no further questions at this time.    Georgie Gaming

## 2025-05-19 NOTE — PROGRESS NOTES
Patient: Fernie Porter  YOB: 1970 54 y.o. male  Medical Record Number: [id number]    Chief Complaints:   Chief Complaint   Patient presents with    Right Hip - Initial Evaluation       History of Present Illness:Fernie Porter is a 54 y.o. male who presents as a new patient both myself as well as to the practice with complaints of right hip pain.  He reports that started several months ago, unfortunately has progressively gotten worse.  He is not able to take anti-inflammatories because he is on a blood thinner secondary to a history of aortic valve replacement and A-fib.  Patient reports at times the pain is worse than others it does fluctuate in intensity    Allergies:   Allergies   Allergen Reactions    Contrast Dye (Echo Or Unknown Ct/Mr) Itching    Iodinated Contrast Media Itching    Tagamet [Cimetidine] Itching       Medications:   Current Outpatient Medications   Medication Sig Dispense Refill    Ajovy 225 MG/1.5ML solution auto-injector INJECT 225 MG UNDER THE SKIN INTO THE APPROPRIATE AREA AS DIRECTED EVERY 30 (THIRTY) DAYS. 4.5 mL 3    amLODIPine (NORVASC) 5 MG tablet TAKE 1 TABLET BY MOUTH EVERY DAY 90 tablet 2    cloNIDine (CATAPRES) 0.2 MG tablet TAKE 1 TABLET BY MOUTH TWICE A  tablet 1    fluticasone (FLONASE) 50 MCG/ACT nasal spray 2 SPRAYS INTO THE NOSTRIL(S) AS DIRECTED BY PROVIDER DAILY. 48 mL 2    hydroCHLOROthiazide (MICROZIDE) 12.5 MG capsule TAKE 1 CAPSULE BY MOUTH EVERY DAY 90 capsule 1    ibuprofen (ADVIL,MOTRIN) 200 MG tablet Take 1 tablet by mouth Every 6 (Six) Hours As Needed for Mild Pain.      imipramine (TOFRANIL) 50 MG tablet TAKE 2 TABLETS BY MOUTH AT BEDTIME 180 tablet 1    propranolol LA (Inderal LA) 160 MG 24 hr capsule Take 1 capsule by mouth Daily. 90 capsule 3    warfarin (COUMADIN) 5 MG tablet TAKE 7.5MG (1 AND 1/2 TABLETS) ON SUNDAY, TUESDAY, AND THURSDAY TAKE 5MG (1 TABLET) ALL OTHER DAYS OR AS DIRECTED BY THE MED MANAGEMENT CLINIC. 105 tablet 1  "    No current facility-administered medications for this visit.         The following portions of the patient's history were reviewed and updated as appropriate: allergies, current medications, past family history, past medical history, past social history, past surgical history and problem list.    Review of Systems:   14 point review of systems was performed. All systems negative except pertinent positives/negatives listed in HPI above    Physical Exam:   Vitals:    05/19/25 1341   Temp: 98 °F (36.7 °C)   Weight: 74.5 kg (164 lb 3.2 oz)   Height: 172.7 cm (68\")       General: A and O x 3, ASA, NAD   Skin clear no unusual lesions noted  Right hip patient is nontender palpation he has mild pain with internal extra rotation with a negative Stinchfield negative logroll calf is soft and nontender       Radiology:  Xrays previous x-rays of the right hip were reviewed and show significant osteoarthritic changes with bone-on-bone articulation best seen on the lateral view.    Assessment/Plan: Osteoarthritis right hip    Patient and I discussed options including conservative measures versus total hip replacement.  At this point his symptoms are still fluctuating not severe in nature, would recommend continuing with Tylenol Extra Strength as needed, we briefly discussed hip injection however he understands it is very short-lived.  I will see him back for follow-up in about 3 to 4 months for follow-up but he will call in the meantime if his symptoms worsen      Santa Bruner, APRN  5/19/2025  "

## 2025-05-24 ENCOUNTER — PATIENT ROUNDING (BHMG ONLY) (OUTPATIENT)
Dept: ORTHOPEDIC SURGERY | Facility: CLINIC | Age: 55
End: 2025-05-24
Payer: COMMERCIAL

## 2025-06-03 ENCOUNTER — ANTICOAGULATION VISIT (OUTPATIENT)
Dept: PHARMACY | Facility: HOSPITAL | Age: 55
End: 2025-06-03
Payer: COMMERCIAL

## 2025-06-03 DIAGNOSIS — Z95.2 AORTIC VALVE REPLACED: Primary | Chronic | ICD-10-CM

## 2025-06-03 DIAGNOSIS — I48.0 PAROXYSMAL ATRIAL FIBRILLATION: Chronic | ICD-10-CM

## 2025-06-03 RX ORDER — WARFARIN SODIUM 5 MG/1
TABLET ORAL
Qty: 105 TABLET | Refills: 1 | Status: SHIPPED | OUTPATIENT
Start: 2025-06-03

## 2025-06-07 LAB — INR PPP: 2.8

## 2025-06-09 ENCOUNTER — ANTICOAGULATION VISIT (OUTPATIENT)
Dept: PHARMACY | Facility: HOSPITAL | Age: 55
End: 2025-06-09
Payer: COMMERCIAL

## 2025-06-09 DIAGNOSIS — I48.0 PAROXYSMAL ATRIAL FIBRILLATION: Chronic | ICD-10-CM

## 2025-06-09 DIAGNOSIS — Z95.2 AORTIC VALVE REPLACED: Primary | Chronic | ICD-10-CM

## 2025-06-09 NOTE — PROGRESS NOTES
Anticoagulation Clinic Progress Note    Anticoagulation Summary  As of 2025      INR goal:  2.5-3.5   TTR:  93.7% (6.5 y)   INR used for dosin.80 (2025)   Warfarin maintenance plan:  7.5 mg every Sun, Tue, Thu; 5 mg all other days   Weekly warfarin total:  42.5 mg   No change documented:  Ervin Byers, Pharmacy Technician   Plan last modified:  Katie Schwarz, PharmD (2021)   Next INR check:  2025   Priority:  Maintenance   Target end date:  --    Indications    Aortic valve replaced [Z95.2]  Paroxysmal atrial fibrillation [I48.0]                 Anticoagulation Episode Summary       INR check location:  --    Preferred lab:  --    Send INR reminders to:   [facility]  POOL    Comments:  *Coaguchek*          Anticoagulation Care Providers       Provider Role Specialty Phone number    Crissy Mejia APRDIMPLE Referring Cardiology [phone]            Clinic Interview:  No pertinent clinical findings have been reported.    INR History:      2025    12:00 AM 2025     9:50 AM 2025    12:00 AM 2025     9:48 AM 6/3/2025    11:05 AM 2025    12:00 AM 2025     8:03 AM   Anticoagulation Monitoring   INR  3.00  3.50 --  2.80   INR Date  2025   INR Goal  2.5-3.5  2.5-3.5 2.5-3.5  2.5-3.5   Trend  Same  Same   Same   Last Week Total  42.5 mg  42.5 mg   42.5 mg   Next Week Total  42.5 mg  42.5 mg   42.5 mg   Sun  7.5 mg  7.5 mg   7.5 mg   Mon  5 mg  5 mg   5 mg   Tue  7.5 mg  7.5 mg   7.5 mg   Wed  5 mg  5 mg   5 mg   Thu  7.5 mg  7.5 mg   7.5 mg   Fri  5 mg  5 mg   5 mg   Sat  5 mg  5 mg   5 mg   Historical INR 3.00      3.50       2.80        Visit Report   Report Report          This result is from an external source.       Plan:  1. INR is therapeutic today- see above in Anticoagulation Summary.    Fernie Porter to continue their warfarin regimen- see above in Anticoagulation Summary.  2. Follow up in 2 weeks  3. Pt has agreed to only  be called if INR out of range. They have been instructed to call if any changes in medications, doses, concerns, etc. Patient expresses understanding and has no further questions at this time.    Ervin Byers, Pharmacy Technician

## 2025-06-20 NOTE — PROGRESS NOTES
RM:________                            : 1970  AGE: 54 y.o.      WT: ____________ HT: ______ BP: __________ HR ______   02% _______                   VISIT:   F/U   HOSP  CC __________________________ OTHER _________                                                  PACER/ ICD        EKG TODAY:  Y /  N        SMOKING: Y / N   PPD ________      EXERCISE: ____________________________________________________      SLEEP STUDY : Y / N     POS / NEG    CPAP / BIPAP     WEARING:  Y / N       DIAGNOSIS: ___________________________________   REFILLS  Y / N      CP _____  SOA______ EDEMA_____ DIZZINESS____ PALPITATIONS____

## 2025-06-23 LAB — INR PPP: 3

## 2025-06-24 ENCOUNTER — OFFICE VISIT (OUTPATIENT)
Dept: CARDIOLOGY | Age: 55
End: 2025-06-24
Payer: COMMERCIAL

## 2025-06-24 ENCOUNTER — ANTICOAGULATION VISIT (OUTPATIENT)
Dept: PHARMACY | Facility: HOSPITAL | Age: 55
End: 2025-06-24
Payer: COMMERCIAL

## 2025-06-24 VITALS
HEART RATE: 51 BPM | HEIGHT: 68 IN | WEIGHT: 163 LBS | SYSTOLIC BLOOD PRESSURE: 110 MMHG | BODY MASS INDEX: 24.71 KG/M2 | DIASTOLIC BLOOD PRESSURE: 60 MMHG

## 2025-06-24 DIAGNOSIS — I25.10 CORONARY ARTERY CALCIFICATION SEEN ON CT SCAN: ICD-10-CM

## 2025-06-24 DIAGNOSIS — I48.0 PAROXYSMAL ATRIAL FIBRILLATION: Chronic | ICD-10-CM

## 2025-06-24 DIAGNOSIS — Z95.2 AORTIC VALVE REPLACED: ICD-10-CM

## 2025-06-24 DIAGNOSIS — Q25.1 COARCTATION OF AORTA: Primary | ICD-10-CM

## 2025-06-24 DIAGNOSIS — Z95.2 AORTIC VALVE REPLACED: Primary | Chronic | ICD-10-CM

## 2025-06-24 PROCEDURE — 99214 OFFICE O/P EST MOD 30 MIN: CPT | Performed by: NURSE PRACTITIONER

## 2025-06-24 PROCEDURE — 93000 ELECTROCARDIOGRAM COMPLETE: CPT | Performed by: NURSE PRACTITIONER

## 2025-06-24 NOTE — PROGRESS NOTES
Date of Office Visit: 25  Encounter Provider: ELLA Montemayor  Place of Service: [facility] CARDIOLOGY  Patient Name: Fernie Porter  :1970    Chief Complaint   Patient presents with    Aortic valve replaced    Coarctation of aorta    Follow-up   :     HPI: Fernie Porter is a 54 y.o. 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  He was followed by Dr. Buenrostro.  I will visit with him for the first time today and have reviewed his medical record.     He had repair of coarctation of the aorta approximately age 5 but later developed a pseudoaneurysm and he had a second open heart surgery approximately age 20.         He ultimately had aortic valve mechanical replacement with a valve conduit for bicuspid aortic valve with severe aortic valve stenosis and ascending aortic enlargement in .  He had cryo-maze procedure and left atrial appendage ligation.  He later was found to have abnormal fluid collection around the aortic graft which was felt to represent a seroma.  In  was found to have mild narrowing in the region of previous coarctation repair and follow-up CT showed stable narrowing distal to the left subclavian with evidence of residual thymus.      Follow-up echo 2023 showed preserved LV systolic function, indeterminate diastolic function, mechanical aortic valve with mean and peak gradients within defined limits.  He wore a 48-hour Holter monitor which showed rare PACs and rare PVCs which was normal 2024.    Presents today for annual reassessment.  He has occasional palpitations which are in his normal range.  No dizziness or lightheadedness or edema or shortness of breath or chest pain.  No bleeding issues.  He does walking 5 to 7 days a week and also runs 3 days.  No exertional issue.    Allergies   Allergen Reactions     "Contrast Dye (Echo Or Unknown Ct/Mr) Itching    Iodinated Contrast Media Itching    Tagamet [Cimetidine] Itching           Family and social history reviewed.     ROS  All other systems were reviewed and are negative          Objective:     Vitals:    06/24/25 1500   BP: 110/60   BP Location: Left arm   Patient Position: Sitting   Cuff Size: Adult   Pulse: 51   Weight: 73.9 kg (163 lb)   Height: 172.7 cm (68\")     Body mass index is 24.78 kg/m².    PHYSICAL EXAM:  Pulmonary:      Effort: Pulmonary effort is normal.      Breath sounds: Normal breath sounds.   Cardiovascular:      Normal rate. Regular rhythm.      Murmurs: There is a grade 2/6 systolic murmur.      Comments: Art s2          ECG 12 Lead    Date/Time: 6/24/2025 3:30 PM  Performed by: Crissy Mejia APRN    Authorized by: Crissy Mejia APRN  Comparison: compared with previous ECG   Similar to previous ECG  Rhythm: sinus rhythm            Current Outpatient Medications   Medication Sig Dispense Refill    Ajovy 225 MG/1.5ML solution auto-injector INJECT 225 MG UNDER THE SKIN INTO THE APPROPRIATE AREA AS DIRECTED EVERY 30 (THIRTY) DAYS. 4.5 mL 3    amLODIPine (NORVASC) 5 MG tablet TAKE 1 TABLET BY MOUTH EVERY DAY 90 tablet 2    cloNIDine (CATAPRES) 0.2 MG tablet TAKE 1 TABLET BY MOUTH TWICE A  tablet 1    fluticasone (FLONASE) 50 MCG/ACT nasal spray 2 SPRAYS INTO THE NOSTRIL(S) AS DIRECTED BY PROVIDER DAILY. 48 mL 2    hydroCHLOROthiazide (MICROZIDE) 12.5 MG capsule TAKE 1 CAPSULE BY MOUTH EVERY DAY 90 capsule 1    ibuprofen (ADVIL,MOTRIN) 200 MG tablet Take 1 tablet by mouth Every 6 (Six) Hours As Needed for Mild Pain.      imipramine (TOFRANIL) 50 MG tablet TAKE 2 TABLETS BY MOUTH AT BEDTIME 180 tablet 1    propranolol LA (Inderal LA) 160 MG 24 hr capsule Take 1 capsule by mouth Daily. 90 capsule 3    warfarin (COUMADIN) 5 MG tablet TAKE 7.5MG (1 AND 1/2 TABLETS) ON SUNDAY, TUESDAY, AND THURSDAY TAKE 5MG (1 TABLET) ALL OTHER DAYS OR AS DIRECTED BY " THE MED MANAGEMENT CLINIC. 105 tablet 1     No current facility-administered medications for this visit.     Assessment:       Diagnosis Plan   1. Coarctation of aorta  ECG 12 Lead    Adult Transthoracic Echo Complete W/ Cont if Necessary Per Protocol      2. Aortic valve replaced  ECG 12 Lead    Adult Transthoracic Echo Complete W/ Cont if Necessary Per Protocol      3. Paroxysmal atrial fibrillation        4. Coronary artery calcification seen on CT scan             Orders Placed This Encounter   Procedures    ECG 12 Lead     This order was created via procedure documentation     Release to patient:   Routine Release [[id number]]    Adult Transthoracic Echo Complete W/ Cont if Necessary Per Protocol     Standing Status:   Future     Expected Date:   6/24/2026     Reason for exam?:   Valvular Function     Valvular Function specification?:   Prosthetic Valve     Prosthetic Valve specification?:   Routine Surveillance (3 years)     Release to patient:   Routine Release [[id number]]         Plan:         1. 54-year-old gentleman with a history of bicuspid aortic valve with severe aortic stenosis, ascending aortic enlargement.  He is now status post aortic valve replacement with a valve conduit using a mechanical St. Vikash valve with reimplantation of the coronary arteries. Echocardiogram June 2023 showed normal mean and peak gradients.  Will plan repeat echo for 2026  2. 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  Was reoperation with  patch repair.  Last CT at [facility] imaging July 2023 showed small residual thymus but otherwise stable  3. Paroxysmal atrial fibrillation.  This was prior to his valve surgery, but he now has cryoMaze procedure and appendage ligation.  No recurrent episodes off medication continue the same.  Remains on warfarin and followed in our anticoagulation clinic  4. Hypertension.  His blood pressure is at goal.   5.  Seroma-he was previously noted to have stable fluid around the  acing aorta thought to be seroma.  CT surgery evaluated previously.  Last CT July 2023 at [facility].  He has no new symptoms so I would hold off on additional testing at this time  6.  Hyperlipidemia.  Not on statin therapy.  7.  Coronary artery calcification small on CT July 2023    Follow-up in 1 year.  Call sooner with any questions or concerns        It has been a pleasure to participate in this patient's care.      Thank you,  ELLA Montemayor      **I used Dragon to dictate this note:**

## 2025-06-24 NOTE — PROGRESS NOTES
Anticoagulation Clinic Progress Note    Anticoagulation Summary  As of 6/24/2025      INR goal:  2.5-3.5   TTR:  93.7% (6.6 y)   INR used for dosing:  3.00 (6/23/2025)   Warfarin maintenance plan:  7.5 mg every Sun, Tue, Thu; 5 mg all other days   Weekly warfarin total:  42.5 mg   No change documented:  Ervin Byers, Pharmacy Technician   Plan last modified:  Katie Schwarz, PharmD (4/13/2021)   Next INR check:  7/7/2025   Priority:  Maintenance   Target end date:  --    Indications    Aortic valve replaced [Z95.2]  Paroxysmal atrial fibrillation [I48.0]                 Anticoagulation Episode Summary       INR check location:  --    Preferred lab:  --    Send INR reminders to:   JUL[facility]TE [facility]  POOL    Comments:  *Coaguchek*          Anticoagulation Care Providers       Provider Role Specialty Phone number    Crissy Mejia ELLA Referring Cardiology [phone]            Clinic Interview:  No pertinent clinical findings have been reported.    INR History:      5/19/2025    12:00 AM 5/19/2025     9:48 AM 6/3/2025    11:05 AM 6/7/2025    12:00 AM 6/9/2025     8:03 AM 6/23/2025    12:00 AM 6/24/2025     7:55 AM   Anticoagulation Monitoring   INR  3.50 --  2.80  3.00   INR Date  5/19/2025 6/7/2025 6/23/2025   INR Goal  2.5-3.5 2.5-3.5  2.5-3.5  2.5-3.5   Trend  Same   Same  Same   Last Week Total  42.5 mg   42.5 mg  42.5 mg   Next Week Total  42.5 mg   42.5 mg  42.5 mg   Sun  7.5 mg   7.5 mg  7.5 mg   Mon  5 mg   5 mg  5 mg   Tue  7.5 mg   7.5 mg  7.5 mg   Wed  5 mg   5 mg  5 mg   Thu  7.5 mg   7.5 mg  7.5 mg   Fri  5 mg   5 mg  5 mg   Sat  5 mg   5 mg  5 mg   Historical INR 3.50       2.80      3.00        Visit Report Report Report            This result is from an external source.       Plan:  1. INR is therapeutic today- see above in Anticoagulation Summary.    Fernie Porter to continue their warfarin regimen- see above in Anticoagulation Summary.  2. Follow up in 2 weeks  3. Pt has agreed to  only be called if INR out of range. They have been instructed to call if any changes in medications, doses, concerns, etc. Patient expresses understanding and has no further questions at this time.    Ervin Byers, Pharmacy Technician

## 2025-07-08 RX ORDER — HYDROCHLOROTHIAZIDE 12.5 MG/1
12.5 CAPSULE ORAL DAILY
Qty: 90 CAPSULE | Refills: 1 | Status: SHIPPED | OUTPATIENT
Start: 2025-07-08

## 2025-07-13 LAB — INR PPP: 3.1

## 2025-07-14 ENCOUNTER — ANTICOAGULATION VISIT (OUTPATIENT)
Dept: PHARMACY | Facility: HOSPITAL | Age: 55
End: 2025-07-14
Payer: COMMERCIAL

## 2025-07-14 DIAGNOSIS — I48.0 PAROXYSMAL ATRIAL FIBRILLATION: Chronic | ICD-10-CM

## 2025-07-14 DIAGNOSIS — Z95.2 AORTIC VALVE REPLACED: Primary | Chronic | ICD-10-CM

## 2025-07-14 NOTE — PROGRESS NOTES
Anticoagulation Clinic Progress Note    Anticoagulation Summary  As of 7/14/2025      INR goal:  2.5-3.5   TTR:  93.8% (6.6 y)   INR used for dosing:  3.10 (7/13/2025)   Warfarin maintenance plan:  7.5 mg every Sun, Tue, Thu; 5 mg all other days   Weekly warfarin total:  42.5 mg   No change documented:  Brayan Patterson, Toni   Plan last modified:  Katie Schwarz PharmD (4/13/2021)   Next INR check:  7/28/2025   Priority:  Maintenance   Target end date:  --    Indications    Aortic valve replaced [Z95.2]  Paroxysmal atrial fibrillation [I48.0]                 Anticoagulation Episode Summary       INR check location:  --    Preferred lab:  --    Send INR reminders to:   [facility]  POOL    Comments:  *Coaguchek*          Anticoagulation Care Providers       Provider Role Specialty Phone number    Jackie ELLA Woodward Referring Cardiology [phone]            Clinic Interview:  No pertinent clinical findings have been reported.    INR History:      6/3/2025    11:05 AM 6/7/2025    12:00 AM 6/9/2025     8:03 AM 6/23/2025    12:00 AM 6/24/2025     7:55 AM 7/13/2025    12:00 AM 7/14/2025     8:30 AM   Anticoagulation Monitoring   INR --  2.80  3.00  3.10   INR Date   6/7/2025 6/23/2025 7/13/2025   INR Goal 2.5-3.5  2.5-3.5  2.5-3.5  2.5-3.5   Trend   Same  Same  Same   Last Week Total   42.5 mg  42.5 mg  42.5 mg   Next Week Total   42.5 mg  42.5 mg  42.5 mg   Sun   7.5 mg  7.5 mg  7.5 mg   Mon   5 mg  5 mg  5 mg   Tue   7.5 mg  7.5 mg  7.5 mg   Wed   5 mg  5 mg  5 mg   Thu   7.5 mg  7.5 mg  7.5 mg   Fri   5 mg  5 mg  5 mg   Sat   5 mg  5 mg  5 mg   Historical INR  2.80      3.00      3.10        Visit Report     Report         This result is from an external source.       Plan:  1. INR is therapeutic today- see above in Anticoagulation Summary.    Fernie Porter to continue their warfarin regimen- see above in Anticoagulation Summary.  2. Follow up in 2 weeks  3. Pt has agreed to only be called if INR  out of range. They have been instructed to call if any changes in medications, doses, concerns, etc. Patient expresses understanding and has no further questions at this time.    Brayan Patterson, PharmD

## 2025-07-21 ENCOUNTER — OFFICE VISIT (OUTPATIENT)
Dept: INTERNAL MEDICINE | Facility: CLINIC | Age: 55
End: 2025-07-21
Payer: COMMERCIAL

## 2025-07-21 VITALS
TEMPERATURE: 98.6 F | RESPIRATION RATE: 16 BRPM | WEIGHT: 166 LBS | BODY MASS INDEX: 25.16 KG/M2 | DIASTOLIC BLOOD PRESSURE: 76 MMHG | HEART RATE: 56 BPM | OXYGEN SATURATION: 97 % | HEIGHT: 68 IN | SYSTOLIC BLOOD PRESSURE: 118 MMHG

## 2025-07-21 DIAGNOSIS — I48.0 PAROXYSMAL ATRIAL FIBRILLATION: Chronic | ICD-10-CM

## 2025-07-21 DIAGNOSIS — Z79.899 MEDICATION MANAGEMENT: ICD-10-CM

## 2025-07-21 DIAGNOSIS — Z12.5 SCREENING FOR PROSTATE CANCER: ICD-10-CM

## 2025-07-21 DIAGNOSIS — I10 PRIMARY HYPERTENSION: Primary | Chronic | ICD-10-CM

## 2025-07-21 PROCEDURE — 99214 OFFICE O/P EST MOD 30 MIN: CPT | Performed by: INTERNAL MEDICINE

## 2025-07-21 NOTE — PROGRESS NOTES
Subjective   Fernie Porter is a 54 y.o. male.     Chief Complaint   Patient presents with    Hypertension         Hypertension  Chronicity:  Chronic  Onset:  More than 1 year ago  Progression since onset:  Unchanged  Condition status:  Controlled  Associated symptoms: headaches    Associated symptoms: no chest pain, no orthopnea, no palpitations, no peripheral edema and no shortness of breath    Primary Care Follow-Up  Conditions present: other    Current symptoms: headaches      Current symptoms: no chest pain, no palpitations, no shortness of breath, no orthopnea, no peripheral edema, no wheezing, no constipation and no diarrhea    Treatment compliance:  All of the time  Exercise:  Most days  Other:     Additional other condition information:  Migraines  Atrial Fibrillation  Presents for follow-up visit. Symptoms are negative for chest pain, palpitations and shortness of breath. The symptoms have been stable.        The following portions of the patient's history were reviewed and updated as appropriate: allergies, current medications, past social history and problem list.    Outpatient Medications Marked as Taking for the 7/21/25 encounter (Office Visit) with Juan Antonio Ford MD   Medication Sig Dispense Refill    Ajovy 225 MG/1.5ML solution auto-injector INJECT 225 MG UNDER THE SKIN INTO THE APPROPRIATE AREA AS DIRECTED EVERY 30 (THIRTY) DAYS. 4.5 mL 3    amLODIPine (NORVASC) 5 MG tablet TAKE 1 TABLET BY MOUTH EVERY DAY 90 tablet 2    cloNIDine (CATAPRES) 0.2 MG tablet TAKE 1 TABLET BY MOUTH TWICE A  tablet 1    fluticasone (FLONASE) 50 MCG/ACT nasal spray 2 SPRAYS INTO THE NOSTRIL(S) AS DIRECTED BY PROVIDER DAILY. 48 mL 2    hydroCHLOROthiazide (MICROZIDE) 12.5 MG capsule TAKE 1 CAPSULE BY MOUTH EVERY DAY 90 capsule 1    ibuprofen (ADVIL,MOTRIN) 200 MG tablet Take 1 tablet by mouth Every 6 (Six) Hours As Needed for Mild Pain.      imipramine (TOFRANIL) 50 MG tablet TAKE 2 TABLETS BY MOUTH AT BEDTIME  180 tablet 1    propranolol LA (Inderal LA) 160 MG 24 hr capsule Take 1 capsule by mouth Daily. 90 capsule 3    warfarin (COUMADIN) 5 MG tablet TAKE 7.5MG (1 AND 1/2 TABLETS) ON SUNDAY, TUESDAY, AND THURSDAY TAKE 5MG (1 TABLET) ALL OTHER DAYS OR AS DIRECTED BY THE MED MANAGEMENT CLINIC. 105 tablet 1       Review of Systems   Respiratory:  Negative for shortness of breath and wheezing.    Cardiovascular:  Negative for chest pain, palpitations, orthopnea and leg swelling.   Gastrointestinal:  Negative for constipation and diarrhea.   Musculoskeletal:  Positive for arthralgias (Right hip pain for 18 months starting October 2023).   Neurological:  Positive for headaches.       Objective   Vitals:    07/21/25 1458   BP: 118/76   Pulse: 56   Resp: 16   Temp: 98.6 °F (37 °C)   SpO2: 97%          07/21/25  1458   Weight: 75.3 kg (166 lb)    [unfilled]  Body mass index is 25.24 kg/m².      Physical Exam  Constitutional:       Appearance: Normal appearance. He is well-developed.   Neck:      Thyroid: No thyromegaly.   Cardiovascular:      Rate and Rhythm: Normal rate and regular rhythm.      Heart sounds: Murmur heard.      Systolic murmur is present with a grade of 2/6.      No gallop.      Comments: Prosthetic heart sounds.  Grade 2/6 JOHNNIE loudest at the pulmonic area  Pulmonary:      Effort: Pulmonary effort is normal. No respiratory distress.      Breath sounds: Normal breath sounds. No wheezing or rales.   Abdominal:      General: Bowel sounds are normal.      Palpations: Abdomen is soft. There is no mass.      Tenderness: There is no abdominal tenderness. There is no guarding.   Neurological:      Mental Status: He is alert.            Problems Addressed this Visit          Cardiac and Vasculature    Paroxysmal atrial fibrillation (Chronic)    Hypertension - Primary (Chronic)     Diagnoses         Codes Comments      Primary hypertension    -  Primary ICD-10-CM: I10  ICD-9-CM: 401.9       Paroxysmal atrial  fibrillation     ICD-10-CM: I48.0  ICD-9-CM: 427.31           Assessment & Plan   In for recheck of hypertension, atrial fibrillation, aortic stenosis and chronic migraines today July 2025.  Migraines persist and are pretty much unchanged.  They're doing pretty well at the present time.  Gets 3 per month.  Has failed several drugs in the past including Topamax.  Currently on Ajovy injections once monthly and they have been working pretty well right now.  Vertigo is much improved.  He had no associated headache with these to suggest vertiginous migraines.  Blood pressure control is excellent now.  Atrial fib is controlled.  On propranolol  mg daily, HCTZ 12.5 mg daily, amlodipine 5 mg daily, and clonidine 0.2 mg twice daily for hypertension.  Those are reviewed today.  Gets annual lab work today July 2025 including CBC, CMP, lipids, PSA urinalysis.  Continue to monitor every 3 months.  Certainly no severe migraine since he started Inderal LA.   Due for annual preventive exam November 2025.  He has had some right hip pain dating back to around October 2023 when he strained during an agility exercise with his dog.  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   He is due for Prevnar 21.  He is also due for repeat CTA of chest to follow his coarct repair to LAD 2025.    The above information was reviewed again today 07/21/25.  It continues to be accurate as reflected above and is unchanged.  History, physical and review of systems all reviewed and are unchanged.  Medications were reviewed today and continue the current dosing.        PPE today includes face mask and eye shield.  The 10-year ASCVD risk score (Sana LEO, et al., 2019) is: 4.5%    Values used to calculate the score:      Age: 54 years      Sex: Male      Is Non- : No      Diabetic: No      Tobacco smoker: No      Systolic Blood Pressure: 118 mmHg      Is BP treated: Yes       HDL Cholesterol: 47 mg/dL      Total Cholesterol: 184 mg/dL           Dragon disclaimer:   Much of this encounter note is an electronic transcription/translation of spoken language to printed text. The electronic translation of spoken language may permit erroneous, or at times, nonsensical words or phrases to be inadvertently transcribed; Although I have reviewed the note for such errors, some may still exist.

## 2025-07-24 DIAGNOSIS — Z91.041 CONTRAST MEDIA ALLERGY: Primary | ICD-10-CM

## 2025-07-28 LAB — INR PPP: 2.8

## 2025-07-29 ENCOUNTER — TELEPHONE (OUTPATIENT)
Dept: CARDIAC SURGERY | Facility: CLINIC | Age: 55
End: 2025-07-29
Payer: COMMERCIAL

## 2025-07-29 ENCOUNTER — ANTICOAGULATION VISIT (OUTPATIENT)
Dept: PHARMACY | Facility: HOSPITAL | Age: 55
End: 2025-07-29
Payer: COMMERCIAL

## 2025-07-29 DIAGNOSIS — Z95.2 AORTIC VALVE REPLACED: Primary | Chronic | ICD-10-CM

## 2025-07-29 DIAGNOSIS — Z91.041 CONTRAST MEDIA ALLERGY: Primary | ICD-10-CM

## 2025-07-29 DIAGNOSIS — Q25.1 COARCTATION OF AORTA: ICD-10-CM

## 2025-07-29 DIAGNOSIS — I48.0 PAROXYSMAL ATRIAL FIBRILLATION: Chronic | ICD-10-CM

## 2025-07-29 RX ORDER — PREDNISONE 50 MG/1
50 TABLET ORAL TAKE AS DIRECTED
Qty: 3 TABLET | Refills: 0 | Status: SHIPPED | OUTPATIENT
Start: 2025-07-29

## 2025-07-29 NOTE — PROGRESS NOTES
Anticoagulation Clinic Progress Note    Anticoagulation Summary  As of 2025      INR goal:  2.5-3.5   TTR:  93.8% (6.7 y)   INR used for dosin.80 (2025)   Warfarin maintenance plan:  7.5 mg every Sun, Tue, Thu; 5 mg all other days   Weekly warfarin total:  42.5 mg   No change documented:  Jazzy Salas RPH   Plan last modified:  Katie Schwarz, Toni (2021)   Next INR check:  2025   Priority:  Maintenance   Target end date:  --    Indications    Aortic valve replaced [Z95.2]  Paroxysmal atrial fibrillation [I48.0]                 Anticoagulation Episode Summary       INR check location:  --    Preferred lab:  --    Send INR reminders to:   [facility]  POOL    Comments:  *Coaguchek*          Anticoagulation Care Providers       Provider Role Specialty Phone number    Jackie ELLA Woodward Referring Cardiology [phone]            Clinic Interview:  No pertinent clinical findings have been reported.    INR History:      2025     8:03 AM 2025    12:00 AM 2025     7:55 AM 2025    12:00 AM 2025     8:30 AM 2025    12:00 AM 2025     8:43 AM   Anticoagulation Monitoring   INR 2.80  3.00  3.10  2.80   INR Date 2025   INR Goal 2.5-3.5  2.5-3.5  2.5-3.5  2.5-3.5   Trend Same  Same  Same  Same   Last Week Total 42.5 mg  42.5 mg  42.5 mg  42.5 mg   Next Week Total 42.5 mg  42.5 mg  42.5 mg  42.5 mg   Sun 7.5 mg  7.5 mg  7.5 mg  7.5 mg   Mon 5 mg  5 mg  5 mg  5 mg   Tue 7.5 mg  7.5 mg  7.5 mg  7.5 mg   Wed 5 mg  5 mg  5 mg  5 mg   Thu 7.5 mg  7.5 mg  7.5 mg  7.5 mg   Fri 5 mg  5 mg  5 mg  5 mg   Sat 5 mg  5 mg  5 mg  5 mg   Historical INR  3.00      3.10      2.80        Visit Report   Report           This result is from an external source.       Plan:  1. INR is therapeutic today- see above in Anticoagulation Summary.    Fernie Porter to continue their warfarin regimen- see above in Anticoagulation Summary.  2.  Follow up in 2 weeks  3. Pt has agreed to only be called if INR out of range. They have been instructed to call if any changes in medications, doses, concerns, etc. Patient expresses understanding and has no further questions at this time.    [facility], located within [facility]

## 2025-08-01 ENCOUNTER — TELEPHONE (OUTPATIENT)
Dept: CARDIAC SURGERY | Facility: CLINIC | Age: 55
End: 2025-08-01
Payer: COMMERCIAL

## 2025-08-13 LAB — INR PPP: 2.8

## 2025-08-15 ENCOUNTER — ANTICOAGULATION VISIT (OUTPATIENT)
Dept: PHARMACY | Facility: HOSPITAL | Age: 55
End: 2025-08-15
Payer: COMMERCIAL

## 2025-08-15 DIAGNOSIS — Z95.2 AORTIC VALVE REPLACED: Primary | Chronic | ICD-10-CM

## 2025-08-15 DIAGNOSIS — I48.0 PAROXYSMAL ATRIAL FIBRILLATION: Chronic | ICD-10-CM

## 2025-08-18 DIAGNOSIS — G43.109 MIGRAINE WITH AURA AND WITHOUT STATUS MIGRAINOSUS, NOT INTRACTABLE: ICD-10-CM

## 2025-08-18 RX ORDER — FREMANEZUMAB-VFRM 225 MG/1.5ML
225 INJECTION SUBCUTANEOUS
Qty: 1.5 ML | Refills: 3 | Status: SHIPPED | OUTPATIENT
Start: 2025-08-18

## 2025-08-20 ENCOUNTER — RESULTS FOLLOW-UP (OUTPATIENT)
Dept: CARDIOLOGY | Age: 55
End: 2025-08-20
Payer: COMMERCIAL

## 2025-08-21 ENCOUNTER — LAB (OUTPATIENT)
Dept: LAB | Facility: HOSPITAL | Age: 55
End: 2025-08-21
Payer: COMMERCIAL

## 2025-08-21 ENCOUNTER — RESULTS FOLLOW-UP (OUTPATIENT)
Dept: INTERNAL MEDICINE | Facility: CLINIC | Age: 55
End: 2025-08-21
Payer: COMMERCIAL

## 2025-08-21 LAB
ALBUMIN SERPL-MCNC: 4.3 G/DL (ref 3.5–5.2)
ALBUMIN/GLOB SERPL: 1.7 G/DL
ALP SERPL-CCNC: 50 U/L (ref 39–117)
ALT SERPL W P-5'-P-CCNC: 16 U/L (ref 1–41)
ANION GAP SERPL CALCULATED.3IONS-SCNC: 9.5 MMOL/L (ref 5–15)
AST SERPL-CCNC: 22 U/L (ref 1–40)
BASOPHILS # BLD AUTO: 0.05 10*3/MM3 (ref 0–0.2)
BASOPHILS NFR BLD AUTO: 0.9 % (ref 0–1.5)
BILIRUB SERPL-MCNC: 0.4 MG/DL (ref 0–1.2)
BUN SERPL-MCNC: 7 MG/DL (ref 6–20)
BUN/CREAT SERPL: 8 (ref 7–25)
CALCIUM SPEC-SCNC: 9.2 MG/DL (ref 8.6–10.5)
CHLORIDE SERPL-SCNC: 102 MMOL/L (ref 98–107)
CHOLEST SERPL-MCNC: 146 MG/DL (ref 0–200)
CO2 SERPL-SCNC: 26.5 MMOL/L (ref 22–29)
CREAT SERPL-MCNC: 0.87 MG/DL (ref 0.76–1.27)
DEPRECATED RDW RBC AUTO: 42.4 FL (ref 37–54)
EGFRCR SERPLBLD CKD-EPI 2021: 103.2 ML/MIN/1.73
EOSINOPHIL # BLD AUTO: 0.23 10*3/MM3 (ref 0–0.4)
EOSINOPHIL NFR BLD AUTO: 4 % (ref 0.3–6.2)
ERYTHROCYTE [DISTWIDTH] IN BLOOD BY AUTOMATED COUNT: 12.9 % (ref 12.3–15.4)
GLOBULIN UR ELPH-MCNC: 2.6 GM/DL
GLUCOSE SERPL-MCNC: 80 MG/DL (ref 65–99)
HCT VFR BLD AUTO: 44.8 % (ref 37.5–51)
HDLC SERPL QL: 3.17
HDLC SERPL-MCNC: 46 MG/DL (ref 40–60)
HGB BLD-MCNC: 14.7 G/DL (ref 13–17.7)
IMM GRANULOCYTES # BLD AUTO: 0.02 10*3/MM3 (ref 0–0.05)
IMM GRANULOCYTES NFR BLD AUTO: 0.3 % (ref 0–0.5)
LDLC SERPL CALC-MCNC: 82 MG/DL (ref 0–100)
LYMPHOCYTES # BLD AUTO: 1.11 10*3/MM3 (ref 0.7–3.1)
LYMPHOCYTES NFR BLD AUTO: 19.1 % (ref 19.6–45.3)
MCH RBC QN AUTO: 29.6 PG (ref 26.6–33)
MCHC RBC AUTO-ENTMCNC: 32.8 G/DL (ref 31.5–35.7)
MCV RBC AUTO: 90.1 FL (ref 79–97)
MONOCYTES # BLD AUTO: 0.55 10*3/MM3 (ref 0.1–0.9)
MONOCYTES NFR BLD AUTO: 9.5 % (ref 5–12)
NEUTROPHILS NFR BLD AUTO: 3.84 10*3/MM3 (ref 1.7–7)
NEUTROPHILS NFR BLD AUTO: 66.2 % (ref 42.7–76)
NRBC BLD AUTO-RTO: 0 /100 WBC (ref 0–0.2)
PLATELET # BLD AUTO: 211 10*3/MM3 (ref 140–450)
PMV BLD AUTO: 10.7 FL (ref 6–12)
POTASSIUM SERPL-SCNC: 4.3 MMOL/L (ref 3.5–5.2)
PROT SERPL-MCNC: 6.9 G/DL (ref 6–8.5)
PSA SERPL-MCNC: 1.99 NG/ML (ref 0–4)
RBC # BLD AUTO: 4.97 10*6/MM3 (ref 4.14–5.8)
SODIUM SERPL-SCNC: 138 MMOL/L (ref 136–145)
TRIGL SERPL-MCNC: 96 MG/DL (ref 0–150)
VLDLC SERPL-MCNC: 18 MG/DL (ref 5–40)
WBC NRBC COR # BLD AUTO: 5.8 10*3/MM3 (ref 3.4–10.8)

## (undated) DEVICE — TUBING, SUCTION, 1/4" X 10', STRAIGHT: Brand: MEDLINE

## (undated) DEVICE — CANN O2 ETCO2 FITS ALL CONN CO2 SMPL A/ 7IN DISP LF

## (undated) DEVICE — LN SMPL CO2 SHTRM SD STREAM W/M LUER

## (undated) DEVICE — ADAPT CLN BIOGUARD AIR/H2O DISP

## (undated) DEVICE — KT ORCA ORCAPOD DISP STRL

## (undated) DEVICE — SENSR O2 OXIMAX FNGR A/ 18IN NONSTR